# Patient Record
Sex: MALE | Race: WHITE | NOT HISPANIC OR LATINO | URBAN - METROPOLITAN AREA
[De-identification: names, ages, dates, MRNs, and addresses within clinical notes are randomized per-mention and may not be internally consistent; named-entity substitution may affect disease eponyms.]

---

## 2017-05-05 ENCOUNTER — INPATIENT (INPATIENT)
Facility: HOSPITAL | Age: 57
LOS: 1 days | Discharge: HOME | DRG: 394 | End: 2017-05-07
Attending: HOSPITALIST | Admitting: INTERNAL MEDICINE
Payer: COMMERCIAL

## 2017-05-05 VITALS
RESPIRATION RATE: 20 BRPM | HEIGHT: 71 IN | HEART RATE: 76 BPM | OXYGEN SATURATION: 100 % | WEIGHT: 149.91 LBS | SYSTOLIC BLOOD PRESSURE: 135 MMHG | TEMPERATURE: 98 F | DIASTOLIC BLOOD PRESSURE: 68 MMHG

## 2017-05-05 DIAGNOSIS — R19.00 INTRA-ABDOMINAL AND PELVIC SWELLING, MASS AND LUMP, UNSPECIFIED SITE: ICD-10-CM

## 2017-05-05 DIAGNOSIS — R93.3 ABNORMAL FINDINGS ON DIAGNOSTIC IMAGING OF OTHER PARTS OF DIGESTIVE TRACT: ICD-10-CM

## 2017-05-05 DIAGNOSIS — N50.89 OTHER SPECIFIED DISORDERS OF THE MALE GENITAL ORGANS: ICD-10-CM

## 2017-05-05 LAB
ALBUMIN SERPL ELPH-MCNC: 3.8 G/DL — SIGNIFICANT CHANGE UP (ref 3.3–5.2)
ALP SERPL-CCNC: 113 U/L — SIGNIFICANT CHANGE UP (ref 40–120)
ALT FLD-CCNC: 14 U/L — SIGNIFICANT CHANGE UP
ANION GAP SERPL CALC-SCNC: 10 MMOL/L — SIGNIFICANT CHANGE UP (ref 5–17)
APPEARANCE UR: CLEAR — SIGNIFICANT CHANGE UP
APTT BLD: 33.6 SEC — SIGNIFICANT CHANGE UP (ref 27.5–37.4)
AST SERPL-CCNC: 17 U/L — SIGNIFICANT CHANGE UP
BACTERIA # UR AUTO: ABNORMAL
BASOPHILS # BLD AUTO: 0 K/UL — SIGNIFICANT CHANGE UP (ref 0–0.2)
BASOPHILS NFR BLD AUTO: 0 % — SIGNIFICANT CHANGE UP (ref 0–2)
BILIRUB SERPL-MCNC: 0.3 MG/DL — LOW (ref 0.4–2)
BILIRUB UR-MCNC: NEGATIVE — SIGNIFICANT CHANGE UP
BLD GP AB SCN SERPL QL: SIGNIFICANT CHANGE UP
BUN SERPL-MCNC: 13 MG/DL — SIGNIFICANT CHANGE UP (ref 8–20)
CALCIUM SERPL-MCNC: 9.1 MG/DL — SIGNIFICANT CHANGE UP (ref 8.6–10.2)
CHLORIDE SERPL-SCNC: 102 MMOL/L — SIGNIFICANT CHANGE UP (ref 98–107)
CO2 SERPL-SCNC: 27 MMOL/L — SIGNIFICANT CHANGE UP (ref 22–29)
COLOR SPEC: YELLOW — SIGNIFICANT CHANGE UP
CREAT SERPL-MCNC: 0.5 MG/DL — SIGNIFICANT CHANGE UP (ref 0.5–1.3)
DIFF PNL FLD: NEGATIVE — SIGNIFICANT CHANGE UP
EOSINOPHIL # BLD AUTO: 1.2 K/UL — HIGH (ref 0–0.5)
EOSINOPHIL NFR BLD AUTO: 8 % — HIGH (ref 0–6)
EPI CELLS # UR: SIGNIFICANT CHANGE UP
GLUCOSE SERPL-MCNC: 105 MG/DL — SIGNIFICANT CHANGE UP (ref 70–115)
GLUCOSE UR QL: NEGATIVE MG/DL — SIGNIFICANT CHANGE UP
HCT VFR BLD CALC: 41.9 % — LOW (ref 42–52)
HGB BLD-MCNC: 14.5 G/DL — SIGNIFICANT CHANGE UP (ref 14–18)
INR BLD: 1.15 RATIO — SIGNIFICANT CHANGE UP (ref 0.88–1.16)
KETONES UR-MCNC: ABNORMAL
LEUKOCYTE ESTERASE UR-ACNC: ABNORMAL
LIDOCAIN IGE QN: 963 U/L — HIGH (ref 22–51)
LYMPHOCYTES # BLD AUTO: 19 % — LOW (ref 20–55)
LYMPHOCYTES # BLD AUTO: 2.4 K/UL — SIGNIFICANT CHANGE UP (ref 1–4.8)
MCHC RBC-ENTMCNC: 34.4 PG — HIGH (ref 27–31)
MCHC RBC-ENTMCNC: 34.6 G/DL — SIGNIFICANT CHANGE UP (ref 32–36)
MCV RBC AUTO: 99.5 FL — HIGH (ref 80–94)
MONOCYTES # BLD AUTO: 0.7 K/UL — SIGNIFICANT CHANGE UP (ref 0–0.8)
MONOCYTES NFR BLD AUTO: 6 % — SIGNIFICANT CHANGE UP (ref 3–10)
NEUTROPHILS # BLD AUTO: 7 K/UL — SIGNIFICANT CHANGE UP (ref 1.8–8)
NEUTROPHILS NFR BLD AUTO: 66 % — SIGNIFICANT CHANGE UP (ref 37–73)
NITRITE UR-MCNC: NEGATIVE — SIGNIFICANT CHANGE UP
PH UR: 6.5 — SIGNIFICANT CHANGE UP (ref 5–8)
PLAT MORPH BLD: NORMAL — SIGNIFICANT CHANGE UP
PLATELET # BLD AUTO: 279 K/UL — SIGNIFICANT CHANGE UP (ref 150–400)
POTASSIUM SERPL-MCNC: 4.2 MMOL/L — SIGNIFICANT CHANGE UP (ref 3.5–5.3)
POTASSIUM SERPL-SCNC: 4.2 MMOL/L — SIGNIFICANT CHANGE UP (ref 3.5–5.3)
PROT SERPL-MCNC: 7.3 G/DL — SIGNIFICANT CHANGE UP (ref 6.6–8.7)
PROT UR-MCNC: 30 MG/DL
PROTHROM AB SERPL-ACNC: 12.7 SEC — SIGNIFICANT CHANGE UP (ref 9.8–12.7)
RBC # BLD: 4.21 M/UL — LOW (ref 4.6–6.2)
RBC # FLD: 12.7 % — SIGNIFICANT CHANGE UP (ref 11–15.6)
RBC BLD AUTO: SIGNIFICANT CHANGE UP
RBC CASTS # UR COMP ASSIST: SIGNIFICANT CHANGE UP /HPF (ref 0–4)
SODIUM SERPL-SCNC: 139 MMOL/L — SIGNIFICANT CHANGE UP (ref 135–145)
SP GR SPEC: 1.01 — SIGNIFICANT CHANGE UP (ref 1.01–1.02)
TYPE + AB SCN PNL BLD: SIGNIFICANT CHANGE UP
UROBILINOGEN FLD QL: 1 MG/DL
VARIANT LYMPHS # BLD: 1 % — SIGNIFICANT CHANGE UP (ref 0–6)
WBC # BLD: 11.2 K/UL — HIGH (ref 4.8–10.8)
WBC # FLD AUTO: 11.2 K/UL — HIGH (ref 4.8–10.8)
WBC UR QL: SIGNIFICANT CHANGE UP

## 2017-05-05 PROCEDURE — 99223 1ST HOSP IP/OBS HIGH 75: CPT

## 2017-05-05 PROCEDURE — 74177 CT ABD & PELVIS W/CONTRAST: CPT | Mod: 26

## 2017-05-05 PROCEDURE — 74182 MRI ABDOMEN W/CONTRAST: CPT | Mod: 26

## 2017-05-05 PROCEDURE — 76870 US EXAM SCROTUM: CPT | Mod: 26

## 2017-05-05 RX ORDER — HYDROMORPHONE HYDROCHLORIDE 2 MG/ML
0.5 INJECTION INTRAMUSCULAR; INTRAVENOUS; SUBCUTANEOUS EVERY 6 HOURS
Qty: 0 | Refills: 0 | Status: DISCONTINUED | OUTPATIENT
Start: 2017-05-05 | End: 2017-05-07

## 2017-05-05 RX ORDER — AMLODIPINE BESYLATE 2.5 MG/1
10 TABLET ORAL DAILY
Qty: 0 | Refills: 0 | Status: DISCONTINUED | OUTPATIENT
Start: 2017-05-05 | End: 2017-05-07

## 2017-05-05 RX ORDER — ENOXAPARIN SODIUM 100 MG/ML
40 INJECTION SUBCUTANEOUS DAILY
Qty: 0 | Refills: 0 | Status: DISCONTINUED | OUTPATIENT
Start: 2017-05-05 | End: 2017-05-07

## 2017-05-05 RX ORDER — SODIUM CHLORIDE 9 MG/ML
1000 INJECTION INTRAMUSCULAR; INTRAVENOUS; SUBCUTANEOUS
Qty: 0 | Refills: 0 | Status: DISCONTINUED | OUTPATIENT
Start: 2017-05-05 | End: 2017-05-07

## 2017-05-05 RX ORDER — MORPHINE SULFATE 50 MG/1
4 CAPSULE, EXTENDED RELEASE ORAL EVERY 4 HOURS
Qty: 0 | Refills: 0 | Status: DISCONTINUED | OUTPATIENT
Start: 2017-05-05 | End: 2017-05-07

## 2017-05-05 RX ORDER — NICOTINE POLACRILEX 2 MG
1 GUM BUCCAL DAILY
Qty: 0 | Refills: 0 | Status: DISCONTINUED | OUTPATIENT
Start: 2017-05-05 | End: 2017-05-07

## 2017-05-05 RX ORDER — SODIUM CHLORIDE 9 MG/ML
3 INJECTION INTRAMUSCULAR; INTRAVENOUS; SUBCUTANEOUS ONCE
Qty: 0 | Refills: 0 | Status: COMPLETED | OUTPATIENT
Start: 2017-05-05 | End: 2017-05-05

## 2017-05-05 RX ADMIN — Medication 1 PATCH: at 18:22

## 2017-05-05 RX ADMIN — HYDROMORPHONE HYDROCHLORIDE 0.5 MILLIGRAM(S): 2 INJECTION INTRAMUSCULAR; INTRAVENOUS; SUBCUTANEOUS at 18:37

## 2017-05-05 RX ADMIN — HYDROMORPHONE HYDROCHLORIDE 0.5 MILLIGRAM(S): 2 INJECTION INTRAMUSCULAR; INTRAVENOUS; SUBCUTANEOUS at 18:13

## 2017-05-05 RX ADMIN — SODIUM CHLORIDE 100 MILLILITER(S): 9 INJECTION INTRAMUSCULAR; INTRAVENOUS; SUBCUTANEOUS at 18:11

## 2017-05-05 RX ADMIN — SODIUM CHLORIDE 3 MILLILITER(S): 9 INJECTION INTRAMUSCULAR; INTRAVENOUS; SUBCUTANEOUS at 10:38

## 2017-05-05 NOTE — ED STATDOCS - OBJECTIVE STATEMENT
58 y/o male presents to ED c/o abdominal pain that began 4 days ago with +bloating, which resolved, though notes development of penile/scrotal swelling yesterday. He notes penile discomfort secondary to swelling, though denies penile pain or difficulty urinating. Pt reports decreased appetite while experiencing abd pain, though now with normal appetite and PO intake. Denies pedal edema or hand swelling. PMHx = HTN and "mild stroke" 2 years ago. Non-smoker, reports former heavy EtOH use (stopped 2 years ago), no illicit drug use. No further complaints at this time.

## 2017-05-05 NOTE — ED STATDOCS - GENITOURINARY, MLM
normal... Edema of the penis, scrotum, and groin.  Normal cremasteric reflexes. Testicles palpated without difficulty bilaterally. Chaperone: SUDHA Roman.

## 2017-05-05 NOTE — H&P ADULT - HISTORY OF PRESENT ILLNESS
57 y M hx tobacco use, past ETOH abuse, HTN, sent from [facility] for evaluation of testicular/scrotal swelling which pt noted today. c/o abd bloating since 4 d ago. Denies pain, CP, SOB, N/V/D, constipation, weight loss, BRBPR, melena. Previous heavy ETOH use, quit 2 yr ago, now only drinks sporadically, last intake around December.

## 2017-05-05 NOTE — H&P ADULT - NSHPLABSRESULTS_GEN_ALL_CORE
CTAP: IMPRESSION:    Evidence of chronic pancreatitis most pronounced in the region of the   pancreatic head. A cyst in the pancreatic head as well as a cyst in the   girish hepatis region are likely pseudocysts. There is obstruction of the   pancreatic duct with diffuse dilatation of the duct throughout the   pancreas.    Infiltration of the fat planes surrounding the SMA and celiac artery   raise the possibility of concomitant neoplasm as well.    Small amount of ascites.    Diffuse soft tissue swelling in the scrotal region..      Testicular sono: IMPRESSION:     Suspect fat and possibly bowel containing right inguinal hernia.   Bilateral scrotal edema. Further evaluation with contrast-enhanced CT to   include the area of scrotum may be considered.    EKG: ordered

## 2017-05-05 NOTE — CONSULT NOTE ADULT - PROBLEM SELECTOR RECOMMENDATION 9
The above CT findings are strongly suggestive of a possible pancreatic malignancy. CA 19-9 and MRI of abdomen and MRCP ordered. Would also recommend  general surgical and urological consultations for the above CT and scrotal ultrasound findings. The above CT findings are strongly suggestive of a possible pancreatic malignancy. CA 19-9 and MRI of abdomen and MRCP ordered. Would also recommend  general surgical and urological consultations for the above CT and scrotal ultrasound findings. Amylase and lipase also ordered as well as CEA..

## 2017-05-05 NOTE — H&P ADULT - NSHPPHYSICALEXAM_GEN_ALL_CORE
Vital Signs Last 24 Hrs  T(C): 36.6, Max: 36.6 (05-05 @ 09:41)  T(F): 97.8, Max: 97.8 (05-05 @ 09:41)  HR: 76 (76 - 76)  BP: 135/68 (135/68 - 135/68)  BP(mean): --  RR: 20 (20 - 20)  SpO2: 100% (100% - 100%)    PHYSICAL EXAM-  GENERAL: alert, NAD  HEAD:  Atraumatic, Normocephalic  EYES: EOMI,  conjunctiva and sclera clear  NECK: Supple   CHEST/LUNG: CTA bilaterally   HEART: Regular rate and rhythm; No murmurs   ABDOMEN: Soft, Nontender, Nondistended; Bowel sounds present  EXTREMITIES:  2+ Peripheral Pulses, No clubbing, cyanosis, or edema  : deferred, patient in results waiting area  NERVOUS SYSTEM:  Alert & Oriented X3, Motor Strength 5/5 B/L upper and lower extremities; CN grossly intact  SKIN: No rashes or lesions  PSYCH: normal affect

## 2017-05-05 NOTE — ED ADULT NURSE NOTE - OBJECTIVE STATEMENT
pt alert and awake x3, arrived to ED with abd pain x4 days and testicle swelling, pt states the abd pain started first and then testicles started to swell yesterday, was seen at [facility] and was advised to come to ED, pt was drinking coffee during assessment, was advised to not eat or drink anything until testing is done, pt accepted, denies cardiac history, denies chest pain/sob, will continue to monitor.

## 2017-05-05 NOTE — ED STATDOCS - PROGRESS NOTE DETAILS
Patient presents c/o having abdominal distention for several days, patient states today edema resolved in abdomen, however groin area is now swollen, no abdominal pain, no vomiting, no dysuria, patient states is taking PO however decreased, no dysuria, no hesitancy, no change in BM's. patient denies any alcohol use for past 2 years, no drug use.  Exam performed by Dr. Barbosa while I was in room, agree with exam, will follow results for attending and discuss when results are in with attending

## 2017-05-05 NOTE — ED STATDOCS - ENMT, MLM
Neck supple, non-tender, no JVD, no palpable nodes or masses. TM’s within normal limits. Posterior pharynx clear and moist.

## 2017-05-05 NOTE — CONSULT NOTE ADULT - SUBJECTIVE AND OBJECTIVE BOX
Patient is a 57y old  Male who presents with a chief complaint of scrotal and penile swelling    HPI:  57 year old male with h/o ETOH induced pancreatitis in 2001 with no subsequent episodes presents to the ED today with scrotal and penile swelling which started last night with lower abdominal distention. No associated nausea or vomiting and the pt. states that he has not consumed ETOH in over two years. His only other PMH is HTN for which he takes Amlodopine 10 mg./d. and ASA 81 mg./d. He has no difficulty urinating as a result of the above scrotal and penile edema and no known h/o chronic pancreatitis or alcoholic liver disease. He has no c/o abdominal pain and denies gross hematochezia or melena.    REVIEW OF SYSTEMS:  Constitutional: No fever, weight loss or fatigue  ENMT:  No difficulty hearing, tinnitus, vertigo; No sinus or throat pain  Respiratory: No cough, wheezing, chills or hemoptysis  Cardiovascular: No chest pain, palpitations, dizziness or leg swelling  Gastrointestinal: No abdominal or epigastric pain. No nausea, vomiting or hematemesis; No diarrhea or constipation. No melena or hematochezia. + lower abdominal distention w/o associated abdominal pain.  Skin: No itching, burning, rashes or lesions   Musculoskeletal: No joint pain or swelling; No muscle, back or extremity paiin.  Urological: Scrotal and penile swelling since last night  Patient has no cardiopulmonary, peripheral vascular, musculoskeletal, dermatological, neurological, or psychological symptoms or complaints at this time    PAST MEDICAL & SURGICAL HISTORY:  Hypertension  TIA (transient ischemic attack)  ORIF left femur many years ago.        FAMILY HISTORY:  Family history of duodenal cancer (Father)  No pertinent family history in first degree relatives      SOCIAL HISTORY:  Smoking Status: [ x] Current, [ ] Former, [ ] Never  Pack Years: 42, 1 ppd. x 40 years. + ETOH in the past mostly heavy beer drinking but pt. states that he has been abstinent from ETOH for roughly two years, no IVDU    MEDICATIONS:  MEDICATIONS  (STANDING):  Amlodopine Tablet 10milliGRAM(s) Oral daily, ASA 81 mg./d.  enoxaparin Injectable 40milliGRAM(s) SubCutaneous daily  sodium chloride 0.9%. 1000milliLiter(s) IV Continuous <Continuous>    MEDICATIONS  (PRN):      Allergies    iodine (Swelling)    Intolerances        Vital Signs Last 24 Hrs  T(C): 36.6, Max: 36.6 (05-05 @ 09:41)  T(F): 97.8, Max: 97.8 (05-05 @ 09:41)  HR: 76 (76 - 76)  BP: 135/68 (135/68 - 135/68)  BP(mean): --  RR: 20 (20 - 20)  SpO2: 100% (100% - 100%)        PHYSICAL EXAM: Very limited as pt. is presently dressed in a large chair in the results waiting section of the ER.  General: Well developed; well nourished; in no acute distress  HEENT: MMM, conjunctiva pink and sclera anicteric  Lungs: Clear bilaterally.  Cor: RRR S1, S2 only  Gastrointestinal: Soft, non-tender non-distended; Normal bowel sounds; No rebound or guarding or palpable HSM  Unable to do SOFÍA or examine his penis and scrotum because pt. in a large chair in the Results Waiting Section in the ER.   Extremities: Normal range of motion, No clubbing, cyanosis or edema  Neurological: Alert and oriented x3  Skin: Warm and dry. No obvious rash      LABS:                        14.5   11.2  )-----------( 279      ( 05 May 2017 10:40 )             41.9     05-05    139  |  102  |  13.0  ----------------------------<  105  4.2   |  27.0  |  0.50    Ca    9.1      05 May 2017 10:40    TPro  7.3  /  Alb  3.8  /  TBili  0.3<L>  /  DBili  x   /  AST  17  /  ALT  14  /  AlkPhos  113  05-05          RADIOLOGY & ADDITIONAL STUDIES:     CT of the abdomen and pelvis with oral and IV contrast: Multiple pancreatic calcifications within the uncinate process of the pancreas and pancreatic head likely secondary to chronic pancreatitis. Marked dilatation of the pancreatic duct extending through the tail and body of the pancreas as well as the pancreatic neck and head. There is a probable 3.5 cm. pseudocyst within the head of the pancreas, There is also a fluid collection measuring 7,3 x 6.1 cm superior to the pancreatic head extending into the girish hepatitis also likely a pseudocyst. Fat planes surrounding the celiac artery and SMA are obliterated. This combination of findings raises possibility of pancreatic malignancy. Prostate normal. Free intraperitoneal fluid in the pelvis.  Bilateral hydroceles with soft tissue swelling in the scrotal region bilaterally, right greater than left.    Scrotal Ultrasound: Suspect fat and possibly bowel containing right inguinal hernia with bilateral scrotal edema. Contrast enhanced CT to include scrotal area may be considered.

## 2017-05-06 DIAGNOSIS — K40.90 UNILATERAL INGUINAL HERNIA, WITHOUT OBSTRUCTION OR GANGRENE, NOT SPECIFIED AS RECURRENT: ICD-10-CM

## 2017-05-06 LAB
AMYLASE P1 CFR SERPL: 1010 U/L — HIGH (ref 36–128)
ANION GAP SERPL CALC-SCNC: 19 MMOL/L — HIGH (ref 5–17)
BASOPHILS # BLD AUTO: 0 K/UL — SIGNIFICANT CHANGE UP (ref 0–0.2)
BASOPHILS NFR BLD AUTO: 0.3 % — SIGNIFICANT CHANGE UP (ref 0–2)
BUN SERPL-MCNC: 13 MG/DL — SIGNIFICANT CHANGE UP (ref 8–20)
CALCIUM SERPL-MCNC: 8.9 MG/DL — SIGNIFICANT CHANGE UP (ref 8.6–10.2)
CHLORIDE SERPL-SCNC: 102 MMOL/L — SIGNIFICANT CHANGE UP (ref 98–107)
CO2 SERPL-SCNC: 21 MMOL/L — LOW (ref 22–29)
CREAT SERPL-MCNC: 0.57 MG/DL — SIGNIFICANT CHANGE UP (ref 0.5–1.3)
EOSINOPHIL # BLD AUTO: 1.2 K/UL — HIGH (ref 0–0.5)
EOSINOPHIL NFR BLD AUTO: 10.9 % — HIGH (ref 0–5)
GLUCOSE SERPL-MCNC: 70 MG/DL — SIGNIFICANT CHANGE UP (ref 70–115)
HCT VFR BLD CALC: 42.9 % — SIGNIFICANT CHANGE UP (ref 42–52)
HGB BLD-MCNC: 14.6 G/DL — SIGNIFICANT CHANGE UP (ref 14–18)
LIDOCAIN IGE QN: 1397 U/L — HIGH (ref 22–51)
LYMPHOCYTES # BLD AUTO: 2.7 K/UL — SIGNIFICANT CHANGE UP (ref 1–4.8)
LYMPHOCYTES # BLD AUTO: 23.5 % — SIGNIFICANT CHANGE UP (ref 20–55)
MCHC RBC-ENTMCNC: 34 G/DL — SIGNIFICANT CHANGE UP (ref 32–36)
MCHC RBC-ENTMCNC: 34.1 PG — HIGH (ref 27–31)
MCV RBC AUTO: 100.2 FL — HIGH (ref 80–94)
MONOCYTES # BLD AUTO: 0.6 K/UL — SIGNIFICANT CHANGE UP (ref 0–0.8)
MONOCYTES NFR BLD AUTO: 5.7 % — SIGNIFICANT CHANGE UP (ref 3–10)
NEUTROPHILS # BLD AUTO: 6.8 K/UL — SIGNIFICANT CHANGE UP (ref 1.8–8)
NEUTROPHILS NFR BLD AUTO: 59.3 % — SIGNIFICANT CHANGE UP (ref 37–73)
PLATELET # BLD AUTO: 302 K/UL — SIGNIFICANT CHANGE UP (ref 150–400)
POTASSIUM SERPL-MCNC: 4 MMOL/L — SIGNIFICANT CHANGE UP (ref 3.5–5.3)
POTASSIUM SERPL-SCNC: 4 MMOL/L — SIGNIFICANT CHANGE UP (ref 3.5–5.3)
RBC # BLD: 4.28 M/UL — LOW (ref 4.6–6.2)
RBC # FLD: 12.9 % — SIGNIFICANT CHANGE UP (ref 11–15.6)
SODIUM SERPL-SCNC: 142 MMOL/L — SIGNIFICANT CHANGE UP (ref 135–145)
WBC # BLD: 11.5 K/UL — HIGH (ref 4.8–10.8)
WBC # FLD AUTO: 11.5 K/UL — HIGH (ref 4.8–10.8)

## 2017-05-06 PROCEDURE — 99233 SBSQ HOSP IP/OBS HIGH 50: CPT

## 2017-05-06 RX ADMIN — ENOXAPARIN SODIUM 40 MILLIGRAM(S): 100 INJECTION SUBCUTANEOUS at 12:16

## 2017-05-06 RX ADMIN — MORPHINE SULFATE 4 MILLIGRAM(S): 50 CAPSULE, EXTENDED RELEASE ORAL at 19:18

## 2017-05-06 RX ADMIN — Medication 1 PATCH: at 12:00

## 2017-05-06 RX ADMIN — SODIUM CHLORIDE 100 MILLILITER(S): 9 INJECTION INTRAMUSCULAR; INTRAVENOUS; SUBCUTANEOUS at 05:36

## 2017-05-06 RX ADMIN — AMLODIPINE BESYLATE 10 MILLIGRAM(S): 2.5 TABLET ORAL at 05:36

## 2017-05-06 RX ADMIN — SODIUM CHLORIDE 100 MILLILITER(S): 9 INJECTION INTRAMUSCULAR; INTRAVENOUS; SUBCUTANEOUS at 12:17

## 2017-05-06 RX ADMIN — Medication 1 PATCH: at 12:15

## 2017-05-06 RX ADMIN — MORPHINE SULFATE 4 MILLIGRAM(S): 50 CAPSULE, EXTENDED RELEASE ORAL at 18:48

## 2017-05-06 NOTE — PROGRESS NOTE ADULT - SUBJECTIVE AND OBJECTIVE BOX
Pt seen and examined MRI results from yesterday noted. + multiple cystic lesions in pancreas with dilated PD and possible SMA involvement concerning for pancreatic neoplasm. No discrete pancreatic masses seen. Will likely need OPT EUS for further evaluation of his pancreas and surrounding structures. CA 19-9 pending. Pt's penile and scrotal edema have improved and are likely secondary to a large right inguinal hernia with scrotal extension and not a primary urological problem as originally suspected yesterday.    MEDICATIONS:  MEDICATIONS  (STANDING):  amLODIPine   Tablet 10milliGRAM(s) Oral daily  enoxaparin Injectable 40milliGRAM(s) SubCutaneous daily  sodium chloride 0.9%. 1000milliLiter(s) IV Continuous <Continuous>  nicotine -  14 mG/24Hr(s) Patch 1patch Transdermal daily    MEDICATIONS  (PRN):  morphine  - Injectable 4milliGRAM(s) IV Push every 4 hours PRN Moderate Pain (4 - 6)  HYDROmorphone  Injectable 0.5milliGRAM(s) IV Push every 6 hours PRN Severe Pain (7 - 10)      Allergies    iodine (Swelling)    Intolerances        Vital Signs Last 24 Hrs  T(C): 36.7, Max: 37.2 ( @ 18:28)  T(F): 98.1, Max: 98.9 ( @ 18:28)  HR: 86 (70 - 86)  BP: 114/57 (114/57 - 140/78)  BP(mean): --  RR: 18 (18 - 18)  SpO2: 97% (97% - 100%)    I & Os for current day (as of  @ 11:28)  =============================================  IN: 1100 ml / OUT: 0 ml / NET: 1100 ml      PHYSICAL EXAM:    General: Well developed; well nourished; in no acute distress  HEENT: MMM, conjunctiva pink and sclera anicteric.  Lungs: clear to auscultation and percussion.  Cor: RRR S1, S2 only.  Gastrointestinal: Abdomen: Soft non-tender non-distended; Normal bowel sounds; No hepatosplenomegaly  no surgical scars.  SOFÍA: Good sphincter tone, no carrie-anal pathology, no masses or tenderness, brown, hemoccult negative stool.  Genitalia: Large right inguinal hernia with scrital extension and tenderness on palpation.  Extremities: no cyanosis, clubbing or edema.  Skin: Warm and dry. No obvious rash  Neuro: Pt. a + o x 3, no tremulousness or asterixsis    LABS:      CBC Full  -  ( 06 May 2017 09:22 )  WBC Count : 11.5 K/uL  Hemoglobin : 14.6 g/dL  Hematocrit : 42.9 %  Platelet Count - Automated : 302 K/uL  Mean Cell Volume : 100.2 fl  Mean Cell Hemoglobin : 34.1 pg  Mean Cell Hemoglobin Concentration : 34.0 g/dL  Auto Neutrophil # : 6.8 K/uL  Auto Lymphocyte # : 2.7 K/uL  Auto Monocyte # : 0.6 K/uL  Auto Eosinophil # : 1.2 K/uL  Auto Basophil # : 0.0 K/uL  Auto Neutrophil % : 59.3 %  Auto Lymphocyte % : 23.5 %  Auto Monocyte % : 5.7 %  Auto Eosinophil % : 10.9 %  Auto Basophil % : 0.3 %        142  |  102  |  13.0  ----------------------------<  70  4.0   |  21.0<L>  |  0.57    Ca    8.9      06 May 2017 09:22    TPro  7.3  /  Alb  3.8  /  TBili  0.3<L>  /  DBili  x   /  AST  17  /  ALT  14  /  AlkPhos  113  05-    PT/INR - ( 05 May 2017 10:40 )   PT: 12.7 sec;   INR: 1.15 ratio         PTT - ( 05 May 2017 10:40 )  PTT:33.6 sec      Urinalysis Basic - ( 05 May 2017 13:37 )    Color: Yellow / Appearance: Clear / S.010 / pH: x  Gluc: x / Ketone: Moderate  / Bili: Negative / Urobili: 1 mg/dL   Blood: x / Protein: 30 mg/dL / Nitrite: Negative   Leuk Esterase: Trace / RBC: 0-2 /HPF / WBC 3-5   Sq Epi: x / Non Sq Epi: Occasional / Bacteria: Occasional                RADIOLOGY & ADDITIONAL STUDIES (The following images were personally reviewed):

## 2017-05-06 NOTE — PROGRESS NOTE ADULT - SUBJECTIVE AND OBJECTIVE BOX
DON GOOD    [id number]    57y      Male    INTERVAL HPI/OVERNIGHT EVENTS:    patient being seen for pancreatic mass, scrotal swelling and medical management. Patient seen at bedside and states swelling improved.     last 24 hours patient is afebrile.     REVIEW OF SYSTEMS:    CONSTITUTIONAL: No fever, weight loss, or fatigue  RESPIRATORY: No cough, wheezing, hemoptysis; No shortness of breath  CARDIOVASCULAR: No chest pain, palpitations  GASTROINTESTINAL: No abdominal or epigastric pain. No nausea, vomiting  NEUROLOGICAL: No headaches, memory loss, loss of strength.  MISCELLANEOUS:      Vital Signs Last 24 Hrs  T(C): 36.7, Max: 37.2 ( @ 18:28)  T(F): 98.1, Max: 98.9 ( @ 18:28)  HR: 86 (70 - 86)  BP: 114/57 (114/57 - 140/78)  BP(mean): --  RR: 18 (18 - 18)  SpO2: 97% (97% - 100%)    PHYSICAL EXAM:    GENERAL: NAD,  HEENT: PERRL, +EOMI  NECK: soft, Supple, No JVD,   CHEST/LUNG: Clear to percussion bilaterally; No wheezing  HEART: S1S2+, Regular rate and rhythm; No murmurs  ABDOMEN: Soft, Nontender,  EXTREMITIES:  2+ Peripheral Pulses, No clubbing, cyanosis, or edema  SKIN: No rashes or lesions  NEURO: AAOX3, no focal deficits, no motor r sensory loss  PSYCH: normal mood      LABS:                        14.6   11.5  )-----------( 302      ( 06 May 2017 09:22 )             42.9         142  |  102  |  13.0  ----------------------------<  70  4.0   |  21.0<L>  |  0.57    Ca    8.9      06 May 2017 09:22    TPro  7.3  /  Alb  3.8  /  TBili  0.3<L>  /  DBili  x   /  AST  17  /  ALT  14  /  AlkPhos  113      PT/INR - ( 05 May 2017 10:40 )   PT: 12.7 sec;   INR: 1.15 ratio         PTT - ( 05 May 2017 10:40 )  PTT:33.6 sec  Urinalysis Basic - ( 05 May 2017 13:37 )    Color: Yellow / Appearance: Clear / S.010 / pH: x  Gluc: x / Ketone: Moderate  / Bili: Negative / Urobili: 1 mg/dL   Blood: x / Protein: 30 mg/dL / Nitrite: Negative   Leuk Esterase: Trace / RBC: 0-2 /HPF / WBC 3-5   Sq Epi: x / Non Sq Epi: Occasional / Bacteria: Occasional          MEDICATIONS  (STANDING):  amLODIPine   Tablet 10milliGRAM(s) Oral daily  enoxaparin Injectable 40milliGRAM(s) SubCutaneous daily  sodium chloride 0.9%. 1000milliLiter(s) IV Continuous <Continuous>  nicotine -  14 mG/24Hr(s) Patch 1patch Transdermal daily    MEDICATIONS  (PRN):  morphine  - Injectable 4milliGRAM(s) IV Push every 4 hours PRN Moderate Pain (4 - 6)  HYDROmorphone  Injectable 0.5milliGRAM(s) IV Push every 6 hours PRN Severe Pain (7 - 10)      RADIOLOGY & ADDITIONAL TESTS: DON GOOD    [id number]    57y      Male    INTERVAL HPI/OVERNIGHT EVENTS:    patient being seen for pancreatic mass, scrotal swelling and medical management. Patient seen at bedside and states swelling improved.     last 24 hours patient is afebrile.     REVIEW OF SYSTEMS:    CONSTITUTIONAL: No fever, weight loss, or fatigue  RESPIRATORY: No cough, wheezing, hemoptysis; No shortness of breath  CARDIOVASCULAR: No chest pain, palpitations  GASTROINTESTINAL: No abdominal or epigastric pain. No nausea, vomiting  NEUROLOGICAL: No headaches, memory loss, loss of strength.  MISCELLANEOUS:      Vital Signs Last 24 Hrs  T(C): 36.7, Max: 37.2 ( @ 18:28)  T(F): 98.1, Max: 98.9 ( @ 18:28)  HR: 86 (70 - 86)  BP: 114/57 (114/57 - 140/78)  BP(mean): --  RR: 18 (18 - 18)  SpO2: 97% (97% - 100%)    PHYSICAL EXAM:    GENERAL: NAD,  HEENT: PERRL, +EOMI  NECK: soft, Supple, No JVD,   CHEST/LUNG: Clear to percussion bilaterally; No wheezing  HEART: S1S2+, Regular rate and rhythm; No murmurs  ABDOMEN: Soft, Nontender,  EXTREMITIES:  2+ Peripheral Pulses, No edema  SKIN: No rashes or lesions  NEURO: AAOX3, no focal deficits  PSYCH: normal mood      LABS:                        14.6   11.5  )-----------( 302      ( 06 May 2017 09:22 )             42.9         142  |  102  |  13.0  ----------------------------<  70  4.0   |  21.0<L>  |  0.57    Ca    8.9      06 May 2017 09:22    TPro  7.3  /  Alb  3.8  /  TBili  0.3<L>  /  DBili  x   /  AST  17  /  ALT  14  /  AlkPhos  113      PT/INR - ( 05 May 2017 10:40 )   PT: 12.7 sec;   INR: 1.15 ratio         PTT - ( 05 May 2017 10:40 )  PTT:33.6 sec  Urinalysis Basic - ( 05 May 2017 13:37 )    Color: Yellow / Appearance: Clear / S.010 / pH: x  Gluc: x / Ketone: Moderate  / Bili: Negative / Urobili: 1 mg/dL   Blood: x / Protein: 30 mg/dL / Nitrite: Negative   Leuk Esterase: Trace / RBC: 0-2 /HPF / WBC 3-5   Sq Epi: x / Non Sq Epi: Occasional / Bacteria: Occasional      MEDICATIONS  (STANDING):  amLODIPine   Tablet 10milliGRAM(s) Oral daily  enoxaparin Injectable 40milliGRAM(s) SubCutaneous daily  sodium chloride 0.9%. 1000milliLiter(s) IV Continuous <Continuous>  nicotine -  14 mG/24Hr(s) Patch 1patch Transdermal daily    MEDICATIONS  (PRN):  morphine  - Injectable 4milliGRAM(s) IV Push every 4 hours PRN Moderate Pain (4 - 6)  HYDROmorphone  Injectable 0.5milliGRAM(s) IV Push every 6 hours PRN Severe Pain (7 - 10)      RADIOLOGY & ADDITIONAL TESTS:    mrcp - IMPRESSION:   Multiple cystic lesions throughout the pancreas, possibly pseudocysts.   Diffuse main pancreatic duct dilatation, measuring up to 1.2 cm.   Unchanged suggestion of enhancing soft tissue surrounding the superior   mesenteric artery, possibly neoplasm. Consider further evaluation with   EUS/ERCP. DON GOOD    [id number]    57y      Male    INTERVAL HPI/OVERNIGHT EVENTS:    patient being seen for pancreatic mass, scrotal swelling and medical management. Patient seen at bedside and states swelling improved.     last 24 hours patient is afebrile.     REVIEW OF SYSTEMS:    CONSTITUTIONAL: No fever, weight loss, or fatigue  RESPIRATORY: No cough, wheezing, hemoptysis; No shortness of breath  CARDIOVASCULAR: No chest pain, palpitations  GASTROINTESTINAL: No abdominal or epigastric pain. No nausea, vomiting  NEUROLOGICAL: No headaches, memory loss, loss of strength.  MISCELLANEOUS:      Vital Signs Last 24 Hrs  T(C): 36.7, Max: 37.2 ( @ 18:28)  T(F): 98.1, Max: 98.9 ( @ 18:28)  HR: 86 (70 - 86)  BP: 114/57 (114/57 - 140/78)  BP(mean): --  RR: 18 (18 - 18)  SpO2: 97% (97% - 100%)    PHYSICAL EXAM:    GENERAL: NAD,  HEENT: PERRL, +EOMI  NECK: soft, Supple, No JVD,   CHEST/LUNG: Clear to percussion bilaterally; No wheezing  HEART: S1S2+, Regular rate and rhythm; No murmurs  ABDOMEN: Soft, Nontender,  EXTREMITIES:  inguinal hernia  SKIN: No rashes or lesions  NEURO: AAOX3, no focal deficits  PSYCH: normal mood      LABS:                        14.6   11.5  )-----------( 302      ( 06 May 2017 09:22 )             42.9     -    142  |  102  |  13.0  ----------------------------<  70  4.0   |  21.0<L>  |  0.57    Ca    8.9      06 May 2017 09:22    TPro  7.3  /  Alb  3.8  /  TBili  0.3<L>  /  DBili  x   /  AST  17  /  ALT  14  /  AlkPhos  113      PT/INR - ( 05 May 2017 10:40 )   PT: 12.7 sec;   INR: 1.15 ratio         PTT - ( 05 May 2017 10:40 )  PTT:33.6 sec  Urinalysis Basic - ( 05 May 2017 13:37 )    Color: Yellow / Appearance: Clear / S.010 / pH: x  Gluc: x / Ketone: Moderate  / Bili: Negative / Urobili: 1 mg/dL   Blood: x / Protein: 30 mg/dL / Nitrite: Negative   Leuk Esterase: Trace / RBC: 0-2 /HPF / WBC 3-5   Sq Epi: x / Non Sq Epi: Occasional / Bacteria: Occasional      MEDICATIONS  (STANDING):  amLODIPine   Tablet 10milliGRAM(s) Oral daily  enoxaparin Injectable 40milliGRAM(s) SubCutaneous daily  sodium chloride 0.9%. 1000milliLiter(s) IV Continuous <Continuous>  nicotine -  14 mG/24Hr(s) Patch 1patch Transdermal daily    MEDICATIONS  (PRN):  morphine  - Injectable 4milliGRAM(s) IV Push every 4 hours PRN Moderate Pain (4 - 6)  HYDROmorphone  Injectable 0.5milliGRAM(s) IV Push every 6 hours PRN Severe Pain (7 - 10)      RADIOLOGY & ADDITIONAL TESTS:    mrcp - IMPRESSION:   Multiple cystic lesions throughout the pancreas, possibly pseudocysts.   Diffuse main pancreatic duct dilatation, measuring up to 1.2 cm.   Unchanged suggestion of enhancing soft tissue surrounding the superior   mesenteric artery, possibly neoplasm. Consider further evaluation with   EUS/ERCP.

## 2017-05-06 NOTE — CONSULT NOTE ADULT - SUBJECTIVE AND OBJECTIVE BOX
[facility]  MD Jhoana Aranda MD  [phone]  Answering David : [phone]        DON [id number]      HPI:  57 y M hx tobacco use, past ETOH abuse, HTN, admitted with c/  testicular/scrotal swelling . Denies pain, CP, SOB, N/V/D, constipation, weight loss, BRBPR, melena. Previous heavy ETOH use, quit 2 yr ago, now only drinks sporadically, last intake around December. (05 May 2017 16:54)  CTscan of abdomen  findings are strongly suggestive of a possible pancreatic malignancy.         PAST MEDICAL & SURGICAL HISTORY:  Hypertension  TIA (transient ischemic attack)  No pertinent past medical history  No significant past surgical history      ANTIBIOTICS      Allergies    iodine (Swelling)    Intolerances        SOCIAL HISTORY:    FAMILY HISTORY:  Family history of duodenal cancer (Father)  No pertinent family history in first degree relatives      Vital Signs Last 24 Hrs  T(C): 36.7, Max: 37.2 ( @ 18:28)  T(F): 98.1, Max: 98.9 ( @ 18:28)  HR: 86 (70 - 86)  BP: 114/57 (114/57 - 140/78)  BP(mean): --  RR: 18 (18 - 18)  SpO2: 97% (97% - 100%)  Drug Dosing Weight  Height (cm): 180.3 (05 May 2017 09:41)  Weight (kg): 68 (05 May 2017 09:41)  BMI (kg/m2): 20.9 (05 May 2017 09:41)  BSA (m2): 1.87 (05 May 2017 09:41)      REVIEW OF SYSTEMS:    CONSTITUTIONAL:  As per HPI.    HEENT:  Eyes:  No diplopia or blurred vision. ENT:  No earache, sore throat or runny nose.    CARDIOVASCULAR:  No pressure, squeezing, strangling, tightness, heaviness or aching about the chest, neck, axilla or epigastrium.    RESPIRATORY:  No cough, shortness of breath, PND or orthopnea.    GASTROINTESTINAL:  No nausea, vomiting or diarrhea.    GENITOURINARY:  No dysuria, frequency or urgency.    MUSCULOSKELETAL:  As per HPI.    SKIN:  No change in skin, hair or nails.    NEUROLOGIC:  No paresthesias, fasciculations, seizures or weakness.    PSYCHIATRIC:  No disorder of thought or mood.    ENDOCRINE:  No heat or cold intolerance, polyuria or polydipsia.    HEMATOLOGICAL:  No easy bruising or bleeding.           PHYSICAL EXAMINATION:    GENERAL: The patient is a well-developed, well-nourished _____in no apparent distress. ___ is alert and oriented x3.    VITAL SIGNS:     HEENT: Head is normocephalic and atraumatic. Extraocular muscles are intact. Pupils are equal, round, and reactive to light and accommodation. Nares appeared normal. Mouth is well hydrated and without lesions. Mucous membranes are moist. Posterior pharynx clear of any exudate or lesions.    NECK: Supple. No carotid bruits.  No lymphadenopathy or thyromegaly.    LUNGS: Clear to auscultation.    HEART: Regular rate and rhythm without murmur.    ABDOMEN: Soft, nontender, and nondistended.  Positive bowel sounds.  No hepatosplenomegaly was noted.    EXTREMITIES: Without any cyanosis, clubbing, rash, lesions or edema.    NEUROLOGIC: Cranial nerves II through XII are grossly intact.    PSYCHIATRIC: Flat affect, but denies suicidal or homicidal ideations.  SKIN: No ulceration or induration present.    MICROBIOLOGY:      LABS:                        14.6   11.5  )-----------( 302      ( 06 May 2017 09:22 )             42.9     -    142  |  102  |  13.0  ----------------------------<  70  4.0   |  21.0<L>  |  0.57    Ca    8.9      06 May 2017 09:22    TPro  7.3  /  Alb  3.8  /  TBili  0.3<L>  /  DBili  x   /  AST  17  /  ALT  14  /  AlkPhos  113      PT/INR - ( 05 May 2017 10:40 )   PT: 12.7 sec;   INR: 1.15 ratio         PTT - ( 05 May 2017 10:40 )  PTT:33.6 sec  Urinalysis Basic - ( 05 May 2017 13:37 )    Color: Yellow / Appearance: Clear / S.010 / pH: x  Gluc: x / Ketone: Moderate  / Bili: Negative / Urobili: 1 mg/dL   Blood: x / Protein: 30 mg/dL / Nitrite: Negative   Leuk Esterase: Trace / RBC: 0-2 /HPF / WBC 3-5   Sq Epi: x / Non Sq Epi: Occasional / Bacteria: Occasional        RADIOLOGY & ADDITIONAL STUDIES:      ASSESSMENT:    57 yr old male with hx of excessive alcohol intake ,and pancreatitis admitted with scrotal swelling sec to hernia.   Ct scan/ MRI  suspicious for pancreatic malignancy    PLAN:  EUS as recommended by GI  Ca 19- 9 levels  Will f/u as out patient pending results of EUS    MARILIN RICHARDSON MD

## 2017-05-06 NOTE — PROGRESS NOTE ADULT - ASSESSMENT
1) Pancreatic mass - 1) Pancreatic mass - GI following  --> consulted oncology   --> may need EUS     2) Scrotal swelling --> improved  --> has inguinal hernia  --,> outpatient surgical consult    3) Pancreatic cysts --> follow up with GI    4) pain control --> continue with iv pain meds prn     5) diet --> low fat diet     6) htn --> norvasc    7) smoker --> patch     8) dvt prop --> lovenox sub q

## 2017-05-06 NOTE — PROGRESS NOTE ADULT - PROBLEM SELECTOR PLAN 1
Pt. may have a pancreatic neoplasm in the face or setting of chronic pancreatitis. He will need an eventual EUS which can be done as OPT at [facility] by the advanced gastroenterologists there. Await CA 19-9. A low fat, low sodium diet was ordered.

## 2017-05-07 VITALS
TEMPERATURE: 99 F | OXYGEN SATURATION: 97 % | DIASTOLIC BLOOD PRESSURE: 74 MMHG | HEART RATE: 76 BPM | RESPIRATION RATE: 18 BRPM | SYSTOLIC BLOOD PRESSURE: 131 MMHG

## 2017-05-07 LAB
ANION GAP SERPL CALC-SCNC: 11 MMOL/L — SIGNIFICANT CHANGE UP (ref 5–17)
BUN SERPL-MCNC: 7 MG/DL — LOW (ref 8–20)
CALCIUM SERPL-MCNC: 8.3 MG/DL — LOW (ref 8.6–10.2)
CANCER AG19-9 SERPL-ACNC: 9.3 U/ML — SIGNIFICANT CHANGE UP
CEA SERPL-MCNC: 2.2 NG/ML — SIGNIFICANT CHANGE UP (ref 0–3.8)
CHLORIDE SERPL-SCNC: 108 MMOL/L — HIGH (ref 98–107)
CO2 SERPL-SCNC: 23 MMOL/L — SIGNIFICANT CHANGE UP (ref 22–29)
CREAT SERPL-MCNC: 0.48 MG/DL — LOW (ref 0.5–1.3)
GLUCOSE SERPL-MCNC: 93 MG/DL — SIGNIFICANT CHANGE UP (ref 70–115)
HCT VFR BLD CALC: 36.9 % — LOW (ref 42–52)
HGB BLD-MCNC: 12.7 G/DL — LOW (ref 14–18)
MCHC RBC-ENTMCNC: 33.8 PG — HIGH (ref 27–31)
MCHC RBC-ENTMCNC: 34.4 G/DL — SIGNIFICANT CHANGE UP (ref 32–36)
MCV RBC AUTO: 98.1 FL — HIGH (ref 80–94)
PLATELET # BLD AUTO: 267 K/UL — SIGNIFICANT CHANGE UP (ref 150–400)
POTASSIUM SERPL-MCNC: 4.5 MMOL/L — SIGNIFICANT CHANGE UP (ref 3.5–5.3)
POTASSIUM SERPL-SCNC: 4.5 MMOL/L — SIGNIFICANT CHANGE UP (ref 3.5–5.3)
RBC # BLD: 3.76 M/UL — LOW (ref 4.6–6.2)
RBC # FLD: 12.6 % — SIGNIFICANT CHANGE UP (ref 11–15.6)
SODIUM SERPL-SCNC: 142 MMOL/L — SIGNIFICANT CHANGE UP (ref 135–145)
WBC # BLD: 10 K/UL — SIGNIFICANT CHANGE UP (ref 4.8–10.8)
WBC # FLD AUTO: 10 K/UL — SIGNIFICANT CHANGE UP (ref 4.8–10.8)

## 2017-05-07 PROCEDURE — 83690 ASSAY OF LIPASE: CPT

## 2017-05-07 PROCEDURE — 99285 EMERGENCY DEPT VISIT HI MDM: CPT | Mod: 25

## 2017-05-07 PROCEDURE — 80048 BASIC METABOLIC PNL TOTAL CA: CPT

## 2017-05-07 PROCEDURE — 82378 CARCINOEMBRYONIC ANTIGEN: CPT

## 2017-05-07 PROCEDURE — 99232 SBSQ HOSP IP/OBS MODERATE 35: CPT

## 2017-05-07 PROCEDURE — 74182 MRI ABDOMEN W/CONTRAST: CPT

## 2017-05-07 PROCEDURE — 81001 URINALYSIS AUTO W/SCOPE: CPT

## 2017-05-07 PROCEDURE — 85730 THROMBOPLASTIN TIME PARTIAL: CPT

## 2017-05-07 PROCEDURE — 86301 IMMUNOASSAY TUMOR CA 19-9: CPT

## 2017-05-07 PROCEDURE — 76870 US EXAM SCROTUM: CPT

## 2017-05-07 PROCEDURE — 74177 CT ABD & PELVIS W/CONTRAST: CPT

## 2017-05-07 PROCEDURE — 86900 BLOOD TYPING SEROLOGIC ABO: CPT

## 2017-05-07 PROCEDURE — 86850 RBC ANTIBODY SCREEN: CPT

## 2017-05-07 PROCEDURE — 85610 PROTHROMBIN TIME: CPT

## 2017-05-07 PROCEDURE — 86901 BLOOD TYPING SEROLOGIC RH(D): CPT

## 2017-05-07 PROCEDURE — 36415 COLL VENOUS BLD VENIPUNCTURE: CPT

## 2017-05-07 PROCEDURE — 82150 ASSAY OF AMYLASE: CPT

## 2017-05-07 PROCEDURE — 80053 COMPREHEN METABOLIC PANEL: CPT

## 2017-05-07 PROCEDURE — 99239 HOSP IP/OBS DSCHRG MGMT >30: CPT

## 2017-05-07 PROCEDURE — 85027 COMPLETE CBC AUTOMATED: CPT

## 2017-05-07 RX ORDER — NICOTINE POLACRILEX 2 MG
1 GUM BUCCAL
Qty: 14 | Refills: 0 | OUTPATIENT
Start: 2017-05-07 | End: 2017-05-21

## 2017-05-07 RX ADMIN — SODIUM CHLORIDE 100 MILLILITER(S): 9 INJECTION INTRAMUSCULAR; INTRAVENOUS; SUBCUTANEOUS at 06:26

## 2017-05-07 RX ADMIN — SODIUM CHLORIDE 100 MILLILITER(S): 9 INJECTION INTRAMUSCULAR; INTRAVENOUS; SUBCUTANEOUS at 03:20

## 2017-05-07 RX ADMIN — AMLODIPINE BESYLATE 10 MILLIGRAM(S): 2.5 TABLET ORAL at 06:24

## 2017-05-07 NOTE — DISCHARGE NOTE ADULT - CARE PROVIDER_API CALL
David Travis), Gastroenterology; Internal Medicine  [address]  [place] [address]  Phone: [phone]  Fax: [phone]    Stella Ross), Hematology; Internal Medicine; Medical Oncology  [address] [place] [address]  Phone: [phone]  Fax: [phone]    Khoi Ch (), Family Medicine  [address] [place] [address] [address]  [place] [address]  Phone: [phone]  Fax: [phone]

## 2017-05-07 NOTE — DISCHARGE NOTE ADULT - HOSPITAL COURSE
57 y M hx tobacco use, past ETOH abuse, HTN, sent from [facility] for evaluation of testicular/scrotal swelling which pt noted today. c/o abd bloating since 4 d ago. Denies pain, CP, SOB, N/V/D, constipation, weight loss, BRBPR, melena. Previous heavy ETOH use, quit 2 yr ago, now only drinks sporadically, last intake around December.     patient admitted to medicine and had a ct scan:    Evidence of chronic pancreatitis most pronounced in the region of the   pancreatic head. A cyst in the pancreatic head as well as a cyst in the   girish hepatis region are likely pseudocysts. There is obstruction of the   pancreatic duct with diffuse dilatation of the duct throughout the   pancreas.    Infiltration of the fat planes surrounding the SMA and celiac artery   raise the possibility of concomitant neoplasm as well.    patient also had elevated lipase and seen by GI. MRCP ordered which showed:    IMPRESSION:   Multiple cystic lesions throughout the pancreas, possibly pseudocysts.   Diffuse main pancreatic duct dilatation, measuring up to 1.2 cm.   Unchanged suggestion of enhancing soft tissue surrounding the superior   mesenteric artery, possibly neoplasm. Consider further evaluation with   EUS/ERCP.    Patient also seen by hemon and sent ca19-9 which is pending. Patient told to follow up with gi and hematology for outpatient EUS. Patient is now stable and ready for dc. Scrotal edema resolved    time spent on dc 32 minutes

## 2017-05-07 NOTE — PROGRESS NOTE ADULT - PROBLEM SELECTOR PLAN 2
Pt. was given my card to arrange for OPT EUS at [facility]. ETOH abstinence and a low fat diet recommended as an OPT.

## 2017-05-07 NOTE — DISCHARGE NOTE ADULT - MEDICATION SUMMARY - MEDICATIONS TO TAKE
I will START or STAY ON the medications listed below when I get home from the hospital:    Percocet 5/325 325 mg-5 mg oral tablet  -- 1 tab(s) by mouth every 6 hours, As Needed MDD:max 4 tabs  -- Caution federal law prohibits the transfer of this drug to any person other  than the person for whom it was prescribed.  May cause drowsiness.  Alcohol may intensify this effect.  Use care when operating dangerous machinery.  This prescription cannot be refilled.  This product contains acetaminophen.  Do not use  with any other product containing acetaminophen to prevent possible liver damage.  Using more of this medication than prescribed may cause serious breathing problems.    -- Indication: For pain    amLODIPine 10 mg oral tablet  -- 1 tab(s) by mouth once a day  -- Indication: For Htn    nicotine 14 mg/24 hr transdermal film, extended release  -- 1 patch by transdermal patch once a day  -- Indication: For Smoking

## 2017-05-07 NOTE — DISCHARGE NOTE ADULT - CARE PLAN
Principal Discharge DX:	Abdominal mass, unspecified location  Goal:	follow up with GI needs EUS as outpatient  Instructions for follow-up, activity and diet:	low fat diet  follow up with pcp and hem onc  Secondary Diagnosis:	Scrotal edema  Goal:	resolved  Instructions for follow-up, activity and diet:	follow up with pcp  Secondary Diagnosis:	Hypertension  Goal:	home meds  Secondary Diagnosis:	Inguinal hernia of right side without obstruction or gangrene  Goal:	follow up with pcp  Secondary Diagnosis:	Smoker

## 2017-05-07 NOTE — PROGRESS NOTE ADULT - SUBJECTIVE AND OBJECTIVE BOX
Pt seen and examined. He is apparently being discharged home today with OPT surgical and GI follow ups.     MEDICATIONS:  MEDICATIONS  (STANDING):  amLODIPine   Tablet 10milliGRAM(s) Oral daily  enoxaparin Injectable 40milliGRAM(s) SubCutaneous daily  nicotine -  14 mG/24Hr(s) Patch 1patch Transdermal daily    MEDICATIONS  (PRN):  morphine  - Injectable 4milliGRAM(s) IV Push every 4 hours PRN Moderate Pain (4 - 6)  HYDROmorphone  Injectable 0.5milliGRAM(s) IV Push every 6 hours PRN Severe Pain (7 - 10)      Allergies    iodine (Swelling)    Intolerances        Vital Signs Last 24 Hrs  T(C): 37, Max: 37.3 ( @ 23:34)  T(F): 98.6, Max: 99.1 ( @ 23:34)  HR: 76 (76 - 83)  BP: 131/74 (126/72 - 131/74)  BP(mean): --  RR: 18 (18 - 20)  SpO2: 97% (92% - 97%)  I & Os for 24h ending  @ 07:00  =============================================  IN: 2340 ml / OUT: 0 ml / NET: 2340 ml    I & Os for current day (as of  @ 10:32)  =============================================  IN: 100 ml / OUT: 0 ml / NET: 100 ml      PHYSICAL EXAM:    General: Well developed; well nourished; in no acute distress  HEENT: MMM, conjunctiva pink and sclera anicteric  Lungs: clear to auscultation and percussion.  Cor: RRR S1, S2 only.  Gastrointestinal: Abdomen: Soft non-tender non-distended; Normal bowel sounds; No hepatosplenomegaly  no surgical scars. + large non tender, non incarerated right inguinal hernia with scrotal extension. No scrotal or penile edema presently.  Extremities: no cyanosis, clubbing or edema.  Skin: Warm and dry. No obvious rash  Neuro: Pt. a + o x 3    LABS:      CBC Full  -  ( 07 May 2017 09:24 )  WBC Count : 10.0 K/uL  Hemoglobin : 12.7 g/dL  Hematocrit : 36.9 %  Platelet Count - Automated : 267 K/uL  Mean Cell Volume : 98.1 fl  Mean Cell Hemoglobin : 33.8 pg  Mean Cell Hemoglobin Concentration : 34.4 g/dL  Auto Neutrophil # : x  Auto Lymphocyte # : x  Auto Monocyte # : x  Auto Eosinophil # : x  Auto Basophil # : x  Auto Neutrophil % : x  Auto Lymphocyte % : x  Auto Monocyte % : x  Auto Eosinophil % : x  Auto Basophil % : x        142  |  108<H>  |  7.0<L>  ----------------------------<  93  4.5   |  23.0  |  0.48<L>    Ca    8.3<L>      07 May 2017 09:24    TPro  7.3  /  Alb  3.8  /  TBili  0.3<L>  /  DBili  x   /  AST  17  /  ALT  14  /  AlkPhos  113  05-    PT/INR - ( 05 May 2017 10:40 )   PT: 12.7 sec;   INR: 1.15 ratio         PTT - ( 05 May 2017 10:40 )  PTT:33.6 sec      Urinalysis Basic - ( 05 May 2017 13:37 )    Color: Yellow / Appearance: Clear / S.010 / pH: x  Gluc: x / Ketone: Moderate  / Bili: Negative / Urobili: 1 mg/dL   Blood: x / Protein: 30 mg/dL / Nitrite: Negative   Leuk Esterase: Trace / RBC: 0-2 /HPF / WBC 3-5   Sq Epi: x / Non Sq Epi: Occasional / Bacteria: Occasional                RADIOLOGY & ADDITIONAL STUDIES (The following images were personally reviewed):

## 2017-05-07 NOTE — DISCHARGE NOTE ADULT - PLAN OF CARE
follow up with GI needs EUS as outpatient low fat diet  follow up with pcp and hem onc resolved follow up with pcp home meds

## 2017-05-07 NOTE — DISCHARGE NOTE ADULT - PATIENT PORTAL LINK FT
“You can access the Follow[facility] Patient Portal, offered by [facility], by registering with the following website: [email or web address]”

## 2017-05-08 PROBLEM — Z00.00 ENCOUNTER FOR PREVENTIVE HEALTH EXAMINATION: Noted: 2017-05-08

## 2017-05-18 ENCOUNTER — LABORATORY RESULT (OUTPATIENT)
Age: 57
End: 2017-05-18

## 2017-05-19 ENCOUNTER — APPOINTMENT (OUTPATIENT)
Dept: FAMILY MEDICINE | Facility: CLINIC | Age: 57
End: 2017-05-19

## 2017-05-19 VITALS
BODY MASS INDEX: 21.7 KG/M2 | HEIGHT: 71 IN | DIASTOLIC BLOOD PRESSURE: 60 MMHG | WEIGHT: 155 LBS | SYSTOLIC BLOOD PRESSURE: 90 MMHG

## 2017-05-19 DIAGNOSIS — N40.0 BENIGN PROSTATIC HYPERPLASIA WITHOUT LOWER URINARY TRACT SYMPMS: ICD-10-CM

## 2017-05-19 DIAGNOSIS — R93.5 ABNORMAL FINDINGS ON DIAGNOSTIC IMAGING OF OTHER ABDOMINAL REGIONS, INCLUDING RETROPERITONEUM: ICD-10-CM

## 2017-05-19 DIAGNOSIS — H91.90 UNSPECIFIED HEARING LOSS, UNSPECIFIED EAR: ICD-10-CM

## 2017-05-19 DIAGNOSIS — R19.09 OTHER INTRA-ABDOMINAL AND PELVIC SWELLING, MASS AND LUMP: ICD-10-CM

## 2017-05-19 DIAGNOSIS — E78.5 HYPERLIPIDEMIA, UNSPECIFIED: ICD-10-CM

## 2017-05-19 DIAGNOSIS — K86.89 OTHER SPECIFIED DISEASES OF PANCREAS: ICD-10-CM

## 2017-05-19 DIAGNOSIS — N50.89 OTHER SPECIFIED DISORDERS OF THE MALE GENITAL ORGANS: ICD-10-CM

## 2017-05-20 ENCOUNTER — CLINICAL ADVICE (OUTPATIENT)
Age: 57
End: 2017-05-20

## 2017-05-20 ENCOUNTER — INPATIENT (INPATIENT)
Facility: HOSPITAL | Age: 57
LOS: 1 days | Discharge: HOME | DRG: 432 | End: 2017-05-22
Attending: HOSPITALIST | Admitting: HOSPITALIST
Payer: COMMERCIAL

## 2017-05-20 ENCOUNTER — RESULT REVIEW (OUTPATIENT)
Age: 57
End: 2017-05-20

## 2017-05-20 VITALS
OXYGEN SATURATION: 98 % | TEMPERATURE: 98 F | HEIGHT: 71 IN | WEIGHT: 160.06 LBS | DIASTOLIC BLOOD PRESSURE: 74 MMHG | SYSTOLIC BLOOD PRESSURE: 161 MMHG | RESPIRATION RATE: 18 BRPM | HEART RATE: 79 BPM

## 2017-05-20 DIAGNOSIS — Z98.890 OTHER SPECIFIED POSTPROCEDURAL STATES: Chronic | ICD-10-CM

## 2017-05-20 PROBLEM — G45.9 TRANSIENT CEREBRAL ISCHEMIC ATTACK, UNSPECIFIED: Chronic | Status: ACTIVE | Noted: 2017-05-05

## 2017-05-20 LAB
ALBUMIN SERPL ELPH-MCNC: 3.1 G/DL — LOW (ref 3.3–5.2)
ALP SERPL-CCNC: 104 U/L — SIGNIFICANT CHANGE UP (ref 40–120)
ALT FLD-CCNC: 16 U/L — SIGNIFICANT CHANGE UP
AMYLASE P1 CFR SERPL: 2170 U/L — HIGH (ref 36–128)
ANION GAP SERPL CALC-SCNC: 13 MMOL/L — SIGNIFICANT CHANGE UP (ref 5–17)
APPEARANCE UR: CLEAR — SIGNIFICANT CHANGE UP
APTT BLD: 35.7 SEC — SIGNIFICANT CHANGE UP (ref 27.5–37.4)
AST SERPL-CCNC: 21 U/L — SIGNIFICANT CHANGE UP
BASOPHILS NFR BLD AUTO: 1 % — SIGNIFICANT CHANGE UP (ref 0–2)
BILIRUB SERPL-MCNC: 0.1 MG/DL — LOW (ref 0.4–2)
BILIRUB UR-MCNC: NEGATIVE — SIGNIFICANT CHANGE UP
BLD GP AB SCN SERPL QL: SIGNIFICANT CHANGE UP
BUN SERPL-MCNC: 11 MG/DL — SIGNIFICANT CHANGE UP (ref 8–20)
CALCIUM SERPL-MCNC: 9.3 MG/DL — SIGNIFICANT CHANGE UP (ref 8.6–10.2)
CHLORIDE SERPL-SCNC: 104 MMOL/L — SIGNIFICANT CHANGE UP (ref 98–107)
CO2 SERPL-SCNC: 26 MMOL/L — SIGNIFICANT CHANGE UP (ref 22–29)
COLOR SPEC: YELLOW — SIGNIFICANT CHANGE UP
CREAT SERPL-MCNC: 0.56 MG/DL — SIGNIFICANT CHANGE UP (ref 0.5–1.3)
CRP SERPL-MCNC: 4.3 MG/DL — HIGH (ref 0–0.4)
DIFF PNL FLD: NEGATIVE — SIGNIFICANT CHANGE UP
EOSINOPHIL NFR BLD AUTO: 47 % — HIGH (ref 0–6)
GGT SERPL-CCNC: 26 U/L — SIGNIFICANT CHANGE UP (ref 8–61)
GLUCOSE SERPL-MCNC: 88 MG/DL — SIGNIFICANT CHANGE UP (ref 70–115)
GLUCOSE UR QL: NEGATIVE MG/DL — SIGNIFICANT CHANGE UP
HCT VFR BLD CALC: 39.1 % — LOW (ref 42–52)
HGB BLD-MCNC: 13.5 G/DL — LOW (ref 14–18)
INR BLD: 1.15 RATIO — SIGNIFICANT CHANGE UP (ref 0.88–1.16)
KETONES UR-MCNC: NEGATIVE — SIGNIFICANT CHANGE UP
LACTATE BLDV-MCNC: 1.1 MMOL/L — SIGNIFICANT CHANGE UP (ref 0.7–2)
LEUKOCYTE ESTERASE UR-ACNC: NEGATIVE — SIGNIFICANT CHANGE UP
LIDOCAIN IGE QN: 2262 U/L — HIGH (ref 22–51)
LYMPHOCYTES # BLD AUTO: 13 % — LOW (ref 20–55)
MAGNESIUM SERPL-MCNC: 2 MG/DL — SIGNIFICANT CHANGE UP (ref 1.6–2.6)
MCHC RBC-ENTMCNC: 34.5 G/DL — SIGNIFICANT CHANGE UP (ref 32–36)
MCHC RBC-ENTMCNC: 34.5 PG — HIGH (ref 27–31)
MCV RBC AUTO: 100 FL — HIGH (ref 80–94)
MONOCYTES NFR BLD AUTO: 1 % — LOW (ref 3–10)
NEUTROPHILS NFR BLD AUTO: 38 % — SIGNIFICANT CHANGE UP (ref 37–73)
NITRITE UR-MCNC: NEGATIVE — SIGNIFICANT CHANGE UP
PH UR: 5 — SIGNIFICANT CHANGE UP (ref 5–8)
PHOSPHATE SERPL-MCNC: 3.8 MG/DL — SIGNIFICANT CHANGE UP (ref 2.4–4.7)
PLAT MORPH BLD: NORMAL — SIGNIFICANT CHANGE UP
PLATELET # BLD AUTO: 370 K/UL — SIGNIFICANT CHANGE UP (ref 150–400)
POTASSIUM SERPL-MCNC: 4.2 MMOL/L — SIGNIFICANT CHANGE UP (ref 3.5–5.3)
POTASSIUM SERPL-SCNC: 4.2 MMOL/L — SIGNIFICANT CHANGE UP (ref 3.5–5.3)
PROT SERPL-MCNC: 6.5 G/DL — LOW (ref 6.6–8.7)
PROT UR-MCNC: NEGATIVE MG/DL — SIGNIFICANT CHANGE UP
PROTHROM AB SERPL-ACNC: 12.7 SEC — SIGNIFICANT CHANGE UP (ref 9.8–12.7)
RBC # BLD: 3.91 M/UL — LOW (ref 4.6–6.2)
RBC # FLD: 13.8 % — SIGNIFICANT CHANGE UP (ref 11–15.6)
RBC BLD AUTO: NORMAL — SIGNIFICANT CHANGE UP
SODIUM SERPL-SCNC: 143 MMOL/L — SIGNIFICANT CHANGE UP (ref 135–145)
SP GR SPEC: 1.01 — SIGNIFICANT CHANGE UP (ref 1.01–1.02)
TYPE + AB SCN PNL BLD: SIGNIFICANT CHANGE UP
UROBILINOGEN FLD QL: NEGATIVE MG/DL — SIGNIFICANT CHANGE UP
WBC # BLD: 16.9 K/UL — HIGH (ref 4.8–10.8)
WBC # FLD AUTO: 16.9 K/UL — HIGH (ref 4.8–10.8)

## 2017-05-20 PROCEDURE — 74177 CT ABD & PELVIS W/CONTRAST: CPT | Mod: 26

## 2017-05-20 RX ORDER — SODIUM CHLORIDE 9 MG/ML
3 INJECTION INTRAMUSCULAR; INTRAVENOUS; SUBCUTANEOUS ONCE
Qty: 0 | Refills: 0 | Status: COMPLETED | OUTPATIENT
Start: 2017-05-20 | End: 2017-05-20

## 2017-05-20 RX ADMIN — SODIUM CHLORIDE 3 MILLILITER(S): 9 INJECTION INTRAMUSCULAR; INTRAVENOUS; SUBCUTANEOUS at 20:34

## 2017-05-20 NOTE — ED PROVIDER NOTE - OBJECTIVE STATEMENT
The patient is 57 year old male presenting to ED with abd pain.  The patient recently diagnosed to have pancreatic mass and hernia.  The PMD called him to return to ED for high WBC count on recent blood test.  +Nausea, No vomit, No CP, No SOB, No fever

## 2017-05-20 NOTE — ED PROVIDER NOTE - CHPI ED SYMPTOMS NEG
no vomiting/no dysuria/no fever/no burning urination/no nausea/no hematuria/no blood in stool/no chills/no diarrhea

## 2017-05-20 NOTE — ED STATDOCS - PROGRESS NOTE DETAILS
56 y/o M pt presents to ED c/o abdominal pain x2 weeks. Pt reports he saw his PMD yesterday and was sent to the ED today for evaluation of "high white count". He was recently admitted to the hospital for a mass on his pancreas and "a swollen hernia in my groin". Pt reports he was never seen by the surgeons while he was here. He notes he was supposed to have surgery to repair the hernia but was discharged on 5/7/17 instead. Pt denies fever, nausea, vomiting, CP, and SOB. No further complaints at this time. NKDA. A brief and initial focused evaluation was performed in intake, pt protocol orders entered, pt placed in main for complete evaluation by another provider. Surgery was called and told to get CT and that they will see the pt.

## 2017-05-20 NOTE — ED PROVIDER NOTE - PROGRESS NOTE DETAILS
WBC, amylase, and lipase trending up.  Eosinophilia noted.  Awaiting CT Abd, will likely require admission, medical vs. surgical received sign out  ct reviewed, findings discussed with patient

## 2017-05-20 NOTE — ED STATDOCS - OBJECTIVE STATEMENT
58 y/o M pt presents to ED c/o abdominal pain x2 weeks. Pt reports he saw his PMD yesterday and was sent to the ED today for evaluation of "high white count". He was recently admitted to the hospital and d/c 5/7/17 for a mass on his pancreas and "a swollen hernia in my groin". Pt reports he was never seen by the surgeons while he was here. He notes he was supposed to have surgery to repair the hernia but was d/c instead. Pt denies fever, nausea, vomiting, CP, and SOB. No further complaints at this time. NKDA. A brief and initial focused evaluation was performed in intake, pt protocol orders entered, pt placed in main for complete evaluation by another provider.

## 2017-05-21 DIAGNOSIS — R18.8 OTHER ASCITES: ICD-10-CM

## 2017-05-21 DIAGNOSIS — K86.0 ALCOHOL-INDUCED CHRONIC PANCREATITIS: ICD-10-CM

## 2017-05-21 DIAGNOSIS — K70.31 ALCOHOLIC CIRRHOSIS OF LIVER WITH ASCITES: ICD-10-CM

## 2017-05-21 DIAGNOSIS — K40.90 UNILATERAL INGUINAL HERNIA, WITHOUT OBSTRUCTION OR GANGRENE, NOT SPECIFIED AS RECURRENT: ICD-10-CM

## 2017-05-21 DIAGNOSIS — K85.90 ACUTE PANCREATITIS WITHOUT NECROSIS OR INFECTION, UNSPECIFIED: ICD-10-CM

## 2017-05-21 DIAGNOSIS — I10 ESSENTIAL (PRIMARY) HYPERTENSION: ICD-10-CM

## 2017-05-21 PROCEDURE — 99254 IP/OBS CNSLTJ NEW/EST MOD 60: CPT

## 2017-05-21 PROCEDURE — 99285 EMERGENCY DEPT VISIT HI MDM: CPT

## 2017-05-21 RX ORDER — SODIUM CHLORIDE 9 MG/ML
3 INJECTION INTRAMUSCULAR; INTRAVENOUS; SUBCUTANEOUS EVERY 8 HOURS
Qty: 0 | Refills: 0 | Status: DISCONTINUED | OUTPATIENT
Start: 2017-05-21 | End: 2017-05-22

## 2017-05-21 RX ORDER — MORPHINE SULFATE 50 MG/1
4 CAPSULE, EXTENDED RELEASE ORAL EVERY 4 HOURS
Qty: 0 | Refills: 0 | Status: DISCONTINUED | OUTPATIENT
Start: 2017-05-21 | End: 2017-05-22

## 2017-05-21 RX ORDER — AMLODIPINE BESYLATE 2.5 MG/1
1 TABLET ORAL
Qty: 0 | Refills: 0 | COMMUNITY

## 2017-05-21 RX ORDER — ENOXAPARIN SODIUM 100 MG/ML
40 INJECTION SUBCUTANEOUS EVERY 24 HOURS
Qty: 0 | Refills: 0 | Status: DISCONTINUED | OUTPATIENT
Start: 2017-05-21 | End: 2017-05-22

## 2017-05-21 RX ORDER — AMLODIPINE BESYLATE 2.5 MG/1
5 TABLET ORAL DAILY
Qty: 0 | Refills: 0 | Status: DISCONTINUED | OUTPATIENT
Start: 2017-05-21 | End: 2017-05-22

## 2017-05-21 RX ORDER — ONDANSETRON 8 MG/1
4 TABLET, FILM COATED ORAL EVERY 6 HOURS
Qty: 0 | Refills: 0 | Status: DISCONTINUED | OUTPATIENT
Start: 2017-05-21 | End: 2017-05-22

## 2017-05-21 RX ADMIN — SODIUM CHLORIDE 3 MILLILITER(S): 9 INJECTION INTRAMUSCULAR; INTRAVENOUS; SUBCUTANEOUS at 13:17

## 2017-05-21 RX ADMIN — MORPHINE SULFATE 4 MILLIGRAM(S): 50 CAPSULE, EXTENDED RELEASE ORAL at 15:50

## 2017-05-21 RX ADMIN — SODIUM CHLORIDE 3 MILLILITER(S): 9 INJECTION INTRAMUSCULAR; INTRAVENOUS; SUBCUTANEOUS at 06:30

## 2017-05-21 RX ADMIN — ENOXAPARIN SODIUM 40 MILLIGRAM(S): 100 INJECTION SUBCUTANEOUS at 08:54

## 2017-05-21 RX ADMIN — MORPHINE SULFATE 4 MILLIGRAM(S): 50 CAPSULE, EXTENDED RELEASE ORAL at 16:43

## 2017-05-21 RX ADMIN — AMLODIPINE BESYLATE 5 MILLIGRAM(S): 2.5 TABLET ORAL at 06:29

## 2017-05-21 RX ADMIN — SODIUM CHLORIDE 3 MILLILITER(S): 9 INJECTION INTRAMUSCULAR; INTRAVENOUS; SUBCUTANEOUS at 22:00

## 2017-05-21 NOTE — CONSULT NOTE ADULT - ATTENDING COMMENTS
plan for right hernia repair on this admission after acute pancreatic / ascites issues are resolved .

## 2017-05-21 NOTE — H&P ADULT - PROBLEM SELECTOR PLAN 2
Surgery note as above, no acute need for repair of inguinal hernia, no evidence of incarceration/strangulation.

## 2017-05-21 NOTE — ED ADULT NURSE REASSESSMENT NOTE - NS ED NURSE REASSESS COMMENT FT1
assumed care of pt from RN in ED. pt resting comfortably in cart. breathing si even and unlabored. pt admitted. pt awaiting bed availability on floor. will continue to monitor and reassess

## 2017-05-21 NOTE — H&P ADULT - PROBLEM SELECTOR PLAN 1
CT with contrast does not show evidence of pancreatic necrosis/hemorrhagic pancreatitis. Likely cause of acute on chronic pancreatitis is ductal obstruction from cyst/mass. Leukocytosis likely reactive. No fever, abdomina pain or other stigmata of infectious process. No clinical evidence of SBP. Can arrange for diagnostic SBP to asses SAAG, cytology and cell count. Patient will need EUS/Biopsy to be arranged prior to DC. Will need to then f/u with GI/Onc as o/p as per previous plan. Trend amylase/lipase, ambulation, DVT-P, diet as tolerated. Smoking cessation counseling provided.

## 2017-05-21 NOTE — H&P ADULT - HISTORY OF PRESENT ILLNESS
56 y/o male with history of HTN, remote hx of etoh induced pancreatitis in 01 who was admitted earlier this month for scrotal edema and bloating and was found to have right sided inguinal hernia he sustained while climbing his roof to get into his house when he got locked out. He was also found to have a pancreatic lesion and evidence of pancreatitis. His tumor markers where negative and he was seen by GI/Onc and was to f/u as o/p at [facility] for EUS with biopsy. He states he called this past weds but has not gotten a call back. He recently saw Dr. Ross but was told until he has tissue diagnosis there is no role for any therapy. He saw his PMD yesterday and was called to come to ED because his "labs where worse. He denies any fever, chills, N/V, SOB, CP, rash. His only complaint is his cont. scrotal swelling for which surgery saw him and at this time there is no acute need for repair of inguinal hernia. CT of abd shows evidence of chronic pancreatitis, pseudocysts and ascites. He has no evidence of SBP clinically. His lipas and amylase have increased and he now has a leukocytosis.

## 2017-05-21 NOTE — CONSULT NOTE ADULT - PROBLEM SELECTOR RECOMMENDATION 9
no acute surgical intervention needed at the moment  pt can follow with [facility] as Out patient to plan for surgery  recommend medicine consultation for ascites and increased lipase  If medicine admits the Pt, surgery will follow and plan for hernia repair before discharge.

## 2017-05-21 NOTE — ED ADULT NURSE REASSESSMENT NOTE - NS ED NURSE REASSESS COMMENT FT1
pt resting comfortably in cart. breathing si even and unlabored. pt awaiting bed availability on floor. will continue to monitor and reassess

## 2017-05-21 NOTE — CONSULT NOTE ADULT - SUBJECTIVE AND OBJECTIVE BOX
56 y/o M presented to ED with complaints of Rt scrotal swelling for past 2 and half week. as per the Pt he was here in the ED on the 5/5 when he got a CT and US. During the process a cyst in his pancreas was noticed and he was admitted under medicine for further management  he states that the swelling was not present before, appeared spontaneously, decreases in size when he lie down but does not disappear completely, a/w dull pain.  he is passing gas and having bowel movement  denies N/V  PMH: chronic pancreatitis  allergies: none      Vitals on arrival: stable    OE:  pt alert, Ox3  Chest: CTAB  CVS: S1S2  abd: soft, distended, non tender on palpation  swelling around 4x3 cm on the rt inguinal region,  from the testis, decreases in size on supine position, no Bowel sound on auscultation      Labs:   WBC: 16.9  tot ricky:0.1  alk phos: 104  lipase: 2262    CT: No change from prior exam. Unchanged right inguinal hernia is seen  containing fluid which is presumably responsible for the patient's symptoms.    Moderate volume ascites. Sequela of chronic pancreatitis with multiple  pancreatic pseudocysts, fully detailed on recent MRI/CT examinations from  5/5/2017. Sizing size and configuration of the pseudocysts is unchanged.

## 2017-05-21 NOTE — H&P ADULT - ASSESSMENT
58 y/o male with acute on chronic pancreatitis, abdominopelvic ascites, pancreatic head cyst/lesion with ductal obstruction

## 2017-05-21 NOTE — ED ADULT NURSE NOTE - OBJECTIVE STATEMENT
pt A&Ox3, states that on 5/8 he was had ED and dc with dx of pancreatic mass and hernia and was sent to ED by PCP for elevated WBC, pt has generalized abd pain and right side groin pain, resp even and unlabored no distress noted, abd soft NTND, pt denies n/v/d,

## 2017-05-21 NOTE — ED ADULT NURSE REASSESSMENT NOTE - NS ED NURSE REASSESS COMMENT FT1
pt sleeping easily arousable , A&OX3 resp even and unlabored no distress noted pt denies any pain , will continue to monitor

## 2017-05-22 ENCOUNTER — RESULT REVIEW (OUTPATIENT)
Age: 57
End: 2017-05-22

## 2017-05-22 ENCOUNTER — TRANSCRIPTION ENCOUNTER (OUTPATIENT)
Age: 57
End: 2017-05-22

## 2017-05-22 VITALS
SYSTOLIC BLOOD PRESSURE: 116 MMHG | HEART RATE: 73 BPM | RESPIRATION RATE: 18 BRPM | OXYGEN SATURATION: 98 % | TEMPERATURE: 98 F | DIASTOLIC BLOOD PRESSURE: 74 MMHG

## 2017-05-22 LAB
ALBUMIN FLD-MCNC: 2 G/DL — SIGNIFICANT CHANGE UP
ALBUMIN SERPL ELPH-MCNC: 2.6 G/DL — LOW (ref 3.3–5.2)
ALP SERPL-CCNC: 94 U/L — SIGNIFICANT CHANGE UP (ref 40–120)
ALT FLD-CCNC: 12 U/L — SIGNIFICANT CHANGE UP
AMYLASE P1 CFR SERPL: 2778 U/L — HIGH (ref 36–128)
ANION GAP SERPL CALC-SCNC: 12 MMOL/L — SIGNIFICANT CHANGE UP (ref 5–17)
AST SERPL-CCNC: 15 U/L — SIGNIFICANT CHANGE UP
B PERT IGG+IGM PNL SER: ABNORMAL
BILIRUB SERPL-MCNC: 0.3 MG/DL — LOW (ref 0.4–2)
BUN SERPL-MCNC: 10 MG/DL — SIGNIFICANT CHANGE UP (ref 8–20)
CALCIUM SERPL-MCNC: 8.5 MG/DL — LOW (ref 8.6–10.2)
CHLORIDE SERPL-SCNC: 106 MMOL/L — SIGNIFICANT CHANGE UP (ref 98–107)
CO2 SERPL-SCNC: 23 MMOL/L — SIGNIFICANT CHANGE UP (ref 22–29)
COLOR FLD: YELLOW
CREAT SERPL-MCNC: 0.47 MG/DL — LOW (ref 0.5–1.3)
EOSINOPHIL # FLD: 75 % — SIGNIFICANT CHANGE UP
FLUID INTAKE SUBSTANCE CLASS: SIGNIFICANT CHANGE UP
FLUID SEGMENTED GRANULOCYTES: 4 % — SIGNIFICANT CHANGE UP
GLUCOSE FLD-MCNC: 79 MG/DL — SIGNIFICANT CHANGE UP
GLUCOSE SERPL-MCNC: 88 MG/DL — SIGNIFICANT CHANGE UP (ref 70–115)
GRAM STN FLD: SIGNIFICANT CHANGE UP
HCT VFR BLD CALC: 35.8 % — LOW (ref 42–52)
HGB BLD-MCNC: 12.3 G/DL — LOW (ref 14–18)
INR BLD: 1.17 RATIO — HIGH (ref 0.88–1.16)
LIDOCAIN IGE QN: 2426 U/L — HIGH (ref 22–51)
LYMPHOCYTES # FLD: 1 % — SIGNIFICANT CHANGE UP
MCHC RBC-ENTMCNC: 34 PG — HIGH (ref 27–31)
MCHC RBC-ENTMCNC: 34.4 G/DL — SIGNIFICANT CHANGE UP (ref 32–36)
MCV RBC AUTO: 98.9 FL — HIGH (ref 80–94)
MESOTHL CELL # FLD: 8 % — SIGNIFICANT CHANGE UP
MONOS+MACROS # FLD: 12 % — SIGNIFICANT CHANGE UP
PLATELET # BLD AUTO: 317 K/UL — SIGNIFICANT CHANGE UP (ref 150–400)
POTASSIUM SERPL-MCNC: 4 MMOL/L — SIGNIFICANT CHANGE UP (ref 3.5–5.3)
POTASSIUM SERPL-SCNC: 4 MMOL/L — SIGNIFICANT CHANGE UP (ref 3.5–5.3)
PROT FLD-MCNC: 3.8 G/DL — SIGNIFICANT CHANGE UP
PROT SERPL-MCNC: 5.6 G/DL — LOW (ref 6.6–8.7)
PROTHROM AB SERPL-ACNC: 12.9 SEC — HIGH (ref 9.8–12.7)
RBC # BLD: 3.62 M/UL — LOW (ref 4.6–6.2)
RBC # FLD: 13.7 % — SIGNIFICANT CHANGE UP (ref 11–15.6)
RCV VOL RI: 215 /UL — HIGH (ref 0–5)
SODIUM SERPL-SCNC: 141 MMOL/L — SIGNIFICANT CHANGE UP (ref 135–145)
SPECIMEN SOURCE: SIGNIFICANT CHANGE UP
TOTAL NUCLEATED CELL COUNT, BODY FLUID: 1493 /UL — HIGH (ref 0–5)
TUBE TYPE: SIGNIFICANT CHANGE UP
WBC # BLD: 14.2 K/UL — HIGH (ref 4.8–10.8)
WBC # FLD AUTO: 14.2 K/UL — HIGH (ref 4.8–10.8)

## 2017-05-22 PROCEDURE — 82042 OTHER SOURCE ALBUMIN QUAN EA: CPT

## 2017-05-22 PROCEDURE — 99285 EMERGENCY DEPT VISIT HI MDM: CPT | Mod: 25

## 2017-05-22 PROCEDURE — 88112 CYTOPATH CELL ENHANCE TECH: CPT | Mod: 26

## 2017-05-22 PROCEDURE — 81001 URINALYSIS AUTO W/SCOPE: CPT

## 2017-05-22 PROCEDURE — 83690 ASSAY OF LIPASE: CPT

## 2017-05-22 PROCEDURE — 83735 ASSAY OF MAGNESIUM: CPT

## 2017-05-22 PROCEDURE — 88305 TISSUE EXAM BY PATHOLOGIST: CPT | Mod: 26

## 2017-05-22 PROCEDURE — 49083 ABD PARACENTESIS W/IMAGING: CPT

## 2017-05-22 PROCEDURE — 85027 COMPLETE CBC AUTOMATED: CPT

## 2017-05-22 PROCEDURE — 85730 THROMBOPLASTIN TIME PARTIAL: CPT

## 2017-05-22 PROCEDURE — 86900 BLOOD TYPING SEROLOGIC ABO: CPT

## 2017-05-22 PROCEDURE — 87102 FUNGUS ISOLATION CULTURE: CPT

## 2017-05-22 PROCEDURE — 89051 BODY FLUID CELL COUNT: CPT

## 2017-05-22 PROCEDURE — 87015 SPECIMEN INFECT AGNT CONCNTJ: CPT

## 2017-05-22 PROCEDURE — 87116 MYCOBACTERIA CULTURE: CPT

## 2017-05-22 PROCEDURE — 87206 SMEAR FLUORESCENT/ACID STAI: CPT

## 2017-05-22 PROCEDURE — 86850 RBC ANTIBODY SCREEN: CPT

## 2017-05-22 PROCEDURE — 85610 PROTHROMBIN TIME: CPT

## 2017-05-22 PROCEDURE — 74177 CT ABD & PELVIS W/CONTRAST: CPT

## 2017-05-22 PROCEDURE — 86140 C-REACTIVE PROTEIN: CPT

## 2017-05-22 PROCEDURE — 82150 ASSAY OF AMYLASE: CPT

## 2017-05-22 PROCEDURE — 84100 ASSAY OF PHOSPHORUS: CPT

## 2017-05-22 PROCEDURE — 87205 SMEAR GRAM STAIN: CPT

## 2017-05-22 PROCEDURE — 36415 COLL VENOUS BLD VENIPUNCTURE: CPT

## 2017-05-22 PROCEDURE — 88305 TISSUE EXAM BY PATHOLOGIST: CPT

## 2017-05-22 PROCEDURE — 80053 COMPREHEN METABOLIC PANEL: CPT

## 2017-05-22 PROCEDURE — 84157 ASSAY OF PROTEIN OTHER: CPT

## 2017-05-22 PROCEDURE — 83605 ASSAY OF LACTIC ACID: CPT

## 2017-05-22 PROCEDURE — 82977 ASSAY OF GGT: CPT

## 2017-05-22 PROCEDURE — 87075 CULTR BACTERIA EXCEPT BLOOD: CPT

## 2017-05-22 PROCEDURE — 86901 BLOOD TYPING SEROLOGIC RH(D): CPT

## 2017-05-22 PROCEDURE — 83615 LACTATE (LD) (LDH) ENZYME: CPT

## 2017-05-22 PROCEDURE — 87070 CULTURE OTHR SPECIMN AEROBIC: CPT

## 2017-05-22 PROCEDURE — 88112 CYTOPATH CELL ENHANCE TECH: CPT

## 2017-05-22 PROCEDURE — 82945 GLUCOSE OTHER FLUID: CPT

## 2017-05-22 PROCEDURE — 96372 THER/PROPH/DIAG INJ SC/IM: CPT

## 2017-05-22 RX ADMIN — SODIUM CHLORIDE 3 MILLILITER(S): 9 INJECTION INTRAMUSCULAR; INTRAVENOUS; SUBCUTANEOUS at 13:46

## 2017-05-22 RX ADMIN — MORPHINE SULFATE 4 MILLIGRAM(S): 50 CAPSULE, EXTENDED RELEASE ORAL at 02:38

## 2017-05-22 RX ADMIN — AMLODIPINE BESYLATE 5 MILLIGRAM(S): 2.5 TABLET ORAL at 05:39

## 2017-05-22 RX ADMIN — SODIUM CHLORIDE 3 MILLILITER(S): 9 INJECTION INTRAMUSCULAR; INTRAVENOUS; SUBCUTANEOUS at 05:38

## 2017-05-22 RX ADMIN — MORPHINE SULFATE 4 MILLIGRAM(S): 50 CAPSULE, EXTENDED RELEASE ORAL at 01:33

## 2017-05-22 NOTE — DISCHARGE NOTE ADULT - MEDICATION SUMMARY - MEDICATIONS TO STOP TAKING
I will STOP taking the medications listed below when I get home from the hospital:    nicotine 14 mg/24 hr transdermal film, extended release  -- 1 patch by transdermal patch once a day

## 2017-05-22 NOTE — DISCHARGE NOTE ADULT - PROVIDER TOKENS
TOKEN:'[id number]:'[id number]:'[id number]' TOKEN:'[id number]:'[id number]:'[id number]'

## 2017-05-22 NOTE — DISCHARGE NOTE ADULT - MEDICATION SUMMARY - MEDICATIONS TO TAKE
I will START or STAY ON the medications listed below when I get home from the hospital:    Percocet 5/325 325 mg-5 mg oral tablet  -- 2 tab(s) by mouth every 6 hours, As Needed MDD:max 8 tabs  -- Caution federal law prohibits the transfer of this drug to any person other  than the person for whom it was prescribed.  May cause drowsiness.  Alcohol may intensify this effect.  Use care when operating dangerous machinery.  This prescription cannot be refilled.  This product contains acetaminophen.  Do not use  with any other product containing acetaminophen to prevent possible liver damage.  Using more of this medication than prescribed may cause serious breathing problems.    -- Indication: For Pain    amLODIPine 10 mg oral tablet  -- 0.5 tab(s) by mouth once a day  -- Indication: For htn

## 2017-05-22 NOTE — DISCHARGE NOTE ADULT - CARE PROVIDER_API CALL
Estrada Recio (), Surgical ICU  [address] [place] [address]  Phone: [phone]  Fax: [phone]    Khoi Ch (), Family Medicine  [address] [place] [address] [address]  [place] [address]  Phone: [phone]  Fax: [phone]    Stella Ross), Hematology; Internal Medicine; Medical Oncology  [address] [place] [address]  Phone: [phone]  Fax: [phone] Estrada Recio (), Surgical ICU  [address] [place] [address]  Phone: [phone]  Fax: [phone]    Khoi Ch (), Family Medicine  [address]  [place] [address]  Phone: [phone]  Fax: [phone]    Stella Ross), Hematology; Internal Medicine; Medical Oncology  [address] [place] [address]  Phone: [phone]  Fax: [phone]    Semaj Briones), Gastroenterology  [address] [place] [address]  Phone: [phone]  Fax: [phone]

## 2017-05-22 NOTE — DISCHARGE NOTE ADULT - PLAN OF CARE
s/p paracentesis follow up with oncology and GI stable no pain follow up with surgery no strangulation no longer drinking home meds follow up GI and hematology

## 2017-05-22 NOTE — PROCEDURE NOTE - NSPROCDETAILS_GEN_ALL_CORE
location identified, sterile technique used, catheter introduced, fluid drained/ultrasound utilization

## 2017-05-22 NOTE — DISCHARGE NOTE ADULT - ADDITIONAL INSTRUCTIONS
follow up with surgery, pcp and GI follow up with surgery, pcp and GI  FOLLOW UP WITH [phone] TOMORROW!!!!! Dr BRIONES is going to fit the patient in tomorrow, he musr go tomorrow and tell the  that Dr Briones said it was ok.

## 2017-05-22 NOTE — DISCHARGE NOTE ADULT - HOSPITAL COURSE
56 y/o male with history of HTN, remote hx of etoh induced pancreatitis in 01 who was admitted earlier this month for scrotal edema and bloating and was found to have right sided inguinal hernia he sustained while climbing his roof to get into his house when he got locked out. He was also found to have a pancreatic lesion and evidence of pancreatitis. His tumor markers where negative and he was seen by GI/Onc and was to f/u as o/p at [facility] for EUS with biopsy. He states he called this past weds but has not gotten a call back. He recently saw Dr. Ross but was told until he has tissue diagnosis there is no role for any therapy. He saw his PMD yesterday and was called to come to ED because his "labs where worse. He denies any fever, chills, N/V, SOB, CP, rash. His only complaint is his cont. scrotal swelling for which surgery saw him and at this time there is no acute need for repair of inguinal hernia. CT of abd shows evidence of chronic pancreatitis, pseudocysts and ascites. He has no evidence of SBP clinically. His lipas and amylase have increased and he now has a leukocytosis.       patient admitted to medicine and had ir paracentesis on 5/22 and toelrated it well. patient will see GI tomorrow in office and is stabel for dc home. patient is asymptomatic and has no abd pain and hernia is stable. follow up with GI and surgery.      time spent on dc 32 minutes

## 2017-05-22 NOTE — DISCHARGE NOTE ADULT - CARE PLAN
Principal Discharge DX:	Ascites  Goal:	s/p paracentesis  Instructions for follow-up, activity and diet:	follow up with oncology and GI  Secondary Diagnosis:	Pancreatitis  Goal:	stable  Instructions for follow-up, activity and diet:	no pain  Secondary Diagnosis:	Unilateral inguinal hernia without obstruction or gangrene, recurrence not specified  Goal:	follow up with surgery  Instructions for follow-up, activity and diet:	no strangulation  Secondary Diagnosis:	Alcohol-induced chronic pancreatitis  Goal:	no longer drinking  Secondary Diagnosis:	Essential hypertension  Goal:	home meds  Secondary Diagnosis:	Pancreatic mass  Goal:	follow up GI and hematology

## 2017-05-22 NOTE — DISCHARGE NOTE ADULT - SECONDARY DIAGNOSIS.
Pancreatitis Unilateral inguinal hernia without obstruction or gangrene, recurrence not specified Alcohol-induced chronic pancreatitis Essential hypertension Pancreatic mass

## 2017-05-22 NOTE — DISCHARGE NOTE ADULT - CARE PROVIDERS DIRECT ADDRESSES
,[email or web address],[email or web address] [email or web address],DirectAddress_Unknown,DirectAddress_Unknown

## 2017-05-23 PROBLEM — K85.90 ACUTE PANCREATITIS WITHOUT NECROSIS OR INFECTION, UNSPECIFIED: Chronic | Status: ACTIVE | Noted: 2017-05-21

## 2017-05-23 LAB
NIGHT BLUE STAIN TISS: SIGNIFICANT CHANGE UP
SPECIMEN SOURCE: SIGNIFICANT CHANGE UP

## 2017-05-24 LAB — LDH SERPL L TO P-CCNC: 180 U/L — SIGNIFICANT CHANGE UP

## 2017-05-25 LAB
ALBUMIN SERPL ELPH-MCNC: 3.2 G/DL
ALP BLD-CCNC: 98 U/L
ALT SERPL-CCNC: 17 U/L
AMYLASE/CREAT SERPL: 989 U/L
ANION GAP SERPL CALC-SCNC: 17 MMOL/L
AST SERPL-CCNC: 20 U/L
BASOPHILS # BLD AUTO: 0.6 K/UL
BASOPHILS NFR BLD AUTO: 2.6 %
BILIRUB SERPL-MCNC: 0.2 MG/DL
BUN SERPL-MCNC: 17 MG/DL
CALCIUM SERPL-MCNC: 9.4 MG/DL
CHLORIDE SERPL-SCNC: 109 MMOL/L
CO2 SERPL-SCNC: 20 MMOL/L
CREAT SERPL-MCNC: 0.78 MG/DL
EOSINOPHIL # BLD AUTO: 14.36 K/UL
EOSINOPHIL NFR BLD AUTO: 62.6 %
GLUCOSE SERPL-MCNC: 80 MG/DL
HCT VFR BLD CALC: 39.2 %
HGB BLD-MCNC: 12.9 G/DL
LPL SERPL-CCNC: 1070 U/L
LYMPHOCYTES # BLD AUTO: 0.99 K/UL
LYMPHOCYTES NFR BLD AUTO: 4.3 %
MAN DIFF?: NORMAL
MCHC RBC-ENTMCNC: 32.9 GM/DL
MCHC RBC-ENTMCNC: 34.3 PG
MCV RBC AUTO: 104.3 FL
MONOCYTES # BLD AUTO: 0.99 K/UL
MONOCYTES NFR BLD AUTO: 4.3 %
NEUTROPHILS # BLD AUTO: 5.99 K/UL
NEUTROPHILS NFR BLD AUTO: 26.1 %
NON-GYN CYTOLOGY SPEC: SIGNIFICANT CHANGE UP
PLATELET # BLD AUTO: 365 K/UL
POTASSIUM SERPL-SCNC: 5.8 MMOL/L
PROT SERPL-MCNC: 6 G/DL
RBC # BLD: 3.76 M/UL
RBC # FLD: 14.6 %
SODIUM SERPL-SCNC: 146 MMOL/L
WBC # FLD AUTO: 22.94 K/UL

## 2017-05-27 LAB
CULTURE RESULTS: SIGNIFICANT CHANGE UP
SPECIMEN SOURCE: SIGNIFICANT CHANGE UP

## 2017-06-01 ENCOUNTER — APPOINTMENT (OUTPATIENT)
Dept: TRAUMA SURGERY | Facility: CLINIC | Age: 57
End: 2017-06-01

## 2017-06-01 VITALS
HEART RATE: 99 BPM | BODY MASS INDEX: 20.44 KG/M2 | SYSTOLIC BLOOD PRESSURE: 123 MMHG | RESPIRATION RATE: 16 BRPM | DIASTOLIC BLOOD PRESSURE: 81 MMHG | OXYGEN SATURATION: 98 % | HEIGHT: 71 IN | TEMPERATURE: 98.7 F | WEIGHT: 146 LBS

## 2017-06-01 DIAGNOSIS — F10.10 ALCOHOL ABUSE, UNCOMPLICATED: ICD-10-CM

## 2017-06-01 DIAGNOSIS — Z87.19 PERSONAL HISTORY OF OTHER DISEASES OF THE DIGESTIVE SYSTEM: ICD-10-CM

## 2017-06-02 ENCOUNTER — OUTPATIENT (OUTPATIENT)
Dept: OUTPATIENT SERVICES | Facility: HOSPITAL | Age: 57
LOS: 1 days | End: 2017-06-02
Payer: COMMERCIAL

## 2017-06-02 VITALS
WEIGHT: 141.1 LBS | SYSTOLIC BLOOD PRESSURE: 124 MMHG | DIASTOLIC BLOOD PRESSURE: 66 MMHG | RESPIRATION RATE: 16 BRPM | HEIGHT: 71 IN | HEART RATE: 82 BPM | TEMPERATURE: 97 F

## 2017-06-02 DIAGNOSIS — Z01.818 ENCOUNTER FOR OTHER PREPROCEDURAL EXAMINATION: ICD-10-CM

## 2017-06-02 DIAGNOSIS — K40.90 UNILATERAL INGUINAL HERNIA, WITHOUT OBSTRUCTION OR GANGRENE, NOT SPECIFIED AS RECURRENT: ICD-10-CM

## 2017-06-02 DIAGNOSIS — I10 ESSENTIAL (PRIMARY) HYPERTENSION: ICD-10-CM

## 2017-06-02 DIAGNOSIS — S72.302A UNSPECIFIED FRACTURE OF SHAFT OF LEFT FEMUR, INITIAL ENCOUNTER FOR CLOSED FRACTURE: Chronic | ICD-10-CM

## 2017-06-02 DIAGNOSIS — Z98.890 OTHER SPECIFIED POSTPROCEDURAL STATES: Chronic | ICD-10-CM

## 2017-06-02 DIAGNOSIS — K46.9 UNSPECIFIED ABDOMINAL HERNIA WITHOUT OBSTRUCTION OR GANGRENE: ICD-10-CM

## 2017-06-02 DIAGNOSIS — Z94.5 SKIN TRANSPLANT STATUS: Chronic | ICD-10-CM

## 2017-06-02 LAB
ALBUMIN SERPL ELPH-MCNC: 3 G/DL — LOW (ref 3.3–5.2)
ALP SERPL-CCNC: 111 U/L — SIGNIFICANT CHANGE UP (ref 40–120)
ALT FLD-CCNC: 14 U/L — SIGNIFICANT CHANGE UP
ANION GAP SERPL CALC-SCNC: 12 MMOL/L — SIGNIFICANT CHANGE UP (ref 5–17)
APTT BLD: 31.6 SEC — SIGNIFICANT CHANGE UP (ref 27.5–37.4)
AST SERPL-CCNC: 18 U/L — SIGNIFICANT CHANGE UP
BILIRUB SERPL-MCNC: 0.3 MG/DL — LOW (ref 0.4–2)
BUN SERPL-MCNC: 17 MG/DL — SIGNIFICANT CHANGE UP (ref 8–20)
CALCIUM SERPL-MCNC: 9.4 MG/DL — SIGNIFICANT CHANGE UP (ref 8.6–10.2)
CHLORIDE SERPL-SCNC: 99 MMOL/L — SIGNIFICANT CHANGE UP (ref 98–107)
CO2 SERPL-SCNC: 26 MMOL/L — SIGNIFICANT CHANGE UP (ref 22–29)
CREAT SERPL-MCNC: 0.64 MG/DL — SIGNIFICANT CHANGE UP (ref 0.5–1.3)
GLUCOSE SERPL-MCNC: 115 MG/DL — SIGNIFICANT CHANGE UP (ref 70–115)
HCT VFR BLD CALC: 36.7 % — LOW (ref 42–52)
HGB BLD-MCNC: 12.5 G/DL — LOW (ref 14–18)
INR BLD: 1.2 RATIO — HIGH (ref 0.88–1.16)
MCHC RBC-ENTMCNC: 33.4 PG — HIGH (ref 27–31)
MCHC RBC-ENTMCNC: 34.1 G/DL — SIGNIFICANT CHANGE UP (ref 32–36)
MCV RBC AUTO: 98.1 FL — HIGH (ref 80–94)
PLATELET # BLD AUTO: 453 K/UL — HIGH (ref 150–400)
POTASSIUM SERPL-MCNC: 4.3 MMOL/L — SIGNIFICANT CHANGE UP (ref 3.5–5.3)
POTASSIUM SERPL-SCNC: 4.3 MMOL/L — SIGNIFICANT CHANGE UP (ref 3.5–5.3)
PROT SERPL-MCNC: 6.9 G/DL — SIGNIFICANT CHANGE UP (ref 6.6–8.7)
PROTHROM AB SERPL-ACNC: 13.3 SEC — HIGH (ref 9.8–12.7)
RBC # BLD: 3.74 M/UL — LOW (ref 4.6–6.2)
RBC # FLD: 13.7 % — SIGNIFICANT CHANGE UP (ref 11–15.6)
SODIUM SERPL-SCNC: 137 MMOL/L — SIGNIFICANT CHANGE UP (ref 135–145)
WBC # BLD: 12 K/UL — HIGH (ref 4.8–10.8)
WBC # FLD AUTO: 12 K/UL — HIGH (ref 4.8–10.8)

## 2017-06-02 PROCEDURE — 93010 ELECTROCARDIOGRAM REPORT: CPT

## 2017-06-02 PROCEDURE — 85610 PROTHROMBIN TIME: CPT

## 2017-06-02 PROCEDURE — 85730 THROMBOPLASTIN TIME PARTIAL: CPT

## 2017-06-02 PROCEDURE — 85027 COMPLETE CBC AUTOMATED: CPT

## 2017-06-02 PROCEDURE — 80053 COMPREHEN METABOLIC PANEL: CPT

## 2017-06-02 PROCEDURE — 93005 ELECTROCARDIOGRAM TRACING: CPT

## 2017-06-02 RX ORDER — CEFAZOLIN SODIUM 1 G
2000 VIAL (EA) INJECTION ONCE
Qty: 0 | Refills: 0 | Status: DISCONTINUED | OUTPATIENT
Start: 2017-06-06 | End: 2017-06-21

## 2017-06-02 NOTE — H&P PST ADULT - NSANTHOSAYNRD_GEN_A_CORE
No. DEQUAN screening performed.  STOP BANG Legend: 0-2 = LOW Risk; 3-4 = INTERMEDIATE Risk; 5-8 = HIGH Risk

## 2017-06-02 NOTE — ASU PATIENT PROFILE, ADULT - LEARNING ASSESSMENT (PATIENT) ADDITIONAL COMMENTS
Instructed pt on pre-op instructions, tips for safer surgery, pre-surgical infection prevention instructions, pain management scale and smoking cessation pamphlets given to pt and verbalized understanding of all. Ebola Screening: Negative,

## 2017-06-02 NOTE — H&P PST ADULT - HISTORY OF PRESENT ILLNESS
57 year old male presents with c/o right inguinal hernia. On may 4 th 2017 pt noticed right groin bulge and discomfort. 57 year old male presents with c/o right inguinal hernia. On may 4 th 2017 pt noticed right groin bulge and discomfort. Pt had ascites in May , removed in ER om 5/20/17. Hx pancreatitis. 57 year old male presents with c/o right inguinal hernia. On may 4 th 2017 pt noticed right groin bulge and discomfort. Pt had ascites in May , removed in ER on 5/20/17. Hx pancreatitis.

## 2017-06-02 NOTE — H&P PST ADULT - PMH
Hernia    Hypertension    Pancreatitis    TIA (transient ischemic attack) Ascites  may 2017  Hernia    Hypertension    Pancreatitis    TIA (transient ischemic attack)

## 2017-06-02 NOTE — H&P PST ADULT - NSALCOHOLUSECOMMENT_GEN_ALL_CORE_FT
last drink 2015 . stopped on own hx etoh abuse no counciling last drink 2015 . stopped on own hx etoh abuse no aa

## 2017-06-02 NOTE — ASU PATIENT PROFILE, ADULT - PSH
Fracture of shaft of left femur  1971 compound fracrture left femur  1980 left femur bone spur removed  S/P skin graft using Integra  right calf skin graft

## 2017-06-04 ENCOUNTER — FORM ENCOUNTER (OUTPATIENT)
Age: 57
End: 2017-06-04

## 2017-06-05 ENCOUNTER — APPOINTMENT (OUTPATIENT)
Dept: FAMILY MEDICINE | Facility: CLINIC | Age: 57
End: 2017-06-05

## 2017-06-05 ENCOUNTER — APPOINTMENT (OUTPATIENT)
Dept: RADIOLOGY | Facility: CLINIC | Age: 57
End: 2017-06-05

## 2017-06-05 ENCOUNTER — OUTPATIENT (OUTPATIENT)
Dept: OUTPATIENT SERVICES | Facility: HOSPITAL | Age: 57
LOS: 1 days | End: 2017-06-05
Payer: COMMERCIAL

## 2017-06-05 VITALS
DIASTOLIC BLOOD PRESSURE: 66 MMHG | BODY MASS INDEX: 20.44 KG/M2 | TEMPERATURE: 98.7 F | SYSTOLIC BLOOD PRESSURE: 120 MMHG | WEIGHT: 146 LBS | HEIGHT: 71 IN | HEART RATE: 99 BPM | RESPIRATION RATE: 16 BRPM

## 2017-06-05 DIAGNOSIS — K40.90 UNILATERAL INGUINAL HERNIA, W/OUT OBSTRUCTION OR GANGRENE, NOT SPECIFIED AS RECURRENT: ICD-10-CM

## 2017-06-05 DIAGNOSIS — R18.8 OTHER ASCITES: ICD-10-CM

## 2017-06-05 DIAGNOSIS — K86.2 CYST OF PANCREAS: ICD-10-CM

## 2017-06-05 DIAGNOSIS — Z98.890 OTHER SPECIFIED POSTPROCEDURAL STATES: Chronic | ICD-10-CM

## 2017-06-05 DIAGNOSIS — S72.302A UNSPECIFIED FRACTURE OF SHAFT OF LEFT FEMUR, INITIAL ENCOUNTER FOR CLOSED FRACTURE: Chronic | ICD-10-CM

## 2017-06-05 DIAGNOSIS — I10 ESSENTIAL (PRIMARY) HYPERTENSION: ICD-10-CM

## 2017-06-05 DIAGNOSIS — Z94.5 SKIN TRANSPLANT STATUS: Chronic | ICD-10-CM

## 2017-06-05 PROCEDURE — 71020: CPT | Mod: 26

## 2017-06-05 PROCEDURE — 71046 X-RAY EXAM CHEST 2 VIEWS: CPT

## 2017-06-06 ENCOUNTER — OUTPATIENT (OUTPATIENT)
Dept: OUTPATIENT SERVICES | Facility: HOSPITAL | Age: 57
LOS: 1 days | End: 2017-06-06
Payer: COMMERCIAL

## 2017-06-06 VITALS
RESPIRATION RATE: 16 BRPM | WEIGHT: 141.1 LBS | DIASTOLIC BLOOD PRESSURE: 88 MMHG | HEART RATE: 82 BPM | TEMPERATURE: 99 F | SYSTOLIC BLOOD PRESSURE: 145 MMHG | OXYGEN SATURATION: 100 % | HEIGHT: 71 IN

## 2017-06-06 VITALS
SYSTOLIC BLOOD PRESSURE: 117 MMHG | HEART RATE: 76 BPM | DIASTOLIC BLOOD PRESSURE: 76 MMHG | TEMPERATURE: 98 F | OXYGEN SATURATION: 95 % | RESPIRATION RATE: 16 BRPM

## 2017-06-06 DIAGNOSIS — Z98.890 OTHER SPECIFIED POSTPROCEDURAL STATES: Chronic | ICD-10-CM

## 2017-06-06 DIAGNOSIS — S72.302A UNSPECIFIED FRACTURE OF SHAFT OF LEFT FEMUR, INITIAL ENCOUNTER FOR CLOSED FRACTURE: Chronic | ICD-10-CM

## 2017-06-06 DIAGNOSIS — K40.90 UNILATERAL INGUINAL HERNIA, WITHOUT OBSTRUCTION OR GANGRENE, NOT SPECIFIED AS RECURRENT: ICD-10-CM

## 2017-06-06 DIAGNOSIS — Z94.5 SKIN TRANSPLANT STATUS: Chronic | ICD-10-CM

## 2017-06-06 PROCEDURE — 49520 REREPAIR ING HERNIA REDUCE: CPT

## 2017-06-06 PROCEDURE — 88302 TISSUE EXAM BY PATHOLOGIST: CPT | Mod: 26

## 2017-06-06 PROCEDURE — C1781: CPT

## 2017-06-06 PROCEDURE — 49505 PRP I/HERN INIT REDUC >5 YR: CPT | Mod: RT

## 2017-06-06 PROCEDURE — 88302 TISSUE EXAM BY PATHOLOGIST: CPT

## 2017-06-06 RX ORDER — ACETAMINOPHEN 500 MG
1000 TABLET ORAL ONCE
Qty: 0 | Refills: 0 | Status: DISCONTINUED | OUTPATIENT
Start: 2017-06-06 | End: 2017-06-21

## 2017-06-06 RX ORDER — ONDANSETRON 8 MG/1
4 TABLET, FILM COATED ORAL ONCE
Qty: 0 | Refills: 0 | Status: DISCONTINUED | OUTPATIENT
Start: 2017-06-06 | End: 2017-06-06

## 2017-06-06 RX ORDER — SODIUM CHLORIDE 9 MG/ML
3 INJECTION INTRAMUSCULAR; INTRAVENOUS; SUBCUTANEOUS ONCE
Qty: 0 | Refills: 0 | Status: DISCONTINUED | OUTPATIENT
Start: 2017-06-06 | End: 2017-06-06

## 2017-06-06 RX ORDER — SODIUM CHLORIDE 9 MG/ML
1000 INJECTION, SOLUTION INTRAVENOUS
Qty: 0 | Refills: 0 | Status: DISCONTINUED | OUTPATIENT
Start: 2017-06-06 | End: 2017-06-06

## 2017-06-06 RX ORDER — FENTANYL CITRATE 50 UG/ML
25 INJECTION INTRAVENOUS
Qty: 0 | Refills: 0 | Status: DISCONTINUED | OUTPATIENT
Start: 2017-06-06 | End: 2017-06-06

## 2017-06-06 NOTE — ASU DISCHARGE PLAN (ADULT/PEDIATRIC). - NOTIFY
Bleeding that does not stop/Numbness, color, or temperature change to extremity/Pain not relieved by Medications/Persistent Nausea and Vomiting/Fever greater than 101/Unable to Urinate

## 2017-06-06 NOTE — ASU DISCHARGE PLAN (ADULT/PEDIATRIC). - MEDICATION SUMMARY - MEDICATIONS TO TAKE
I will START or STAY ON the medications listed below when I get home from the hospital:    Aleve 220 mg oral tablet  -- 1 tab(s) by mouth every 8 hours  -- Indication: For as per PMD    Motrin  mg oral tablet  -- 1 tab(s) by mouth every 6 hours  -- Indication: For as per PMD     aspirin 81 mg oral tablet  -- 1 tab(s) by mouth once a day  -- Indication: For as per PMD     amLODIPine 10 mg oral tablet  -- 0.5 tab(s) by mouth once a day  -- 5 mg daily  -- Indication: For as per PMD

## 2017-06-07 ENCOUNTER — TRANSCRIPTION ENCOUNTER (OUTPATIENT)
Age: 57
End: 2017-06-07

## 2017-06-12 LAB
CULTURE RESULTS: SIGNIFICANT CHANGE UP
SPECIMEN SOURCE: SIGNIFICANT CHANGE UP

## 2017-07-05 LAB
CULTURE RESULTS: SIGNIFICANT CHANGE UP
SPECIMEN SOURCE: SIGNIFICANT CHANGE UP

## 2017-07-26 DIAGNOSIS — K64.8 OTHER HEMORRHOIDS: ICD-10-CM

## 2017-07-26 DIAGNOSIS — K57.30 DIVERTICULOSIS OF LARGE INTESTINE W/OUT PERFORATION OR ABSCESS W/OUT BLEEDING: ICD-10-CM

## 2017-07-26 DIAGNOSIS — K63.5 POLYP OF COLON: ICD-10-CM

## 2017-07-27 ENCOUNTER — TRANSCRIPTION ENCOUNTER (OUTPATIENT)
Age: 57
End: 2017-07-27

## 2017-07-29 ENCOUNTER — INPATIENT (INPATIENT)
Facility: HOSPITAL | Age: 57
LOS: 16 days | Discharge: HOME | DRG: 602 | End: 2017-08-15
Attending: INTERNAL MEDICINE | Admitting: INTERNAL MEDICINE
Payer: COMMERCIAL

## 2017-07-29 VITALS
OXYGEN SATURATION: 100 % | SYSTOLIC BLOOD PRESSURE: 161 MMHG | WEIGHT: 139.99 LBS | TEMPERATURE: 98 F | RESPIRATION RATE: 18 BRPM | HEIGHT: 71 IN | HEART RATE: 100 BPM | DIASTOLIC BLOOD PRESSURE: 93 MMHG

## 2017-07-29 DIAGNOSIS — F17.200 NICOTINE DEPENDENCE, UNSPECIFIED, UNCOMPLICATED: ICD-10-CM

## 2017-07-29 DIAGNOSIS — I10 ESSENTIAL (PRIMARY) HYPERTENSION: ICD-10-CM

## 2017-07-29 DIAGNOSIS — Z94.5 SKIN TRANSPLANT STATUS: Chronic | ICD-10-CM

## 2017-07-29 DIAGNOSIS — L03.116 CELLULITIS OF LEFT LOWER LIMB: ICD-10-CM

## 2017-07-29 DIAGNOSIS — L03.119 CELLULITIS OF UNSPECIFIED PART OF LIMB: ICD-10-CM

## 2017-07-29 DIAGNOSIS — S72.302A UNSPECIFIED FRACTURE OF SHAFT OF LEFT FEMUR, INITIAL ENCOUNTER FOR CLOSED FRACTURE: Chronic | ICD-10-CM

## 2017-07-29 LAB
ANISOCYTOSIS BLD QL: SLIGHT — SIGNIFICANT CHANGE UP
APTT BLD: 28.3 SEC — SIGNIFICANT CHANGE UP (ref 27.5–37.4)
BASOPHILS # BLD AUTO: 0.1 K/UL — SIGNIFICANT CHANGE UP (ref 0–0.2)
BASOPHILS NFR BLD AUTO: 0.3 % — SIGNIFICANT CHANGE UP (ref 0–2)
CRP SERPL-MCNC: 20 MG/DL — HIGH (ref 0–0.4)
EOSINOPHIL # BLD AUTO: 4.4 K/UL — HIGH (ref 0–0.5)
EOSINOPHIL NFR BLD AUTO: 21.8 % — HIGH (ref 0–6)
ERYTHROCYTE [SEDIMENTATION RATE] IN BLOOD: 54 MM/HR — HIGH (ref 0–20)
GIANT PLATELETS BLD QL SMEAR: PRESENT — SIGNIFICANT CHANGE UP
HCT VFR BLD CALC: 34.8 % — LOW (ref 42–52)
HGB BLD-MCNC: 11.5 G/DL — LOW (ref 14–18)
HYPOCHROMIA BLD QL: SLIGHT — SIGNIFICANT CHANGE UP
INR BLD: 1.25 RATIO — HIGH (ref 0.88–1.16)
LACTATE BLDV-MCNC: 1.5 MMOL/L — SIGNIFICANT CHANGE UP (ref 0.5–2)
LYMPHOCYTES # BLD AUTO: 1.4 K/UL — SIGNIFICANT CHANGE UP (ref 1–4.8)
LYMPHOCYTES # BLD AUTO: 7.2 % — LOW (ref 20–55)
MACROCYTES BLD QL: SLIGHT — SIGNIFICANT CHANGE UP
MCHC RBC-ENTMCNC: 32.4 PG — HIGH (ref 27–31)
MCHC RBC-ENTMCNC: 33 G/DL — SIGNIFICANT CHANGE UP (ref 32–36)
MCV RBC AUTO: 98 FL — HIGH (ref 80–94)
MICROCYTES BLD QL: SLIGHT — SIGNIFICANT CHANGE UP
MONOCYTES # BLD AUTO: 1.4 K/UL — HIGH (ref 0–0.8)
MONOCYTES NFR BLD AUTO: 6.9 % — SIGNIFICANT CHANGE UP (ref 3–10)
NEUTROPHILS # BLD AUTO: 12.7 K/UL — HIGH (ref 1.8–8)
NEUTROPHILS NFR BLD AUTO: 63.6 % — SIGNIFICANT CHANGE UP (ref 37–73)
NT-PROBNP SERPL-SCNC: 209 PG/ML — SIGNIFICANT CHANGE UP (ref 0–300)
PLAT MORPH BLD: ABNORMAL
PLATELET # BLD AUTO: 381 K/UL — SIGNIFICANT CHANGE UP (ref 150–400)
PLATELET CLUMP BLD QL SMEAR: SIGNIFICANT CHANGE UP
PROTHROM AB SERPL-ACNC: 13.8 SEC — HIGH (ref 9.8–12.7)
RBC # BLD: 3.55 M/UL — LOW (ref 4.6–6.2)
RBC # FLD: 16.4 % — HIGH (ref 11–15.6)
RBC BLD AUTO: ABNORMAL
URATE SERPL-MCNC: 4.2 MG/DL — SIGNIFICANT CHANGE UP (ref 3.4–7)
WBC # BLD: 20 K/UL — HIGH (ref 4.8–10.8)
WBC # FLD AUTO: 20 K/UL — HIGH (ref 4.8–10.8)

## 2017-07-29 PROCEDURE — 73700 CT LOWER EXTREMITY W/O DYE: CPT | Mod: 26,50

## 2017-07-29 PROCEDURE — 99223 1ST HOSP IP/OBS HIGH 75: CPT

## 2017-07-29 PROCEDURE — 99285 EMERGENCY DEPT VISIT HI MDM: CPT | Mod: 25

## 2017-07-29 PROCEDURE — 73630 X-RAY EXAM OF FOOT: CPT | Mod: 26,50

## 2017-07-29 PROCEDURE — 93970 EXTREMITY STUDY: CPT | Mod: 26

## 2017-07-29 RX ORDER — VANCOMYCIN HCL 1 G
1000 VIAL (EA) INTRAVENOUS ONCE
Qty: 0 | Refills: 0 | Status: COMPLETED | OUTPATIENT
Start: 2017-07-29 | End: 2017-07-29

## 2017-07-29 RX ORDER — ASPIRIN/CALCIUM CARB/MAGNESIUM 324 MG
1 TABLET ORAL
Qty: 0 | Refills: 0 | COMMUNITY

## 2017-07-29 RX ORDER — NICOTINE POLACRILEX 2 MG
1 GUM BUCCAL DAILY
Qty: 0 | Refills: 0 | Status: DISCONTINUED | OUTPATIENT
Start: 2017-07-29 | End: 2017-08-15

## 2017-07-29 RX ORDER — CEFEPIME 1 G/1
1000 INJECTION, POWDER, FOR SOLUTION INTRAMUSCULAR; INTRAVENOUS EVERY 12 HOURS
Qty: 0 | Refills: 0 | Status: DISCONTINUED | OUTPATIENT
Start: 2017-07-29 | End: 2017-07-29

## 2017-07-29 RX ORDER — CEFAZOLIN SODIUM 1 G
2000 VIAL (EA) INJECTION EVERY 8 HOURS
Qty: 0 | Refills: 0 | Status: DISCONTINUED | OUTPATIENT
Start: 2017-07-29 | End: 2017-08-03

## 2017-07-29 RX ORDER — AMLODIPINE BESYLATE 2.5 MG/1
5 TABLET ORAL DAILY
Qty: 0 | Refills: 0 | Status: DISCONTINUED | OUTPATIENT
Start: 2017-07-29 | End: 2017-08-15

## 2017-07-29 RX ORDER — CEFAZOLIN SODIUM 1 G
2000 VIAL (EA) INJECTION ONCE
Qty: 0 | Refills: 0 | Status: COMPLETED | OUTPATIENT
Start: 2017-07-29 | End: 2017-07-29

## 2017-07-29 RX ORDER — LACTOBACILLUS ACIDOPHILUS 100MM CELL
2 CAPSULE ORAL
Qty: 0 | Refills: 0 | Status: DISCONTINUED | OUTPATIENT
Start: 2017-07-29 | End: 2017-08-11

## 2017-07-29 RX ORDER — OXYCODONE AND ACETAMINOPHEN 5; 325 MG/1; MG/1
1 TABLET ORAL EVERY 4 HOURS
Qty: 0 | Refills: 0 | Status: DISCONTINUED | OUTPATIENT
Start: 2017-07-29 | End: 2017-07-31

## 2017-07-29 RX ORDER — ENOXAPARIN SODIUM 100 MG/ML
40 INJECTION SUBCUTANEOUS EVERY 24 HOURS
Qty: 0 | Refills: 0 | Status: DISCONTINUED | OUTPATIENT
Start: 2017-07-29 | End: 2017-08-06

## 2017-07-29 RX ORDER — OXYCODONE AND ACETAMINOPHEN 5; 325 MG/1; MG/1
1 TABLET ORAL ONCE
Qty: 0 | Refills: 0 | Status: DISCONTINUED | OUTPATIENT
Start: 2017-07-29 | End: 2017-07-29

## 2017-07-29 RX ORDER — CEFAZOLIN SODIUM 1 G
VIAL (EA) INJECTION
Qty: 0 | Refills: 0 | Status: DISCONTINUED | OUTPATIENT
Start: 2017-07-29 | End: 2017-08-03

## 2017-07-29 RX ADMIN — Medication 100 MILLIGRAM(S): at 19:27

## 2017-07-29 RX ADMIN — OXYCODONE AND ACETAMINOPHEN 1 TABLET(S): 5; 325 TABLET ORAL at 20:17

## 2017-07-29 RX ADMIN — Medication 100 MILLIGRAM(S): at 20:20

## 2017-07-29 RX ADMIN — OXYCODONE AND ACETAMINOPHEN 1 TABLET(S): 5; 325 TABLET ORAL at 15:43

## 2017-07-29 RX ADMIN — ENOXAPARIN SODIUM 40 MILLIGRAM(S): 100 INJECTION SUBCUTANEOUS at 19:28

## 2017-07-29 RX ADMIN — OXYCODONE AND ACETAMINOPHEN 1 TABLET(S): 5; 325 TABLET ORAL at 20:47

## 2017-07-29 RX ADMIN — CEFEPIME 100 MILLIGRAM(S): 1 INJECTION, POWDER, FOR SOLUTION INTRAMUSCULAR; INTRAVENOUS at 14:12

## 2017-07-29 RX ADMIN — Medication 2 TABLET(S): at 19:27

## 2017-07-29 RX ADMIN — OXYCODONE AND ACETAMINOPHEN 1 TABLET(S): 5; 325 TABLET ORAL at 10:37

## 2017-07-29 RX ADMIN — Medication 250 MILLIGRAM(S): at 10:37

## 2017-07-29 RX ADMIN — Medication 1 PATCH: at 14:12

## 2017-07-29 NOTE — CONSULT NOTE ADULT - SUBJECTIVE AND OBJECTIVE BOX
[facility] INFECTIOUS DISEASES AND INTERNAL MEDICINE OF [place]  =======================================================  Lyle Hardy MD [facility]   Kendell Caceres MD  Diplomates American Board of Internal Medicine and Infectious Diseases  =======================================================    [id number]  DON GOOD is a 57y  Male   This 57 y.o. man with HTN, here for left foot infectio  4 days prior to admission,  reported redness and pain to left foot medial dorsum of foot. he was given Clindamycin by his PMD Motrin for possible spider bite. Since then it has been worsening and increasing clinically.       =======================================================  Past Medical & Surgical Hx:  =====================  PAST MEDICAL & SURGICAL HISTORY:  Ascites: may 2017  Hernia  Pancreatitis  Hypertension  TIA (transient ischemic attack)  S/P skin graft using Integra: right calf skin graft  Fracture of shaft of left femur: 1971 compound fracrture left femur  1980 left femur bone spur removed      Problem List:  ==========  HEALTH ISSUES - PROBLEM Dx:     Social Hx:  =======  41 yrs of smoking 1 ppd, no intention of quitting, quit alcohol 2 yrs ago, when in his teens - substance abuse    FAMILY HISTORY:  Family history of duodenal cancer      Allergies    iodine (Swelling)    Intolerances        MEDICATIONS  (STANDING):  cefepime  IVPB 1000 milliGRAM(s) IV Intermittent every 12 hours  nicotine - 21 mG/24Hr(s) Patch 1 patch Transdermal daily  amLODIPine   Tablet 5 milliGRAM(s) Oral daily  enoxaparin Injectable 40 milliGRAM(s) SubCutaneous every 24 hours    MEDICATIONS  (PRN):  oxyCODONE    5 mG/acetaminophen 325 mG 1 Tablet(s) Oral every 4 hours PRN Moderate Pain (4 - 6)        =======================================================  REVIEW OF SYSTEMS:  Constitutional: No fever, No chills, No sweats.  Eye: No icterus, No double vision.  Ear/Nose/Mouth/Throat: No nasal congestion, No sore throat.  Respiratory: No shortness of breath, No cough, No sputum production, No wheezing.  Cardiovascular: No chest pain, No palpitations, No syncope.  Gastrointestinal: No nausea, No vomiting, No diarrhea, No abdominal pain.  Genitourinary: No dysuria, No hematuria, No change in urine stream.  Hematology/Lymphatics: No bleeding tendency.  Endocrine: No excessive thirst, No polyuria.  Immunologic: No malaise.  Musculoskeletal: No back pain, No neck pain, No joint pain, No muscle pain.  Integumentary: No rash, No pruritus, No skin lesion.  Neurologic: No numbness, No tingling, No headache.  Psychiatric: No depression.    =======================================================  I&O's Detail      Physical Exam:  ============  Vital Signs Last 24 Hrs  T(C): 37.7 (29 Jul 2017 16:23), Max: 37.7 (29 Jul 2017 16:23)  T(F): 99.8 (29 Jul 2017 16:23), Max: 99.8 (29 Jul 2017 16:23)  HR: 98 (29 Jul 2017 16:23) (77 - 100)  BP: 142/63 (29 Jul 2017 16:23) (133/73 - 161/93)  BP(mean): --  RR: 18 (29 Jul 2017 16:23) (16 - 18)  SpO2: 95% (29 Jul 2017 16:23) (95% - 100%)  Height (cm): 180.34 (07-29 @ 05:45)  Weight (kg): 63.5 (07-29 @ 05:45)  BMI (kg/m2): 19.5 (07-29 @ 05:45)  BSA (m2): 1.81 (07-29 @ 05:45)  General: Alert and oriented, No acute distress.  Eye: Pupils are equal, round and reactive to light, Extraocular movements are intact, Normal conjunctiva.  HENT: Normocephalic, Oral mucosa is moist, No pharyngeal erythema, No sinus tenderness.  Neck: Supple, No lymphadenopathy.  Respiratory: Lungs are clear to auscultation, Respirations are non-labored.  Cardiovascular: Normal rate, Regular rhythm, No murmur, Good pulses equal in all extremities, No edema.  Gastrointestinal: Soft, Non-tender, Non-distended, Normal bowel sounds.  Genitourinary: No costovertebral angle tenderness.  Lymphatics: No lymphadenopathy neck, axilla, groin.  Musculoskeletal: Normal range of motion, Normal strength.  Integumentary: No rash.  Neurologic: Alert, Oriented, No focal deficits, Cranial Nerves II-XII are grossly intact.  Psychiatric: Appropriate mood & affect.      =======================================================  Labs:  ====    Labs:                              11.5   20.0  )-----------( 381      ( 29 Jul 2017 08:18 )             34.8       PT/INR - ( 29 Jul 2017 08:18 )   PT: 13.8 sec;   INR: 1.25 ratio         PTT - ( 29 Jul 2017 08:18 )  PTT:28.3 sec          CAPILLARY BLOOD GLUCOSE            RECENT CULTURES: [facility] INFECTIOUS DISEASES AND INTERNAL MEDICINE [facility]  =======================================================  Lyle Hardy MD [facility]   Kendell Caceres MD  Diplomates American Board of Internal Medicine and Infectious Diseases  =======================================================    [id number]  DON GOOD is a 57y  Male   This 57 y.o. man with HTN, here for left foot infectio  4 days prior to admission,  reported redness and pain to left foot medial dorsum of foot. He called his PMD Khoi Ch and sent to [facility], seen by PA and  he was given Clindamycin by 300mg PO Q8H for possible spider bite. Since then it has been worsening and increasing clinically.   No trauma reported.       =======================================================  Past Medical & Surgical Hx:  =====================  PAST MEDICAL & SURGICAL HISTORY:  Ascites: may 2017  Hernia  Pancreatitis  Hypertension  TIA (transient ischemic attack)  S/P skin graft using Integra: right calf skin graft  Fracture of shaft of left femur: 1971 compound fracrture left femur  1980 left femur bone spur removed      Problem List:  ==========  HEALTH ISSUES - PROBLEM Dx:     Social Hx:  =======  41 yrs of smoking 1 ppd; used to smoke 2-3 PPD, no intention of quitting, quit alcohol 2 yrs ago, when in his teens - substance abuse  works as     FAMILY HISTORY:  Family history of duodenal cancer      Allergies    iodine (Swelling)    Intolerances        MEDICATIONS  (STANDING):  cefepime  IVPB 1000 milliGRAM(s) IV Intermittent every 12 hours  nicotine - 21 mG/24Hr(s) Patch 1 patch Transdermal daily  amLODIPine   Tablet 5 milliGRAM(s) Oral daily  enoxaparin Injectable 40 milliGRAM(s) SubCutaneous every 24 hours    MEDICATIONS  (PRN):  oxyCODONE    5 mG/acetaminophen 325 mG 1 Tablet(s) Oral every 4 hours PRN Moderate Pain (4 - 6)        =======================================================  REVIEW OF SYSTEMS:  Constitutional: No fever, No chills, No sweats.  Eye: No icterus, No double vision.  Ear/Nose/Mouth/Throat: No nasal congestion, No sore throat.  Respiratory: No shortness of breath, No cough, No sputum production, No wheezing.  Cardiovascular: No chest pain, No palpitations, No syncope.  Gastrointestinal: No nausea, No vomiting, No diarrhea, No abdominal pain.  Genitourinary: No dysuria, No hematuria, No change in urine stream.  Hematology/Lymphatics: No bleeding tendency.  Endocrine: No excessive thirst, No polyuria.  Immunologic: No malaise.  Musculoskeletal: No back pain, No neck pain, No joint pain, No muscle pain.  Integumentary: No rash, No pruritus, No skin lesion.  Neurologic: No numbness, No tingling, No headache.  Psychiatric: No depression.    =======================================================  I&O's Detail      Physical Exam:  ============  Vital Signs Last 24 Hrs  T(C): 37.7 (29 Jul 2017 16:23), Max: 37.7 (29 Jul 2017 16:23)  T(F): 99.8 (29 Jul 2017 16:23), Max: 99.8 (29 Jul 2017 16:23)  HR: 98 (29 Jul 2017 16:23) (77 - 100)  BP: 142/63 (29 Jul 2017 16:23) (133/73 - 161/93)  BP(mean): --  RR: 18 (29 Jul 2017 16:23) (16 - 18)  SpO2: 95% (29 Jul 2017 16:23) (95% - 100%)  Height (cm): 180.34 (07-29 @ 05:45)  Weight (kg): 63.5 (07-29 @ 05:45)  BMI (kg/m2): 19.5 (07-29 @ 05:45)  BSA (m2): 1.81 (07-29 @ 05:45)    General: Alert and oriented, No acute distress.  Eye: Pupils are equal, round and reactive to light, Extraocular movements are intact, Normal conjunctiva.  HENT: Normocephalic, Oral mucosa is moist, No pharyngeal erythema, No sinus tenderness.  Neck: Supple, No lymphadenopathy.  Respiratory: Lungs are clear to auscultation, Respirations are non-labored.  Cardiovascular: Normal rate, Regular rhythm, No murmur, Good pulses equal in all extremities, No edema.  Gastrointestinal: Soft, Non-tender, Non-distended, Normal bowel sounds.  Genitourinary: No costovertebral angle tenderness.  Lymphatics: No lymphadenopathy neck, axilla, groin.  Musculoskeletal: Normal range of motion, Normal strength.  Integumentary: ERYTHEMA & EDEMA of dorsum of the LEFT FOOT and the left 3rd and 4th toes.  ERYTHEMA & EDEMA of RIGHT 4th toe  Neurologic: Alert, Oriented, No focal deficits, Cranial Nerves II-XII are grossly intact.  Psychiatric: Appropriate mood & affect.      =======================================================  Labs:  ====    Labs:                              11.5   20.0  )-----------( 381      ( 29 Jul 2017 08:18 )             34.8       PT/INR - ( 29 Jul 2017 08:18 )   PT: 13.8 sec;   INR: 1.25 ratio         PTT - ( 29 Jul 2017 08:18 )  PTT:28.3 sec

## 2017-07-29 NOTE — ED PROVIDER NOTE - OBJECTIVE STATEMENT
69 year old male with a h/o ascites, pancreatitis, htn  presents with pain to the both legs and swelling. pt went to an outpatient center and has been on clindamycin for 2 days with worsening of his symptoms

## 2017-07-29 NOTE — H&P ADULT - ASSESSMENT
1. Marcus feet abscess/ cellulitis- CT scan; Podiatry and ID consult; cultures sent from ED.   2. Pain issues- on pain medications  3. HTN- COnt home medications  4. prior admission- pancreatic psuedocysts and was recommended to f/u GI outpatient for EUS/ ERCP. No issues now.  Lovenox

## 2017-07-29 NOTE — CONSULT NOTE ADULT - SUBJECTIVE AND OBJECTIVE BOX
Patient is a 57y old  Male who presents with a chief complaint of foot swelling and pain (29 Jul 2017 12:54)       HPI:  Pt noted on Tuesday night, left foot medial aspect on the dorsum area of redness with pain and swelling. HE went to PMD and was given Clinda + Motrin for possible spider bite. Since then it has been worsening and increasing clinically. denies trauma/ fevers, patient denies illicit drug use, no recent travel history , no trauma    PMH- HTN, Inguinal hernia repair right  SH- 41 yrs of smoking 1 ppd, no intention of quitting, quit alcohol 2 yrs ago, when in his teens - substance abuse  meds- amlodipine (29 Jul 2017 12:54)      PAST MEDICAL & SURGICAL HISTORY:  Ascites: may 2017  Hernia  Pancreatitis  Hypertension  TIA (transient ischemic attack)  S/P skin graft using Integra: right calf skin graft  Fracture of shaft of left femur: 1971 compound fracrture left femur  1980 left femur bone spur removed      MEDICATIONS  (STANDING):  cefepime  IVPB 1000 milliGRAM(s) IV Intermittent every 12 hours  nicotine - 21 mG/24Hr(s) Patch 1 patch Transdermal daily  amLODIPine   Tablet 5 milliGRAM(s) Oral daily  enoxaparin Injectable 40 milliGRAM(s) SubCutaneous every 24 hours    MEDICATIONS  (PRN):  oxyCODONE    5 mG/acetaminophen 325 mG 1 Tablet(s) Oral every 4 hours PRN Moderate Pain (4 - 6)      Allergies    iodine (Swelling)    Intolerances        FAMILY HISTORY:  Family history of duodenal cancer                            11.5   20.0  )-----------( 381      ( 29 Jul 2017 08:18 )             34.8                                                          PT/INR - ( 29 Jul 2017 08:18 )   PT: 13.8 sec;   INR: 1.25 ratio         PTT - ( 29 Jul 2017 08:18 )  PTT:28.3 sec    Medical Imaging: X rays negative for fracture or soft tissue emphysema       PE:   GEN: Patient is a 57y well developed, well nourished Male, alert, awake and oriented to person, place and time in no acute distress.   HEENT: NCAT; PERRLA, no appreciable asymmetry noted  Lungs: Non-labored breathing in no respiratory distress  Heart: RRR  Abdomen: NTND, BS x4     Extremities : Bilateral foot edema and erythema extending from digits to midfoot, isolated edema and erythema to right 4th digit , pain on light touch to dorsal foot , no open lesion , no drainage, varicosities visible medial ankle B/L  vascular and neuro status unremarkable to feet with CFT< 3 sec and palpable pedal pulse , No neurological focal deficit with protective sensation intact , no pain on ROM of pedal joints     A/P:  57yMale presents with bilateral foot pain possible cellultis  Patient evaluated   ESR/CRP/Uric acis ordered to R/O gout   continue IV abx   CT pending   pain management per medicine  podiatry will follow in house with

## 2017-07-29 NOTE — ED ADULT TRIAGE NOTE - CHIEF COMPLAINT QUOTE
pt c/o swelling to bilat feet, worse left. began tuesday. placed on clindamycin and ibuprofen by PMD.

## 2017-07-29 NOTE — H&P ADULT - HISTORY OF PRESENT ILLNESS
Pt noted on Tuesday night, left foot medial aspect on the dorsum area of redness with pain and swelling. HE went to PMD and was given Clinda + Motrin for possible spider bite. Since then it has been worsening and increasing clinically. denies trauma/ fevers    PMH- HTN, Inguinal hernia repair right  SH- 41 yrs of smoking 1 ppd, no intention of quitting, quit alcohol 2 yrs ago, when in his teens - substance abuse  meds- amlodipine Pt noted on Tuesday night, left foot medial aspect on the dorsum area of redness with pain and swelling. HE went to PMD and was given Clinda + Motrin for possible spider bite. Since then it has been worsening and increasing clinically. denies trauma/ fevers    PMH- HTN, Inguinal hernia repair right, noted from prior records- pancreatitis  SH- 41 yrs of smoking 1 ppd, no intention of quitting, quit alcohol 2 yrs ago, when in his teens - substance abuse  meds- amlodipine

## 2017-07-29 NOTE — ED ADULT NURSE REASSESSMENT NOTE - NS ED NURSE REASSESS COMMENT FT1
Patient A&OX3, c/o swelling to BLE, worse left extremity. redness and swelling noted to BLE. began Tuesday. placed on clindamycin and ibuprofen by PMD. VSS. clear BBS abd soft, nondistended and nontender. moving all extremities well.

## 2017-07-29 NOTE — H&P ADULT - ADDITIONAL PE
left foot- swelling/ tender/ edema with possible tip point on the medial aspect of dorsum.   right foot- swelling, 4th toe purple with tenderness/ edema/ soft

## 2017-07-29 NOTE — H&P ADULT - NSHPPHYSICALEXAM_GEN_ALL_CORE
ICU Vital Signs Last 24 Hrs  T(C): 36.8 (29 Jul 2017 11:24), Max: 36.8 (29 Jul 2017 11:24)  T(F): 98.2 (29 Jul 2017 11:24), Max: 98.2 (29 Jul 2017 11:24)  HR: 77 (29 Jul 2017 11:24) (77 - 100)  BP: 133/73 (29 Jul 2017 11:24) (133/73 - 161/93)  BP(mean): --  ABP: --  ABP(mean): --  RR: 16 (29 Jul 2017 11:24) (16 - 18)  SpO2: 100% (29 Jul 2017 11:24) (100% - 100%)

## 2017-07-29 NOTE — ED PROVIDER NOTE - CARE PLAN
Principal Discharge DX:	Cellulitis of foot, left  Secondary Diagnosis:	Cellulitis of leg, left  Secondary Diagnosis:	Cellulitis of leg without foot, right

## 2017-07-30 LAB
ANION GAP SERPL CALC-SCNC: 14 MMOL/L — SIGNIFICANT CHANGE UP (ref 5–17)
BUN SERPL-MCNC: 9 MG/DL — SIGNIFICANT CHANGE UP (ref 8–20)
CALCIUM SERPL-MCNC: 7.9 MG/DL — LOW (ref 8.6–10.2)
CHLORIDE SERPL-SCNC: 99 MMOL/L — SIGNIFICANT CHANGE UP (ref 98–107)
CO2 SERPL-SCNC: 22 MMOL/L — SIGNIFICANT CHANGE UP (ref 22–29)
CREAT SERPL-MCNC: 0.4 MG/DL — LOW (ref 0.5–1.3)
GLUCOSE SERPL-MCNC: 100 MG/DL — SIGNIFICANT CHANGE UP (ref 70–115)
HCT VFR BLD CALC: 32 % — LOW (ref 42–52)
HGB BLD-MCNC: 11.2 G/DL — LOW (ref 14–18)
MCHC RBC-ENTMCNC: 34.3 PG — HIGH (ref 27–31)
MCHC RBC-ENTMCNC: 35 G/DL — SIGNIFICANT CHANGE UP (ref 32–36)
MCV RBC AUTO: 97.9 FL — HIGH (ref 80–94)
PLATELET # BLD AUTO: 390 K/UL — SIGNIFICANT CHANGE UP (ref 150–400)
POTASSIUM SERPL-MCNC: 3.5 MMOL/L — SIGNIFICANT CHANGE UP (ref 3.5–5.3)
POTASSIUM SERPL-SCNC: 3.5 MMOL/L — SIGNIFICANT CHANGE UP (ref 3.5–5.3)
RBC # BLD: 3.27 M/UL — LOW (ref 4.6–6.2)
RBC # FLD: 15.9 % — HIGH (ref 11–15.6)
SODIUM SERPL-SCNC: 135 MMOL/L — SIGNIFICANT CHANGE UP (ref 135–145)
WBC # BLD: 16.4 K/UL — HIGH (ref 4.8–10.8)
WBC # FLD AUTO: 16.4 K/UL — HIGH (ref 4.8–10.8)

## 2017-07-30 PROCEDURE — 99233 SBSQ HOSP IP/OBS HIGH 50: CPT

## 2017-07-30 RX ORDER — SODIUM CHLORIDE 9 MG/ML
2000 INJECTION INTRAMUSCULAR; INTRAVENOUS; SUBCUTANEOUS ONCE
Qty: 0 | Refills: 0 | Status: COMPLETED | OUTPATIENT
Start: 2017-07-30 | End: 2017-07-30

## 2017-07-30 RX ORDER — ACETAMINOPHEN 500 MG
650 TABLET ORAL EVERY 6 HOURS
Qty: 0 | Refills: 0 | Status: DISCONTINUED | OUTPATIENT
Start: 2017-07-30 | End: 2017-08-15

## 2017-07-30 RX ORDER — SODIUM CHLORIDE 9 MG/ML
1000 INJECTION INTRAMUSCULAR; INTRAVENOUS; SUBCUTANEOUS
Qty: 0 | Refills: 0 | Status: DISCONTINUED | OUTPATIENT
Start: 2017-07-30 | End: 2017-08-04

## 2017-07-30 RX ORDER — SODIUM CHLORIDE 9 MG/ML
1000 INJECTION INTRAMUSCULAR; INTRAVENOUS; SUBCUTANEOUS ONCE
Qty: 0 | Refills: 0 | Status: COMPLETED | OUTPATIENT
Start: 2017-07-30 | End: 2017-07-30

## 2017-07-30 RX ADMIN — OXYCODONE AND ACETAMINOPHEN 1 TABLET(S): 5; 325 TABLET ORAL at 16:20

## 2017-07-30 RX ADMIN — Medication 100 MILLIGRAM(S): at 19:22

## 2017-07-30 RX ADMIN — OXYCODONE AND ACETAMINOPHEN 1 TABLET(S): 5; 325 TABLET ORAL at 02:35

## 2017-07-30 RX ADMIN — Medication 100 MILLIGRAM(S): at 11:52

## 2017-07-30 RX ADMIN — OXYCODONE AND ACETAMINOPHEN 1 TABLET(S): 5; 325 TABLET ORAL at 20:40

## 2017-07-30 RX ADMIN — Medication 1 PATCH: at 15:40

## 2017-07-30 RX ADMIN — Medication 100 MILLIGRAM(S): at 20:40

## 2017-07-30 RX ADMIN — Medication 100 MILLIGRAM(S): at 03:25

## 2017-07-30 RX ADMIN — OXYCODONE AND ACETAMINOPHEN 1 TABLET(S): 5; 325 TABLET ORAL at 21:10

## 2017-07-30 RX ADMIN — Medication 100 MILLIGRAM(S): at 12:14

## 2017-07-30 RX ADMIN — Medication 2 TABLET(S): at 18:48

## 2017-07-30 RX ADMIN — OXYCODONE AND ACETAMINOPHEN 1 TABLET(S): 5; 325 TABLET ORAL at 08:48

## 2017-07-30 RX ADMIN — Medication 100 MILLIGRAM(S): at 02:06

## 2017-07-30 RX ADMIN — SODIUM CHLORIDE 75 MILLILITER(S): 9 INJECTION INTRAMUSCULAR; INTRAVENOUS; SUBCUTANEOUS at 16:38

## 2017-07-30 RX ADMIN — ENOXAPARIN SODIUM 40 MILLIGRAM(S): 100 INJECTION SUBCUTANEOUS at 18:48

## 2017-07-30 RX ADMIN — OXYCODONE AND ACETAMINOPHEN 1 TABLET(S): 5; 325 TABLET ORAL at 02:05

## 2017-07-30 RX ADMIN — Medication 650 MILLIGRAM(S): at 15:39

## 2017-07-30 RX ADMIN — AMLODIPINE BESYLATE 5 MILLIGRAM(S): 2.5 TABLET ORAL at 04:56

## 2017-07-30 RX ADMIN — Medication 1 PATCH: at 12:14

## 2017-07-30 RX ADMIN — SODIUM CHLORIDE 4000 MILLILITER(S): 9 INJECTION INTRAMUSCULAR; INTRAVENOUS; SUBCUTANEOUS at 15:39

## 2017-07-30 RX ADMIN — OXYCODONE AND ACETAMINOPHEN 1 TABLET(S): 5; 325 TABLET ORAL at 15:38

## 2017-07-30 RX ADMIN — Medication 2 TABLET(S): at 08:54

## 2017-07-30 RX ADMIN — OXYCODONE AND ACETAMINOPHEN 1 TABLET(S): 5; 325 TABLET ORAL at 09:20

## 2017-07-30 RX ADMIN — SODIUM CHLORIDE 4000 MILLILITER(S): 9 INJECTION INTRAMUSCULAR; INTRAVENOUS; SUBCUTANEOUS at 18:13

## 2017-07-30 NOTE — PROGRESS NOTE ADULT - SUBJECTIVE AND OBJECTIVE BOX
HEALTH ISSUES - PROBLEM Dx:  HPI:  Pt noted on Tuesday night, left foot medial aspect on the dorsum area of redness with pain and swelling. HE went to PMD and was given Clinda + Motrin for possible spider bite. Since then it has been worsening and increasing clinically. denies trauma/ fevers    PMH- HTN, Inguinal hernia repair right, noted from prior records- pancreatitis  SH- 41 yrs of smoking 1 ppd, no intention of quitting, quit alcohol 2 yrs ago, when in his teens - substance abuse  meds- amlodipine (29 Jul 2017 12:54)    NOW  cellulitis/ Abscess    INTERVAL HPI/ OVERNIGHT EVENTS:  c/o gen body aches, feet pain, still able to walk to the bathroom and back  denies chest pain, nausea, vomits, cough, SOB, fever, HA    MEDICATIONS  (STANDING):  nicotine - 21 mG/24Hr(s) Patch 1 patch Transdermal daily  amLODIPine   Tablet 5 milliGRAM(s) Oral daily  enoxaparin Injectable 40 milliGRAM(s) SubCutaneous every 24 hours  clindamycin IVPB   IV Intermittent   lactobacillus acidophilus 2 Tablet(s) Oral two times a day with meals  ceFAZolin   IVPB   IV Intermittent   clindamycin IVPB 600 milliGRAM(s) IV Intermittent every 8 hours  ceFAZolin   IVPB 2000 milliGRAM(s) IV Intermittent every 8 hours    MEDICATIONS  (PRN):  oxyCODONE    5 mG/acetaminophen 325 mG 1 Tablet(s) Oral every 4 hours PRN Moderate Pain (4 - 6)      Allergies    iodine (Swelling)    Intolerances        Vital Signs Last 24 Hrs  T(C): 37.4 (30 Jul 2017 08:08), Max: 37.7 (29 Jul 2017 16:23)  T(F): 99.3 (30 Jul 2017 08:08), Max: 99.8 (29 Jul 2017 16:23)  HR: 101 (30 Jul 2017 08:08) (75 - 101)  BP: 124/71 (30 Jul 2017 08:08) (124/71 - 142/63)  BP(mean): --  RR: 19 (30 Jul 2017 08:08) (18 - 20)  SpO2: 96% (29 Jul 2017 23:22) (95% - 100%)    ACCUCHECKS    PHYSICAL EXAM-  GENERAL: NAD, well-groomed, well-developed  HEAD:  Atraumatic, Normocephalic  EYES: EOMI, PERRLA, conjunctiva and sclera clear  ENMT:  Moist mucous membranes, No lesions  NECK: Supple, No JVD, Normal thyroid  NERVOUS SYSTEM:  Alert & Oriented X 3, Motor Strength 5/5 B/L upper and lower extremities;  CHEST/LUNG: Clear to asucultate bilaterally; No rales, rhonchi, wheezing, or rubs  HEART: Regular rate and rhythm; No murmurs, rubs, or gallops  ABDOMEN: Soft, Nontender, Nondistended; Bowel sounds present  EXTREMITIES:  2+ Peripheral Pulses, No clubbing, cyanosis; · left foot- swelling/ tender/ edema with possible tip point on the medial aspect of dorsum; right foot- swelling, 4th toe purple with tenderness/ edema/ soft  LYMPH: No lymphadenopathy noted  SKIN: No rashes or lesions    LABS:                        11.2   16.4  )-----------( 390      ( 30 Jul 2017 08:35 )             32.0     07-30    135  |  99  |  9.0  ----------------------------<  100  3.5   |  22.0  |  0.40<L>    Ca    7.9<L>      30 Jul 2017 08:33      PT/INR - ( 29 Jul 2017 08:18 )   PT: 13.8 sec;   INR: 1.25 ratio         PTT - ( 29 Jul 2017 08:18 )  PTT:28.3 sec    RADIOLOGY & ADDITIONAL TESTS:    Assessment and Plan  DVT Prophylaxis    Discussed with: Patient, family, RN, CM, Consultants  Plan of care/ Discharge planning discussed.    Visit Time:

## 2017-07-30 NOTE — PROGRESS NOTE ADULT - ASSESSMENT
1. Marcus feet abscess/ cellulitis- CT scan no bone involvement or collection; Podiatry and ID consult; cultures sent from ED.   2. Pain issues- on pain medications  3. HTN- Cont home medications  4. prior admission- pancreatic pseudocysts and was recommended to f/u GI outpatient for EUS/ ERCP. No issues now.  Lovenox

## 2017-07-30 NOTE — PROGRESS NOTE ADULT - SUBJECTIVE AND OBJECTIVE BOX
I was called to see this patient by RN who noticed new swelling of right elbow, and less so on left elbow. Patient is not complaining of any new symptoms or physical ailments. RN called attending who asked me to come assess the patient for any progression of admission diagnosis signs and symptoms. I was called to see this patient by RN who noticed new swelling of right elbow, and less so on left elbow. Patient is not complaining of any new symptoms or physical ailments. RN called attending who asked me to come assess the patient for any progression of admission diagnosis signs and symptoms.  Pt found lying in bed in supine, appearing comfortable and in NAD.   Pt is alert and well oriented X 3 and not complaining of any acute pain or discomfort at this time.    PHYSICAL EXAM-  GENERAL: NAD, well-groomed, well-developed  HEAD:  Atraumatic, Normocephalic  EYES: EOMI, PERRLA, conjunctiva and sclera clear  ENMT:  Moist mucous membranes, No lesions  NECK: Supple, No JVD, Normal thyroid  NERVOUS SYSTEM:  Alert & Oriented X 3, Motor Strength 5/5 B/L upper and lower extremities;  CHEST/LUNG: Clear to auscultation  bilaterally; No rales, rhonchi, wheezing, or rubs  HEART: Regular rate and rhythm; No murmurs, rubs, or gallops  ABDOMEN: Soft, Nontender, Nondistended; Bowel sounds present  EXTREMITIES:  2+ Peripheral Pulses, No clubbing, cyanosis; · left foot- swelling/ tender/ edema with possible tip point on the medial aspect of dorsum; right foot- swelling, 4th toe purple with tenderness/ edema/ soft with pustule present in center portion of 4th toe right foot. Several petechiae over both hands and lower ext below elbows. None on either lower extremity.  LYMPH: No lymphadenopathy noted  SKIN: Several petechiae over dorsum both hands extending upwards towards elbows. None on upper arms.  Neuro: Intact, without any deficits except decreased range of motion bilateral extremities related to current swelling and pain on passive motion of upper and lower extremities.      All lab results reviewed, and case update discussed with Dr. Sam who will follow-up with ANGELY ESTEVES.

## 2017-07-31 ENCOUNTER — RESULT REVIEW (OUTPATIENT)
Age: 57
End: 2017-07-31

## 2017-07-31 DIAGNOSIS — M25.50 PAIN IN UNSPECIFIED JOINT: ICD-10-CM

## 2017-07-31 LAB
ALBUMIN SERPL ELPH-MCNC: 1.9 G/DL — LOW (ref 3.3–5.2)
ALP SERPL-CCNC: 158 U/L — HIGH (ref 40–120)
ALT FLD-CCNC: 19 U/L — SIGNIFICANT CHANGE UP
AMYLASE P1 CFR SERPL: 6813 U/L — HIGH (ref 36–128)
ANION GAP SERPL CALC-SCNC: 13 MMOL/L — SIGNIFICANT CHANGE UP (ref 5–17)
APTT BLD: 29.2 SEC — SIGNIFICANT CHANGE UP (ref 27.5–37.4)
AST SERPL-CCNC: 39 U/L — SIGNIFICANT CHANGE UP
BASOPHILS # BLD AUTO: 0 K/UL — SIGNIFICANT CHANGE UP (ref 0–0.2)
BILIRUB SERPL-MCNC: 0.3 MG/DL — LOW (ref 0.4–2)
BUN SERPL-MCNC: 11 MG/DL — SIGNIFICANT CHANGE UP (ref 8–20)
CALCIUM SERPL-MCNC: 7.8 MG/DL — LOW (ref 8.6–10.2)
CHLORIDE SERPL-SCNC: 102 MMOL/L — SIGNIFICANT CHANGE UP (ref 98–107)
CO2 SERPL-SCNC: 20 MMOL/L — LOW (ref 22–29)
CREAT SERPL-MCNC: 0.52 MG/DL — SIGNIFICANT CHANGE UP (ref 0.5–1.3)
EOSINOPHIL # BLD AUTO: 0.6 K/UL — HIGH (ref 0–0.5)
EOSINOPHIL NFR BLD AUTO: 5 % — SIGNIFICANT CHANGE UP (ref 0–6)
GLUCOSE SERPL-MCNC: 112 MG/DL — SIGNIFICANT CHANGE UP (ref 70–115)
GRAM STN FLD: SIGNIFICANT CHANGE UP
HCT VFR BLD CALC: 31.2 % — LOW (ref 42–52)
HGB BLD-MCNC: 10.6 G/DL — LOW (ref 14–18)
INR BLD: 1.34 RATIO — HIGH (ref 0.88–1.16)
LACTATE BLDV-MCNC: 1.5 MMOL/L — SIGNIFICANT CHANGE UP (ref 0.5–2)
LIDOCAIN IGE QN: >3000 U/L — HIGH (ref 22–51)
LYMPHOCYTES # BLD AUTO: 1.4 K/UL — SIGNIFICANT CHANGE UP (ref 1–4.8)
LYMPHOCYTES # BLD AUTO: 5 % — LOW (ref 20–55)
MCHC RBC-ENTMCNC: 33 PG — HIGH (ref 27–31)
MCHC RBC-ENTMCNC: 34 G/DL — SIGNIFICANT CHANGE UP (ref 32–36)
MCV RBC AUTO: 97.2 FL — HIGH (ref 80–94)
MONOCYTES # BLD AUTO: 1.9 K/UL — HIGH (ref 0–0.8)
MONOCYTES NFR BLD AUTO: 9 % — SIGNIFICANT CHANGE UP (ref 3–10)
NEUTROPHILS # BLD AUTO: 20.2 K/UL — HIGH (ref 1.8–8)
NEUTROPHILS NFR BLD AUTO: 79 % — HIGH (ref 37–73)
NEUTS BAND # BLD: 2 % — SIGNIFICANT CHANGE UP (ref 0–8)
PLAT MORPH BLD: NORMAL — SIGNIFICANT CHANGE UP
PLATELET # BLD AUTO: 437 K/UL — HIGH (ref 150–400)
POTASSIUM SERPL-MCNC: 4.1 MMOL/L — SIGNIFICANT CHANGE UP (ref 3.5–5.3)
POTASSIUM SERPL-SCNC: 4.1 MMOL/L — SIGNIFICANT CHANGE UP (ref 3.5–5.3)
PROCALCITONIN SERPL-MCNC: 0.42 NG/ML — HIGH (ref 0–0.04)
PROT SERPL-MCNC: 5.7 G/DL — LOW (ref 6.6–8.7)
PROTHROM AB SERPL-ACNC: 14.8 SEC — HIGH (ref 9.8–12.7)
RBC # BLD: 3.21 M/UL — LOW (ref 4.6–6.2)
RBC # FLD: 15.9 % — HIGH (ref 11–15.6)
RBC BLD AUTO: SIGNIFICANT CHANGE UP
SODIUM SERPL-SCNC: 135 MMOL/L — SIGNIFICANT CHANGE UP (ref 135–145)
SPECIMEN SOURCE: SIGNIFICANT CHANGE UP
WBC # BLD: 24.2 K/UL — HIGH (ref 4.8–10.8)
WBC # FLD AUTO: 24.2 K/UL — HIGH (ref 4.8–10.8)

## 2017-07-31 PROCEDURE — 71010: CPT | Mod: 26

## 2017-07-31 PROCEDURE — 99233 SBSQ HOSP IP/OBS HIGH 50: CPT

## 2017-07-31 PROCEDURE — 88305 TISSUE EXAM BY PATHOLOGIST: CPT | Mod: 26

## 2017-07-31 RX ORDER — SODIUM CHLORIDE 9 MG/ML
2000 INJECTION, SOLUTION INTRAVENOUS ONCE
Qty: 0 | Refills: 0 | Status: DISCONTINUED | OUTPATIENT
Start: 2017-07-31 | End: 2017-07-31

## 2017-07-31 RX ORDER — SODIUM CHLORIDE 9 MG/ML
1000 INJECTION, SOLUTION INTRAVENOUS ONCE
Qty: 0 | Refills: 0 | Status: DISCONTINUED | OUTPATIENT
Start: 2017-07-31 | End: 2017-07-31

## 2017-07-31 RX ORDER — KETOROLAC TROMETHAMINE 30 MG/ML
30 SYRINGE (ML) INJECTION EVERY 6 HOURS
Qty: 0 | Refills: 0 | Status: DISCONTINUED | OUTPATIENT
Start: 2017-07-31 | End: 2017-08-02

## 2017-07-31 RX ORDER — OXYCODONE AND ACETAMINOPHEN 5; 325 MG/1; MG/1
1 TABLET ORAL ONCE
Qty: 0 | Refills: 0 | Status: DISCONTINUED | OUTPATIENT
Start: 2017-07-31 | End: 2017-07-31

## 2017-07-31 RX ORDER — OXYCODONE AND ACETAMINOPHEN 5; 325 MG/1; MG/1
2 TABLET ORAL EVERY 4 HOURS
Qty: 0 | Refills: 0 | Status: DISCONTINUED | OUTPATIENT
Start: 2017-07-31 | End: 2017-08-07

## 2017-07-31 RX ORDER — GABAPENTIN 400 MG/1
300 CAPSULE ORAL EVERY 8 HOURS
Qty: 0 | Refills: 0 | Status: DISCONTINUED | OUTPATIENT
Start: 2017-07-31 | End: 2017-08-15

## 2017-07-31 RX ORDER — OXYCODONE AND ACETAMINOPHEN 5; 325 MG/1; MG/1
1 TABLET ORAL EVERY 4 HOURS
Qty: 0 | Refills: 0 | Status: DISCONTINUED | OUTPATIENT
Start: 2017-07-31 | End: 2017-08-02

## 2017-07-31 RX ORDER — KETOROLAC TROMETHAMINE 30 MG/ML
30 SYRINGE (ML) INJECTION ONCE
Qty: 0 | Refills: 0 | Status: DISCONTINUED | OUTPATIENT
Start: 2017-07-31 | End: 2017-07-31

## 2017-07-31 RX ORDER — SODIUM CHLORIDE 9 MG/ML
800 INJECTION, SOLUTION INTRAVENOUS ONCE
Qty: 0 | Refills: 0 | Status: DISCONTINUED | OUTPATIENT
Start: 2017-07-31 | End: 2017-07-31

## 2017-07-31 RX ADMIN — OXYCODONE AND ACETAMINOPHEN 1 TABLET(S): 5; 325 TABLET ORAL at 12:40

## 2017-07-31 RX ADMIN — OXYCODONE AND ACETAMINOPHEN 1 TABLET(S): 5; 325 TABLET ORAL at 00:47

## 2017-07-31 RX ADMIN — Medication 100 MILLIGRAM(S): at 18:52

## 2017-07-31 RX ADMIN — GABAPENTIN 300 MILLIGRAM(S): 400 CAPSULE ORAL at 21:40

## 2017-07-31 RX ADMIN — SODIUM CHLORIDE 75 MILLILITER(S): 9 INJECTION INTRAMUSCULAR; INTRAVENOUS; SUBCUTANEOUS at 11:27

## 2017-07-31 RX ADMIN — Medication 100 MILLIGRAM(S): at 04:37

## 2017-07-31 RX ADMIN — ENOXAPARIN SODIUM 40 MILLIGRAM(S): 100 INJECTION SUBCUTANEOUS at 17:58

## 2017-07-31 RX ADMIN — Medication 1 PATCH: at 11:46

## 2017-07-31 RX ADMIN — SODIUM CHLORIDE 75 MILLILITER(S): 9 INJECTION INTRAMUSCULAR; INTRAVENOUS; SUBCUTANEOUS at 04:59

## 2017-07-31 RX ADMIN — SODIUM CHLORIDE 75 MILLILITER(S): 9 INJECTION INTRAMUSCULAR; INTRAVENOUS; SUBCUTANEOUS at 07:36

## 2017-07-31 RX ADMIN — Medication 30 MILLIGRAM(S): at 12:03

## 2017-07-31 RX ADMIN — Medication 2 TABLET(S): at 07:36

## 2017-07-31 RX ADMIN — Medication 30 MILLIGRAM(S): at 11:47

## 2017-07-31 RX ADMIN — Medication 1 PATCH: at 13:42

## 2017-07-31 RX ADMIN — AMLODIPINE BESYLATE 5 MILLIGRAM(S): 2.5 TABLET ORAL at 05:00

## 2017-07-31 RX ADMIN — OXYCODONE AND ACETAMINOPHEN 1 TABLET(S): 5; 325 TABLET ORAL at 12:00

## 2017-07-31 RX ADMIN — GABAPENTIN 300 MILLIGRAM(S): 400 CAPSULE ORAL at 13:22

## 2017-07-31 RX ADMIN — OXYCODONE AND ACETAMINOPHEN 1 TABLET(S): 5; 325 TABLET ORAL at 04:59

## 2017-07-31 RX ADMIN — OXYCODONE AND ACETAMINOPHEN 1 TABLET(S): 5; 325 TABLET ORAL at 11:46

## 2017-07-31 RX ADMIN — OXYCODONE AND ACETAMINOPHEN 1 TABLET(S): 5; 325 TABLET ORAL at 01:17

## 2017-07-31 RX ADMIN — OXYCODONE AND ACETAMINOPHEN 2 TABLET(S): 5; 325 TABLET ORAL at 21:40

## 2017-07-31 RX ADMIN — Medication 100 MILLIGRAM(S): at 02:20

## 2017-07-31 RX ADMIN — OXYCODONE AND ACETAMINOPHEN 2 TABLET(S): 5; 325 TABLET ORAL at 22:10

## 2017-07-31 RX ADMIN — Medication 30 MILLIGRAM(S): at 17:53

## 2017-07-31 RX ADMIN — Medication 100 MILLIGRAM(S): at 18:51

## 2017-07-31 RX ADMIN — OXYCODONE AND ACETAMINOPHEN 1 TABLET(S): 5; 325 TABLET ORAL at 00:29

## 2017-07-31 RX ADMIN — Medication 100 MILLIGRAM(S): at 11:27

## 2017-07-31 RX ADMIN — Medication 2 TABLET(S): at 17:53

## 2017-07-31 RX ADMIN — OXYCODONE AND ACETAMINOPHEN 1 TABLET(S): 5; 325 TABLET ORAL at 11:27

## 2017-07-31 RX ADMIN — Medication 30 MILLIGRAM(S): at 18:08

## 2017-07-31 RX ADMIN — Medication 650 MILLIGRAM(S): at 17:53

## 2017-07-31 NOTE — CONSULT NOTE ADULT - SUBJECTIVE AND OBJECTIVE BOX
Chief Complaint:    HPI:  Pt noted on Tuesday night, left foot medial aspect on the dorsum area of redness with pain and swelling. HE went to PMD and was given Clinda + Motrin for possible spider bite. Since then it has been worsening and increasing clinically. denies trauma/ fevers    PMH- HTN, Inguinal hernia repair right, noted from prior records- pancreatitis  SH- 41 yrs of smoking 1 ppd, no intention of quitting, quit alcohol 2 yrs ago, when in his teens - substance abuse  meds- amlodipine (29 Jul 2017 12:54)      PAST MEDICAL & SURGICAL HISTORY:  Ascites: may 2017  Hernia  Pancreatitis  Hypertension  TIA (transient ischemic attack)  S/P skin graft using Integra: right calf skin graft  Fracture of shaft of left femur: 1971 compound fracrture left femur  1980 left femur bone spur removed      FAMILY HISTORY:  Family history of duodenal cancer      SOCIAL HISTORY:  [ ] Denies Smoking, Alcohol, or Drug Use    Allergies    iodine (Swelling)    Intolerances        PAIN MEDICATIONS:  oxyCODONE    5 mG/acetaminophen 325 mG 1 Tablet(s) Oral every 4 hours PRN  acetaminophen   Tablet 650 milliGRAM(s) Oral every 6 hours PRN    Heme:  enoxaparin Injectable 40 milliGRAM(s) SubCutaneous every 24 hours    Antibiotics:  ceFAZolin   IVPB   IV Intermittent   ceFAZolin   IVPB 2000 milliGRAM(s) IV Intermittent every 8 hours    Cardiovascular:  amLODIPine   Tablet 5 milliGRAM(s) Oral daily    GI:    Endocrine:    All Other Medications:  nicotine - 21 mG/24Hr(s) Patch 1 patch Transdermal daily  lactobacillus acidophilus 2 Tablet(s) Oral two times a day with meals  sodium chloride 0.9%. 1000 milliLiter(s) IV Continuous <Continuous>      REVIEW OF SYSTEMS:    CONSTITUTIONAL: No fever, weight loss, or fatigue  EYES: No eye pain, visual disturbances, or discharge  ENMT:  No difficulty hearing, tinnitus, vertigo; No sinus or throat pain  NECK: No pain or stiffness  BREASTS: No pain, masses, or nipple discharge  RESPIRATORY: No cough, wheezing, chills or hemoptysis; No shortness of breath  CARDIOVASCULAR: No chest pain, palpitations, dizziness, or leg swelling  GASTROINTESTINAL: No abdominal or epigastric pain. No nausea, vomiting, or hematemesis; No diarrhea or constipation. No melena or hematochezia.  GENITOURINARY: No dysuria, frequency, hematuria, or incontinence  NEUROLOGICAL: No headaches, memory loss, loss of strength, numbness, or tremors  SKIN: No itching, burning, rashes, or lesions   LYMPH NODES: No enlarged glands  ENDOCRINE: No heat or cold intolerance; No hair loss  MUSCULOSKELETAL: No joint pain or swelling; No muscle, back, or extremity pain  PSYCHIATRIC: No depression, anxiety, mood swings, or difficulty sleeping  HEME/LYMPH: No easy bruising, or bleeding gums  ALLERY AND IMMUNOLOGIC: No hives or eczema      Vital Signs Last 24 Hrs  T(C): 37.1 (31 Jul 2017 07:56), Max: 37.8 (30 Jul 2017 15:28)  T(F): 98.8 (31 Jul 2017 07:56), Max: 100.1 (30 Jul 2017 15:28)  HR: 90 (31 Jul 2017 07:56) (90 - 105)  BP: 123/76 (31 Jul 2017 07:56) (123/76 - 160/89)  BP(mean): --  RR: 18 (31 Jul 2017 07:56) (18 - 20)  SpO2: 94% (31 Jul 2017 07:56) (92% - 98%)    PAIN SCALE:     VNRS (Verbal Numerical Rating Scale)1-10             CONSTITUTIONAL: Well-appearing; well nourished; in no apparent distress.  HEAD: Normocephalic, atraumatic.  EYES: PERRL, EOM intact, conjunctiva and sclera WNL  NECK/LYMPH: Supple, non tender, no cervical lymphadenopathy  LUNGS: Normal chest excursion with respiration  ABD/GI: Normal bowel sounds; non-distended, non-tender, no palpable organomegaly.  Back: No evidence of deformity, or step off noted.  Mild pain to palpation of the para-spinal area.   EXT/MS: Normal ROM in all four extremities; non-tender to palpation; distal pulses are normal.  SKIN: Warm and dry; good skin turgor; no apparrent lesions or exudate.  NEURO: Awake, alert and oriented X3, no gross deficits        LABS:                          11.2   16.4  )-----------( 390      ( 30 Jul 2017 08:35 )             32.0     07-30    135  |  99  |  9.0  ----------------------------<  100  3.5   |  22.0  |  0.40<L>    Ca    7.9<L>      30 Jul 2017 08:33            RADIOLOGY:    Drug Screen:            [x ]  [place]  Reviewed and Copied to Chart    reflects the information as submitted by the pharmacies.  This report was requested by: Allyson Smalls | Reference #: [id number]   Others' Prescriptions  Patient Name:	David Guzman	YOB: 1960	  Address:	[address] [place] [address]	Sex:	Male	    Rx Written	Rx Dispensed	Drug	Quantity	Days Supply	Prescriber Name			  06/06/2017	06/06/2017	oxycodone-acetaminophen 5-325 mg tablet 	20	5	Chris Tipton			  05/22/2017	05/22/2017	oxycodone-acetaminophen 5-325 mg tablet 	24	3	Deep Yates MD			  05/07/2017	05/07/2017	oxycodone-acetaminophen 5-325 mg tablet 	20	5	Deep Yates MD			  * - Drugs marked with an asterisk are compound drugs. If the compound drug is made up of more than one controlled substance, then each controlled substance will Chief Complaint: Joint pain    HPI:  56 y/o male admitted via ED with left foot/ankle joint pain, swelling and redness two days ago. Pain and swelling has progressed to bilateral elbows, left wrist, and right ankle and foot.  Patient reports pain is constant, severe, excruciating 10/10 at it's worst, unable to eat as unable to move wrist, or hold utensils.  Current medication regimen of one (1) Percocet is not effective, unable to sleep, due to pain. Patient admits was treated by he PCP prior to admission with Clindamycin and Motrin for possible spider bite; without resolution. Patient denies fever, chills, nausea, vomiting, tick bite.        PMH- HTN,( Inguinal hernia repair right, noted from prior records- pancreatitis  SH- 41 yrs of smoking 1 ppd, no intention of quitting, quit alcohol 2 yrs ago, when in his teens - substance abuse.)      PAST MEDICAL & SURGICAL HISTORY:  Ascites: may 2017  Hernia  Pancreatitis  Hypertension  TIA (transient ischemic attack)  S/P skin graft using Integra: right calf skin graft  Fracture of shaft of left femur: 1971 compound fracrture left femur  1980 left femur bone spur removed      FAMILY HISTORY:  Family history of duodenal cancer      SOCIAL HISTORY:  [ ] Denies Smoking, Alcohol, or Drug Use    Allergies    iodine (Swelling)    Intolerances        PAIN MEDICATIONS:  oxyCODONE    5 mG/acetaminophen 325 mG 1 Tablet(s) Oral every 4 hours PRN  acetaminophen   Tablet 650 milliGRAM(s) Oral every 6 hours PRN    Heme:  enoxaparin Injectable 40 milliGRAM(s) SubCutaneous every 24 hours    Antibiotics:  ceFAZolin   IVPB   IV Intermittent   ceFAZolin   IVPB 2000 milliGRAM(s) IV Intermittent every 8 hours    Cardiovascular:  amLODIPine   Tablet 5 milliGRAM(s) Oral daily    GI:    Endocrine:    All Other Medications:  nicotine - 21 mG/24Hr(s) Patch 1 patch Transdermal daily  lactobacillus acidophilus 2 Tablet(s) Oral two times a day with meals  sodium chloride 0.9%. 1000 milliLiter(s) IV Continuous <Continuous>      REVIEW OF SYSTEMS:    CONSTITUTIONAL: No fever, weight loss, or fatigue  ENMT:  No difficulty hearing, tinnitus, vertigo; No sinus or throat pain  NECK: No pain or stiffness  RESPIRATORY: No cough, wheezing, chills; No shortness of breath  CARDIOVASCULAR: No chest pain, palpitations,   GASTROINTESTINAL: No abdominal pain. No nausea, vomiting; No diarrhea or constipation.   GENITOURINARY: No dysuria, frequency, hematuria, or incontinence  NEUROLOGICAL: No headaches, memory loss, + loss of strength, due to pain and swelling.  SKIN: No itching, burning,+  rashes, erythema   ENDOCRINE: No heat or cold intolerance; No hair loss  MUSCULOSKELETAL: + joint pain, swelling; No muscle, back, + extremity pain  PSYCHIATRIC: + depression, anxiety, + difficulty sleeping      Vital Signs Last 24 Hrs  T(C): 37.1 (31 Jul 2017 07:56), Max: 37.8 (30 Jul 2017 15:28)  T(F): 98.8 (31 Jul 2017 07:56), Max: 100.1 (30 Jul 2017 15:28)  HR: 90 (31 Jul 2017 07:56) (90 - 105)  BP: 123/76 (31 Jul 2017 07:56) (123/76 - 160/89)  BP(mean): --  RR: 18 (31 Jul 2017 07:56) (18 - 20)  SpO2: 94% (31 Jul 2017 07:56) (92% - 98%)    PAIN SCALE:  10/10   VNRS (Verbal Numerical Rating Scale)1-10             CONSTITUTIONAL: Uncomfortable-appearing; well nourished; in no apparent distress.  HEAD: Normocephalic, atraumatic.  EYES: PERRL, EOM intact, conjunctiva and sclera WNL  NECK/LYMPH: Supple, non tender, no cervical lymphadenopathy  LUNGS: Normal chest excursion with respiration  ABD/GI: Normal bowel sounds; non-distended, non-tender, no palpable organomegaly.  Back: No evidence of deformity, or step off noted.    EXT/MS: Limited ROM, positive tenderness to palpation, bilateral Upper and Lower joints, positive swelling  SKIN: Warm and dry; erythema to ankles, feet; left wrist and bilateral elbow; no exudate  NEURO: Awake, alert and oriented X3, no gross deficits        LABS:                          11.2   16.4  )-----------( 390      ( 30 Jul 2017 08:35 )             32.0     07-30    135  |  99  |  9.0  ----------------------------<  100  3.5   |  22.0  |  0.40<L>    Ca    7.9<L>      30 Jul 2017 08:33            RADIOLOGY:    Drug Screen:            [x ]  [place]  Reviewed and Copied to Chart    reflects the information as submitted by the pharmacies.  This report was requested by: Allyson Smalls | Reference #: [id number]   Others' Prescriptions  Patient Name:	David Guzman	YOB: 1960	  Address:	[address] [place] [address]	Sex:	Male	    Rx Written	Rx Dispensed	Drug	Quantity	Days Supply	Prescriber Name			  06/06/2017	06/06/2017	oxycodone-acetaminophen 5-325 mg tablet 	20	5	Chris Tipton			  05/22/2017	05/22/2017	oxycodone-acetaminophen 5-325 mg tablet 	24	3	Deep Yates MD			  05/07/2017	05/07/2017	oxycodone-acetaminophen 5-325 mg tablet 	20	5	Deep Yates MD			  * - Drugs marked with an asterisk are compound drugs. If the compound drug is made up of more than one controlled substance, then each controlled substance will

## 2017-07-31 NOTE — PROGRESS NOTE ADULT - SUBJECTIVE AND OBJECTIVE BOX
DON GOOD is a 57y Male with HPI:  Pt noted on Tuesday night, left foot medial aspect on the dorsum area of redness with pain and swelling. HE went to PMD and was given Clinda + Motrin for possible spider bite.  PT WITH BILATERAL FOOT EDEMA AND ERYTEHAM LEFT 4HT TOE WITH PUSTUEL ON DIGIT  NOW WITH BILATERAL  ELBOW PAIN   NO FEVERS        Allergies:  iodine (Swelling)      Medications:  nicotine - 21 mG/24Hr(s) Patch 1 patch Transdermal daily  oxyCODONE    5 mG/acetaminophen 325 mG 1 Tablet(s) Oral every 4 hours PRN  amLODIPine   Tablet 5 milliGRAM(s) Oral daily  enoxaparin Injectable 40 milliGRAM(s) SubCutaneous every 24 hours  lactobacillus acidophilus 2 Tablet(s) Oral two times a day with meals  ceFAZolin   IVPB   IV Intermittent   ceFAZolin   IVPB 2000 milliGRAM(s) IV Intermittent every 8 hours  acetaminophen   Tablet 650 milliGRAM(s) Oral every 6 hours PRN  sodium chloride 0.9%. 1000 milliLiter(s) IV Continuous <Continuous>      ANTIBIOTICS:         Review of Systems: - Negative except as mentioned above     Physical Exam:  ICU Vital Signs Last 24 Hrs  T(C): 37.1 (31 Jul 2017 07:56), Max: 37.8 (30 Jul 2017 15:28)  T(F): 98.8 (31 Jul 2017 07:56), Max: 100.1 (30 Jul 2017 15:28)  HR: 90 (31 Jul 2017 07:56) (90 - 105)  BP: 123/76 (31 Jul 2017 07:56) (123/76 - 160/89)  BP(mean): --  ABP: --  ABP(mean): --  RR: 18 (31 Jul 2017 07:56) (18 - 20)  SpO2: 94% (31 Jul 2017 07:56) (92% - 98%)    GEN: NAD, pleasant  HEENT: normocephalic and atraumatic. EOMI. LUDIN...  NECK: Supple. No carotid bruits.  No lymphadenopathy or thyromegaly.  LUNGS: Clear to auscultation.  HEART: Regular rate and rhythm without murmur.  ABDOMEN: Soft, nontender, and nondistended.  Positive bowel sounds.  No hepatosplenomegaly was noted.  NO REBOUND NO GUARDING  EXTREMITIES: BILATERAL FOOT EDEAMADN TENDERNESS LEFT 4TH TOE WITH PUSTULE   TENDER ELBOW  NEUROLOGIC: Cranial nerves II through XII are grossly intact.    SKIN:MACULAR RASH ON LOWER EXTEREMITIES      Labs:    07-30    135  |  99  |  9.0  ----------------------------<  100  3.5   |  22.0  |  0.40<L>    Ca    7.9<L>      30 Jul 2017 08:33                            11.2   16.4  )-----------( 390      ( 30 Jul 2017 08:35 )             32.0                 CAPILLARY BLOOD GLUCOSE

## 2017-07-31 NOTE — CHART NOTE - NSCHARTNOTEFT_GEN_A_CORE
57 year old male patient with previous medical history of hypertension, came into [facility] complaining of pain in his right foot, with some exudate.  Patient admitted with cellulitis of the right foot.  Code sepsis was called because patient had temperature of 101 and heart rate of 104. Patient complains of pain in his elbow, shoulder, hands bilaterally, as well as knees and foot bilaterally. Patient states that he had some pain in his joints all his life, but never had redness. Patient denies diarrhea, denies burning on urination, denies cough, denies abdominal pain.     Vitals:     HR: 104  BP: 120/80  Blood sugar: 120 SpO2: 90% on room air    Physical exam:    General: patient is in distress due to pain, mild respiratory distress  HEENT: EOMI/JUHI, throat no exudates, no erythema    Neck: No JVD, supple, normal thyroid gland  Cardiac: s1/s2/rrr/no s4 or s3, no murmurs  Pulmonary:   expiratory wheezing in the right lung base, otherwise clear to auscultation   Abdominal: soft, no guarding or rebound, bowel sounds positive  Extremities: no calf tednerness, no edema, no cyanosis, erythematous 1 cm lesions on the anterior shins which are painful to palpation, no calf tednress. Right foot with exudate in the 5th toe, with sorrounding erythema demarcated with pen. Erythema and edema of the carpo-metacarpal joint of the left hand 5th digit. Slight erythema of the elbows bilaterally. Decreased active and passive range of motion of the knees.   Neurological: alert and oriented x 3   Capillary refill: < 2 seconds     Labs:                         10.6   24.2  )-----------( 437      ( 31 Jul 2017 18:02 )             31.2       07-30    135  |  99  |  9.0  ----------------------------<  100  3.5   |  22.0  |  0.40<L>    Ca    7.9<L>      30 Jul 2017 08:33    Lactate:   1.5   Procalcitonin: pending     A/p:57 year old male patient with previous medical history of hypertension, came into [facility] complaining of pain in his right foot, with some exudate.  Patient admitted with cellulitis of the right foot.  Code sepsis was called because patient had temperature of 101 and heart rate of 104.    - paient does have exudate of the 5th toe of right foot  - lactate is 1.5, fluids were given at 30 cc/hour but then stopped  - continue with cefazolin for cellulitis of the left foot, pending wound cultures   - blood cultures are negative to date  - procalcitonin is pending  - patient will be started on methylprednisolone 100 mg  - nabil/anca/rf/anti-ds dna/anti-ccp  - case discussed with Dr. Sam who agrees with current management 57 year old male patient with previous medical history of hypertension, came into [facility] complaining of pain in his right foot, with some exudate.  Patient admitted with cellulitis of the right foot.  Code sepsis was called because patient had temperature of 101 and heart rate of 104. Patient complains of pain in his elbow, shoulder, hands bilaterally, as well as knees and foot bilaterally. Patient states that he had some pain in his joints all his life, but never had redness. Patient denies diarrhea, denies burning on urination, denies cough, denies abdominal pain.     Vitals:     HR: 104  BP: 120/80  Blood sugar: 120 SpO2: 90% on room air    Physical exam:    General: patient is in distress due to pain, mild respiratory distress  HEENT: EOMI/JUHI, throat no exudates, no erythema    Neck: No JVD, supple, normal thyroid gland  Cardiac: s1/s2/rrr/no s4 or s3, no murmurs  Pulmonary:   expiratory wheezing in the right lung base, otherwise clear to auscultation   Abdominal: soft, no guarding or rebound, bowel sounds positive  Extremities: no calf tednerness, no edema, no cyanosis, erythematous 1 cm lesions on the anterior shins which are painful to palpation, no calf tednress. Right foot with exudate in the 5th toe, with sorrounding erythema demarcated with pen. Erythema and edema of the carpo-metacarpal and PIP joint of the left hand 5th digit. Slight erythema of the elbows bilaterally. Decreased active and passive range of motion of the knees.   Neurological: alert and oriented x 3   Capillary refill: < 2 seconds     Labs:                         10.6   24.2  )-----------( 437      ( 31 Jul 2017 18:02 )             31.2       07-30    135  |  99  |  9.0  ----------------------------<  100  3.5   |  22.0  |  0.40<L>    Ca    7.9<L>      30 Jul 2017 08:33    Lactate:   1.5   Procalcitonin: pending     A/p:57 year old male patient with previous medical history of hypertension, came into [facility] complaining of pain in his right foot, with some exudate.  Patient admitted with cellulitis of the right foot.  Code sepsis was called because patient had temperature of 101 and heart rate of 104.    - paient does have exudate of the 5th toe of right foot  - lactate is 1.5, fluids were given at 30 cc/hour but then stopped  - continue with cefazolin for cellulitis of the left foot, pending wound cultures   - blood cultures are negative to date  - procalcitonin is pending  - blood cultures, urinalysis, urine cultures, chest x-ray, pt/inr   - patient will be started on methylprednisolone 100 mg  - nabil/anca/rf/anti-ds dna/anti-ccp  - case discussed with Dr. Sam who agrees with current management

## 2017-07-31 NOTE — PROGRESS NOTE ADULT - SUBJECTIVE AND OBJECTIVE BOX
CC: 58y/o male was visited bedside presenting with a cc of  foot swelling, redness and pain b/l    HPI:  Pt noted on Tuesday night, left foot medial aspect on the dorsum area of redness with pain and swelling. HE went to PMD and was given Clinda + Motrin for possible spider bite. Since then it has been worsening and increasing clinically. Denies F/C/N/V/SOB, illicit drug use, no recent travel history , no trauma. Pt is NAD and AAOx3.    PMH- HTN, Inguinal hernia repair right  SH- 41 yrs of smoking 1 ppd, no intention of quitting, quit alcohol 2 yrs ago, when in his teens - substance abuse  Meds- Medications reconciled    PAST MEDICAL & SURGICAL HISTORY:  Ascites: may 2017  Hernia  Pancreatitis  Hypertension  TIA (transient ischemic attack)  S/P skin graft using Integra: right calf skin graft  Fracture of shaft of left femur: 1971 compound fracrture left femur  1980 left femur bone spur removed        Allergies: iodine (Swelling)    FAMILY HISTORY:Family history of duodenal cancer                                                     11.2   16.4  )-----------( 390      ( 30 Jul 2017 08:35 )             32.0       07-30    135  |  99  |  9.0  ----------------------------<  100  3.5   |  22.0  |  0.40<L>    Ca    7.9<L>      30 Jul 2017 08:33    O:  Vasc: 2/4 bounding DP/PT b/l; TG: warm to warm; pedal hair absent with +2 pitting edema dorsally extending up just distal to the anterior ankle joint. Diffuse erythema extending proximally from the digits up to the midfoot, CFT<3secs x10  Neuro: Epicritic sensation unremarkable  Derm: Diffuse erythema b/l, clinical signs of infection are evident (rubor, calor, dolor, tumor) b/l, no open lesion , no drainage, varicosities visible medial ankle B/L  Ortho: NO POP at any of the distal pedal digits     A: B/L pedal cellulitis     P:  Patient evaluated and chart reveiwed   Continue treatment via IV abx   CT: Limited evaluation w/o contrast but no abscess noted or OM    MRI: ordered  Abcess on the dorsal PIPJ of the fourth digit was cultured and drained   Culture was sent to CNS   Wound was dressed with bacitracin and DSD.  Pt tolerated procedure well.   Podiatry will follow in house with

## 2017-07-31 NOTE — PROGRESS NOTE ADULT - SUBJECTIVE AND OBJECTIVE BOX
HEALTH ISSUES - PROBLEM Dx:  HPI:  Pt noted on Tuesday night, left foot medial aspect on the dorsum area of redness with pain and swelling. HE went to PMD and was given Clinda + Motrin for possible spider bite. Since then it has been worsening and increasing clinically. denies trauma/ fevers    PMH- HTN, Inguinal hernia repair right, noted from prior records- pancreatitis  SH- 41 yrs of smoking 1 ppd, no intention of quitting, quit alcohol 2 yrs ago, when in his teens - substance abuse  meds- amlodipine (29 Jul 2017 12:54)    NOW  foot cellulitis, toe abscess, joint swelling, skin rashes    INTERVAL HPI/ OVERNIGHT EVENTS:  overnight with low grade fever and fluid boluses given, responded well. last evening noted right elbow selling and erythema with skin rash macular on dorsum of both hands. this morning noted quarter sized macular lesions on LE. ascending upwards. and ricky wrist joint severe tenderness  denies chest pain, nausea, vomits, cough, SOB, fever, HA    MEDICATIONS  (STANDING):  nicotine - 21 mG/24Hr(s) Patch 1 patch Transdermal daily  amLODIPine   Tablet 5 milliGRAM(s) Oral daily  enoxaparin Injectable 40 milliGRAM(s) SubCutaneous every 24 hours  lactobacillus acidophilus 2 Tablet(s) Oral two times a day with meals  ceFAZolin   IVPB   IV Intermittent   ceFAZolin   IVPB 2000 milliGRAM(s) IV Intermittent every 8 hours  sodium chloride 0.9%. 1000 milliLiter(s) (75 mL/Hr) IV Continuous <Continuous>  ketorolac   Injectable 30 milliGRAM(s) IV Push every 6 hours  gabapentin 300 milliGRAM(s) Oral every 8 hours    MEDICATIONS  (PRN):  acetaminophen   Tablet 650 milliGRAM(s) Oral every 6 hours PRN For Temp greater than 38 C (100.4 F)  oxyCODONE    5 mG/acetaminophen 325 mG 1 Tablet(s) Oral every 4 hours PRN Moderate Pain (4 - 6)  oxyCODONE    5 mG/acetaminophen 325 mG 2 Tablet(s) Oral every 4 hours PRN Severe Pain (7 - 10)      Allergies    iodine (Swelling)    Intolerances        Vital Signs Last 24 Hrs  T(C): 37.1 (31 Jul 2017 07:56), Max: 37.8 (30 Jul 2017 15:28)  T(F): 98.8 (31 Jul 2017 07:56), Max: 100.1 (30 Jul 2017 15:28)  HR: 90 (31 Jul 2017 07:56) (90 - 105)  BP: 123/76 (31 Jul 2017 07:56) (123/76 - 160/89)  BP(mean): --  RR: 18 (31 Jul 2017 07:56) (18 - 20)  SpO2: 94% (31 Jul 2017 07:56) (92% - 98%)    ACCUCHECKS    PHYSICAL EXAM-  GENERAL: NAD, well-groomed, well-developed  HEAD:  Atraumatic, Normocephalic  EYES: EOMI, PERRLA, conjunctiva and sclera clear  ENMT:  Moist mucous membranes, No lesions  NECK: Supple, No JVD, Normal thyroid  NERVOUS SYSTEM:  Alert & Oriented X 3, Motor Strength 5/5 B/L upper and lower extremities;  CHEST/LUNG: Clear to asucultate bilaterally; No rales, rhonchi, wheezing, or rubs  HEART: Regular rate and rhythm; No murmurs, rubs, or gallops  ABDOMEN: Soft, Nontender, Nondistended; Bowel sounds present  EXTREMITIES:  2+ Peripheral Pulses, No clubbing, cyanosis;  left foot- swelling/ tender/ edema with possible tip point on the medial aspect of dorsum; right foot- swelling, 4th toe purple with tenderness/ edema/ soft. right elbow swelling with erythema and tenderness, ricky hands dorsum rash, ricky wrist tenderness, ascending rash ricky LE  LYMPH: No lymphadenopathy noted  SKIN: No rashes or lesions    LABS:                        11.2   16.4  )-----------( 390      ( 30 Jul 2017 08:35 )             32.0     07-30    135  |  99  |  9.0  ----------------------------<  100  3.5   |  22.0  |  0.40<L>    Ca    7.9<L>      30 Jul 2017 08:33          RADIOLOGY & ADDITIONAL TESTS:    Assessment and Plan  DVT Prophylaxis    Discussed with: Patient, family, RN, CM, Consultants  Plan of care/ Discharge planning discussed.    Visit Time:

## 2017-07-31 NOTE — PROGRESS NOTE ADULT - ASSESSMENT
Pt noted on Tuesday night, left foot medial aspect on the dorsum area of redness with pain and swelling. HE went to PMD and was given Clinda + Motrin for possible spider bite.  PT WITH BILATERAL FOOT EDEMA AND ERYTEHAM LEFT 4HT TOE WITH PUSTUEL ON DIGIT  NOW WITH BILATERAL  ELBOW PAIN   NO FEVERS NO ANIAML OR TICK EXPOSURE NO HX STD NO  SX  UNCLEAR ETIOLOGY F JOINT PAIN AND RASH I AM CONCEREND ABOUT AUTOIMMUNE PROCESS  SERUMSICKNESS LIKE ILLNESS   OR VIRAL RECOMMEND RHEUM EVAL ?DERM EVAL  CONSIDER STEROIDS

## 2017-07-31 NOTE — PROGRESS NOTE ADULT - ASSESSMENT
1. Marcus feet abscess/ cellulitis- CT scan no bone involvement or collection; Podiatry and ID consult; cultures sent from ED- testing; with new findings of joint and skin involvement derm opines- panniculitis; skin biopsy done; await rheumatic consult. possibly starting on steroids tomorrow  2. Pain issues- on pain medications; pain management called.   3. HTN- Cont home medications  4. Prior admission- pancreatic pseudocysts and was recommended to f/u GI outpatient for EUS/ ERCP. per pt he went to see Dr Briones and got upper and lower endoscopy WNL. deemed possible alcoholic pancreatitis and was advised to f/u MRI in 6 months for the psuedocysts.  Lovenox
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
## 2017-07-31 NOTE — CHART NOTE - NSCHARTNOTEFT_GEN_A_CORE
Went to reasses patient, he states that he is feeling better with the steroid injections, as well as he can almost bend his hands now, whereas before he couldn't.  Overall he seems improved from code sepsis.

## 2017-08-01 DIAGNOSIS — K86.0 ALCOHOL-INDUCED CHRONIC PANCREATITIS: ICD-10-CM

## 2017-08-01 LAB
ANION GAP SERPL CALC-SCNC: 13 MMOL/L — SIGNIFICANT CHANGE UP (ref 5–17)
BUN SERPL-MCNC: 17 MG/DL — SIGNIFICANT CHANGE UP (ref 8–20)
CALCIUM SERPL-MCNC: 8 MG/DL — LOW (ref 8.6–10.2)
CHLORIDE SERPL-SCNC: 104 MMOL/L — SIGNIFICANT CHANGE UP (ref 98–107)
CO2 SERPL-SCNC: 21 MMOL/L — LOW (ref 22–29)
CREAT SERPL-MCNC: 0.49 MG/DL — LOW (ref 0.5–1.3)
GLUCOSE SERPL-MCNC: 145 MG/DL — HIGH (ref 70–115)
HCT VFR BLD CALC: 30.8 % — LOW (ref 42–52)
HGB BLD-MCNC: 10.3 G/DL — LOW (ref 14–18)
MCHC RBC-ENTMCNC: 32.6 PG — HIGH (ref 27–31)
MCHC RBC-ENTMCNC: 33.4 G/DL — SIGNIFICANT CHANGE UP (ref 32–36)
MCV RBC AUTO: 97.5 FL — HIGH (ref 80–94)
PLATELET # BLD AUTO: 411 K/UL — HIGH (ref 150–400)
POTASSIUM SERPL-MCNC: 4.2 MMOL/L — SIGNIFICANT CHANGE UP (ref 3.5–5.3)
POTASSIUM SERPL-SCNC: 4.2 MMOL/L — SIGNIFICANT CHANGE UP (ref 3.5–5.3)
RBC # BLD: 3.16 M/UL — LOW (ref 4.6–6.2)
RBC # FLD: 16.3 % — HIGH (ref 11–15.6)
SODIUM SERPL-SCNC: 138 MMOL/L — SIGNIFICANT CHANGE UP (ref 135–145)
WBC # BLD: 25 K/UL — HIGH (ref 4.8–10.8)
WBC # FLD AUTO: 25 K/UL — HIGH (ref 4.8–10.8)

## 2017-08-01 PROCEDURE — 99233 SBSQ HOSP IP/OBS HIGH 50: CPT

## 2017-08-01 PROCEDURE — 99223 1ST HOSP IP/OBS HIGH 75: CPT

## 2017-08-01 RX ORDER — ONDANSETRON 8 MG/1
4 TABLET, FILM COATED ORAL ONCE
Qty: 0 | Refills: 0 | Status: COMPLETED | OUTPATIENT
Start: 2017-08-01 | End: 2017-08-01

## 2017-08-01 RX ORDER — PANTOPRAZOLE SODIUM 20 MG/1
40 TABLET, DELAYED RELEASE ORAL
Qty: 0 | Refills: 0 | Status: DISCONTINUED | OUTPATIENT
Start: 2017-08-01 | End: 2017-08-15

## 2017-08-01 RX ORDER — ONDANSETRON 8 MG/1
4 TABLET, FILM COATED ORAL EVERY 4 HOURS
Qty: 0 | Refills: 0 | Status: DISCONTINUED | OUTPATIENT
Start: 2017-08-01 | End: 2017-08-15

## 2017-08-01 RX ADMIN — Medication 100 MILLIGRAM(S): at 12:25

## 2017-08-01 RX ADMIN — ENOXAPARIN SODIUM 40 MILLIGRAM(S): 100 INJECTION SUBCUTANEOUS at 21:32

## 2017-08-01 RX ADMIN — Medication 1 PATCH: at 12:39

## 2017-08-01 RX ADMIN — ONDANSETRON 4 MILLIGRAM(S): 8 TABLET, FILM COATED ORAL at 14:37

## 2017-08-01 RX ADMIN — Medication 30 MILLIGRAM(S): at 01:07

## 2017-08-01 RX ADMIN — Medication 2 TABLET(S): at 07:44

## 2017-08-01 RX ADMIN — Medication 100 MILLIGRAM(S): at 21:32

## 2017-08-01 RX ADMIN — OXYCODONE AND ACETAMINOPHEN 1 TABLET(S): 5; 325 TABLET ORAL at 08:30

## 2017-08-01 RX ADMIN — Medication 100 MILLIGRAM(S): at 02:33

## 2017-08-01 RX ADMIN — Medication 1 PATCH: at 12:25

## 2017-08-01 RX ADMIN — Medication 100 MILLIGRAM(S): at 05:41

## 2017-08-01 RX ADMIN — GABAPENTIN 300 MILLIGRAM(S): 400 CAPSULE ORAL at 21:32

## 2017-08-01 RX ADMIN — SODIUM CHLORIDE 75 MILLILITER(S): 9 INJECTION INTRAMUSCULAR; INTRAVENOUS; SUBCUTANEOUS at 02:33

## 2017-08-01 RX ADMIN — Medication 30 MILLIGRAM(S): at 12:38

## 2017-08-01 RX ADMIN — Medication 30 MILLIGRAM(S): at 07:38

## 2017-08-01 RX ADMIN — OXYCODONE AND ACETAMINOPHEN 2 TABLET(S): 5; 325 TABLET ORAL at 20:10

## 2017-08-01 RX ADMIN — Medication 30 MILLIGRAM(S): at 14:53

## 2017-08-01 RX ADMIN — OXYCODONE AND ACETAMINOPHEN 1 TABLET(S): 5; 325 TABLET ORAL at 07:43

## 2017-08-01 RX ADMIN — Medication 30 MILLIGRAM(S): at 00:52

## 2017-08-01 RX ADMIN — Medication 30 MILLILITER(S): at 21:32

## 2017-08-01 RX ADMIN — Medication 30 MILLIGRAM(S): at 17:18

## 2017-08-01 RX ADMIN — Medication 60 MILLIGRAM(S): at 12:24

## 2017-08-01 RX ADMIN — GABAPENTIN 300 MILLIGRAM(S): 400 CAPSULE ORAL at 05:40

## 2017-08-01 RX ADMIN — Medication 60 MILLIGRAM(S): at 17:17

## 2017-08-01 RX ADMIN — Medication 30 MILLIGRAM(S): at 17:17

## 2017-08-01 RX ADMIN — Medication 30 MILLIGRAM(S): at 05:41

## 2017-08-01 RX ADMIN — SODIUM CHLORIDE 75 MILLILITER(S): 9 INJECTION INTRAMUSCULAR; INTRAVENOUS; SUBCUTANEOUS at 07:44

## 2017-08-01 RX ADMIN — AMLODIPINE BESYLATE 5 MILLIGRAM(S): 2.5 TABLET ORAL at 05:40
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
## 2017-08-01 NOTE — PROGRESS NOTE ADULT - ASSESSMENT
1. Marcus feet abscess/ cellulitis- derm- s/p skin Bx; Rheum consult not done yet despite multiple calls, steroids started. with good response  2. Pain issues- on pain medications; pain management called.   3. HTN- Cont home medications  4. Prior admission- pancreatic pseudocysts and was recommended to f/u GI outpatient for EUS/ ERCP. per pt he went to see Dr Briones and got upper and lower endoscopy WNL. deemed possible alcoholic pancreatitis and was advised to f/u MRI in 6 months for the psuedocysts. amylase/ lipase elevated. per patient outpatient w/u was recommended prior. no acute signs now. will get Gi to opine if repeat imaging is required +/ - other w/u.  Lovenox
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
## 2017-08-01 NOTE — PROGRESS NOTE ADULT - SUBJECTIVE AND OBJECTIVE BOX
CC: 56y/o male was visited bedside presenting with a cc of  foot swelling, redness and pain b/l    HPI:  Pt noted on Tuesday night, left foot medial aspect on the dorsum area of redness with pain and swelling. HE went to PMD and was given Clinda + Motrin for possible spider bite. Since then it has been worsening and increasing clinically. Denies F/C/N/V/SOB, illicit drug use, no recent travel history , no trauma. Pt is NAD and AAOx3. Today, Patient states that his joint pain is aching less and believes it had to do with steroids he received.    PMH- HTN, Inguinal hernia repair right  SH- 41 yrs of smoking 1 ppd, no intention of quitting, quit alcohol 2 yrs ago, when in his teens - substance abuse  Meds- Medications reconciled    PAST MEDICAL & SURGICAL HISTORY:  Ascites: may 2017  Hernia  Pancreatitis  Hypertension  TIA (transient ischemic attack)  S/P skin graft using Integra: right calf skin graft  Fracture of shaft of left femur: 1971 compound fracrture left femur  1980 left femur bone spur removed        Allergies: iodine (Swelling)    FAMILY HISTORY:Family history of duodenal cancer                                                      10.3   25.0  )-----------( 411      ( 01 Aug 2017 07:52 )             30.8     08-01    138  |  104  |  17.0  ----------------------------<  145<H>  4.2   |  21.0<L>  |  0.49<L>    Ca    8.0<L>      01 Aug 2017 07:52    TPro  5.7<L>  /  Alb  1.9<L>  /  TBili  0.3<L>  /  DBili  x   /  AST  39  /  ALT  19  /  AlkPhos  158<H>  07-31      O:  Vasc: 2/4 bounding DP/PT b/l; TG: warm to warm; pedal hair absent with +2 pitting edema dorsally extending up just distal to the anterior ankle joint. Diffuse erythema extending proximally from the digits up to the midfoot, CFT<3secs x10  Neuro: Epicritic sensation unremarkable  Derm: Diffuse erythema b/l, clinical signs of infection are evident (rubor, calor, dolor, tumor) b/l, no open lesion , no drainage, varicosities visible medial ankle B/L; right 4th digit pustule  Ortho: NO POP at any of the distal pedal digits     A: B/L pedal cellulitis     P:  Patient evaluated and chart reveiwed   Continue treatment via IV abx   CT: Limited evaluation w/o contrast but no abscess noted or OM    MRI cancelled after discussion with medicine and the conclusion was made that the issue is systemic and an MRI of the foot would provide little information in use for treatment - awaiting Reumatology consult  Abcess on the dorsal PIPJ of the fourth digit was drained again   Wound was dressed with bacitracin and DSD.  Pt tolerated procedure well.   Podiatry will follow in house with

## 2017-08-01 NOTE — PROGRESS NOTE ADULT - ASSESSMENT
Pt noted on Tuesday night, left foot medial aspect on the dorsum area of redness with pain and swelling. HE went to PMD and was given Clinda + Motrin for possible spider bite.  PT WITH BILATERAL FOOT EDEMA AND ERYTHEMA  LEFT 4TH  TOE WITH PUSTULE  ON DIGIT    WITH BILATERAL  ELBOW PAIN   NO FEVERS NO ANIMAL  OR TICK EXPOSURE NO HX STD NO  SX  UNCLEAR ETIOLOGY OF JOINT PAIN AND RASH I AM CONCERNED ABOUT AUTOIMMUNE PROCESS  DERM EVAL DONE   BX TAKEN ONE  DOSE OF STEROIDS GIVEN LAST NGHT  WILL CONTINUE   AWAIT RHEUM EVAL  MIGRATORY ARTHALGIAS  SERUM SICKNESS  LIKE ILLNESS   OR VIRAL

## 2017-08-01 NOTE — PROGRESS NOTE ADULT - SUBJECTIVE AND OBJECTIVE BOX
DON GOOD is a 57y Male with HPI:  Pt noted on Tuesday night, left foot medial aspect on the dorsum area of redness with pain and swelling. HE went to PMD and was given Clinda + Motrin for possible spider bite.  PT WITH BILATERAL FOOT EDEMA AND ERYTHEMA  LEFT 4TH  TOE WITH PUSTULE  ON DIGIT  NOW WITH BILATERAL  ELBOW PAIN   FEVER LAST PM        Allergies:  iodine (Swelling)      Medications:  nicotine - 21 mG/24Hr(s) Patch 1 patch Transdermal daily  oxyCODONE    5 mG/acetaminophen 325 mG 1 Tablet(s) Oral every 4 hours PRN  amLODIPine   Tablet 5 milliGRAM(s) Oral daily  enoxaparin Injectable 40 milliGRAM(s) SubCutaneous every 24 hours  lactobacillus acidophilus 2 Tablet(s) Oral two times a day with meals  ceFAZolin   IVPB   IV Intermittent   ceFAZolin   IVPB 2000 milliGRAM(s) IV Intermittent every 8 hours  acetaminophen   Tablet 650 milliGRAM(s) Oral every 6 hours PRN  sodium chloride 0.9%. 1000 milliLiter(s) IV Continuous <Continuous>      ANTIBIOTICS:         Review of Systems: - Negative except as mentioned above     Physical Exam:  ICU Vital Signs Last 24 Hrs  T(C): 37.1 (31 Jul 2017 07:56), Max: 37.8 (30 Jul 2017 15:28)  T(F): 98.8 (31 Jul 2017 07:56), Max: 100.1 (30 Jul 2017 15:28)  HR: 90 (31 Jul 2017 07:56) (90 - 105)  BP: 123/76 (31 Jul 2017 07:56) (123/76 - 160/89)  BP(mean): --  ABP: --  ABP(mean): --  RR: 18 (31 Jul 2017 07:56) (18 - 20)  SpO2: 94% (31 Jul 2017 07:56) (92% - 98%)    GEN: NAD, pleasant  HEENT: normocephalic and atraumatic. EOMI. LUDIN...  NECK: Supple. No carotid bruits.  No lymphadenopathy or thyromegaly.  LUNGS: Clear to auscultation.  HEART: Regular rate and rhythm without murmur.  ABDOMEN: Soft, nontender, and nondistended.  Positive bowel sounds.  No hepatosplenomegaly was noted.  NO REBOUND NO GUARDING  EXTREMITIES: BILATERAL FOOT EDEAMADN TENDERNESS LEFT 4TH TOE WITH PUSTULE   TENDER ELBOW  NEUROLOGIC: Cranial nerves II through XII are grossly intact.    SKIN:MACULAR RASH ON LOWER EXTEREMITIES      Labs:                           10.6   24.2  )-----------( 437      ( 31 Jul 2017 18:02 )             31.2   08-01    138  |  104  |  17.0  ----------------------------<  145<H>  4.2   |  21.0<L>  |  0.49<L>    Ca    8.0<L>      01 Aug 2017 07:52    TPro  5.7<L>  /  Alb  1.9<L>  /  TBili  0.3<L>  /  DBili  x   /  AST  39  /  ALT  19  /  AlkPhos  158<H>  07-31                CAPILLARY BLOOD GLUCOSE

## 2017-08-01 NOTE — PROGRESS NOTE ADULT - PROBLEM SELECTOR PLAN 1
1-Patient is stable, improved pain control  2-Meds:   -Continue: Percocet 5/325mg 1-2tabs q4h/prn                  Toradol 30mg/ivp-q6h/x 2dys                  Gabapentin 300mg/q8h  3-Patient agrees with above plan, questions answered to his satisfaction verbalised understanding.  4-Will sign off, reconsult as needed

## 2017-08-01 NOTE — PROGRESS NOTE ADULT - SUBJECTIVE AND OBJECTIVE BOX
HEALTH ISSUES - PROBLEM Dx:  HPI:  Pt noted on Tuesday night, left foot medial aspect on the dorsum area of redness with pain and swelling. HE went to PMD and was given Clinda + Motrin for possible spider bite. Since then it has been worsening and increasing clinically. denies trauma/ fevers    PMH- HTN, Inguinal hernia repair right, noted from prior records- pancreatitis  SH- 41 yrs of smoking 1 ppd, no intention of quitting, quit alcohol 2 yrs ago, when in his teens - substance abuse  meds- amlodipine (29 Jul 2017 12:54)      NOW  foot cellulitis, toe abscess, joint swelling, skin rashes    INTERVAL HPI/ OVERNIGHT EVENTS:  joint pain reduced, elbow joint resolved issue, skin rash much improved. ricky feet edema +, right foot erythema reduced, left foot erythema reducing in size.  denies chest pain, nausea, vomits, cough, SOB, fever, HA      MEDICATIONS  (STANDING):  nicotine - 21 mG/24Hr(s) Patch 1 patch Transdermal daily  amLODIPine   Tablet 5 milliGRAM(s) Oral daily  enoxaparin Injectable 40 milliGRAM(s) SubCutaneous every 24 hours  lactobacillus acidophilus 2 Tablet(s) Oral two times a day with meals  ceFAZolin   IVPB   IV Intermittent   ceFAZolin   IVPB 2000 milliGRAM(s) IV Intermittent every 8 hours  sodium chloride 0.9%. 1000 milliLiter(s) (75 mL/Hr) IV Continuous <Continuous>  ketorolac   Injectable 30 milliGRAM(s) IV Push every 6 hours  gabapentin 300 milliGRAM(s) Oral every 8 hours  methylPREDNISolone sodium succinate Injectable 60 milliGRAM(s) IV Push four times a day    MEDICATIONS  (PRN):  acetaminophen   Tablet 650 milliGRAM(s) Oral every 6 hours PRN For Temp greater than 38 C (100.4 F)  oxyCODONE    5 mG/acetaminophen 325 mG 1 Tablet(s) Oral every 4 hours PRN Moderate Pain (4 - 6)  oxyCODONE    5 mG/acetaminophen 325 mG 2 Tablet(s) Oral every 4 hours PRN Severe Pain (7 - 10)      Allergies    iodine (Swelling)    Intolerances        Vital Signs Last 24 Hrs  T(C): 37 (01 Aug 2017 16:08), Max: 38.3 (31 Jul 2017 17:34)  T(F): 98.6 (01 Aug 2017 16:08), Max: 101 (31 Jul 2017 17:34)  HR: 81 (01 Aug 2017 16:08) (76 - 114)  BP: 140/80 (01 Aug 2017 16:08) (111/69 - 160/70)  BP(mean): --  RR: 18 (01 Aug 2017 16:08) (18 - 20)  SpO2: 91% (01 Aug 2017 08:06) (90% - 91%)    ACCUCHECKS    PHYSICAL EXAM-  GENERAL: NAD, well-groomed, well-developed  HEAD:  Atraumatic, Normocephalic  EYES: EOMI, PERRLA, conjunctiva and sclera clear  ENMT:  Moist mucous membranes, No lesions  NECK: Supple, No JVD, Normal thyroid  NERVOUS SYSTEM:  Alert & Oriented X 3, Motor Strength 5/5 B/L upper and lower extremities;  CHEST/LUNG: Clear to asucultate bilaterally; No rales, rhonchi, wheezing, or rubs  HEART: Regular rate and rhythm; No murmurs, rubs, or gallops  ABDOMEN: Soft, Nontender, Nondistended; Bowel sounds present  EXTREMITIES:  2+ Peripheral Pulses, joint pain reduced, elbow joint resolved issue, skin rash much improved. ricky feet edema +, right foot erythema reduced, left foot erythema reducing in size  LYMPH: No lymphadenopathy noted  SKIN: No rashes or lesions    LABS:                        10.3   25.0  )-----------( 411      ( 01 Aug 2017 07:52 )             30.8     08-01    138  |  104  |  17.0  ----------------------------<  145<H>  4.2   |  21.0<L>  |  0.49<L>    Ca    8.0<L>      01 Aug 2017 07:52    TPro  5.7<L>  /  Alb  1.9<L>  /  TBili  0.3<L>  /  DBili  x   /  AST  39  /  ALT  19  /  AlkPhos  158<H>  07-31    PT/INR - ( 31 Jul 2017 18:02 )   PT: 14.8 sec;   INR: 1.34 ratio         PTT - ( 31 Jul 2017 18:02 )  PTT:29.2 sec    RADIOLOGY & ADDITIONAL TESTS:    Assessment and Plan  DVT Prophylaxis    Discussed with: Patient, family, RN, CM, Consultants  Plan of care/ Discharge planning discussed.    Visit Time:

## 2017-08-01 NOTE — PROGRESS NOTE ADULT - ASSESSMENT
Patient seen and examined at bedside, improved pain relief, with adjustment of medication regimen.  Patient states able to change position with greater ease, although pain persists, able to tolerate. No adverse side effects. Diet as tolerated

## 2017-08-02 LAB
ANA TITR SER: NEGATIVE — SIGNIFICANT CHANGE UP
APPEARANCE UR: CLEAR — SIGNIFICANT CHANGE UP
AUTO DIFF PNL BLD: NEGATIVE — SIGNIFICANT CHANGE UP
BACTERIA # UR AUTO: ABNORMAL
BILIRUB UR-MCNC: NEGATIVE — SIGNIFICANT CHANGE UP
C-ANCA SER-ACNC: NEGATIVE — SIGNIFICANT CHANGE UP
COLOR SPEC: YELLOW — SIGNIFICANT CHANGE UP
DIFF PNL FLD: NEGATIVE — SIGNIFICANT CHANGE UP
EPI CELLS # UR: SIGNIFICANT CHANGE UP
GLUCOSE UR QL: NEGATIVE MG/DL — SIGNIFICANT CHANGE UP
HAV IGM SER-ACNC: SIGNIFICANT CHANGE UP
HBV CORE IGM SER-ACNC: SIGNIFICANT CHANGE UP
HBV SURFACE AG SER-ACNC: SIGNIFICANT CHANGE UP
HCV AB S/CO SERPL IA: 0.14 S/CO — SIGNIFICANT CHANGE UP
HCV AB SERPL-IMP: SIGNIFICANT CHANGE UP
KETONES UR-MCNC: NEGATIVE — SIGNIFICANT CHANGE UP
LEUKOCYTE ESTERASE UR-ACNC: NEGATIVE — SIGNIFICANT CHANGE UP
NITRITE UR-MCNC: NEGATIVE — SIGNIFICANT CHANGE UP
P-ANCA SER-ACNC: NEGATIVE — SIGNIFICANT CHANGE UP
PH UR: 6 — SIGNIFICANT CHANGE UP (ref 5–8)
PROT UR-MCNC: 15 MG/DL
RBC CASTS # UR COMP ASSIST: SIGNIFICANT CHANGE UP /HPF (ref 0–4)
RHEUMATOID FACT SERPL-ACNC: 416 IU/ML — HIGH (ref 0–13.9)
SP GR SPEC: 1.01 — SIGNIFICANT CHANGE UP (ref 1.01–1.02)
SURGICAL PATHOLOGY FINAL REPORT - CH: SIGNIFICANT CHANGE UP
UROBILINOGEN FLD QL: NEGATIVE MG/DL — SIGNIFICANT CHANGE UP
WBC UR QL: SIGNIFICANT CHANGE UP

## 2017-08-02 PROCEDURE — 99233 SBSQ HOSP IP/OBS HIGH 50: CPT

## 2017-08-02 PROCEDURE — 99255 IP/OBS CONSLTJ NEW/EST HI 80: CPT

## 2017-08-02 RX ORDER — LACTULOSE 10 G/15ML
10 SOLUTION ORAL ONCE
Qty: 0 | Refills: 0 | Status: COMPLETED | OUTPATIENT
Start: 2017-08-02 | End: 2017-08-02

## 2017-08-02 RX ADMIN — Medication 60 MILLIGRAM(S): at 00:19

## 2017-08-02 RX ADMIN — Medication 100 MILLIGRAM(S): at 03:05

## 2017-08-02 RX ADMIN — Medication 60 MILLIGRAM(S): at 16:43

## 2017-08-02 RX ADMIN — SODIUM CHLORIDE 75 MILLILITER(S): 9 INJECTION INTRAMUSCULAR; INTRAVENOUS; SUBCUTANEOUS at 21:47

## 2017-08-02 RX ADMIN — Medication 60 MILLIGRAM(S): at 12:51

## 2017-08-02 RX ADMIN — Medication 30 MILLIGRAM(S): at 12:51

## 2017-08-02 RX ADMIN — Medication 100 MILLIGRAM(S): at 16:42

## 2017-08-02 RX ADMIN — Medication 30 MILLIGRAM(S): at 12:30

## 2017-08-02 RX ADMIN — Medication 1 PATCH: at 13:00

## 2017-08-02 RX ADMIN — Medication 100 MILLIGRAM(S): at 12:52

## 2017-08-02 RX ADMIN — ENOXAPARIN SODIUM 40 MILLIGRAM(S): 100 INJECTION SUBCUTANEOUS at 16:43

## 2017-08-02 RX ADMIN — Medication 1 PATCH: at 12:52

## 2017-08-02 RX ADMIN — GABAPENTIN 300 MILLIGRAM(S): 400 CAPSULE ORAL at 21:47

## 2017-08-02 RX ADMIN — Medication 30 MILLIGRAM(S): at 06:05

## 2017-08-02 RX ADMIN — OXYCODONE AND ACETAMINOPHEN 1 TABLET(S): 5; 325 TABLET ORAL at 12:58

## 2017-08-02 RX ADMIN — Medication 30 MILLIGRAM(S): at 00:19

## 2017-08-02 RX ADMIN — Medication 2 TABLET(S): at 16:43

## 2017-08-02 RX ADMIN — OXYCODONE AND ACETAMINOPHEN 1 TABLET(S): 5; 325 TABLET ORAL at 10:30

## 2017-08-02 RX ADMIN — OXYCODONE AND ACETAMINOPHEN 2 TABLET(S): 5; 325 TABLET ORAL at 20:07

## 2017-08-02 RX ADMIN — Medication 2 TABLET(S): at 07:51

## 2017-08-02 RX ADMIN — PANTOPRAZOLE SODIUM 40 MILLIGRAM(S): 20 TABLET, DELAYED RELEASE ORAL at 05:50

## 2017-08-02 RX ADMIN — OXYCODONE AND ACETAMINOPHEN 1 TABLET(S): 5; 325 TABLET ORAL at 09:28

## 2017-08-02 RX ADMIN — Medication 30 MILLIGRAM(S): at 00:35

## 2017-08-02 RX ADMIN — LACTULOSE 10 GRAM(S): 10 SOLUTION ORAL at 16:45

## 2017-08-02 RX ADMIN — AMLODIPINE BESYLATE 5 MILLIGRAM(S): 2.5 TABLET ORAL at 05:48

## 2017-08-02 RX ADMIN — OXYCODONE AND ACETAMINOPHEN 1 TABLET(S): 5; 325 TABLET ORAL at 13:40

## 2017-08-02 RX ADMIN — Medication 60 MILLIGRAM(S): at 05:49

## 2017-08-02 RX ADMIN — SODIUM CHLORIDE 75 MILLILITER(S): 9 INJECTION INTRAMUSCULAR; INTRAVENOUS; SUBCUTANEOUS at 07:51

## 2017-08-02 RX ADMIN — OXYCODONE AND ACETAMINOPHEN 2 TABLET(S): 5; 325 TABLET ORAL at 19:37

## 2017-08-02 RX ADMIN — GABAPENTIN 300 MILLIGRAM(S): 400 CAPSULE ORAL at 15:45

## 2017-08-02 RX ADMIN — GABAPENTIN 300 MILLIGRAM(S): 400 CAPSULE ORAL at 05:48

## 2017-08-02 RX ADMIN — Medication 30 MILLIGRAM(S): at 05:48

## 2017-08-02 NOTE — PROGRESS NOTE ADULT - SUBJECTIVE AND OBJECTIVE BOX
HEALTH ISSUES - PROBLEM Dx:  HPI:  Pt noted on Tuesday night, left foot medial aspect on the dorsum area of redness with pain and swelling. HE went to PMD and was given Clinda + Motrin for possible spider bite. Since then it has been worsening and increasing clinically. denies trauma/ fevers    PMH- HTN, Inguinal hernia repair right, noted from prior records- pancreatitis  SH- 41 yrs of smoking 1 ppd, no intention of quitting, quit alcohol 2 yrs ago, when in his teens - substance abuse  meds- amlodipine (29 Jul 2017 12:54)      NOW  foot cellulitis, toe abscess, joint swelling, skin rashes    INTERVAL HPI/ OVERNIGHT EVENTS:  joint pain reduced, elbow joint resolved issue, skin rash much improved. ricky feet edema +, right foot erythema reduced, left foot erythema reducing in size.  denies chest pain, nausea, vomits, cough, SOB, fever, HA      MEDICATIONS  (STANDING):  nicotine - 21 mG/24Hr(s) Patch 1 patch Transdermal daily  amLODIPine   Tablet 5 milliGRAM(s) Oral daily  enoxaparin Injectable 40 milliGRAM(s) SubCutaneous every 24 hours  lactobacillus acidophilus 2 Tablet(s) Oral two times a day with meals  ceFAZolin   IVPB   IV Intermittent   ceFAZolin   IVPB 2000 milliGRAM(s) IV Intermittent every 8 hours  sodium chloride 0.9%. 1000 milliLiter(s) (75 mL/Hr) IV Continuous <Continuous>  gabapentin 300 milliGRAM(s) Oral every 8 hours  methylPREDNISolone sodium succinate Injectable 60 milliGRAM(s) IV Push four times a day  pantoprazole    Tablet 40 milliGRAM(s) Oral before breakfast  lactulose Syrup 10 Gram(s) Oral once    MEDICATIONS  (PRN):  acetaminophen   Tablet 650 milliGRAM(s) Oral every 6 hours PRN For Temp greater than 38 C (100.4 F)  oxyCODONE    5 mG/acetaminophen 325 mG 1 Tablet(s) Oral every 4 hours PRN Moderate Pain (4 - 6)  oxyCODONE    5 mG/acetaminophen 325 mG 2 Tablet(s) Oral every 4 hours PRN Severe Pain (7 - 10)  aluminum hydroxide/magnesium hydroxide/simethicone Suspension 30 milliLiter(s) Oral every 6 hours PRN Dyspepsia  ondansetron Injectable 4 milliGRAM(s) IV Push every 4 hours PRN Nausea and/or Vomiting      Allergies    iodine (Swelling)    Intolerances        Vital Signs Last 24 Hrs  T(C): 36.8 (02 Aug 2017 07:40), Max: 36.9 (02 Aug 2017 00:33)  T(F): 98.3 (02 Aug 2017 07:40), Max: 98.4 (02 Aug 2017 00:33)  HR: 86 (02 Aug 2017 07:40) (78 - 86)  BP: 131/70 (02 Aug 2017 07:40) (131/70 - 147/84)  BP(mean): --  RR: 18 (02 Aug 2017 07:40) (18 - 18)  SpO2: 98% (02 Aug 2017 00:33) (98% - 98%)    ACCUCHECKS    PHYSICAL EXAM-  GENERAL: NAD, well-groomed, well-developed  HEAD:  Atraumatic, Normocephalic  EYES: EOMI, PERRLA, conjunctiva and sclera clear  ENMT:  Moist mucous membranes, No lesions  NECK: Supple, No JVD, Normal thyroid  NERVOUS SYSTEM:  Alert & Oriented X 3, Motor Strength 5/5 B/L upper and lower extremities;  CHEST/LUNG: Clear to asucultate bilaterally; No rales, rhonchi, wheezing, or rubs  HEART: Regular rate and rhythm; No murmurs, rubs, or gallops  ABDOMEN: Soft, Nontender, Nondistended; Bowel sounds present  EXTREMITIES:  2+ Peripheral Pulses, joint pain reduced, elbow joint resolved issue, skin rash much improved. ricky feet edema +, right foot erythema reduced, left foot erythema reducing in size  LYMPH: No lymphadenopathy noted  SKIN: No rashes or lesions    LABS:                        10.3   25.0  )-----------( 411      ( 01 Aug 2017 07:52 )             30.8     08-01    138  |  104  |  17.0  ----------------------------<  145<H>  4.2   |  21.0<L>  |  0.49<L>    Ca    8.0<L>      01 Aug 2017 07:52    TPro  5.7<L>  /  Alb  1.9<L>  /  TBili  0.3<L>  /  DBili  x   /  AST  39  /  ALT  19  /  AlkPhos  158<H>  07-31    PT/INR - ( 31 Jul 2017 18:02 )   PT: 14.8 sec;   INR: 1.34 ratio         PTT - ( 31 Jul 2017 18:02 )  PTT:29.2 sec    RADIOLOGY & ADDITIONAL TESTS:    Assessment and Plan  DVT Prophylaxis    Discussed with: Patient, family, RN, CM, Consultants  Plan of care/ Discharge planning discussed.    Visit Time:

## 2017-08-02 NOTE — PROGRESS NOTE ADULT - ASSESSMENT
Pt noted on Tuesday night, left foot medial aspect on the dorsum area of redness with pain and swelling. HE went to PMD and was given Clinda + Motrin for possible spider bite.  PT WITH BILATERAL FOOT EDEMA AND ERYTHEMA  LEFT 4TH  TOE WITH PUSTULE  ON DIGIT    WITH BILATERAL  ELBOW PAIN   NO FEVERS NO ANIMAL  OR TICK EXPOSURE NO HX STD NO  SX   RHEUMATOID FACTOR POSITVE  RHEUM CONSULT  PENDING  ON STEROIDS IMPROVING  ALSO WITH ELEVATED AMYLASE LIPASE  AWAIT SKIN BIOPSY   WILL LIKELY D/C ALL ABX IF CX REMAIN NEGATIVE   AS UNLIKELY INFECTIOUS PROCESS

## 2017-08-02 NOTE — PROGRESS NOTE ADULT - SUBJECTIVE AND OBJECTIVE BOX
Dermatology followup note  Spoke to Dr. Wali Olivier at dermatopathology and he confirmed the skin biopsy results supporting the diagnosis of pancreatic panniculitis. As pt's pancreatitis improves, so will his skin lesions. Then he can be weaned off the systemic steroids. I will try to contact Dr. Sam with these results.    Minnie Blank MD

## 2017-08-02 NOTE — PROGRESS NOTE ADULT - SUBJECTIVE AND OBJECTIVE BOX
Followup of pt with clinical panniculitis likely due to pancreatitis. Awaiting biopsy report. Started on steroids with much improvement of his symptoms.    PMH- HTN, Inguinal hernia repair right, noted from prior records- pancreatitis  SH- 41 yrs of smoking 1 ppd, no intention of quitting, quit alcohol 2 yrs ago, when in his teens - substance abuse  meds- amlodipine (29 Jul 2017 12:54)      PAST MEDICAL & SURGICAL HISTORY:  Ascites: may 2017  Hernia  Pancreatitis  Hypertension  TIA (transient ischemic attack)  S/P skin graft using Integra: right calf skin graft  Fracture of shaft of left femur: 1971 compound fracrture left femur  1980 left femur bone spur removed      REVIEW OF SYSTEMS:    CONSTITUTIONAL: No fever, weight loss, or fatigue    SKIN: improving eruptions        MEDICATIONS  (STANDING):  nicotine - 21 mG/24Hr(s) Patch 1 patch Transdermal daily  amLODIPine   Tablet 5 milliGRAM(s) Oral daily  enoxaparin Injectable 40 milliGRAM(s) SubCutaneous every 24 hours  lactobacillus acidophilus 2 Tablet(s) Oral two times a day with meals  ceFAZolin   IVPB   IV Intermittent   ceFAZolin   IVPB 2000 milliGRAM(s) IV Intermittent every 8 hours  sodium chloride 0.9%. 1000 milliLiter(s) (75 mL/Hr) IV Continuous <Continuous>  ketorolac   Injectable 30 milliGRAM(s) IV Push every 6 hours  gabapentin 300 milliGRAM(s) Oral every 8 hours  methylPREDNISolone sodium succinate Injectable 60 milliGRAM(s) IV Push four times a day  pantoprazole    Tablet 40 milliGRAM(s) Oral before breakfast    MEDICATIONS  (PRN):  acetaminophen   Tablet 650 milliGRAM(s) Oral every 6 hours PRN For Temp greater than 38 C (100.4 F)  oxyCODONE    5 mG/acetaminophen 325 mG 1 Tablet(s) Oral every 4 hours PRN Moderate Pain (4 - 6)  oxyCODONE    5 mG/acetaminophen 325 mG 2 Tablet(s) Oral every 4 hours PRN Severe Pain (7 - 10)  aluminum hydroxide/magnesium hydroxide/simethicone Suspension 30 milliLiter(s) Oral every 6 hours PRN Dyspepsia  ondansetron Injectable 4 milliGRAM(s) IV Push every 4 hours PRN Nausea and/or Vomiting      Allergies    iodine (Swelling)    Intolerances        SOCIAL HISTORY:    FAMILY HISTORY:  Family history of duodenal cancer      Vital Signs Last 24 Hrs  T(C): 36.8 (02 Aug 2017 07:40), Max: 37 (01 Aug 2017 16:08)  T(F): 98.3 (02 Aug 2017 07:40), Max: 98.6 (01 Aug 2017 16:08)  HR: 86 (02 Aug 2017 07:40) (78 - 86)  BP: 131/70 (02 Aug 2017 07:40) (131/70 - 147/84)  BP(mean): --  RR: 18 (02 Aug 2017 07:40) (18 - 18)  SpO2: 98% (02 Aug 2017 00:33) (98% - 98%)    PHYSICAL EXAM:    GENERAL: NAD, well-groomed, well-developed  HEAD:  Atraumatic, Normocephalic  EYES: EOMI, PERRLA, conjunctiva and sclera clear  SKIN: resolving pink nodules that are definitely smaller and flatter on his legs with decreased edema and redness of his feet  EXTREMITIES:  decreasing edema and erythema  LYMPH: No lymphadenopathy noted        LABS:                        10.3   25.0  )-----------( 411      ( 01 Aug 2017 07:52 )             30.8     08-01    138  |  104  |  17.0  ----------------------------<  145<H>  4.2   |  21.0<L>  |  0.49<L>    Ca    8.0<L>      01 Aug 2017 07:52    TPro  5.7<L>  /  Alb  1.9<L>  /  TBili  0.3<L>  /  DBili  x   /  AST  39  /  ALT  19  /  AlkPhos  158<H>  07-31    PT/INR - ( 31 Jul 2017 18:02 )   PT: 14.8 sec;   INR: 1.34 ratio         PTT - ( 31 Jul 2017 18:02 )  PTT:29.2 sec    Sedimentation Rate, Erythrocyte: 54 mm/hr (07-29 @ 18:40)  increased amylase and lipase and positive RF    ASSESSMENT/PLAN:  Clinical panniculitis probably related to his underlying pancreatitis with increased amylase/lipase.  I will check on his skin biopsy this morning. Continue steroids for now. Ask GI for guidance in further managing his pancreatitis.  Thank you,  Minnie Blank MD

## 2017-08-02 NOTE — PROGRESS NOTE ADULT - SUBJECTIVE AND OBJECTIVE BOX
DON GOOD is a 57y Male with HPI:  Pt noted on Tuesday night, left foot medial aspect on the dorsum area of redness with pain and swelling. HE went to PMD and was given Clinda + Motrin for possible spider bite.  PT WITH BILATERAL FOOT EDEMA AND ERYTHEMA  LEFT 4TH  TOE WITH PUSTULE  ON DIGIT  OVERALL IMPROVED ON STEROIDS ELEVATED RHEUAMTOID FACTOR AND AMYLASE LIPASE        Allergies:  iodine (Swelling)      Medications:  nicotine - 21 mG/24Hr(s) Patch 1 patch Transdermal daily  oxyCODONE    5 mG/acetaminophen 325 mG 1 Tablet(s) Oral every 4 hours PRN  amLODIPine   Tablet 5 milliGRAM(s) Oral daily  enoxaparin Injectable 40 milliGRAM(s) SubCutaneous every 24 hours  lactobacillus acidophilus 2 Tablet(s) Oral two times a day with meals  ceFAZolin   IVPB   IV Intermittent   ceFAZolin   IVPB 2000 milliGRAM(s) IV Intermittent every 8 hours  acetaminophen   Tablet 650 milliGRAM(s) Oral every 6 hours PRN  sodium chloride 0.9%. 1000 milliLiter(s) IV Continuous <Continuous>      ANTIBIOTICS:         Review of Systems: - Negative except as mentioned above     Physical Exam:  I Vital Signs Last 24 Hrs  T(C): 36.9 (02 Aug 2017 00:33), Max: 37 (01 Aug 2017 16:08)  T(F): 98.4 (02 Aug 2017 00:33), Max: 98.6 (01 Aug 2017 16:08)  HR: 78 (02 Aug 2017 00:33) (77 - 81)  BP: 147/84 (02 Aug 2017 00:33) (111/69 - 147/84)  BP(mean): --  RR: 18 (02 Aug 2017 00:33) (18 - 19)  SpO2: 98% (02 Aug 2017 00:33) (91% - 98%)    GEN: NAD, pleasant  HEENT: normocephalic and atraumatic. EOMI. LUDIN...  NECK: Supple. No carotid bruits.  No lymphadenopathy or thyromegaly.  LUNGS: Clear to auscultation.  HEART: Regular rate and rhythm without murmur.  ABDOMEN: Soft, nontender, and nondistended.  Positive bowel sounds.  No hepatosplenomegaly was noted.  NO REBOUND NO GUARDING  EXTREMITIES:  DECREASED BILATERAL FOOT EDEMA LEFT ELBOW ERYTHEMA AND EDEMA DECREASED SED   NEUROLOGIC: Cranial nerves II through XII are grossly intact.    SKIN: MACULAR RASH ON LOWER EXTEREMITIES REOLVING      Labs:                                         10.3   25.0  )-----------( 411      ( 01 Aug 2017 07:52 )             30.8    08-01    138  |  104  |  17.0  ----------------------------<  145<H>  4.2   |  21.0<L>  |  0.49<L>    Ca    8.0<L>      01 Aug 2017 07:52    TPro  5.7<L>  /  Alb  1.9<L>  /  TBili  0.3<L>  /  DBili  x   /  AST  39  /  ALT  19  /  AlkPhos  158<H>  07-31                  CAPILLARY BLOOD GLUCOSE

## 2017-08-02 NOTE — PROGRESS NOTE ADULT - ASSESSMENT
1. Marcus feet abscess/ cellulitis- derm- s/p skin Bx; Rheum consult not done yet despite multiple calls, steroids started. with good response. Rheum Kleiner called today. skin Bx consistent with panniculitis due to pancreatitis. Rheum to decide steroid course.  2. Pain issues- on pain medications; pain management called.   3. HTN- Cont home medications  4. Prior admission- pancreatic pseudocysts and was recommended to f/u GI outpatient for EUS/ ERCP. d/w Dr. Briones- pt got EUS outpatient which showed chronic calcified pancreatitis likely etiology Alcoholic. However IgG was elevated abnormally suggesting autoimmune component. amylase/ lipase elevated chronically. and if needed can rpt MRI   Lovenox

## 2017-08-02 NOTE — CONSULT NOTE ADULT - SUBJECTIVE AND OBJECTIVE BOX
Rheumatology Consultation Note:  Patient was seen and Hx and physical exam performed.  Full consultation note to follow.  Thank you for this consultation.  (Of note: I first was told of a request for this consultation today.)  Myron I. Kleiner, M.D., FACR
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
## 2017-08-03 DIAGNOSIS — M79.3 PANNICULITIS, UNSPECIFIED: ICD-10-CM

## 2017-08-03 DIAGNOSIS — M08.40 PAUCIARTICULAR JUVENILE RHEUMATOID ARTHRITIS, UNSPECIFIED SITE: ICD-10-CM

## 2017-08-03 DIAGNOSIS — K85.90 ACUTE PANCREATITIS WITHOUT NECROSIS OR INFECTION, UNSPECIFIED: ICD-10-CM

## 2017-08-03 LAB
CK SERPL-CCNC: 72 U/L — SIGNIFICANT CHANGE UP (ref 30–200)
CULTURE RESULTS: NO GROWTH — SIGNIFICANT CHANGE UP
CULTURE RESULTS: SIGNIFICANT CHANGE UP
DSDNA AB SER-ACNC: <12 IU/ML — SIGNIFICANT CHANGE UP
SPECIMEN SOURCE: SIGNIFICANT CHANGE UP
T3 SERPL-MCNC: 63 NG/DL — LOW (ref 80–200)
T4 AB SER-ACNC: 6.5 UG/DL — SIGNIFICANT CHANGE UP (ref 4.5–12)
TSH SERPL-MCNC: 0.83 UIU/ML — SIGNIFICANT CHANGE UP (ref 0.27–4.2)

## 2017-08-03 PROCEDURE — 73130 X-RAY EXAM OF HAND: CPT | Mod: 26,50

## 2017-08-03 PROCEDURE — 99233 SBSQ HOSP IP/OBS HIGH 50: CPT

## 2017-08-03 PROCEDURE — 73110 X-RAY EXAM OF WRIST: CPT | Mod: 26,50

## 2017-08-03 RX ORDER — LACTULOSE 10 G/15ML
10 SOLUTION ORAL
Qty: 0 | Refills: 0 | Status: DISCONTINUED | OUTPATIENT
Start: 2017-08-03 | End: 2017-08-04

## 2017-08-03 RX ADMIN — Medication 60 MILLIGRAM(S): at 00:03

## 2017-08-03 RX ADMIN — OXYCODONE AND ACETAMINOPHEN 2 TABLET(S): 5; 325 TABLET ORAL at 12:37

## 2017-08-03 RX ADMIN — OXYCODONE AND ACETAMINOPHEN 2 TABLET(S): 5; 325 TABLET ORAL at 21:06

## 2017-08-03 RX ADMIN — OXYCODONE AND ACETAMINOPHEN 2 TABLET(S): 5; 325 TABLET ORAL at 13:45

## 2017-08-03 RX ADMIN — Medication 100 MILLIGRAM(S): at 12:37

## 2017-08-03 RX ADMIN — Medication 2 TABLET(S): at 08:28

## 2017-08-03 RX ADMIN — Medication 60 MILLIGRAM(S): at 05:17

## 2017-08-03 RX ADMIN — AMLODIPINE BESYLATE 5 MILLIGRAM(S): 2.5 TABLET ORAL at 05:17

## 2017-08-03 RX ADMIN — OXYCODONE AND ACETAMINOPHEN 2 TABLET(S): 5; 325 TABLET ORAL at 09:00

## 2017-08-03 RX ADMIN — OXYCODONE AND ACETAMINOPHEN 2 TABLET(S): 5; 325 TABLET ORAL at 08:27

## 2017-08-03 RX ADMIN — ENOXAPARIN SODIUM 40 MILLIGRAM(S): 100 INJECTION SUBCUTANEOUS at 17:35

## 2017-08-03 RX ADMIN — Medication 1 PATCH: at 12:39

## 2017-08-03 RX ADMIN — OXYCODONE AND ACETAMINOPHEN 2 TABLET(S): 5; 325 TABLET ORAL at 20:36

## 2017-08-03 RX ADMIN — GABAPENTIN 300 MILLIGRAM(S): 400 CAPSULE ORAL at 17:35

## 2017-08-03 RX ADMIN — GABAPENTIN 300 MILLIGRAM(S): 400 CAPSULE ORAL at 21:27

## 2017-08-03 RX ADMIN — GABAPENTIN 300 MILLIGRAM(S): 400 CAPSULE ORAL at 05:17

## 2017-08-03 RX ADMIN — OXYCODONE AND ACETAMINOPHEN 2 TABLET(S): 5; 325 TABLET ORAL at 02:43

## 2017-08-03 RX ADMIN — Medication 1 PATCH: at 12:38

## 2017-08-03 RX ADMIN — LACTULOSE 10 GRAM(S): 10 SOLUTION ORAL at 17:35

## 2017-08-03 RX ADMIN — Medication 100 MILLIGRAM(S): at 02:25

## 2017-08-03 RX ADMIN — OXYCODONE AND ACETAMINOPHEN 2 TABLET(S): 5; 325 TABLET ORAL at 03:13

## 2017-08-03 RX ADMIN — SODIUM CHLORIDE 75 MILLILITER(S): 9 INJECTION INTRAMUSCULAR; INTRAVENOUS; SUBCUTANEOUS at 08:28

## 2017-08-03 RX ADMIN — Medication 2 TABLET(S): at 17:35

## 2017-08-03 RX ADMIN — PANTOPRAZOLE SODIUM 40 MILLIGRAM(S): 20 TABLET, DELAYED RELEASE ORAL at 05:17

## 2017-08-03 NOTE — PROGRESS NOTE ADULT - ASSESSMENT
1. Marcus feet abscess/ cellulitis- derm- s/p skin Bx; Rheum consult not done yet despite multiple calls, steroids started. with good response. Rheum Kleiner called and Melissa saw pt. skin Bx consistent with panniculitis due to pancreatitis. Rheum to decide steroid course.  2. Pain issues- on pain medications; pain management called.   3. HTN- Cont home medications  4. Prior admission- pancreatic pseudocysts and was recommended to f/u GI outpatient for EUS/ ERCP. d/w Dr. Briones- pt got EUS outpatient which showed chronic calcified pancreatitis likely etiology Alcoholic. However IgG was elevated abnormally suggesting autoimmune component. amylase/ lipase elevated chronically. and if needed can rpt MRI . Now high Amylase/ Lipase elevated, but consistent with prior elevated numbers. and same as outpatient numbers.  Rheum ordered panel of tests. sent out. when improved clinically to change to PO steroids and discharge.  5. cosntiaption with abd distention: lactulose until BMs. meanwhile liquid diet.  Lovenox 1. Marcus feet abscess/ cellulitis- derm- s/p skin Bx; steroids started. with good response. Rheum Kleiner called and Melissa saw pt. skin Bx consistent with panniculitis due to pancreatitis. Rheum to decide steroid course.  2. Pain issues- on pain medications; pain management called.   3. HTN- Cont home medications  4. Prior admission- pancreatic pseudocysts and was recommended to f/u GI outpatient for EUS/ ERCP. d/w Dr. Briones- pt got EUS outpatient which showed chronic calcified pancreatitis likely etiology Alcoholic. However IgG was elevated abnormally suggesting autoimmune component. amylase/ lipase elevated chronically. and if needed can rpt MRI . Now high Amylase/ Lipase elevated, but consistent with prior elevated numbers. and same as outpatient numbers.  Rheum ordered panel of tests. sent out. when improved clinically to change to PO steroids and discharge.  5. cosntiaption with abd distention: lactulose until BMs. meanwhile liquid diet.  Lovenox

## 2017-08-03 NOTE — PROGRESS NOTE ADULT - SUBJECTIVE AND OBJECTIVE BOX
DON GOOD is a 57y Male with HPI:  Pt noted on Tuesday night, left foot medial aspect on the dorsum area of redness with pain and swelling. HE went to PMD and was given Clinda + Motrin for possible spider bite.  PT WITH BILATERAL FOOT EDEMA AND ERYTHEMA  LEFT 4TH  TOE WITH PUSTULE  ON DIGIT  OVERALL IMPROVED ON STEROIDS ELEVATED RHEUAMTOID FACTOR AND AMYLASE LIPASE  RHEUM CONSULT DONE        Allergies:  iodine (Swelling)      Medications:  nicotine - 21 mG/24Hr(s) Patch 1 patch Transdermal daily  oxyCODONE    5 mG/acetaminophen 325 mG 1 Tablet(s) Oral every 4 hours PRN  amLODIPine   Tablet 5 milliGRAM(s) Oral daily  enoxaparin Injectable 40 milliGRAM(s) SubCutaneous every 24 hours  lactobacillus acidophilus 2 Tablet(s) Oral two times a day with meals     acetaminophen   Tablet 650 milliGRAM(s) Oral every 6 hours PRN  sodium chloride 0.9%. 1000 milliLiter(s) IV Continuous <Continuous>      ANTIBIOTICS: none        Review of Systems: - Negative except as mentioned above     Physical Exam:  I Vital Signs Last 24 Hrs  T(C): 36.9 (02 Aug 2017 23:24), Max: 36.9 (02 Aug 2017 23:24)  T(F): 98.4 (02 Aug 2017 23:24), Max: 98.4 (02 Aug 2017 23:24)  HR: 65 (03 Aug 2017 05:16) (65 - 78)  BP: 126/68 (03 Aug 2017 05:16) (126/68 - 133/78)  BP(mean): --  RR: 20 (02 Aug 2017 23:24) (18 - 20)  SpO2: --    GEN: NAD, pleasant  HEENT: normocephalic and atraumatic. EOMI. LUDIN...  NECK: Supple. No carotid bruits.  No lymphadenopathy or thyromegaly.  LUNGS: Clear to auscultation.  HEART: Regular rate and rhythm without murmur.  ABDOMEN: Soft, nontender, and nondistended.  Positive bowel sounds.  No hepatosplenomegaly was noted.  NO REBOUND NO GUARDING  EXTREMITIES:  DECREASED BILATERAL FOOT EDEMA LEFT ELBOW ERYTHEMA AND EDEMA DECREASED SED   NEUROLOGIC: Cranial nerves II through XII are grossly intact.    SKIN: MACULAR RASH ON LOWER EXTREMITIES  RESOLVING      Labs:                                         10.3   25.0  )-----------( 411      ( 01 Aug 2017 07:52 )             30.8    08-01    138  |  104  |  17.0  ----------------------------<  145<H>  4.2   |  21.0<L>  |  0.49<L>    Ca    8.0<L>      01 Aug 2017 07:52    TPro  5.7<L>  /  Alb  1.9<L>  /  TBili  0.3<L>  /  DBili  x   /  AST  39  /  ALT  19  /  AlkPhos  158<H>  07-31                  CAPILLARY BLOOD GLUCOSE

## 2017-08-03 NOTE — PROGRESS NOTE ADULT - SUBJECTIVE AND OBJECTIVE BOX
HEALTH ISSUES - PROBLEM Dx:  HPI:  Pt noted on Tuesday night, left foot medial aspect on the dorsum area of redness with pain and swelling. HE went to PMD and was given Clinda + Motrin for possible spider bite. Since then it has been worsening and increasing clinically. denies trauma/ fevers    PMH- HTN, Inguinal hernia repair right, noted from prior records- pancreatitis  SH- 41 yrs of smoking 1 ppd, no intention of quitting, quit alcohol 2 yrs ago, when in his teens - substance abuse  meds- amlodipine (2017 12:54)      NOW  foot cellulitis, toe abscess, joint swelling, skin rashes    INTERVAL HPI/ OVERNIGHT EVENTS:  joint pain reduced, elbow joint resolved issue, skin rash much improved. ricky feet edema +, right foot erythema reduced, left foot erythema reducing in size. cosntipation  denies chest pain, nausea, vomits, cough, SOB, fever, HA    MEDICATIONS  (STANDING):  nicotine - 21 mG/24Hr(s) Patch 1 patch Transdermal daily  amLODIPine   Tablet 5 milliGRAM(s) Oral daily  enoxaparin Injectable 40 milliGRAM(s) SubCutaneous every 24 hours  lactobacillus acidophilus 2 Tablet(s) Oral two times a day with meals  sodium chloride 0.9%. 1000 milliLiter(s) (75 mL/Hr) IV Continuous <Continuous>  gabapentin 300 milliGRAM(s) Oral every 8 hours  pantoprazole    Tablet 40 milliGRAM(s) Oral before breakfast  methylPREDNISolone sodium succinate Injectable 100 milliGRAM(s) IV Push daily  lactulose Syrup 10 Gram(s) Oral two times a day    MEDICATIONS  (PRN):  acetaminophen   Tablet 650 milliGRAM(s) Oral every 6 hours PRN For Temp greater than 38 C (100.4 F)  oxyCODONE    5 mG/acetaminophen 325 mG 1 Tablet(s) Oral every 4 hours PRN Moderate Pain (4 - 6)  oxyCODONE    5 mG/acetaminophen 325 mG 2 Tablet(s) Oral every 4 hours PRN Severe Pain (7 - 10)  aluminum hydroxide/magnesium hydroxide/simethicone Suspension 30 milliLiter(s) Oral every 6 hours PRN Dyspepsia  ondansetron Injectable 4 milliGRAM(s) IV Push every 4 hours PRN Nausea and/or Vomiting      Allergies    iodine (Swelling)    Intolerances        Vital Signs Last 24 Hrs  T(C): 36.8 (03 Aug 2017 07:51), Max: 36.9 (02 Aug 2017 23:24)  T(F): 98.2 (03 Aug 2017 07:51), Max: 98.4 (02 Aug 2017 23:24)  HR: 75 (03 Aug 2017 07:51) (65 - 78)  BP: 128/70 (03 Aug 2017 07:51) (126/68 - 133/78)  BP(mean): --  RR: 19 (03 Aug 2017 07:51) (18 - 20)  SpO2: --    ACCUCHECKS    PHYSICAL EXAM-  GENERAL: NAD, well-groomed, well-developed  HEAD:  Atraumatic, Normocephalic  EYES: EOMI, PERRLA, conjunctiva and sclera clear  ENMT:  Moist mucous membranes, No lesions  NECK: Supple, No JVD, Normal thyroid  NERVOUS SYSTEM:  Alert & Oriented X 3, Motor Strength 5/5 B/L upper and lower extremities;  CHEST/LUNG: Clear to asucultate bilaterally; No rales, rhonchi, wheezing, or rubs  HEART: Regular rate and rhythm; No murmurs, rubs, or gallops  ABDOMEN: Soft, Nontender, N++distended; Bowel sounds present  EXTREMITIES:  2+ Peripheral Pulses, joint pain reduced, elbow joint resolved issue, skin rash much improved. ricky feet edema +, right foot erythema reduced, left foot erythema reducing in size  LYMPH: No lymphadenopathy noted  SKIN: No rashes or lesions    LABS:    Urinalysis Basic - ( 02 Aug 2017 19:25 )    Color: Yellow / Appearance: Clear / S.015 / pH: x  Gluc: x / Ketone: Negative  / Bili: Negative / Urobili: Negative mg/dL   Blood: x / Protein: 15 mg/dL / Nitrite: Negative   Leuk Esterase: Negative / RBC: 0-2 /HPF / WBC 3-5   Sq Epi: x / Non Sq Epi: x / Bacteria: x      RADIOLOGY & ADDITIONAL TESTS:    Assessment and Plan  DVT Prophylaxis    Discussed with: Patient, family, RN, CM, Consultants  Plan of care/ Discharge planning discussed.    Visit Time:
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
## 2017-08-03 NOTE — PROGRESS NOTE ADULT - ASSESSMENT
Pt noted on Tuesday night, left foot medial aspect on the dorsum area of redness with pain and swelling. HE went to PMD and was given Clinda + Motrin for possible spider bite.  PT WITH BILATERAL FOOT EDEMA AND ERYTHEMA  LEFT 4TH  TOE WITH PUSTULE  ON DIGIT    WITH BILATERAL  ELBOW PAIN   NO FEVERS NO ANIMAL  OR TICK EXPOSURE NO HX STD NO  SX   RHEUMATOID FACTOR POSITVE   IMPROVING  ALSO WITH ELEVATED AMYLASE LIPASE  SKIN BX PANNICULITIS  RHEUM CONSULT  NOTED  STEROIDS CHANGED  WILL D/C ABX  WILL FOLLOW UP AS NEEDED PLEASE CALL IF QUESTIONS

## 2017-08-04 LAB
ACE SERPL-CCNC: 27 U/L — SIGNIFICANT CHANGE UP (ref 14–82)
ALDOLASE SERPL-CCNC: 11.3 U/L — HIGH (ref 3.3–10.3)
B2 GLYCOPROT1 AB SER QL: NEGATIVE — SIGNIFICANT CHANGE UP
C3 SERPL-MCNC: 128 MG/DL — SIGNIFICANT CHANGE UP (ref 80–180)
C4 SERPL-MCNC: 48 MG/DL — HIGH (ref 10–45)
CARDIOLIPIN AB SER-ACNC: POSITIVE
CARDIOLIPIN IGM SER-MCNC: 35.3 GPL — HIGH (ref 0–12.5)
CARDIOLIPIN IGM SER-MCNC: 9.2 MPL — SIGNIFICANT CHANGE UP (ref 0–12.5)
DEPRECATED CARDIOLIPIN IGA SER: <5 APL — SIGNIFICANT CHANGE UP (ref 0–12.5)
DSDNA AB FLD-ACNC: <0.2 AI — SIGNIFICANT CHANGE UP
ENA SS-A AB FLD IA-ACNC: <0.2 AI — SIGNIFICANT CHANGE UP
IGA FLD-MCNC: 269 MG/DL — SIGNIFICANT CHANGE UP (ref 68–378)
IGG FLD-MCNC: 818 MG/DL — SIGNIFICANT CHANGE UP (ref 694–1618)
IGM SERPL-MCNC: 104 MG/DL — SIGNIFICANT CHANGE UP (ref 40–230)
KAPPA LC SER QL IFE: 1.35 MG/DL — SIGNIFICANT CHANGE UP (ref 0.33–1.94)
KAPPA/LAMBDA FREE LIGHT CHAIN RATIO, SERUM: 0.75 RATIO — SIGNIFICANT CHANGE UP (ref 0.26–1.65)
LAMBDA LC SER QL IFE: 1.81 MG/DL — SIGNIFICANT CHANGE UP (ref 0.57–2.63)
MYELOPEROXIDASE AB SER-ACNC: <5 UNITS — SIGNIFICANT CHANGE UP
MYELOPEROXIDASE CELLS FLD QL: NEGATIVE — SIGNIFICANT CHANGE UP
PROTEINASE3 AB FLD-ACNC: <5 UNITS — SIGNIFICANT CHANGE UP
PROTEINASE3 AB SER-ACNC: NEGATIVE — SIGNIFICANT CHANGE UP

## 2017-08-04 PROCEDURE — 99233 SBSQ HOSP IP/OBS HIGH 50: CPT

## 2017-08-04 PROCEDURE — 99254 IP/OBS CNSLTJ NEW/EST MOD 60: CPT

## 2017-08-04 PROCEDURE — 74176 CT ABD & PELVIS W/O CONTRAST: CPT | Mod: 26

## 2017-08-04 RX ORDER — PSYLLIUM SEED (WITH DEXTROSE)
1 POWDER (GRAM) ORAL
Qty: 0 | Refills: 0 | Status: DISCONTINUED | OUTPATIENT
Start: 2017-08-04 | End: 2017-08-15

## 2017-08-04 RX ADMIN — LACTULOSE 10 GRAM(S): 10 SOLUTION ORAL at 05:11

## 2017-08-04 RX ADMIN — Medication 1 PATCH: at 12:29

## 2017-08-04 RX ADMIN — PANTOPRAZOLE SODIUM 40 MILLIGRAM(S): 20 TABLET, DELAYED RELEASE ORAL at 05:12

## 2017-08-04 RX ADMIN — GABAPENTIN 300 MILLIGRAM(S): 400 CAPSULE ORAL at 21:32

## 2017-08-04 RX ADMIN — OXYCODONE AND ACETAMINOPHEN 2 TABLET(S): 5; 325 TABLET ORAL at 15:41

## 2017-08-04 RX ADMIN — AMLODIPINE BESYLATE 5 MILLIGRAM(S): 2.5 TABLET ORAL at 05:12

## 2017-08-04 RX ADMIN — Medication 2 TABLET(S): at 08:28

## 2017-08-04 RX ADMIN — Medication 1 PATCH: at 12:30

## 2017-08-04 RX ADMIN — OXYCODONE AND ACETAMINOPHEN 2 TABLET(S): 5; 325 TABLET ORAL at 00:46

## 2017-08-04 RX ADMIN — ENOXAPARIN SODIUM 40 MILLIGRAM(S): 100 INJECTION SUBCUTANEOUS at 21:32

## 2017-08-04 RX ADMIN — OXYCODONE AND ACETAMINOPHEN 2 TABLET(S): 5; 325 TABLET ORAL at 19:06

## 2017-08-04 RX ADMIN — Medication 2 TABLET(S): at 19:05

## 2017-08-04 RX ADMIN — OXYCODONE AND ACETAMINOPHEN 2 TABLET(S): 5; 325 TABLET ORAL at 01:16

## 2017-08-04 RX ADMIN — OXYCODONE AND ACETAMINOPHEN 2 TABLET(S): 5; 325 TABLET ORAL at 23:51

## 2017-08-04 RX ADMIN — OXYCODONE AND ACETAMINOPHEN 2 TABLET(S): 5; 325 TABLET ORAL at 19:26

## 2017-08-04 RX ADMIN — Medication 1 PACKET(S): at 19:05

## 2017-08-04 RX ADMIN — OXYCODONE AND ACETAMINOPHEN 2 TABLET(S): 5; 325 TABLET ORAL at 10:28

## 2017-08-04 RX ADMIN — OXYCODONE AND ACETAMINOPHEN 2 TABLET(S): 5; 325 TABLET ORAL at 09:46

## 2017-08-04 RX ADMIN — Medication 100 MILLIGRAM(S): at 05:12

## 2017-08-04 RX ADMIN — OXYCODONE AND ACETAMINOPHEN 2 TABLET(S): 5; 325 TABLET ORAL at 05:12

## 2017-08-04 RX ADMIN — GABAPENTIN 300 MILLIGRAM(S): 400 CAPSULE ORAL at 15:02

## 2017-08-04 RX ADMIN — OXYCODONE AND ACETAMINOPHEN 2 TABLET(S): 5; 325 TABLET ORAL at 15:10

## 2017-08-04 RX ADMIN — OXYCODONE AND ACETAMINOPHEN 2 TABLET(S): 5; 325 TABLET ORAL at 05:42

## 2017-08-04 RX ADMIN — GABAPENTIN 300 MILLIGRAM(S): 400 CAPSULE ORAL at 05:12

## 2017-08-04 NOTE — DIETITIAN INITIAL EVALUATION ADULT. - OTHER INFO
Pt seen at bedside. Pt states having a good appetite and PO intake currently. Pt only consuming clear liquids as per diet order. Pt states having a good appetite and PO intake PTA and at beginning of admission but had an episode of vomiting on Wednesday after lunch and has had abdominal distention and is afraid to eat but willing to try eating solid foods again when permitted. Pt denies nausea. Pt states not having a bowel movement in 1 week. Pt states having limited mobility at home and decreased intake due to frequent testing and hospital visits since may.

## 2017-08-04 NOTE — PROGRESS NOTE ADULT - SUBJECTIVE AND OBJECTIVE BOX
HEALTH ISSUES - PROBLEM Dx:  HPI:  Pt noted on Tuesday night, left foot medial aspect on the dorsum area of redness with pain and swelling. HE went to PMD and was given Clinda + Motrin for possible spider bite. Since then it has been worsening and increasing clinically. denies trauma/ fevers    PMH- HTN, Inguinal hernia repair right, noted from prior records- pancreatitis  SH- 41 yrs of smoking 1 ppd, no intention of quitting, quit alcohol 2 yrs ago, when in his teens - substance abuse  meds- amlodipine (2017 12:54)      NOW  foot cellulitis, toe abscess, joint swelling, skin rashes    INTERVAL HPI/ OVERNIGHT EVENTS:  arthralgias resolved, diffuse rash and erythema much reduced, ricky feet cellulitis reduced, obstruction vs constipation ongoing  denies chest pain, nausea, vomits, cough, SOB, fever, HA        MEDICATIONS  (STANDING):  nicotine - 21 mG/24Hr(s) Patch 1 patch Transdermal daily  amLODIPine   Tablet 5 milliGRAM(s) Oral daily  enoxaparin Injectable 40 milliGRAM(s) SubCutaneous every 24 hours  lactobacillus acidophilus 2 Tablet(s) Oral two times a day with meals  gabapentin 300 milliGRAM(s) Oral every 8 hours  pantoprazole    Tablet 40 milliGRAM(s) Oral before breakfast  methylPREDNISolone sodium succinate Injectable 100 milliGRAM(s) IV Push daily    MEDICATIONS  (PRN):  acetaminophen   Tablet 650 milliGRAM(s) Oral every 6 hours PRN For Temp greater than 38 C (100.4 F)  oxyCODONE    5 mG/acetaminophen 325 mG 1 Tablet(s) Oral every 4 hours PRN Moderate Pain (4 - 6)  oxyCODONE    5 mG/acetaminophen 325 mG 2 Tablet(s) Oral every 4 hours PRN Severe Pain (7 - 10)  aluminum hydroxide/magnesium hydroxide/simethicone Suspension 30 milliLiter(s) Oral every 6 hours PRN Dyspepsia  ondansetron Injectable 4 milliGRAM(s) IV Push every 4 hours PRN Nausea and/or Vomiting      Allergies    iodine (Swelling)    Intolerances        Vital Signs Last 24 Hrs  T(C): 37 (04 Aug 2017 00:16), Max: 37 (04 Aug 2017 00:16)  T(F): 98.6 (04 Aug 2017 00:16), Max: 98.6 (04 Aug 2017 00:16)  HR: 77 (04 Aug 2017 05:08) (71 - 77)  BP: 166/97 (04 Aug 2017 05:08) (141/75 - 166/97)  BP(mean): --  RR: 16 (04 Aug 2017 00:16) (16 - 16)  SpO2: 93% (04 Aug 2017 00:16) (93% - 93%)    ACCUCHECKS    PHYSICAL EXAM-  GENERAL: NAD, well-groomed, well-developed  HEAD:  Atraumatic, Normocephalic  EYES: EOMI, PERRLA, conjunctiva and sclera clear  ENMT:  Moist mucous membranes, No lesions  NECK: Supple, No JVD, Normal thyroid  NERVOUS SYSTEM:  Alert & Oriented X 3, Motor Strength 5/5 B/L upper and lower extremities;  CHEST/LUNG: Clear to asucultate bilaterally; No rales, rhonchi, wheezing, or rubs  HEART: Regular rate and rhythm; No murmurs, rubs, or gallops  ABDOMEN: Soft, Nontender, ++distended; Bowel sounds present  EXTREMITIES:  2+ Peripheral Pulses, ricky leg edema due to immobility; skin lesions and joint pains much reduced   left foot erythema reducing in size  LYMPH: No lymphadenopathy noted  SKIN: reducing skin rash    LABS:      Urinalysis Basic - ( 02 Aug 2017 19:25 )    Color: Yellow / Appearance: Clear / S.015 / pH: x  Gluc: x / Ketone: Negative  / Bili: Negative / Urobili: Negative mg/dL   Blood: x / Protein: 15 mg/dL / Nitrite: Negative   Leuk Esterase: Negative / RBC: 0-2 /HPF / WBC 3-5   Sq Epi: x / Non Sq Epi: x / Bacteria: x      RADIOLOGY & ADDITIONAL TESTS:    Assessment and Plan  DVT Prophylaxis    Discussed with: Patient, family, RN, CM, Consultants  Plan of care/ Discharge planning discussed.    Visit Time:

## 2017-08-04 NOTE — CONSULT NOTE ADULT - SUBJECTIVE AND OBJECTIVE BOX
HPI:  56 yo wm with chronic alcoholic calcific pancreatitis, colon polyps, constipation and prior episodes of rectal prolapse, usually resolving spontaneously with external pressure.  Recent colonoscopy 1 week ago by Dr. Briones revealed 1 colon polyp but poor prep. Advised need for repeat.  Made no mention of hemorrhoids.  Presumed alcoholci liver disease and known to have ascites: aspirated 500 cc 3 months ago.  Recent EUS:  findings c/w chronic pancreatitis and multiple small pseudocysts.  No neoplasia identified.    Currently admitted for foot cellulitis, just completed a course of antibiotics via ID.    Strainig at stools, still and felt a rectal prolapse around 2pm today.  Unable to resolve with gentle pressure.  Uncomfortable.  Alleges to be passing small watery stools.  Minimal rectal bleeding.  No hx of hemorrhoids.      PMH- HTN, Inguinal hernia repair right, noted from prior records- pancreatitis  SH- 41 yrs of smoking 1 ppd, no intention of quitting, quit alcohol 2 yrs ago, when in his teens - substance abuse  meds- amlodipine (2017 12:54)      PAST MEDICAL & SURGICAL HISTORY:  Ascites: may 2017  Hernia  Pancreatitis  Hypertension  TIA (transient ischemic attack)  S/P skin graft using Integra: right calf skin graft  Fracture of shaft of left femur:  compound fracrture left femur   left femur bone spur removed      ROS:  No Heartburn, regurgitation, dysphagia, odynophagia.  No dyspepsia  No abdominal pain.    No Nausea, vomiting.  No Bleeding.  No hematemesis.   No diarrhea.    No hematochesia.  No weight loss, anorexia.  No edema.      MEDICATIONS  (STANDING):  nicotine - 21 mG/24Hr(s) Patch 1 patch Transdermal daily  amLODIPine   Tablet 5 milliGRAM(s) Oral daily  enoxaparin Injectable 40 milliGRAM(s) SubCutaneous every 24 hours  lactobacillus acidophilus 2 Tablet(s) Oral two times a day with meals  gabapentin 300 milliGRAM(s) Oral every 8 hours  pantoprazole    Tablet 40 milliGRAM(s) Oral before breakfast  methylPREDNISolone sodium succinate Injectable 100 milliGRAM(s) IV Push daily  psyllium Powder 1 Packet(s) Oral two times a day    MEDICATIONS  (PRN):  acetaminophen   Tablet 650 milliGRAM(s) Oral every 6 hours PRN For Temp greater than 38 C (100.4 F)  oxyCODONE    5 mG/acetaminophen 325 mG 1 Tablet(s) Oral every 4 hours PRN Moderate Pain (4 - 6)  oxyCODONE    5 mG/acetaminophen 325 mG 2 Tablet(s) Oral every 4 hours PRN Severe Pain (7 - 10)  aluminum hydroxide/magnesium hydroxide/simethicone Suspension 30 milliLiter(s) Oral every 6 hours PRN Dyspepsia  ondansetron Injectable 4 milliGRAM(s) IV Push every 4 hours PRN Nausea and/or Vomiting      Allergies    iodine (Swelling)    Intolerances        SOCIAL HISTORY:    FAMILY HISTORY:  Family history of duodenal cancer      Vital Signs Last 24 Hrs  T(C): 36.5 (04 Aug 2017 16:37), Max: 37 (04 Aug 2017 00:16)  T(F): 97.7 (04 Aug 2017 16:37), Max: 98.6 (04 Aug 2017 00:16)  HR: 72 (04 Aug 2017 16:37) (71 - 77)  BP: 153/86 (04 Aug 2017 16:37) (141/75 - 166/97)  BP(mean): --  RR: 18 (04 Aug 2017 16:37) (16 - 18)  SpO2: 96% (04 Aug 2017 16:37) (93% - 96%)    PHYSICAL EXAM:    GENERAL: NAD, well-groomed, well-developed  HEAD:  Atraumatic, Normocephalic  EYES: EOMI, PERRLA, conjunctiva and sclera clear  ENMT: No tonsillar erythema, exudates, or enlargement; Moist mucous membranes, Good dentition, No lesions  NECK: Supple, No JVD, Normal thyroid  CHEST/LUNG: Clear to percussion bilaterally; No rales, rhonchi, wheezing, or rubs  HEART: Regular rate and rhythm; No murmurs, rubs, or gallops  ABDOMEN: Soft, Nontender, Nondistended; Bowel sounds present  EXTREMITIES:  2+ Peripheral Pulses, No clubbing, cyanosis, or edema  LYMPH: No lymphadenopathy noted  SKIN: No rashes or lesions      LABS:  Last wbc: 25k.  Hgb: 10.3.  Pl: 411.  Bun: 17;  Cr: 0.5      Urinalysis Basic - ( 02 Aug 2017 19:25 )    Color: Yellow / Appearance: Clear / S.015 / pH: x  Gluc: x / Ketone: Negative  / Bili: Negative / Urobili: Negative mg/dL   Blood: x / Protein: 15 mg/dL / Nitrite: Negative   Leuk Esterase: Negative / RBC: 0-2 /HPF / WBC 3-5   Sq Epi: x / Non Sq Epi: x / Bacteria: x    RADIOLOGY & ADDITIONAL STUDIES:  CT A/p: pending.

## 2017-08-04 NOTE — PROGRESS NOTE ADULT - ASSESSMENT
1. Marcus feet abscess/ cellulitis- derm- s/p skin Bx; steroids started. with good response. Rheum Kleiner called and Melissa saw pt. skin Bx consistent with panniculitis due to pancreatitis. when clinically improved change to PO steroids of 60 mg daily and slowly taper over 2 weeks each dose.   2. Pain issues- on pain medications; pain management called.   3. HTN- Cont home medications  4. Prior admission- pancreatic pseudocysts and was recommended to f/u GI outpatient for EUS/ ERCP. d/w Dr. Briones- pt got EUS outpatient which showed chronic calcified pancreatitis likely etiology Alcoholic. However IgG was elevated abnormally suggesting autoimmune component. amylase/ lipase elevated chronically. and if needed can rpt MRI . Now high Amylase/ Lipase elevated, but consistent with prior elevated numbers. and same as outpatient numbers.  Rheum ordered panel of tests. sent out. when improved clinically to change to PO steroids and discharge.  5. constipation with abd distention: no response to lactulose. today patient strained but no BMs, instead now with rectal prolapse. GI recalled on case. CT abd non con ordered. suspect ileus.  Lovenox

## 2017-08-04 NOTE — CHART NOTE - NSCHARTNOTEFT_GEN_A_CORE
Upon Nutritional Assessment by the Registered Dietitian your patient was determined to meet criteria / has evidence of the following diagnosis/diagnoses:          [ ]  Mild Protein Calorie Malnutrition        [ ]  Moderate Protein Calorie Malnutrition        [ x ] Severe Protein Calorie Malnutrition        [ ] Unspecified Protein Calorie Malnutrition        [ ] Underweight / BMI <19        [ ] Morbid Obesity / BMI > 40      Findings as based on:  •  Comprehensive nutrition assessment and consultation  •  Calorie counts (nutrient intake analysis)  •  Food acceptance and intake status from observations by staff  •  Follow up  •  Patient education  •  Intervention secondary to interdisciplinary rounds  •   concerns      Treatment:    The following diet has been recommended:  Add Ensure Clear TID (600kcal, 21g protein) to diet.  Advance back to regular DASH/TLC diet when medically feasible.    PROVIDER Section:     By signing this assessment you are acknowledging and agree with the diagnosis/diagnoses assigned by the Registered Dietitian    Comments:

## 2017-08-04 NOTE — DIETITIAN INITIAL EVALUATION ADULT. - NS FNS REASON FOR WEIGHT CHANG
Pt with hospital visits and testing, also hard for pt to move around to get food at home/other (specify)

## 2017-08-04 NOTE — CONSULT NOTE ADULT - SUBJECTIVE AND OBJECTIVE BOX
57 year old male with a history of chronic pancreatitis, ascites who was admitted with left lower extremity cellulitis. He has undergone evaluation for infectious versus inflammatory etiology for cellulitis. He has a long standing history of intermittent rectal prolapse (~ 10 years) which he has been able to reduce manually Prolapse happens intermittently but usually with straining or constipation and as sporadically as once a month or less. He recently underwent a colonoscopy by Dr. Briones in the last week- polyps removed and poor prep so scheduled to repeat. No lead point or masses noted. He has been constipated since admission although today had some liquid stool and large amount of gas. He has prolapse of rectum with straining today and was unable to reduce himself. It required manually reduction by Dr. Gonzales who saw patient prior to my evaluation. He denies bleeding. No abdominal pain although feels bloated and did have emesis 2 days ago.    PAST MEDICAL & SURGICAL HISTORY:  Ascites:   Hernia -right inguinal hernia repair 2017  Pancreatitis  Hypertension  TIA (transient ischemic attack)  S/P skin graft using Integra: right calf skin graft  Fracture of shaft of left femur:  compound fracrture left femur   left femur bone spur removed  Alcohol abuse    Social history: current smoker, still 1ppd, Quite alcohol use 2 years ago    Family history: father  of duodenal cancer.     ROS: see HPI    Exam:  Vital Signs Last 24 Hrs  T(C): 36.5 (04 Aug 2017 16:37), Max: 37 (04 Aug 2017 00:16)  T(F): 97.7 (04 Aug 2017 16:37), Max: 98.6 (04 Aug 2017 00:16)  HR: 72 (04 Aug 2017 16:37) (71 - 77)  BP: 153/86 (04 Aug 2017 16:37) (141/75 - 166/97)  RR: 18 (04 Aug 2017 16:37) (16 - 18)  SpO2: 96% (04 Aug 2017 16:37) (93% - 96%)    General: in no distress  HEENT: normocephalic, atraumatic  Respiratory: non labored breathing on room air  Cardiovascular: regular rate and rhythm  Abdomen: soft, distended, non tender, healed RLQ incision  Rectal: no prolapse and could not reproduce. Was just reduced prior to arrival. SOFÍA with hemorrhoids, no mass. no blood  Extremities: swelling of both feet L.R  skin: warm and dry  neuro: alert and oriented x 3

## 2017-08-05 LAB
ALBUMIN SERPL ELPH-MCNC: 2 G/DL — LOW (ref 3.3–5.2)
ALP SERPL-CCNC: 236 U/L — HIGH (ref 40–120)
ALT FLD-CCNC: 103 U/L — HIGH
AMYLASE P1 CFR SERPL: 1789 U/L — HIGH (ref 36–128)
ANION GAP SERPL CALC-SCNC: 12 MMOL/L — SIGNIFICANT CHANGE UP (ref 5–17)
AST SERPL-CCNC: 70 U/L — HIGH
BILIRUB SERPL-MCNC: 0.6 MG/DL — SIGNIFICANT CHANGE UP (ref 0.4–2)
BUN SERPL-MCNC: 23 MG/DL — HIGH (ref 8–20)
CALCIUM SERPL-MCNC: 7.8 MG/DL — LOW (ref 8.6–10.2)
CCP IGG SERPL-ACNC: <8 UNITS — SIGNIFICANT CHANGE UP (ref 0–19)
CHLORIDE SERPL-SCNC: 102 MMOL/L — SIGNIFICANT CHANGE UP (ref 98–107)
CO2 SERPL-SCNC: 20 MMOL/L — LOW (ref 22–29)
CREAT SERPL-MCNC: 0.47 MG/DL — LOW (ref 0.5–1.3)
CULTURE RESULTS: SIGNIFICANT CHANGE UP
DSDNA AB SER-ACNC: <12 IU/ML — SIGNIFICANT CHANGE UP
GLUCOSE SERPL-MCNC: 108 MG/DL — SIGNIFICANT CHANGE UP (ref 70–115)
HCT VFR BLD CALC: 33.8 % — LOW (ref 42–52)
HGB BLD-MCNC: 11.3 G/DL — LOW (ref 14–18)
IGG SERPL-MCNC: 899 MG/DL — SIGNIFICANT CHANGE UP (ref 767–1590)
IGG1 SER-MCNC: 502 MG/DL — SIGNIFICANT CHANGE UP (ref 341–894)
IGG2 SER-MCNC: 287 MG/DL — SIGNIFICANT CHANGE UP (ref 171–632)
IGG3 SER-MCNC: 49.4 MG/DL — SIGNIFICANT CHANGE UP (ref 18.4–106)
IGG4 SER-MCNC: 101 MG/DL — SIGNIFICANT CHANGE UP (ref 2.4–121)
LIDOCAIN IGE QN: 997 U/L — HIGH (ref 22–51)
MAGNESIUM SERPL-MCNC: 2.1 MG/DL — SIGNIFICANT CHANGE UP (ref 1.6–2.6)
MCHC RBC-ENTMCNC: 32.9 PG — HIGH (ref 27–31)
MCHC RBC-ENTMCNC: 33.4 G/DL — SIGNIFICANT CHANGE UP (ref 32–36)
MCV RBC AUTO: 98.5 FL — HIGH (ref 80–94)
PLATELET # BLD AUTO: 609 K/UL — HIGH (ref 150–400)
POTASSIUM SERPL-MCNC: 4.8 MMOL/L — SIGNIFICANT CHANGE UP (ref 3.5–5.3)
POTASSIUM SERPL-SCNC: 4.8 MMOL/L — SIGNIFICANT CHANGE UP (ref 3.5–5.3)
PROT SERPL-MCNC: 5 G/DL — LOW (ref 6.6–8.7)
RBC # BLD: 3.43 M/UL — LOW (ref 4.6–6.2)
RBC # FLD: 15.7 % — HIGH (ref 11–15.6)
RF+CCP IGG SER-IMP: NEGATIVE — SIGNIFICANT CHANGE UP
SODIUM SERPL-SCNC: 134 MMOL/L — LOW (ref 135–145)
SPECIMEN SOURCE: SIGNIFICANT CHANGE UP
WBC # BLD: 35 K/UL — HIGH (ref 4.8–10.8)
WBC # FLD AUTO: 35 K/UL — HIGH (ref 4.8–10.8)

## 2017-08-05 PROCEDURE — 99233 SBSQ HOSP IP/OBS HIGH 50: CPT

## 2017-08-05 RX ORDER — HYDROCORTISONE 1 %
1 OINTMENT (GRAM) TOPICAL
Qty: 0 | Refills: 0 | Status: DISCONTINUED | OUTPATIENT
Start: 2017-08-05 | End: 2017-08-15

## 2017-08-05 RX ADMIN — OXYCODONE AND ACETAMINOPHEN 2 TABLET(S): 5; 325 TABLET ORAL at 09:52

## 2017-08-05 RX ADMIN — GABAPENTIN 300 MILLIGRAM(S): 400 CAPSULE ORAL at 16:40

## 2017-08-05 RX ADMIN — OXYCODONE AND ACETAMINOPHEN 2 TABLET(S): 5; 325 TABLET ORAL at 05:22

## 2017-08-05 RX ADMIN — OXYCODONE AND ACETAMINOPHEN 2 TABLET(S): 5; 325 TABLET ORAL at 21:51

## 2017-08-05 RX ADMIN — GABAPENTIN 300 MILLIGRAM(S): 400 CAPSULE ORAL at 21:51

## 2017-08-05 RX ADMIN — Medication 1 PATCH: at 12:06

## 2017-08-05 RX ADMIN — OXYCODONE AND ACETAMINOPHEN 2 TABLET(S): 5; 325 TABLET ORAL at 22:51

## 2017-08-05 RX ADMIN — GABAPENTIN 300 MILLIGRAM(S): 400 CAPSULE ORAL at 05:22

## 2017-08-05 RX ADMIN — OXYCODONE AND ACETAMINOPHEN 2 TABLET(S): 5; 325 TABLET ORAL at 00:21

## 2017-08-05 RX ADMIN — OXYCODONE AND ACETAMINOPHEN 2 TABLET(S): 5; 325 TABLET ORAL at 16:40

## 2017-08-05 RX ADMIN — Medication 2 TABLET(S): at 16:41

## 2017-08-05 RX ADMIN — Medication 100 MILLIGRAM(S): at 05:21

## 2017-08-05 RX ADMIN — Medication 2 TABLET(S): at 09:49

## 2017-08-05 RX ADMIN — PANTOPRAZOLE SODIUM 40 MILLIGRAM(S): 20 TABLET, DELAYED RELEASE ORAL at 05:23

## 2017-08-05 RX ADMIN — AMLODIPINE BESYLATE 5 MILLIGRAM(S): 2.5 TABLET ORAL at 05:22

## 2017-08-05 RX ADMIN — OXYCODONE AND ACETAMINOPHEN 2 TABLET(S): 5; 325 TABLET ORAL at 10:40

## 2017-08-05 RX ADMIN — OXYCODONE AND ACETAMINOPHEN 2 TABLET(S): 5; 325 TABLET ORAL at 05:52

## 2017-08-05 RX ADMIN — OXYCODONE AND ACETAMINOPHEN 2 TABLET(S): 5; 325 TABLET ORAL at 17:30

## 2017-08-05 RX ADMIN — ENOXAPARIN SODIUM 40 MILLIGRAM(S): 100 INJECTION SUBCUTANEOUS at 21:51

## 2017-08-05 NOTE — PROGRESS NOTE ADULT - SUBJECTIVE AND OBJECTIVE BOX
Patient seen and examined;  Still having some straining at stool and brief episode of rectal ptolapse today which reversed itself in 10 minutes with gentle pressure. Seen by Dr BLUM, of [facility] surgery.  Thought to have prolapsing internal hemorrhoids.    CT scan results reviewed:  confusing.  No clinical evidence of acute pancreatitis and no sx of bowel obstruction.      PAST MEDICAL & SURGICAL HISTORY:  Ascites: may 2017  Hernia  Pancreatitis  Hypertension  TIA (transient ischemic attack)  S/P skin graft using Integra: right calf skin graft  Fracture of shaft of left femur: 1971 compound fracrture left femur  1980 left femur bone spur removed      ROS:  No Heartburn, regurgitation, dysphagia, odynophagia.  No dyspepsia  No abdominal pain.    No Nausea, vomiting.  No Bleeding.  No hematemesis.   No diarrhea.    No hematochesia.  No weight loss, anorexia.  No edema.      MEDICATIONS  (STANDING):  nicotine - 21 mG/24Hr(s) Patch 1 patch Transdermal daily  amLODIPine   Tablet 5 milliGRAM(s) Oral daily  enoxaparin Injectable 40 milliGRAM(s) SubCutaneous every 24 hours  lactobacillus acidophilus 2 Tablet(s) Oral two times a day with meals  gabapentin 300 milliGRAM(s) Oral every 8 hours  pantoprazole    Tablet 40 milliGRAM(s) Oral before breakfast  methylPREDNISolone sodium succinate Injectable 100 milliGRAM(s) IV Push daily  psyllium Powder 1 Packet(s) Oral two times a day    MEDICATIONS  (PRN):  acetaminophen   Tablet 650 milliGRAM(s) Oral every 6 hours PRN For Temp greater than 38 C (100.4 F)  oxyCODONE    5 mG/acetaminophen 325 mG 1 Tablet(s) Oral every 4 hours PRN Moderate Pain (4 - 6)  oxyCODONE    5 mG/acetaminophen 325 mG 2 Tablet(s) Oral every 4 hours PRN Severe Pain (7 - 10)  aluminum hydroxide/magnesium hydroxide/simethicone Suspension 30 milliLiter(s) Oral every 6 hours PRN Dyspepsia  ondansetron Injectable 4 milliGRAM(s) IV Push every 4 hours PRN Nausea and/or Vomiting      Allergies    iodine (Swelling)    Intolerances        Vital Signs Last 24 Hrs  T(C): 36.7 (05 Aug 2017 16:15), Max: 37.1 (04 Aug 2017 23:40)  T(F): 98.1 (05 Aug 2017 16:15), Max: 98.7 (04 Aug 2017 23:40)  HR: 94 (05 Aug 2017 16:15) (76 - 109)  BP: 127/79 (05 Aug 2017 16:15) (127/79 - 146/77)  BP(mean): --  RR: 18 (04 Aug 2017 23:40) (18 - 18)  SpO2: 96% (04 Aug 2017 23:40) (96% - 96%)    PHYSICAL EXAM:    GENERAL: NAD, well-groomed, well-developed  HEAD:  Atraumatic, Normocephalic  EYES: EOMI, PERRLA, conjunctiva and sclera clear  ENMT: No tonsillar erythema, exudates, or enlargement; Moist mucous membranes, Good dentition, No lesions  NECK: Supple, No JVD, Normal thyroid  CHEST/LUNG: Clear to percussion bilaterally; No rales, rhonchi, wheezing, or rubs  HEART: Regular rate and rhythm; No murmurs, rubs, or gallops  ABDOMEN: Soft, Nontender, Nondistended; Bowel sounds present  EXTREMITIES:  2+ Peripheral Pulses, No clubbing, cyanosis, or edema  LYMPH: No lymphadenopathy noted  SKIN: No rashes or lesions      LABS:                        11.3   35.0  )-----------( 609      ( 05 Aug 2017 09:02 )             33.8     08-05    134<L>  |  102  |  23.0<H>  ----------------------------<  108  4.8   |  20.0<L>  |  0.47<L>    Ca    7.8<L>      05 Aug 2017 09:02  Mg     2.1     08-05    TPro  5.0<L>  /  Alb  2.0<L>  /  TBili  0.6  /  DBili  x   /  AST  70<H>  /  ALT  103<H>  /  AlkPhos  236<H>  08-05             RADIOLOGY & ADDITIONAL STUDIES:CT a/P:  Ascites.  Ileus vs SBO; Acute or chronic pancreatitis.  Normal liver and GB.and ducts.  Generous stool in colon.  Multiple psuedocysts. in and around the pancreas.

## 2017-08-05 NOTE — PROGRESS NOTE ADULT - ASSESSMENT
Severe chronic pancreatitis.  Patti and lipase are improving compared to priors.    Alleged to have prolapsing hemorrhoids.  Will prescribe anusol hc ointment.  Continue with high fiber diet and metamucil supplements.

## 2017-08-05 NOTE — PROGRESS NOTE ADULT - ASSESSMENT
57 year old male with constipation and rectal prolapse which required manual reduction. Rectal prolapse is a chronic but intermittent issue. CT scan and report noted. Recommend addition of miralax twice daily in addition to fiber. Minimize narcotics. CT also noted ileus, can continue diet as tolerated.     No plan for surgical management during this hospital stay. Will continue to follow.

## 2017-08-05 NOTE — PROGRESS NOTE ADULT - SUBJECTIVE AND OBJECTIVE BOX
DON GOOD Patient is a 57y old  Male who presents with a chief complaint of foot swelling and pain (29 Jul 2017 12:54)     HPI:  Pt noted on Tuesday night, left foot medial aspect on the dorsum area of redness with pain and swelling. HE went to PMD and was given Clinda + Motrin for possible spider bite. Since then it has been worsening and increasing clinically. denies trauma/ fevers    PMH- HTN, Inguinal hernia repair right, noted from prior records- pancreatitis  SH- 41 yrs of smoking 1 ppd, no intention of quitting, quit alcohol 2 yrs ago, when in his teens - substance abuse  meds- amlodipine (29 Jul 2017 12:54)    The patient was seen and evaluated feet edema and redness   The patient is in no acute distress.  Denied any fever chest pain, palpitations, shortness of breath, abdominal pain, fever, dysuria, cough, edema       I&O's Summary    04 Aug 2017 07:01  -  05 Aug 2017 07:00  --------------------------------------------------------  IN: 150 mL / OUT: 801 mL / NET: -651 mL      Allergies    iodine (Swelling)    Intolerances      HEALTH ISSUES - PROBLEM Dx:  Pancreatitis: Pancreatitis  Pauciarticular arthritis: Pauciarticular arthritis  Panniculitis: Panniculitis  Alcohol-induced chronic pancreatitis: Alcohol-induced chronic pancreatitis  Arthralgia of multiple joints: Arthralgia of multiple joints  Smoker: Smoker  Essential hypertension: Essential hypertension  Abscess or cellulitis of foot: Abscess or cellulitis of foot        PAST MEDICAL & SURGICAL HISTORY:  Ascites: may 2017  Hernia  Pancreatitis  Hypertension  TIA (transient ischemic attack)  S/P skin graft using Integra: right calf skin graft  Fracture of shaft of left femur: 1971 compound fracrture left femur  1980 left femur bone spur removed          Vital Signs Last 24 Hrs  T(C): 37.1 (04 Aug 2017 23:40), Max: 37.1 (04 Aug 2017 23:40)  T(F): 98.7 (04 Aug 2017 23:40), Max: 98.7 (04 Aug 2017 23:40)  HR: 109 (05 Aug 2017 05:20) (72 - 109)  BP: 138/84 (05 Aug 2017 05:20) (138/84 - 153/86)  BP(mean): --  RR: 18 (04 Aug 2017 23:40) (18 - 18)  SpO2: 96% (04 Aug 2017 23:40) (96% - 96%)T(C): 37.1 (04 Aug 2017 23:40), Max: 37.1 (04 Aug 2017 23:40)  HR: 109 (05 Aug 2017 05:20) (72 - 109)  BP: 138/84 (05 Aug 2017 05:20) (138/84 - 153/86)  RR: 18 (04 Aug 2017 23:40) (18 - 18)  SpO2: 96% (04 Aug 2017 23:40) (96% - 96%)  Wt(kg): --    PHYSICAL EXAM:    GENERAL: NAD, chronically ill appearing   HEAD:  Atraumatic, Normocephalic  EYES: EOMI, PERRL, conjunctiva and sclera clear  ENMT:  Moist mucous membranes,  No lesions  NECK: Supple, No JVD, Normal thyroid  NERVOUS SYSTEM:  Alert & Oriented X3,  Moves upper and lower extremities; CNS-II-XII  CHEST/LUNG: Clear to auscultation bilaterally; No rales, rhonchi, wheezing,   HEART: Regular rate and rhythm; No murmurs,   ABDOMEN: Soft, Nontender, Nondistended; Bowel sounds present  EXTREMITIES:  Peripheral2 plus edema  SKIN: No rashes or lesions  psychiatry- mood and affect approprite, Insight and judgement intact     nicotine - 21 mG/24Hr(s) Patch 1 patch Transdermal daily  amLODIPine   Tablet 5 milliGRAM(s) Oral daily  enoxaparin Injectable 40 milliGRAM(s) SubCutaneous every 24 hours  lactobacillus acidophilus 2 Tablet(s) Oral two times a day with meals  acetaminophen   Tablet 650 milliGRAM(s) Oral every 6 hours PRN  oxyCODONE    5 mG/acetaminophen 325 mG 1 Tablet(s) Oral every 4 hours PRN  oxyCODONE    5 mG/acetaminophen 325 mG 2 Tablet(s) Oral every 4 hours PRN  gabapentin 300 milliGRAM(s) Oral every 8 hours  pantoprazole    Tablet 40 milliGRAM(s) Oral before breakfast  aluminum hydroxide/magnesium hydroxide/simethicone Suspension 30 milliLiter(s) Oral every 6 hours PRN  ondansetron Injectable 4 milliGRAM(s) IV Push every 4 hours PRN  methylPREDNISolone sodium succinate Injectable 100 milliGRAM(s) IV Push daily  psyllium Powder 1 Packet(s) Oral two times a day      LABS:                          11.3   35.0  )-----------( 609      ( 05 Aug 2017 09:02 )             33.8     08-05    134<L>  |  102  |  23.0<H>  ----------------------------<  108  4.8   |  20.0<L>  |  0.47<L>    Ca    7.8<L>      05 Aug 2017 09:02  Mg     2.1     08-05    TPro  5.0<L>  /  Alb  2.0<L>  /  TBili  0.6  /  DBili  x   /  AST  70<H>  /  ALT  103<H>  /  AlkPhos  236<H>  08-05    LIVER FUNCTIONS - ( 05 Aug 2017 09:02 )  Alb: 2.0 g/dL / Pro: 5.0 g/dL / ALK PHOS: 236 U/L / ALT: 103 U/L / AST: 70 U/L / GGT: x                   CAPILLARY BLOOD GLUCOSE          RADIOLOGY & ADDITIONAL TESTS:      Consultant notes reviewed    Case discussed with consultant/provider/ family /patient

## 2017-08-06 PROCEDURE — 99233 SBSQ HOSP IP/OBS HIGH 50: CPT

## 2017-08-06 RX ORDER — SPIRONOLACTONE 25 MG/1
100 TABLET, FILM COATED ORAL DAILY
Qty: 0 | Refills: 0 | Status: DISCONTINUED | OUTPATIENT
Start: 2017-08-06 | End: 2017-08-15

## 2017-08-06 RX ORDER — FUROSEMIDE 40 MG
20 TABLET ORAL DAILY
Qty: 0 | Refills: 0 | Status: DISCONTINUED | OUTPATIENT
Start: 2017-08-06 | End: 2017-08-13

## 2017-08-06 RX ORDER — POLYETHYLENE GLYCOL 3350 17 G/17G
17 POWDER, FOR SOLUTION ORAL THREE TIMES A DAY
Qty: 0 | Refills: 0 | Status: COMPLETED | OUTPATIENT
Start: 2017-08-06 | End: 2017-08-07

## 2017-08-06 RX ADMIN — OXYCODONE AND ACETAMINOPHEN 2 TABLET(S): 5; 325 TABLET ORAL at 13:20

## 2017-08-06 RX ADMIN — OXYCODONE AND ACETAMINOPHEN 2 TABLET(S): 5; 325 TABLET ORAL at 17:20

## 2017-08-06 RX ADMIN — Medication 1 PATCH: at 12:20

## 2017-08-06 RX ADMIN — OXYCODONE AND ACETAMINOPHEN 2 TABLET(S): 5; 325 TABLET ORAL at 12:20

## 2017-08-06 RX ADMIN — Medication 2 TABLET(S): at 17:12

## 2017-08-06 RX ADMIN — Medication 100 MILLIGRAM(S): at 05:45

## 2017-08-06 RX ADMIN — Medication 1 PACKET(S): at 17:13

## 2017-08-06 RX ADMIN — OXYCODONE AND ACETAMINOPHEN 2 TABLET(S): 5; 325 TABLET ORAL at 03:10

## 2017-08-06 RX ADMIN — Medication 1 APPLICATION(S): at 17:12

## 2017-08-06 RX ADMIN — Medication 1 PATCH: at 12:21

## 2017-08-06 RX ADMIN — GABAPENTIN 300 MILLIGRAM(S): 400 CAPSULE ORAL at 21:58

## 2017-08-06 RX ADMIN — AMLODIPINE BESYLATE 5 MILLIGRAM(S): 2.5 TABLET ORAL at 05:46

## 2017-08-06 RX ADMIN — Medication 60 MILLIGRAM(S): at 17:20

## 2017-08-06 RX ADMIN — GABAPENTIN 300 MILLIGRAM(S): 400 CAPSULE ORAL at 05:46

## 2017-08-06 RX ADMIN — GABAPENTIN 300 MILLIGRAM(S): 400 CAPSULE ORAL at 14:16

## 2017-08-06 RX ADMIN — OXYCODONE AND ACETAMINOPHEN 2 TABLET(S): 5; 325 TABLET ORAL at 08:11

## 2017-08-06 RX ADMIN — Medication 1 APPLICATION(S): at 05:45

## 2017-08-06 RX ADMIN — OXYCODONE AND ACETAMINOPHEN 2 TABLET(S): 5; 325 TABLET ORAL at 23:46

## 2017-08-06 RX ADMIN — OXYCODONE AND ACETAMINOPHEN 2 TABLET(S): 5; 325 TABLET ORAL at 02:40

## 2017-08-06 RX ADMIN — Medication 2 TABLET(S): at 08:12

## 2017-08-06 RX ADMIN — OXYCODONE AND ACETAMINOPHEN 2 TABLET(S): 5; 325 TABLET ORAL at 09:11

## 2017-08-06 RX ADMIN — PANTOPRAZOLE SODIUM 40 MILLIGRAM(S): 20 TABLET, DELAYED RELEASE ORAL at 05:46

## 2017-08-06 RX ADMIN — Medication 20 MILLIGRAM(S): at 17:12

## 2017-08-06 NOTE — PROGRESS NOTE ADULT - ASSESSMENT
57 year old male with constipation and rectal prolapse which required manual reduction. Rectal prolapse is a chronic but intermittent issue.    Needs aggressive bowel regimen with Miralax and may need to add more if not having bowel function. His WBC is elevated at 35K today. He has no clinical suggestion of acute intraabdominal process- no pain, fevers or chills. He is distended and the ascites makes exam somewhat limited. There was  no obstruction on CT and constipation was mostly in right colon. If no bowel movements by tomorrow, will plan for abdominal xray to evaluate colonic distension and ensure cecum is not getting more and more enlarged. Leukocytosis may also be reactive from steroids.     No plan for surgical management during this hospital stay. Will continue to follow.

## 2017-08-06 NOTE — PHYSICAL THERAPY INITIAL EVALUATION ADULT - MANUAL MUSCLE TESTING RESULTS, REHAB EVAL
Pt c/o pain with movement. Both ankles PF/DF grossly 3/5. Both quads and hip extensors 3/5 grossly./grossly assessed due to

## 2017-08-06 NOTE — PROGRESS NOTE ADULT - ASSESSMENT
1. Marcus feet abscess/ cellulitis- derm- s/p skin Bx; steroids started. with good response. Rheum Kleiner called and Melissa saw pt. skin Bx consistent with panniculitis due to pancreatitis. NOw tapered steroid to solumedrol 60 daily-when clinically improved will change to PO steroids of 60 mg daily and slowly taper over 2 weeks each dose.   2. Pain issues- on pain medications; pain management called.   3. HTN- Cont home medications  4. Prior admission- pancreatic pseudocysts and was recommended to f/u GI outpatient for EUS/ ERCP. d/w Dr. Briones- pt got EUS outpatient which showed chronic calcified pancreatitis likely etiology Alcoholic. However IgG was elevated abnormally suggesting autoimmune component. amylase/ lipase elevated chronically. and if needed can rpt MRI . Now high Amylase/ Lipase elevated, but consistent with prior elevated numbers. and same as outpatient numbers.  Rheum ordered panel of tests. sent out. when improved clinically to change to PO steroids and discharge.  5. constipation with abd distention: no response to lactulose.  6 DVT PPX Lovenox   7. Ascites with edema - add lasix and aldactone- discussed with GI

## 2017-08-06 NOTE — PROGRESS NOTE ADULT - SUBJECTIVE AND OBJECTIVE BOX
Patient seen and examined;  has developed progressively worsening anasarca and abdominal distention.  Normal liver on Ct.  Chronic pancreatitis on CT.    No current abdominal apin or back pain.  no fever.  Weight up to 74 Kg.  Baseline 150#.  Prior paracentesis on 5/22 showed low protein ascites.   No further issues with rectal prolapse.  No rectal bleeding.   Still on IV antibiotics and IV medrol for cellulitis of both feet.      PAST MEDICAL & SURGICAL HISTORY:  Ascites: may 2017  Hernia  Pancreatitis  Hypertension  TIA (transient ischemic attack)  S/P skin graft using Integra: right calf skin graft  Fracture of shaft of left femur: 1971 compound fracrture left femur  1980 left femur bone spur removed      ROS:  No Heartburn, regurgitation, dysphagia, odynophagia.  No dyspepsia  No abdominal pain.    No Nausea, vomiting.  No Bleeding.  No hematemesis.   No diarrhea.    No hematochesia.  No weight loss, anorexia.  No edema.      MEDICATIONS  (STANDING):  nicotine - 21 mG/24Hr(s) Patch 1 patch Transdermal daily  amLODIPine   Tablet 5 milliGRAM(s) Oral daily  lactobacillus acidophilus 2 Tablet(s) Oral two times a day with meals  gabapentin 300 milliGRAM(s) Oral every 8 hours  pantoprazole    Tablet 40 milliGRAM(s) Oral before breakfast  methylPREDNISolone sodium succinate Injectable 100 milliGRAM(s) IV Push daily  psyllium Powder 1 Packet(s) Oral two times a day  hydrocortisone 2.5% Rectal Cream 1 Application(s) Rectal two times a day  polyethylene glycol 3350 17 Gram(s) Oral three times a day  spironolactone 100 milliGRAM(s) Oral daily  furosemide    Tablet 20 milliGRAM(s) Oral daily    MEDICATIONS  (PRN):  acetaminophen   Tablet 650 milliGRAM(s) Oral every 6 hours PRN For Temp greater than 38 C (100.4 F)  oxyCODONE    5 mG/acetaminophen 325 mG 1 Tablet(s) Oral every 4 hours PRN Moderate Pain (4 - 6)  oxyCODONE    5 mG/acetaminophen 325 mG 2 Tablet(s) Oral every 4 hours PRN Severe Pain (7 - 10)  aluminum hydroxide/magnesium hydroxide/simethicone Suspension 30 milliLiter(s) Oral every 6 hours PRN Dyspepsia  ondansetron Injectable 4 milliGRAM(s) IV Push every 4 hours PRN Nausea and/or Vomiting      Allergies    iodine (Swelling)    Intolerances        Vital Signs Last 24 Hrs  T(C): 36.9 (06 Aug 2017 07:18), Max: 37.1 (05 Aug 2017 23:36)  T(F): 98.4 (06 Aug 2017 07:18), Max: 98.7 (05 Aug 2017 23:36)  HR: 88 (06 Aug 2017 07:18) (82 - 94)  BP: 157/89 (06 Aug 2017 07:18) (127/79 - 157/89)  BP(mean): --  RR: 18 (06 Aug 2017 07:18) (18 - 20)  SpO2: 97% (06 Aug 2017 07:18) (97% - 97%)    PHYSICAL EXAM:    GENERAL: NAD, well-groomed, well-developed  HEAD:  Atraumatic, Normocephalic  EYES: EOMI, PERRLA, conjunctiva and sclera clear  ENMT: No tonsillar erythema, exudates, or enlargement; Moist mucous membranes, Good dentition, No lesions  NECK: Supple, No JVD, Normal thyroid  CHEST/LUNG: Clear to percussion bilaterally; No rales, rhonchi, wheezing, or rubs  HEART: Regular rate and rhythm; No murmurs, rubs, or gallops  ABDOMEN: Soft, Nontender, Nondistended; Bowel sounds present  EXTREMITIES:  3+ Pedal edema to thighs.  No clubbing, cyanosis;  Erythema at dorsum of both feet.    LYMPH: No lymphadenopathy noted  SKIN: No rashes or lesions      LABS:                        11.3   35.0  )-----------( 609      ( 05 Aug 2017 09:02 )             33.8     08-05    134<L>  |  102  |  23.0<H>  ----------------------------<  108  4.8   |  20.0<L>  |  0.47<L>    Ca    7.8<L>      05 Aug 2017 09:02  Mg     2.1     08-05    TPro  5.0<L>  /  Alb  2.0<L>  /  TBili  0.6  /  DBili  x   /  AST  70<H>  /  ALT  103<H>  /  AlkPhos  236<H>  08-05             RADIOLOGY & ADDITIONAL STUDIES:

## 2017-08-06 NOTE — PROGRESS NOTE ADULT - SUBJECTIVE AND OBJECTIVE BOX
DON GOOD Patient is a 57y old  Male who presents with a chief complaint of foot swelling and pain (29 Jul 2017 12:54)     HPI:  Pt noted on Tuesday night, left foot medial aspect on the dorsum area of redness with pain and swelling. HE went to PMD and was given Clinda + Motrin for possible spider bite. Since then it has been worsening and increasing clinically. denies trauma/ fevers    PMH- HTN, Inguinal hernia repair right, noted from prior records- pancreatitis  SH- 41 yrs of smoking 1 ppd, no intention of quitting, quit alcohol 2 yrs ago, when in his teens - substance abuse  meds- amlodipine (29 Jul 2017 12:54)    The patient was seen and evaluated complains of pain in both feet unable to walk due to pain   The patient is in no acute distress.  Denied any fever chest pain, palpitations, shortness of breath, abdominal pain, fever, dysuria, cough      I&O's Summary    Allergies    iodine (Swelling)    Intolerances      HEALTH ISSUES - PROBLEM Dx:  Pancreatitis: Pancreatitis  Pauciarticular arthritis: Pauciarticular arthritis  Panniculitis: Panniculitis  Alcohol-induced chronic pancreatitis: Alcohol-induced chronic pancreatitis  Arthralgia of multiple joints: Arthralgia of multiple joints  Smoker: Smoker  Essential hypertension: Essential hypertension  Abscess or cellulitis of foot: Abscess or cellulitis of foot        PAST MEDICAL & SURGICAL HISTORY:  Ascites: may 2017  Hernia  Pancreatitis  Hypertension  TIA (transient ischemic attack)  S/P skin graft using Integra: right calf skin graft  Fracture of shaft of left femur: 1971 compound fracrture left femur  1980 left femur bone spur removed          Vital Signs Last 24 Hrs  T(C): 36.9 (06 Aug 2017 07:18), Max: 37.1 (05 Aug 2017 23:36)  T(F): 98.4 (06 Aug 2017 07:18), Max: 98.7 (05 Aug 2017 23:36)  HR: 88 (06 Aug 2017 07:18) (82 - 88)  BP: 157/89 (06 Aug 2017 07:18) (138/72 - 157/89)  BP(mean): --  RR: 18 (06 Aug 2017 07:18) (18 - 20)  SpO2: 97% (06 Aug 2017 07:18) (97% - 97%)T(C): 36.9 (06 Aug 2017 07:18), Max: 37.1 (05 Aug 2017 23:36)  HR: 88 (06 Aug 2017 07:18) (82 - 88)  BP: 157/89 (06 Aug 2017 07:18) (138/72 - 157/89)  RR: 18 (06 Aug 2017 07:18) (18 - 20)  SpO2: 97% (06 Aug 2017 07:18) (97% - 97%)  Wt(kg): --    PHYSICAL EXAM:    GENERAL: NAD, anasarca, ill appearing   HEAD:  Atraumatic, Normocephalic  EYES: EOMI, PERRL, conjunctiva and sclera clear  ENMT:  Moist mucous membranes,  No lesions  NECK: Supple,  NERVOUS SYSTEM:  Alert & Oriented X3,  Moves upper and lower extremities; CNS-II-XII  CHEST/LUNG: Clear to auscultation bilaterally; No rales, rhonchi, wheezing,   HEART: Regular rate and rhythm; No murmurs,   ABDOMEN: Soft, Nontender, ascites with distension ,Bowel sounds present  EXTREMITIES:  Peripheral edema, swollen red bilateral feet   SKIN: No rashes or lesions  psychiatry- mood and affect nxious  Insight and judgement intact     nicotine - 21 mG/24Hr(s) Patch 1 patch Transdermal daily  amLODIPine   Tablet 5 milliGRAM(s) Oral daily  lactobacillus acidophilus 2 Tablet(s) Oral two times a day with meals  acetaminophen   Tablet 650 milliGRAM(s) Oral every 6 hours PRN  oxyCODONE    5 mG/acetaminophen 325 mG 1 Tablet(s) Oral every 4 hours PRN  oxyCODONE    5 mG/acetaminophen 325 mG 2 Tablet(s) Oral every 4 hours PRN  gabapentin 300 milliGRAM(s) Oral every 8 hours  pantoprazole    Tablet 40 milliGRAM(s) Oral before breakfast  aluminum hydroxide/magnesium hydroxide/simethicone Suspension 30 milliLiter(s) Oral every 6 hours PRN  ondansetron Injectable 4 milliGRAM(s) IV Push every 4 hours PRN  psyllium Powder 1 Packet(s) Oral two times a day  hydrocortisone 2.5% Rectal Cream 1 Application(s) Rectal two times a day  polyethylene glycol 3350 17 Gram(s) Oral three times a day  methylPREDNISolone sodium succinate Injectable 60 milliGRAM(s) IV Push daily  spironolactone 100 milliGRAM(s) Oral daily  furosemide    Tablet 20 milliGRAM(s) Oral daily      LABS:                          11.3   35.0  )-----------( 609      ( 05 Aug 2017 09:02 )             33.8     08-05    134<L>  |  102  |  23.0<H>  ----------------------------<  108  4.8   |  20.0<L>  |  0.47<L>    Ca    7.8<L>      05 Aug 2017 09:02  Mg     2.1     08-05    TPro  5.0<L>  /  Alb  2.0<L>  /  TBili  0.6  /  DBili  x   /  AST  70<H>  /  ALT  103<H>  /  AlkPhos  236<H>  08-05    LIVER FUNCTIONS - ( 05 Aug 2017 09:02 )  Alb: 2.0 g/dL / Pro: 5.0 g/dL / ALK PHOS: 236 U/L / ALT: 103 U/L / AST: 70 U/L / GGT: x                   CAPILLARY BLOOD GLUCOSE          RADIOLOGY & ADDITIONAL TESTS:      Consultant notes reviewed    Case discussed with consultant/provider/ family /patient

## 2017-08-06 NOTE — PROGRESS NOTE ADULT - SUBJECTIVE AND OBJECTIVE BOX
Stable. Has not had any further bowel movements. Denies abdominal pain, fevers or chills. No further prolapse    Exam:  Vital Signs Last 24 Hrs  T(C): 36.9 (06 Aug 2017 07:18), Max: 37.1 (05 Aug 2017 23:36)  T(F): 98.4 (06 Aug 2017 07:18), Max: 98.7 (05 Aug 2017 23:36)  HR: 88 (06 Aug 2017 07:18) (82 - 94)  BP: 157/89 (06 Aug 2017 07:18) (127/79 - 157/89)  RR: 18 (06 Aug 2017 07:18) (18 - 20)  SpO2: 97% (06 Aug 2017 07:18) (97% - 97%)    General: in no distress  Abdomen: soft, distended, non tender                          11.3   35.0  )-----------( 609      ( 05 Aug 2017 09:02 )             33.8   08-05    134<L>  |  102  |  23.0<H>  ----------------------------<  108  4.8   |  20.0<L>  |  0.47<L>    Ca    7.8<L>      05 Aug 2017 09:02  Mg     2.1     08-05    TPro  5.0<L>  /  Alb  2.0<L>  /  TBili  0.6  /  DBili  x   /  AST  70<H>  /  ALT  103<H>  /  AlkPhos  236<H>  08-05

## 2017-08-06 NOTE — PROGRESS NOTE ADULT - ASSESSMENT
Anasarca and ascites severe.  Chronic pancreatits.  Not clinically acute pancreatitis.  Volume overload.    Plan:  Start diuretics:  Lasix 20 mg po qd and Aldactone 100 mg po qd.  Daily weights.  Continue with Na restriction.  Arrange for Lg volume paracentesis.  Check for amylase and lipase in ascites fluid.    Consider DC of Medrol.  Needs podiatry reeval of feet.

## 2017-08-07 ENCOUNTER — RESULT REVIEW (OUTPATIENT)
Age: 57
End: 2017-08-07

## 2017-08-07 LAB
ALBUMIN FLD-MCNC: 1.2 G/DL — SIGNIFICANT CHANGE UP
ALBUMIN SERPL ELPH-MCNC: 2.2 G/DL — LOW (ref 3.3–5.2)
ALBUMIN SERPL ELPH-MCNC: 2.3 G/DL — LOW (ref 3.3–5.2)
ALP SERPL-CCNC: 173 U/L — HIGH (ref 40–120)
ALP SERPL-CCNC: 173 U/L — HIGH (ref 40–120)
ALT FLD-CCNC: 48 U/L — HIGH
ALT FLD-CCNC: 60 U/L — HIGH
ANION GAP SERPL CALC-SCNC: 11 MMOL/L — SIGNIFICANT CHANGE UP (ref 5–17)
ANION GAP SERPL CALC-SCNC: 13 MMOL/L — SIGNIFICANT CHANGE UP (ref 5–17)
AST SERPL-CCNC: 30 U/L — SIGNIFICANT CHANGE UP
AST SERPL-CCNC: 56 U/L — HIGH
B PERT IGG+IGM PNL SER: ABNORMAL
BASOPHILS # BLD AUTO: 0 K/UL — SIGNIFICANT CHANGE UP (ref 0–0.2)
BILIRUB DIRECT SERPL-MCNC: 0.1 MG/DL — SIGNIFICANT CHANGE UP (ref 0–0.3)
BILIRUB INDIRECT FLD-MCNC: 0.1 MG/DL — LOW (ref 0.2–1)
BILIRUB SERPL-MCNC: 0.2 MG/DL — LOW (ref 0.4–2)
BILIRUB SERPL-MCNC: 0.3 MG/DL — LOW (ref 0.4–2)
BUN SERPL-MCNC: 25 MG/DL — HIGH (ref 8–20)
BUN SERPL-MCNC: 25 MG/DL — HIGH (ref 8–20)
CALCIUM SERPL-MCNC: 8.1 MG/DL — LOW (ref 8.6–10.2)
CALCIUM SERPL-MCNC: 8.2 MG/DL — LOW (ref 8.6–10.2)
CHLORIDE SERPL-SCNC: 101 MMOL/L — SIGNIFICANT CHANGE UP (ref 98–107)
CHLORIDE SERPL-SCNC: 99 MMOL/L — SIGNIFICANT CHANGE UP (ref 98–107)
CO2 SERPL-SCNC: 22 MMOL/L — SIGNIFICANT CHANGE UP (ref 22–29)
CO2 SERPL-SCNC: 22 MMOL/L — SIGNIFICANT CHANGE UP (ref 22–29)
COLOR FLD: YELLOW
CREAT SERPL-MCNC: 0.49 MG/DL — LOW (ref 0.5–1.3)
CREAT SERPL-MCNC: 0.5 MG/DL — SIGNIFICANT CHANGE UP (ref 0.5–1.3)
EOSINOPHIL # FLD: 1 % — SIGNIFICANT CHANGE UP
FLUID INTAKE SUBSTANCE CLASS: SIGNIFICANT CHANGE UP
FLUID SEGMENTED GRANULOCYTES: 90 % — SIGNIFICANT CHANGE UP
GLUCOSE FLD-MCNC: 152 MG/DL — SIGNIFICANT CHANGE UP
GLUCOSE SERPL-MCNC: 139 MG/DL — HIGH (ref 70–115)
GLUCOSE SERPL-MCNC: 169 MG/DL — HIGH (ref 70–115)
GRAM STN FLD: SIGNIFICANT CHANGE UP
HCT VFR BLD CALC: 30.2 % — LOW (ref 42–52)
HCT VFR BLD CALC: 32.9 % — LOW (ref 42–52)
HGB BLD-MCNC: 10.2 G/DL — LOW (ref 14–18)
HGB BLD-MCNC: 11.1 G/DL — LOW (ref 14–18)
INR BLD: 1.36 RATIO — HIGH (ref 0.88–1.16)
INR BLD: 1.38 RATIO — HIGH (ref 0.88–1.16)
INTERPRETATION SERPL IFE-IMP: SIGNIFICANT CHANGE UP
LYMPHOCYTES # BLD AUTO: 0.6 K/UL — LOW (ref 1–4.8)
LYMPHOCYTES # BLD AUTO: 5 % — LOW (ref 20–55)
LYMPHOCYTES # FLD: 7 % — SIGNIFICANT CHANGE UP
MCHC RBC-ENTMCNC: 32.8 PG — HIGH (ref 27–31)
MCHC RBC-ENTMCNC: 32.8 PG — HIGH (ref 27–31)
MCHC RBC-ENTMCNC: 33.7 G/DL — SIGNIFICANT CHANGE UP (ref 32–36)
MCHC RBC-ENTMCNC: 33.8 G/DL — SIGNIFICANT CHANGE UP (ref 32–36)
MCV RBC AUTO: 97.1 FL — HIGH (ref 80–94)
MCV RBC AUTO: 97.3 FL — HIGH (ref 80–94)
MONOCYTES # BLD AUTO: 1 K/UL — HIGH (ref 0–0.8)
MONOCYTES NFR BLD AUTO: 4 % — SIGNIFICANT CHANGE UP (ref 3–10)
MONOS+MACROS # FLD: 2 % — SIGNIFICANT CHANGE UP
NEUTROPHILS # BLD AUTO: 29.8 K/UL — HIGH (ref 1.8–8)
NEUTROPHILS NFR BLD AUTO: 89 % — HIGH (ref 37–73)
NEUTS BAND # BLD: 2 % — SIGNIFICANT CHANGE UP (ref 0–8)
PLAT MORPH BLD: NORMAL — SIGNIFICANT CHANGE UP
PLATELET # BLD AUTO: 610 K/UL — HIGH (ref 150–400)
PLATELET # BLD AUTO: 629 K/UL — HIGH (ref 150–400)
POTASSIUM SERPL-MCNC: 5 MMOL/L — SIGNIFICANT CHANGE UP (ref 3.5–5.3)
POTASSIUM SERPL-MCNC: 5 MMOL/L — SIGNIFICANT CHANGE UP (ref 3.5–5.3)
POTASSIUM SERPL-SCNC: 5 MMOL/L — SIGNIFICANT CHANGE UP (ref 3.5–5.3)
POTASSIUM SERPL-SCNC: 5 MMOL/L — SIGNIFICANT CHANGE UP (ref 3.5–5.3)
PROT FLD-MCNC: 2.3 G/DL — SIGNIFICANT CHANGE UP
PROT SERPL-MCNC: 5 G/DL — LOW (ref 6.6–8.7)
PROT SERPL-MCNC: 5.2 G/DL — LOW (ref 6.6–8.7)
PROTHROM AB SERPL-ACNC: 15.1 SEC — HIGH (ref 9.8–12.7)
PROTHROM AB SERPL-ACNC: 15.3 SEC — HIGH (ref 9.8–12.7)
RBC # BLD: 3.11 M/UL — LOW (ref 4.6–6.2)
RBC # BLD: 3.38 M/UL — LOW (ref 4.6–6.2)
RBC # FLD: 15.5 % — SIGNIFICANT CHANGE UP (ref 11–15.6)
RBC # FLD: 15.6 % — SIGNIFICANT CHANGE UP (ref 11–15.6)
RBC BLD AUTO: SIGNIFICANT CHANGE UP
RCV VOL RI: 118 /UL — HIGH (ref 0–5)
SODIUM SERPL-SCNC: 134 MMOL/L — LOW (ref 135–145)
SODIUM SERPL-SCNC: 134 MMOL/L — LOW (ref 135–145)
SPECIMEN SOURCE: SIGNIFICANT CHANGE UP
TOTAL NUCLEATED CELL COUNT, BODY FLUID: 1523 /UL — HIGH (ref 0–5)
TUBE TYPE: SIGNIFICANT CHANGE UP
WBC # BLD: 29.1 K/UL — HIGH (ref 4.8–10.8)
WBC # BLD: 31.6 K/UL — HIGH (ref 4.8–10.8)
WBC # FLD AUTO: 29.1 K/UL — HIGH (ref 4.8–10.8)
WBC # FLD AUTO: 31.6 K/UL — HIGH (ref 4.8–10.8)

## 2017-08-07 PROCEDURE — 49083 ABD PARACENTESIS W/IMAGING: CPT

## 2017-08-07 PROCEDURE — 99233 SBSQ HOSP IP/OBS HIGH 50: CPT

## 2017-08-07 PROCEDURE — 99232 SBSQ HOSP IP/OBS MODERATE 35: CPT

## 2017-08-07 RX ORDER — ERTAPENEM SODIUM 1 G/1
1000 INJECTION, POWDER, LYOPHILIZED, FOR SOLUTION INTRAMUSCULAR; INTRAVENOUS EVERY 24 HOURS
Qty: 0 | Refills: 0 | Status: DISCONTINUED | OUTPATIENT
Start: 2017-08-08 | End: 2017-08-11

## 2017-08-07 RX ORDER — ERTAPENEM SODIUM 1 G/1
1000 INJECTION, POWDER, LYOPHILIZED, FOR SOLUTION INTRAMUSCULAR; INTRAVENOUS ONCE
Qty: 0 | Refills: 0 | Status: COMPLETED | OUTPATIENT
Start: 2017-08-07 | End: 2017-08-07

## 2017-08-07 RX ORDER — ERTAPENEM SODIUM 1 G/1
INJECTION, POWDER, LYOPHILIZED, FOR SOLUTION INTRAMUSCULAR; INTRAVENOUS
Qty: 0 | Refills: 0 | Status: DISCONTINUED | OUTPATIENT
Start: 2017-08-07 | End: 2017-08-11

## 2017-08-07 RX ORDER — DOCUSATE SODIUM 100 MG
100 CAPSULE ORAL
Qty: 0 | Refills: 0 | Status: DISCONTINUED | OUTPATIENT
Start: 2017-08-07 | End: 2017-08-15

## 2017-08-07 RX ADMIN — GABAPENTIN 300 MILLIGRAM(S): 400 CAPSULE ORAL at 05:57

## 2017-08-07 RX ADMIN — OXYCODONE AND ACETAMINOPHEN 2 TABLET(S): 5; 325 TABLET ORAL at 06:00

## 2017-08-07 RX ADMIN — Medication 100 MILLIGRAM(S): at 17:31

## 2017-08-07 RX ADMIN — PANTOPRAZOLE SODIUM 40 MILLIGRAM(S): 20 TABLET, DELAYED RELEASE ORAL at 05:58

## 2017-08-07 RX ADMIN — OXYCODONE AND ACETAMINOPHEN 2 TABLET(S): 5; 325 TABLET ORAL at 21:06

## 2017-08-07 RX ADMIN — POLYETHYLENE GLYCOL 3350 17 GRAM(S): 17 POWDER, FOR SOLUTION ORAL at 05:57

## 2017-08-07 RX ADMIN — OXYCODONE AND ACETAMINOPHEN 2 TABLET(S): 5; 325 TABLET ORAL at 07:00

## 2017-08-07 RX ADMIN — Medication 40 MILLIGRAM(S): at 17:30

## 2017-08-07 RX ADMIN — Medication 60 MILLIGRAM(S): at 06:01

## 2017-08-07 RX ADMIN — OXYCODONE AND ACETAMINOPHEN 2 TABLET(S): 5; 325 TABLET ORAL at 00:46

## 2017-08-07 RX ADMIN — Medication 2 TABLET(S): at 17:30

## 2017-08-07 RX ADMIN — GABAPENTIN 300 MILLIGRAM(S): 400 CAPSULE ORAL at 13:13

## 2017-08-07 RX ADMIN — ERTAPENEM SODIUM 120 MILLIGRAM(S): 1 INJECTION, POWDER, LYOPHILIZED, FOR SOLUTION INTRAMUSCULAR; INTRAVENOUS at 18:52

## 2017-08-07 RX ADMIN — Medication 1 PACKET(S): at 06:31

## 2017-08-07 RX ADMIN — OXYCODONE AND ACETAMINOPHEN 2 TABLET(S): 5; 325 TABLET ORAL at 14:44

## 2017-08-07 RX ADMIN — AMLODIPINE BESYLATE 5 MILLIGRAM(S): 2.5 TABLET ORAL at 05:57

## 2017-08-07 RX ADMIN — Medication 1 PACKET(S): at 17:33

## 2017-08-07 RX ADMIN — Medication 2 TABLET(S): at 09:32

## 2017-08-07 RX ADMIN — Medication 1 PATCH: at 17:31

## 2017-08-07 RX ADMIN — POLYETHYLENE GLYCOL 3350 17 GRAM(S): 17 POWDER, FOR SOLUTION ORAL at 17:33

## 2017-08-07 RX ADMIN — Medication 20 MILLIGRAM(S): at 05:57

## 2017-08-07 RX ADMIN — Medication 1 PATCH: at 14:46

## 2017-08-07 RX ADMIN — Medication 1 APPLICATION(S): at 05:56

## 2017-08-07 RX ADMIN — POLYETHYLENE GLYCOL 3350 17 GRAM(S): 17 POWDER, FOR SOLUTION ORAL at 22:47

## 2017-08-07 RX ADMIN — Medication 1 APPLICATION(S): at 17:31

## 2017-08-07 RX ADMIN — OXYCODONE AND ACETAMINOPHEN 2 TABLET(S): 5; 325 TABLET ORAL at 13:15

## 2017-08-07 RX ADMIN — SPIRONOLACTONE 100 MILLIGRAM(S): 25 TABLET, FILM COATED ORAL at 05:57

## 2017-08-07 RX ADMIN — GABAPENTIN 300 MILLIGRAM(S): 400 CAPSULE ORAL at 22:47

## 2017-08-07 RX ADMIN — OXYCODONE AND ACETAMINOPHEN 2 TABLET(S): 5; 325 TABLET ORAL at 20:36

## 2017-08-07 RX ADMIN — POLYETHYLENE GLYCOL 3350 17 GRAM(S): 17 POWDER, FOR SOLUTION ORAL at 13:13

## 2017-08-07 NOTE — PROGRESS NOTE ADULT - SUBJECTIVE AND OBJECTIVE BOX
Followup on biopsy proven pancreatic panniculitis.    PMH- HTN, Inguinal hernia repair right, noted from prior records- pancreatitis  SH- 41 yrs of smoking 1 ppd, no intention of quitting, quit alcohol 2 yrs ago, when in his teens - substance abuse  meds- amlodipine (29 Jul 2017 12:54)      PAST MEDICAL & SURGICAL HISTORY:  Ascites: may 2017  Hernia  Pancreatitis  Hypertension  TIA (transient ischemic attack)  S/P skin graft using Integra: right calf skin graft  Fracture of shaft of left femur: 1971 compound fracrture left femur  1980 left femur bone spur removed      REVIEW OF SYSTEMS:    CONSTITUTIONAL: No fever, weight loss, or fatigue    SKIN: No itching, burning, swelling of his feet          MEDICATIONS  (STANDING):  nicotine - 21 mG/24Hr(s) Patch 1 patch Transdermal daily  amLODIPine   Tablet 5 milliGRAM(s) Oral daily  lactobacillus acidophilus 2 Tablet(s) Oral two times a day with meals  gabapentin 300 milliGRAM(s) Oral every 8 hours  pantoprazole    Tablet 40 milliGRAM(s) Oral before breakfast  psyllium Powder 1 Packet(s) Oral two times a day  hydrocortisone 2.5% Rectal Cream 1 Application(s) Rectal two times a day  polyethylene glycol 3350 17 Gram(s) Oral three times a day  methylPREDNISolone sodium succinate Injectable 60 milliGRAM(s) IV Push daily  spironolactone 100 milliGRAM(s) Oral daily  furosemide    Tablet 20 milliGRAM(s) Oral daily    MEDICATIONS  (PRN):  acetaminophen   Tablet 650 milliGRAM(s) Oral every 6 hours PRN For Temp greater than 38 C (100.4 F)  oxyCODONE    5 mG/acetaminophen 325 mG 1 Tablet(s) Oral every 4 hours PRN Moderate Pain (4 - 6)  oxyCODONE    5 mG/acetaminophen 325 mG 2 Tablet(s) Oral every 4 hours PRN Severe Pain (7 - 10)  aluminum hydroxide/magnesium hydroxide/simethicone Suspension 30 milliLiter(s) Oral every 6 hours PRN Dyspepsia  ondansetron Injectable 4 milliGRAM(s) IV Push every 4 hours PRN Nausea and/or Vomiting      Allergies    iodine (Swelling)    Intolerances        SOCIAL HISTORY:    FAMILY HISTORY:  Family history of duodenal cancer      Vital Signs Last 24 Hrs  T(C): 36.6 (07 Aug 2017 07:59), Max: 37.1 (06 Aug 2017 15:50)  T(F): 97.9 (07 Aug 2017 07:59), Max: 98.7 (06 Aug 2017 15:50)  HR: 68 (07 Aug 2017 07:59) (68 - 89)  BP: 134/73 (07 Aug 2017 07:59) (134/73 - 143/85)  BP(mean): --  RR: 18 (07 Aug 2017 07:59) (18 - 19)  SpO2: 97% (07 Aug 2017 07:59) (97% - 98%)    PHYSICAL EXAM:    GENERAL: NAD, well-groomed, well-developed  HEAD:  Atraumatic, Normocephalic  EYES: EOMI, PERRLA, conjunctiva and sclera clear  ENMT: No tonsillar erythema, exudates, or enlargement; Moist mucous membranes, Good dentition, No lesions  SKIN: resolving erythematous nodules that are flatter and less red and not tender.  His feet remain swollen but decreased redness overall.        LABS:                        10.2   29.1  )-----------( 629      ( 07 Aug 2017 07:47 )             30.2     08-05    134<L>  |  102  |  23.0<H>  ----------------------------<  108  4.8   |  20.0<L>  |  0.47<L>    Ca    7.8<L>      05 Aug 2017 09:02  Mg     2.1     08-05    TPro  5.0<L>  /  Alb  2.0<L>  /  TBili  0.6  /  DBili  x   /  AST  70<H>  /  ALT  103<H>  /  AlkPhos  236<H>  08-05        Aldolase, Serum: 11.3 U/L (08-04 @ 00:09)  Anti SS-B Antibody: <0.2 AI (08-03 @ 22:54)    RADIOLOGY & ADDITIONAL STUDIES: all noted    ASSESSMENT/PLAN:  Continue steroids and taper as per Dr. Kleiner, Rheum. Complete evaluation as ordered.  GI/colorectal management as ordered.   Medical management per medical team.  I removed sutures from the punch biopsy site today.  Call me back if needed.    Thank you,   Minnie Blank MD

## 2017-08-07 NOTE — PROGRESS NOTE ADULT - ASSESSMENT
1. Marcus feet abscess/ panniculitis - not cellulitis -Leukocytosis with Steroids - derm- s/p skin Bx; steroids started. with good response. Rheum Kleiner called. skin Bx consistent with panniculitis due to pancreatitis. NOw tapered steroid to solumedrol 40 daily-when clinically improved will change to PO steroids of 60 mg daily and slowly taper over 2 weeks each dose.   2. Pain issues- on pain medications; pain management called.   3. HTN- Cont home medications  4. Prior admission- pancreatic pseudocysts and was recommended to f/u GI outpatient for EUS/ ERCP. d/w Dr. Briones- pt got EUS outpatient which showed chronic calcified pancreatitis likely etiology Alcoholic. However IgG was elevated abnormally suggesting autoimmune component. amylase/ lipase elevated chronically. and if needed can rpt MRI . Now high Amylase/ Lipase elevated, but consistent with prior elevated numbers. and same as outpatient numbers.  Rheum ordered panel of tests. sent out. when improved clinically to change to PO steroids and discharge.  5. constipation with abd distention: no response to lactulose.  6 DVT PPX Lovenox   7. Ascites with edema - add lasix and aldactone- discussed with GI - for IR US guided paracentesis today

## 2017-08-07 NOTE — PROGRESS NOTE ADULT - SUBJECTIVE AND OBJECTIVE BOX
DON GOOD Patient is a 57y old  Male who presents with a chief complaint of foot swelling and pain (29 Jul 2017 12:54)     HPI:  Pt noted on Tuesday night, left foot medial aspect on the dorsum area of redness with pain and swelling. HE went to PMD and was given Clinda + Motrin for possible spider bite. Since then it has been worsening and increasing clinically. denies trauma/ fevers    PMH- HTN, Inguinal hernia repair right, noted from prior records- pancreatitis  SH- 41 yrs of smoking 1 ppd, no intention of quitting, quit alcohol 2 yrs ago, when in his teens - substance abuse  meds- amlodipine (29 Jul 2017 12:54)    The patient was seen and evaluated panniculitis secondary to pancreatitis, Ascites, leukocytosis secondary to steroids   The patient is in no acute distress.  Denied any fever chest pain, palpitations, shortness of breath, abdominal pain, fever, dysuria, cough, edema       Height (cm): 165.1 (08-07 @ 10:09)I&O's Summary    06 Aug 2017 07:01  -  07 Aug 2017 07:00  --------------------------------------------------------  IN: 0 mL / OUT: 800 mL / NET: -800 mL      Allergies    iodine (Swelling)    Intolerances      HEALTH ISSUES - PROBLEM Dx:  Pancreatitis: Pancreatitis  Pauciarticular arthritis: Pauciarticular arthritis  Panniculitis: Panniculitis  Alcohol-induced chronic pancreatitis: Alcohol-induced chronic pancreatitis  Arthralgia of multiple joints: Arthralgia of multiple joints  Smoker: Smoker  Essential hypertension: Essential hypertension  Abscess or cellulitis of foot: Abscess or cellulitis of foot        PAST MEDICAL & SURGICAL HISTORY:  Ascites: may 2017  Hernia  Pancreatitis  Hypertension  TIA (transient ischemic attack)  S/P skin graft using Integra: right calf skin graft  Fracture of shaft of left femur: 1971 compound fracrture left femur  1980 left femur bone spur removed          Vital Signs Last 24 Hrs  T(C): 36.6 (07 Aug 2017 11:08), Max: 37.1 (06 Aug 2017 15:50)  T(F): 979 (07 Aug 2017 13:06), Max: 979 (07 Aug 2017 13:06)  HR: 68 (07 Aug 2017 13:06) (64 - 89)  BP: 130/72 (07 Aug 2017 13:06) (130/72 - 143/85)  BP(mean): --  RR: 18 (07 Aug 2017 13:06) (18 - 19)  SpO2: 97% (07 Aug 2017 13:06) (97% - 98%)T(C): 36.6 (07 Aug 2017 11:08), Max: 37.1 (06 Aug 2017 15:50)  HR: 68 (07 Aug 2017 13:06) (64 - 89)  BP: 130/72 (07 Aug 2017 13:06) (130/72 - 143/85)  RR: 18 (07 Aug 2017 13:06) (18 - 19)  SpO2: 97% (07 Aug 2017 13:06) (97% - 98%)  Wt(kg): --    PHYSICAL EXAM:    GENERAL: NAD, well-groomed, well-developed  HEAD:  Atraumatic, Normocephalic  EYES: EOMI, PERRL, conjunctiva and sclera clear  ENMT:  Moist mucous membranes,  No lesions  NECK: Supple,   NERVOUS SYSTEM:  Alert & Oriented X3,  Moves upper and lower extremities; CNS-II-XII  CHEST/LUNG: Clear to auscultation bilaterally; No rales, rhonchi, wheezing,   HEART: Regular rate and rhythm; No murmurs,   ABDOMEN: Soft, Nontender, Nondistended; Bowel sounds present, ascites   EXTREMITIES:  Peripheral 3 plus pedal edema  SKIN: red bilateral feet  psychiatry- mood and affect appropriate Insight and judgement intact     nicotine - 21 mG/24Hr(s) Patch 1 patch Transdermal daily  amLODIPine   Tablet 5 milliGRAM(s) Oral daily  lactobacillus acidophilus 2 Tablet(s) Oral two times a day with meals  acetaminophen   Tablet 650 milliGRAM(s) Oral every 6 hours PRN  oxyCODONE    5 mG/acetaminophen 325 mG 1 Tablet(s) Oral every 4 hours PRN  oxyCODONE    5 mG/acetaminophen 325 mG 2 Tablet(s) Oral every 4 hours PRN  gabapentin 300 milliGRAM(s) Oral every 8 hours  pantoprazole    Tablet 40 milliGRAM(s) Oral before breakfast  aluminum hydroxide/magnesium hydroxide/simethicone Suspension 30 milliLiter(s) Oral every 6 hours PRN  ondansetron Injectable 4 milliGRAM(s) IV Push every 4 hours PRN  psyllium Powder 1 Packet(s) Oral two times a day  hydrocortisone 2.5% Rectal Cream 1 Application(s) Rectal two times a day  polyethylene glycol 3350 17 Gram(s) Oral three times a day  methylPREDNISolone sodium succinate Injectable 60 milliGRAM(s) IV Push daily  spironolactone 100 milliGRAM(s) Oral daily  furosemide    Tablet 20 milliGRAM(s) Oral daily  docusate sodium 100 milliGRAM(s) Oral two times a day      LABS:                          10.2   29.1  )-----------( 629      ( 07 Aug 2017 07:47 )             30.2     08-07    134<L>  |  99  |  25.0<H>  ----------------------------<  139<H>  5.0   |  22.0  |  0.50    Ca    8.2<L>      07 Aug 2017 07:47    TPro  5.0<L>  /  Alb  2.3<L>  /  TBili  0.3<L>  /  DBili  x   /  AST  30  /  ALT  48<H>  /  AlkPhos  173<H>  08-07    LIVER FUNCTIONS - ( 07 Aug 2017 07:47 )  Alb: 2.3 g/dL / Pro: 5.0 g/dL / ALK PHOS: 173 U/L / ALT: 48 U/L / AST: 30 U/L / GGT: x           PT/INR - ( 07 Aug 2017 08:14 )   PT: 15.1 sec;   INR: 1.36 ratio                 CAPILLARY BLOOD GLUCOSE          RADIOLOGY & ADDITIONAL TESTS:      Consultant notes reviewed    Case discussed with consultant/provider/ family /patient

## 2017-08-07 NOTE — PROGRESS NOTE ADULT - ASSESSMENT
57 year old male with constipation and rectal prolapse which required manual reduction. Rectal prolapse is a chronic but intermittent issue.    Cont with current bowel regimen, adding colace  Proctozone cream for hemorrhoids  No abd xray for now    No plan for surgical management during this hospital stay. Will continue to follow

## 2017-08-07 NOTE — PROGRESS NOTE ADULT - SUBJECTIVE AND OBJECTIVE BOX
Patient is a 57y old  Male who presents with a chief complaint of foot swelling and pain (29 Jul 2017 12:54)      HPI: Patient with acute on chronic pancreatitis. No abdominal  pain. Increased abdominal girth. No labs in 2 days. Small BM today. Rectum intact  (no prolapse today at time of BM)    PMH- HTN, Inguinal hernia repair right, noted from prior records- pancreatitis  SH- 41 yrs of smoking 1 ppd, no intention of quitting, quit alcohol 2 yrs ago, when in his teens - substance abuse  meds- amlodipine (29 Jul 2017 12:54)      REVIEW OF SYSTEMS:  Constitutional: No fever, weight loss or fatigue  ENMT:  No difficulty hearing, tinnitus, vertigo; No sinus or throat pain  Respiratory: No cough, wheezing, chills or hemoptysis  Cardiovascular: No chest pain, palpitations, dizziness or leg swelling  Gastrointestinal: No abdominal or epigastric pain. No nausea, vomiting or hematemesis; No diarrhea or constipation. No melena or hematochezia.  Skin: No itching, burning, rashes or lesions   Musculoskeletal: No joint pain or swelling; No muscle, back or extremity pain    PAST MEDICAL & SURGICAL HISTORY:  Ascites: may 2017  Hernia  Pancreatitis  Hypertension  TIA (transient ischemic attack)  S/P skin graft using Integra: right calf skin graft  Fracture of shaft of left femur: 1971 compound fracrture left femur  1980 left femur bone spur removed      FAMILY HISTORY:  Family history of duodenal cancer      SOCIAL HISTORY:  Smoking Status: [ ] Current, [ ] Former, [ ] Never  Pack Years:  [X  ] EtOH- stopped 2 years ago  [  ] IVDA    MEDICATIONS:  MEDICATIONS  (STANDING):  nicotine - 21 mG/24Hr(s) Patch 1 patch Transdermal daily  amLODIPine   Tablet 5 milliGRAM(s) Oral daily  lactobacillus acidophilus 2 Tablet(s) Oral two times a day with meals  gabapentin 300 milliGRAM(s) Oral every 8 hours  pantoprazole    Tablet 40 milliGRAM(s) Oral before breakfast  psyllium Powder 1 Packet(s) Oral two times a day  hydrocortisone 2.5% Rectal Cream 1 Application(s) Rectal two times a day  polyethylene glycol 3350 17 Gram(s) Oral three times a day  methylPREDNISolone sodium succinate Injectable 60 milliGRAM(s) IV Push daily  spironolactone 100 milliGRAM(s) Oral daily  furosemide    Tablet 20 milliGRAM(s) Oral daily    MEDICATIONS  (PRN):  acetaminophen   Tablet 650 milliGRAM(s) Oral every 6 hours PRN For Temp greater than 38 C (100.4 F)  oxyCODONE    5 mG/acetaminophen 325 mG 1 Tablet(s) Oral every 4 hours PRN Moderate Pain (4 - 6)  oxyCODONE    5 mG/acetaminophen 325 mG 2 Tablet(s) Oral every 4 hours PRN Severe Pain (7 - 10)  aluminum hydroxide/magnesium hydroxide/simethicone Suspension 30 milliLiter(s) Oral every 6 hours PRN Dyspepsia  ondansetron Injectable 4 milliGRAM(s) IV Push every 4 hours PRN Nausea and/or Vomiting      Allergies    iodine (Swelling)    Intolerances        Vital Signs Last 24 Hrs  T(C): 37 (06 Aug 2017 23:21), Max: 37.1 (06 Aug 2017 15:50)  T(F): 98.6 (06 Aug 2017 23:21), Max: 98.7 (06 Aug 2017 15:50)  HR: 89 (06 Aug 2017 23:21) (74 - 89)  BP: 143/85 (06 Aug 2017 23:21) (134/75 - 157/89)  BP(mean): --  RR: 19 (06 Aug 2017 23:21) (18 - 19)  SpO2: 98% (06 Aug 2017 15:50) (97% - 98%)    08-06 @ 07:01  -  08-07 @ 07:00  --------------------------------------------------------  IN: 0 mL / OUT: 800 mL / NET: -800 mL          PHYSICAL EXAM:    General: thin  HEENT: MMM, conjunctiva and sclera clear  Gastrointestinal: Soft, +distended; Normal bowel sounds; No rebound or guarding  Extremities: Normal range of motion, No clubbing,+4 LE edema edema  Neurological: Alert and oriented x3  Skin: Warm and dry. No obvious rash      LABS:                        11.3   35.0  )-----------( 609      ( 05 Aug 2017 09:02 )             33.8     05 Aug 2017 09:02    134    |  102    |  23.0   ----------------------------<  108    4.8     |  20.0   |  0.47     Ca    7.8        05 Aug 2017 09:02  Mg     2.1       05 Aug 2017 09:02    TPro  5.0    /  Alb  2.0    /  TBili  0.6    /  DBili  x      /  AST  70     /  ALT  103    /  AlkPhos  236    / Amylase 1789   /Lipase 997    05 Aug 2017 09:02              R< from: CT Abdomen and Pelvis No Cont (08.04.17 @ 20:31) >  MPRESSION:       1.  Edema and inflammatory changes around the pancreas consistent with   acute   pancreatitis.    2.  Calcifications in the pancreas consistent with chronic pancreatitis.   Dilatation of the pancreatic duct.    3.  Multiple fluid collections adjacent to pancreas consistent with   pancreatic   pseudocysts. Largest measures 4 cm. Previously it measured 3.7 cm.    4.  Small left pleural effusion. Smaller right pleural effusion.    5.  Large amount of ascites throughout the abdomen and pelvis.    6.  There are multiple loops of dilated small bowel with air-fluid   levels.    This may represent ileus or early small bowel obstruction.    7. Anasarca      < end of copied text >  ADIOLOGY & ADDITIONAL STUDIES:

## 2017-08-07 NOTE — PROGRESS NOTE ADULT - SUBJECTIVE AND OBJECTIVE BOX
Just returned from IR, paracentesis  having small Bms  no n/v  no prolapse  states hemorrhoids are bothering somehwat    Vital Signs Last 24 Hrs  T(C): 36.6 (07 Aug 2017 11:08), Max: 37.1 (06 Aug 2017 15:50)  T(F): 979 (07 Aug 2017 13:06), Max: 979 (07 Aug 2017 13:06)  HR: 68 (07 Aug 2017 13:06) (64 - 89)  BP: 130/72 (07 Aug 2017 13:06) (130/72 - 143/85)  BP(mean): --  RR: 18 (07 Aug 2017 13:06) (18 - 19)  SpO2: 97% (07 Aug 2017 13:06) (97% - 98%)    NAD  abd soft                          10.2   29.1  )-----------( 629      ( 07 Aug 2017 07:47 )             30.2     08-07    134<L>  |  99  |  25.0<H>  ----------------------------<  139<H>  5.0   |  22.0  |  0.50    Ca    8.2<L>      07 Aug 2017 07:47    TPro  5.0<L>  /  Alb  2.3<L>  /  TBili  0.3<L>  /  DBili  x   /  AST  30  /  ALT  48<H>  /  AlkPhos  173<H>  08-07

## 2017-08-08 ENCOUNTER — APPOINTMENT (OUTPATIENT)
Dept: FAMILY MEDICINE | Facility: CLINIC | Age: 57
End: 2017-08-08

## 2017-08-08 DIAGNOSIS — K65.2 SPONTANEOUS BACTERIAL PERITONITIS: ICD-10-CM

## 2017-08-08 DIAGNOSIS — L03.116 CELLULITIS OF LEFT LOWER LIMB: ICD-10-CM

## 2017-08-08 DIAGNOSIS — R18.8 OTHER ASCITES: ICD-10-CM

## 2017-08-08 LAB
LDH SERPL L TO P-CCNC: 243 U/L — SIGNIFICANT CHANGE UP
NIGHT BLUE STAIN TISS: SIGNIFICANT CHANGE UP
SPECIMEN SOURCE: SIGNIFICANT CHANGE UP

## 2017-08-08 PROCEDURE — 99233 SBSQ HOSP IP/OBS HIGH 50: CPT

## 2017-08-08 RX ORDER — OXYCODONE AND ACETAMINOPHEN 5; 325 MG/1; MG/1
2 TABLET ORAL EVERY 4 HOURS
Qty: 0 | Refills: 0 | Status: DISCONTINUED | OUTPATIENT
Start: 2017-08-08 | End: 2017-08-11

## 2017-08-08 RX ADMIN — OXYCODONE AND ACETAMINOPHEN 2 TABLET(S): 5; 325 TABLET ORAL at 21:32

## 2017-08-08 RX ADMIN — OXYCODONE AND ACETAMINOPHEN 2 TABLET(S): 5; 325 TABLET ORAL at 22:02

## 2017-08-08 RX ADMIN — OXYCODONE AND ACETAMINOPHEN 2 TABLET(S): 5; 325 TABLET ORAL at 10:34

## 2017-08-08 RX ADMIN — SPIRONOLACTONE 100 MILLIGRAM(S): 25 TABLET, FILM COATED ORAL at 05:49

## 2017-08-08 RX ADMIN — Medication 1 PATCH: at 11:26

## 2017-08-08 RX ADMIN — GABAPENTIN 300 MILLIGRAM(S): 400 CAPSULE ORAL at 05:50

## 2017-08-08 RX ADMIN — PANTOPRAZOLE SODIUM 40 MILLIGRAM(S): 20 TABLET, DELAYED RELEASE ORAL at 05:50

## 2017-08-08 RX ADMIN — ONDANSETRON 4 MILLIGRAM(S): 8 TABLET, FILM COATED ORAL at 18:34

## 2017-08-08 RX ADMIN — Medication 1 PATCH: at 11:25

## 2017-08-08 RX ADMIN — Medication 20 MILLIGRAM(S): at 05:50

## 2017-08-08 RX ADMIN — OXYCODONE AND ACETAMINOPHEN 2 TABLET(S): 5; 325 TABLET ORAL at 11:20

## 2017-08-08 RX ADMIN — OXYCODONE AND ACETAMINOPHEN 2 TABLET(S): 5; 325 TABLET ORAL at 04:58

## 2017-08-08 RX ADMIN — Medication 100 MILLIGRAM(S): at 16:48

## 2017-08-08 RX ADMIN — Medication 2 TABLET(S): at 16:48

## 2017-08-08 RX ADMIN — Medication 100 MILLIGRAM(S): at 05:49

## 2017-08-08 RX ADMIN — Medication 1 APPLICATION(S): at 05:51

## 2017-08-08 RX ADMIN — AMLODIPINE BESYLATE 5 MILLIGRAM(S): 2.5 TABLET ORAL at 05:50

## 2017-08-08 RX ADMIN — Medication 40 MILLIGRAM(S): at 05:50

## 2017-08-08 RX ADMIN — GABAPENTIN 300 MILLIGRAM(S): 400 CAPSULE ORAL at 16:48

## 2017-08-08 RX ADMIN — OXYCODONE AND ACETAMINOPHEN 2 TABLET(S): 5; 325 TABLET ORAL at 17:35

## 2017-08-08 RX ADMIN — Medication 1 APPLICATION(S): at 21:32

## 2017-08-08 RX ADMIN — OXYCODONE AND ACETAMINOPHEN 2 TABLET(S): 5; 325 TABLET ORAL at 16:50

## 2017-08-08 RX ADMIN — Medication 2 TABLET(S): at 08:57

## 2017-08-08 RX ADMIN — OXYCODONE AND ACETAMINOPHEN 2 TABLET(S): 5; 325 TABLET ORAL at 04:28

## 2017-08-08 RX ADMIN — GABAPENTIN 300 MILLIGRAM(S): 400 CAPSULE ORAL at 21:32

## 2017-08-08 RX ADMIN — ERTAPENEM SODIUM 120 MILLIGRAM(S): 1 INJECTION, POWDER, LYOPHILIZED, FOR SOLUTION INTRAMUSCULAR; INTRAVENOUS at 16:48

## 2017-08-08 NOTE — PROGRESS NOTE ADULT - ASSESSMENT
ot stable post paracenthesis.  Awaiting cultures and on Invanz for soniya wbc count in ascitic fluid,  Continue to follow clinically

## 2017-08-08 NOTE — PROGRESS NOTE ADULT - SUBJECTIVE AND OBJECTIVE BOX
Pt seen and examined   cc .  Denies pain; passing flatus; no distention.  Joanie diet s/p lg vol paracenthesis.  Results = very high neutrophil ct constent with bacterial peritonitis.  Started on Invanz    REVIEW OF SYSTEMS:  Constitutional: No fever, weight loss or fatigue  Cardiovascular: No chest pain, palpitations, dizziness or leg swelling  Pulm:  No cough, sob, wheeze  Skin: No itching, burning, rashes or lesions       MEDICATIONS:  MEDICATIONS  (STANDING):  nicotine - 21 mG/24Hr(s) Patch 1 patch Transdermal daily  amLODIPine   Tablet 5 milliGRAM(s) Oral daily  lactobacillus acidophilus 2 Tablet(s) Oral two times a day with meals  gabapentin 300 milliGRAM(s) Oral every 8 hours  pantoprazole    Tablet 40 milliGRAM(s) Oral before breakfast  psyllium Powder 1 Packet(s) Oral two times a day  hydrocortisone 2.5% Rectal Cream 1 Application(s) Rectal two times a day  spironolactone 100 milliGRAM(s) Oral daily  furosemide    Tablet 20 milliGRAM(s) Oral daily  methylPREDNISolone sodium succinate Injectable 40 milliGRAM(s) IV Push daily  docusate sodium 100 milliGRAM(s) Oral two times a day  ertapenem  IVPB   IV Intermittent   ertapenem  IVPB 1000 milliGRAM(s) IV Intermittent every 24 hours    MEDICATIONS  (PRN):  acetaminophen   Tablet 650 milliGRAM(s) Oral every 6 hours PRN For Temp greater than 38 C (100.4 F)  aluminum hydroxide/magnesium hydroxide/simethicone Suspension 30 milliLiter(s) Oral every 6 hours PRN Dyspepsia  ondansetron Injectable 4 milliGRAM(s) IV Push every 4 hours PRN Nausea and/or Vomiting  oxyCODONE    5 mG/acetaminophen 325 mG 2 Tablet(s) Oral every 4 hours PRN Severe Pain (7 - 10)      Allergies    iodine (Swelling)    Intolerances        Vital Signs Last 24 Hrs  T(C): 37.1 (08 Aug 2017 07:45), Max: 37.3 (07 Aug 2017 23:22)  T(F): 98.8 (08 Aug 2017 07:45), Max: 979 (07 Aug 2017 13:06)  HR: 79 (07 Aug 2017 23:22) (68 - 79)  BP: 130/80 (08 Aug 2017 07:45) (130/72 - 139/68)  BP(mean): --  RR: 18 (08 Aug 2017 07:45) (18 - 18)  SpO2: 94% (07 Aug 2017 23:22) (94% - 97%)    08-07 @ 07:01  -  08-08 @ 07:00  --------------------------------------------------------  IN: 0 mL / OUT: 500 mL / NET: -500 mL        PHYSICAL EXAM:    General: Well developed; well nourished; in no acute distress  HEENT: Normocephalic; Anicteric, eom intact, throat clear   HEART nsr; no mrg  LUNGS:  clear to ippa  Gastrointestinal: Abd soft; bs wnl.  No masses, organomegaly or tenderness  Skin: Warm and dry. No obvious rash  EXT neg without edema    LABS:      CBC Full  -  ( 07 Aug 2017 14:07 )  WBC Count : 31.6 K/uL  Hemoglobin : 11.1 g/dL  Hematocrit : 32.9 %  Platelet Count - Automated : 610 K/uL  Mean Cell Volume : 97.3 fl  Mean Cell Hemoglobin : 32.8 pg  Mean Cell Hemoglobin Concentration : 33.7 g/dL  Auto Neutrophil # : 29.8 K/uL  Auto Lymphocyte # : 0.6 K/uL  Auto Monocyte # : 1.0 K/uL  Auto Eosinophil # : x  Auto Basophil # : 0.0 K/uL  Auto Neutrophil % : 89.0 %  Auto Lymphocyte % : 5.0 %  Auto Monocyte % : 4.0 %  Auto Eosinophil % : x  Auto Basophil % : x    08-07    134<L>  |  101  |  25.0<H>  ----------------------------<  169<H>  5.0   |  22.0  |  0.49<L>    Ca    8.1<L>      07 Aug 2017 14:07    TPro  5.2<L>  /  Alb  2.2<L>  /  TBili  0.2<L>  /  DBili  0.1  /  AST  56<H>  /  ALT  60<H>  /  AlkPhos  173<H>  08-07    PT/INR - ( 07 Aug 2017 14:07 )   PT: 15.3 sec;   INR: 1.38 ratio                           RADIOLOGY & ADDITIONAL STUDIES (The following images were personally reviewed):

## 2017-08-08 NOTE — PROGRESS NOTE ADULT - ASSESSMENT
1. Marcus feet abscess/ panniculitis - not cellulitis -Leukocytosis with Steroids - derm- s/p skin Bx; steroids started. with good response. Rheum Kleiner called. skin Bx consistent with panniculitis due to pancreatitis. NOw tapered steroid to solumedrol 40 daily-when clinically improved will change to PO steroids of 60 mg daily and slowly taper over 2 weeks each dose.   2. Pain issues- on pain medications; pain management called.   3. HTN- Cont home medications  4.pancreatic pseudocysts and was recommended to f/u GI outpatient for EUS/ ERCP. d/w Dr. Briones- pt got EUS outpatient which showed chronic calcified pancreatitis likely etiology Alcoholic. However IgG was elevated abnormally suggesting autoimmune component. amylase/ lipase elevated chronically. and if needed can rpt MRI . Now high Amylase/ Lipase elevated, but consistent with prior elevated numbers. and same as outpatient numbers.  Rheum ordered panel of tests. sent out. when improved clinically to change to PO steroids and discharge.  5. constipation with abd distention: no response to lactulose.  6 DVT PPX Lovenox   7. Ascites with edema - add lasix and aldactone- discussed with GI - for IR US guided paracentesis  8. possible SBP- INVANZ started by GI

## 2017-08-08 NOTE — PROGRESS NOTE ADULT - SUBJECTIVE AND OBJECTIVE BOX
DON GOOD Patient is a 57y old  Male who presents with a chief complaint of foot swelling and pain (29 Jul 2017 12:54)     HPI:  Pt noted on Tuesday night, left foot medial aspect on the dorsum area of redness with pain and swelling. HE went to PMD and was given Clinda + Motrin for possible spider bite. Since then it has been worsening and increasing clinically. denies trauma/ fevers    PMH- HTN, Inguinal hernia repair right, noted from prior records- pancreatitis  SH- 41 yrs of smoking 1 ppd, no intention of quitting, quit alcohol 2 yrs ago, when in his teens - substance abuse  meds- amlodipine (29 Jul 2017 12:54)    The patient was seen and evaluated complains of feet pain   The patient is in no acute distress.  Denied any fever chest pain, palpitations, shortness of breath, abdominal pain, fever, dysuria, cough, edema       I&O's Summary    07 Aug 2017 07:01  -  08 Aug 2017 07:00  --------------------------------------------------------  IN: 0 mL / OUT: 500 mL / NET: -500 mL      Allergies    iodine (Swelling)    Intolerances      HEALTH ISSUES - PROBLEM Dx:  SBP (spontaneous bacterial peritonitis): SBP (spontaneous bacterial peritonitis)  Ascites: Ascites  Cellulitis of leg, left: Cellulitis of leg, left  Pancreatitis: Pancreatitis  Pauciarticular arthritis: Pauciarticular arthritis  Panniculitis: Panniculitis  Alcohol-induced chronic pancreatitis: Alcohol-induced chronic pancreatitis  Arthralgia of multiple joints: Arthralgia of multiple joints  Smoker: Smoker  Essential hypertension: Essential hypertension  Abscess or cellulitis of foot: Abscess or cellulitis of foot        PAST MEDICAL & SURGICAL HISTORY:  Ascites: may 2017  Hernia  Pancreatitis  Hypertension  TIA (transient ischemic attack)  S/P skin graft using Integra: right calf skin graft  Fracture of shaft of left femur: 1971 compound fracrture left femur  1980 left femur bone spur removed          Vital Signs Last 24 Hrs  T(C): 37.1 (08 Aug 2017 07:45), Max: 37.3 (07 Aug 2017 23:22)  T(F): 98.8 (08 Aug 2017 07:45), Max: 99.1 (07 Aug 2017 23:22)  HR: 79 (07 Aug 2017 23:22) (79 - 79)  BP: 130/80 (08 Aug 2017 07:45) (130/80 - 139/68)  BP(mean): --  RR: 18 (08 Aug 2017 07:45) (18 - 18)  SpO2: 94% (07 Aug 2017 23:22) (94% - 94%)T(C): 37.1 (08 Aug 2017 07:45), Max: 37.3 (07 Aug 2017 23:22)  HR: 79 (07 Aug 2017 23:22) (79 - 79)  BP: 130/80 (08 Aug 2017 07:45) (130/80 - 139/68)  RR: 18 (08 Aug 2017 07:45) (18 - 18)  SpO2: 94% (07 Aug 2017 23:22) (94% - 94%)  Wt(kg): --    PHYSICAL EXAM:    GENERAL: NAD, ill appearing   HEAD:  Atraumatic, Normocephalic  EYES: EOMI, PERRL, conjunctiva and sclera clear  ENMT:  Moist mucous membranes,  No lesions  NECK: Supple,  NERVOUS SYSTEM:  Alert & Oriented X3,  Moves upper and lower extremities; CNS-II-XII  CHEST/LUNG: Clear to auscultation bilaterally; No rales, rhonchi, wheezing,   HEART: Regular rate and rhythm; No murmurs,   ABDOMEN: Soft, Nontender, Nondistended; Bowel sounds present, ascites   EXTREMITIES:  Peripheral Pulses, No  cyanosis, or edema  SKIN: No rashes or lesions improved   psychiatry- mood and affect appropriate Insight and judgement intact     nicotine - 21 mG/24Hr(s) Patch 1 patch Transdermal daily  amLODIPine   Tablet 5 milliGRAM(s) Oral daily  lactobacillus acidophilus 2 Tablet(s) Oral two times a day with meals  acetaminophen   Tablet 650 milliGRAM(s) Oral every 6 hours PRN  gabapentin 300 milliGRAM(s) Oral every 8 hours  pantoprazole    Tablet 40 milliGRAM(s) Oral before breakfast  aluminum hydroxide/magnesium hydroxide/simethicone Suspension 30 milliLiter(s) Oral every 6 hours PRN  ondansetron Injectable 4 milliGRAM(s) IV Push every 4 hours PRN  psyllium Powder 1 Packet(s) Oral two times a day  hydrocortisone 2.5% Rectal Cream 1 Application(s) Rectal two times a day  spironolactone 100 milliGRAM(s) Oral daily  furosemide    Tablet 20 milliGRAM(s) Oral daily  methylPREDNISolone sodium succinate Injectable 40 milliGRAM(s) IV Push daily  docusate sodium 100 milliGRAM(s) Oral two times a day  ertapenem  IVPB   IV Intermittent   ertapenem  IVPB 1000 milliGRAM(s) IV Intermittent every 24 hours  oxyCODONE    5 mG/acetaminophen 325 mG 2 Tablet(s) Oral every 4 hours PRN      LABS:                          11.1   31.6  )-----------( 610      ( 07 Aug 2017 14:07 )             32.9     08-07    134<L>  |  101  |  25.0<H>  ----------------------------<  169<H>  5.0   |  22.0  |  0.49<L>    Ca    8.1<L>      07 Aug 2017 14:07    TPro  5.2<L>  /  Alb  2.2<L>  /  TBili  0.2<L>  /  DBili  0.1  /  AST  56<H>  /  ALT  60<H>  /  AlkPhos  173<H>  08-07    LIVER FUNCTIONS - ( 07 Aug 2017 14:07 )  Alb: 2.2 g/dL / Pro: 5.2 g/dL / ALK PHOS: 173 U/L / ALT: 60 U/L / AST: 56 U/L / GGT: x           PT/INR - ( 07 Aug 2017 14:07 )   PT: 15.3 sec;   INR: 1.38 ratio                 CAPILLARY BLOOD GLUCOSE          RADIOLOGY & ADDITIONAL TESTS:      Consultant notes reviewed    Case discussed with consultant/provider/ family /patient

## 2017-08-09 ENCOUNTER — RESULT REVIEW (OUTPATIENT)
Age: 57
End: 2017-08-09

## 2017-08-09 LAB
ANION GAP SERPL CALC-SCNC: 14 MMOL/L — SIGNIFICANT CHANGE UP (ref 5–17)
B PERT IGG+IGM PNL SER: ABNORMAL
BASOPHILS # BLD AUTO: 0 K/UL — SIGNIFICANT CHANGE UP (ref 0–0.2)
BUN SERPL-MCNC: 36 MG/DL — HIGH (ref 8–20)
CALCIUM SERPL-MCNC: 8 MG/DL — LOW (ref 8.6–10.2)
CHLORIDE SERPL-SCNC: 96 MMOL/L — LOW (ref 98–107)
CO2 SERPL-SCNC: 22 MMOL/L — SIGNIFICANT CHANGE UP (ref 22–29)
COLOR FLD: YELLOW
CREAT SERPL-MCNC: 1.22 MG/DL — SIGNIFICANT CHANGE UP (ref 0.5–1.3)
FLUID INTAKE SUBSTANCE CLASS: SIGNIFICANT CHANGE UP
FLUID SEGMENTED GRANULOCYTES: 90 % — SIGNIFICANT CHANGE UP
GLUCOSE SERPL-MCNC: 128 MG/DL — HIGH (ref 70–115)
GRAM STN FLD: SIGNIFICANT CHANGE UP
HCT VFR BLD CALC: 41.5 % — LOW (ref 42–52)
HGB BLD-MCNC: 13.9 G/DL — LOW (ref 14–18)
LYMPHOCYTES # BLD AUTO: 1 K/UL — SIGNIFICANT CHANGE UP (ref 1–4.8)
LYMPHOCYTES # BLD AUTO: 3 % — LOW (ref 20–55)
LYMPHOCYTES # FLD: 4 % — SIGNIFICANT CHANGE UP
MCHC RBC-ENTMCNC: 33.1 PG — HIGH (ref 27–31)
MCHC RBC-ENTMCNC: 33.5 G/DL — SIGNIFICANT CHANGE UP (ref 32–36)
MCV RBC AUTO: 98.8 FL — HIGH (ref 80–94)
MONOCYTES # BLD AUTO: 1.6 K/UL — HIGH (ref 0–0.8)
MONOCYTES NFR BLD AUTO: 4 % — SIGNIFICANT CHANGE UP (ref 3–10)
MONOS+MACROS # FLD: 6 % — SIGNIFICANT CHANGE UP
NEUTROPHILS # BLD AUTO: 37.8 K/UL — HIGH (ref 1.8–8)
NEUTROPHILS NFR BLD AUTO: 90 % — HIGH (ref 37–73)
NEUTS BAND # BLD: 3 % — SIGNIFICANT CHANGE UP (ref 0–8)
PLAT MORPH BLD: NORMAL — SIGNIFICANT CHANGE UP
PLATELET # BLD AUTO: 663 K/UL — HIGH (ref 150–400)
POTASSIUM SERPL-MCNC: 5.9 MMOL/L — HIGH (ref 3.5–5.3)
POTASSIUM SERPL-SCNC: 5.9 MMOL/L — HIGH (ref 3.5–5.3)
RBC # BLD: 4.2 M/UL — LOW (ref 4.6–6.2)
RBC # FLD: 16 % — HIGH (ref 11–15.6)
RBC BLD AUTO: NORMAL — SIGNIFICANT CHANGE UP
RCV VOL RI: 188 /UL — HIGH (ref 0–5)
SODIUM SERPL-SCNC: 132 MMOL/L — LOW (ref 135–145)
SPECIMEN SOURCE: SIGNIFICANT CHANGE UP
TOTAL NUCLEATED CELL COUNT, BODY FLUID: 5363 /UL — HIGH (ref 0–5)
TUBE TYPE: SIGNIFICANT CHANGE UP
URATE SERPL-MCNC: 7.4 MG/DL — HIGH (ref 3.4–7)
WBC # BLD: 40.7 K/UL — CRITICAL HIGH (ref 4.8–10.8)
WBC # FLD AUTO: 40.7 K/UL — CRITICAL HIGH (ref 4.8–10.8)

## 2017-08-09 PROCEDURE — 74020: CPT | Mod: 26

## 2017-08-09 PROCEDURE — 99233 SBSQ HOSP IP/OBS HIGH 50: CPT

## 2017-08-09 PROCEDURE — 88112 CYTOPATH CELL ENHANCE TECH: CPT | Mod: 26

## 2017-08-09 PROCEDURE — 88305 TISSUE EXAM BY PATHOLOGIST: CPT | Mod: 26

## 2017-08-09 RX ORDER — LACTULOSE 10 G/15ML
30 SOLUTION ORAL ONCE
Qty: 0 | Refills: 0 | Status: COMPLETED | OUTPATIENT
Start: 2017-08-09 | End: 2017-08-09

## 2017-08-09 RX ADMIN — SPIRONOLACTONE 100 MILLIGRAM(S): 25 TABLET, FILM COATED ORAL at 06:20

## 2017-08-09 RX ADMIN — Medication 24 MILLIGRAM(S): at 21:47

## 2017-08-09 RX ADMIN — Medication 1 APPLICATION(S): at 17:03

## 2017-08-09 RX ADMIN — Medication 1 PATCH: at 16:45

## 2017-08-09 RX ADMIN — Medication 40 MILLIGRAM(S): at 06:20

## 2017-08-09 RX ADMIN — AMLODIPINE BESYLATE 5 MILLIGRAM(S): 2.5 TABLET ORAL at 06:20

## 2017-08-09 RX ADMIN — PANTOPRAZOLE SODIUM 40 MILLIGRAM(S): 20 TABLET, DELAYED RELEASE ORAL at 06:20

## 2017-08-09 RX ADMIN — Medication 1 PATCH: at 17:03

## 2017-08-09 RX ADMIN — Medication 20 MILLIGRAM(S): at 06:20

## 2017-08-09 RX ADMIN — OXYCODONE AND ACETAMINOPHEN 2 TABLET(S): 5; 325 TABLET ORAL at 01:39

## 2017-08-09 RX ADMIN — Medication 2 TABLET(S): at 08:32

## 2017-08-09 RX ADMIN — Medication 100 MILLIGRAM(S): at 06:20

## 2017-08-09 RX ADMIN — Medication 100 MILLIGRAM(S): at 16:46

## 2017-08-09 RX ADMIN — ERTAPENEM SODIUM 120 MILLIGRAM(S): 1 INJECTION, POWDER, LYOPHILIZED, FOR SOLUTION INTRAMUSCULAR; INTRAVENOUS at 16:45

## 2017-08-09 RX ADMIN — GABAPENTIN 300 MILLIGRAM(S): 400 CAPSULE ORAL at 14:05

## 2017-08-09 RX ADMIN — Medication 1 APPLICATION(S): at 06:20

## 2017-08-09 RX ADMIN — LACTULOSE 30 GRAM(S): 10 SOLUTION ORAL at 14:05

## 2017-08-09 RX ADMIN — GABAPENTIN 300 MILLIGRAM(S): 400 CAPSULE ORAL at 21:47

## 2017-08-09 RX ADMIN — Medication 2 TABLET(S): at 16:46

## 2017-08-09 RX ADMIN — GABAPENTIN 300 MILLIGRAM(S): 400 CAPSULE ORAL at 06:20

## 2017-08-09 RX ADMIN — OXYCODONE AND ACETAMINOPHEN 2 TABLET(S): 5; 325 TABLET ORAL at 02:09

## 2017-08-09 NOTE — PROGRESS NOTE ADULT - SUBJECTIVE AND OBJECTIVE BOX
[facility] [place] LUIS FRENCH MD FACP   KATYA URBAN MD  Diplomates American Board of Internal Medicine and Infectious Diseases      DON GOOD  MRN-[id number]  57y    INTERVAL HPI/OVERNIGHT EVENTS:    ID FOLLOW UP  LEUKOCYTOSIS PROB FROM STEROIDS  RECENT DX CULTURE NEG PERITONITIS ON INVANZ    ALSO C/O SWOLLEN JNTS    Vital Signs Last 24 Hrs  T(C): 36.9 (09 Aug 2017 08:00), Max: 36.9 (09 Aug 2017 08:00)  T(F): 98.4 (09 Aug 2017 08:00), Max: 98.4 (09 Aug 2017 08:00)  HR: 84 (08 Aug 2017 23:25) (73 - 84)  BP: 130/65 (09 Aug 2017 08:00) (130/65 - 140/79)  BP(mean): --  RR: 22 (09 Aug 2017 08:00) (18 - 22)  SpO2: 96% (09 Aug 2017 08:00) (95% - 97%)    ANTIBIOTICS  ertapenem  IVPB   IV Intermittent   ertapenem  IVPB 1000 milliGRAM(s) IV Intermittent every 24 hours      Allergies    iodine (Swelling)    Intolerances        REVIEW OF SYSTEMS:OAIN ABD AND FEET    PHYSICAL EXAM:  Vital Signs Last 24 Hrs  T(C): 36.9 (09 Aug 2017 08:00), Max: 36.9 (09 Aug 2017 08:00)  T(F): 98.4 (09 Aug 2017 08:00), Max: 98.4 (09 Aug 2017 08:00)  HR: 84 (08 Aug 2017 23:25) (73 - 84)  BP: 130/65 (09 Aug 2017 08:00) (130/65 - 140/79)  BP(mean): --  RR: 22 (09 Aug 2017 08:00) (18 - 22)  SpO2: 96% (09 Aug 2017 08:00) (95% - 97%)      GEN: NAD, pleasant  HEENT: normocephalic and atraumatic. EOMI. LUDIN. Moist mucosa. Clear Posterior pharynx.  NECK: Supple. No carotid bruits.  No lymphadenopathy or thyromegaly.  LUNGS: Clear to auscultation.  HEART: Regular rate and rhythm without murmur.  ABDOMEN: DISTENDED AND TENDER  EXTREMITIES: Without any cyanosis, clubbing, rash, lesions or edema.  NEUROLOGIC: Cranial nerves II through XII are grossly intact.  MUSCULOSKELETAL:PAINFUL TOES IST MTP L, SEVERAL FINGERS AT PIP  SKIN: No ulceration or induration present    LABS:                        11.1   31.6  )-----------( 610      ( 07 Aug 2017 14:07 )             32.9       08-09    132<L>  |  96<L>  |  36.0<H>  ----------------------------<  128<H>  5.9<H>   |  22.0  |  1.22    Ca    8.0<L>      09 Aug 2017 08:09    TPro  5.2<L>  /  Alb  2.2<L>  /  TBili  0.2<L>  /  DBili  0.1  /  AST  56<H>  /  ALT  60<H>  /  AlkPhos  173<H>  08-07 08-07 @ 14:07  hct 32.9 % hgb 11.1 g/dL    08-07 @ 14:07  plat 610 K/uL wbc 31.6 K/uL    08-07 @ 07:47  hct 30.2 % hgb 10.2 g/dL    08-07 @ 07:47  plat 629 K/uL wbc 29.1 K/uL      08-09-17  creat 1.22 mg/dL gfr 77 mL/min/1.73M2 bun 36.0 mg/dL k 5.9 mmol/L  08-07-17  creat 0.49 mg/dL gfr 143 mL/min/1.73M2 bun 25.0 mg/dL k 5.0 mmol/L  08-07-17  creat 0.50 mg/dL gfr 141 mL/min/1.73M2 bun 25.0 mg/dL k 5.0 mmol/L      MICROBIOLOGY:    ALL NG    RADIOLOGY & ADDITIONAL STUDIES:  < from: CT Abdomen and Pelvis No Cont (08.04.17 @ 20:31) >  MPRESSION:       1.  Edema and inflammatory changes around the pancreas consistent with   acute   pancreatitis.    2.  Calcifications in the pancreas consistent with chronic pancreatitis.   Dilatation of the pancreatic duct.    3.  Multiple fluid collections adjacent to pancreas consistent with   pancreatic   pseudocysts. Largest measures 4 cm. Previously it measured 3.7 cm.    4.  Small left pleural effusion. Smaller right pleural effusion.    5.  Large amount of ascites throughout the abdomen and pelvis.    6.  There are multiple loops of dilated small bowel with air-fluid   levels.    This may represent ileus or early small bowel obstruction.    7. Anasarca        < end of copied text >          BRIT FRENCH MD FACP

## 2017-08-09 NOTE — PROGRESS NOTE ADULT - ASSESSMENT
Continued abd pain, distention.  IGg Subsets gregory.  Amylase Lipase elev.  WBC 31,000.  Imaging acute and chr pancreatitis.  Prognosis guarded.  On DASH/TLC low fat diet and diet does not appear to alter sxs but will start clear liq diet.  Will continue abx and await cultures

## 2017-08-09 NOTE — PROGRESS NOTE ADULT - SUBJECTIVE AND OBJECTIVE BOX
DON GOOD Patient is a 57y old  Male who presents with a chief complaint of foot swelling and pain (29 Jul 2017 12:54)     HPI:  Pt noted on Tuesday night, left foot medial aspect on the dorsum area of redness with pain and swelling. HE went to PMD and was given Clinda + Motrin for possible spider bite. Since then it has been worsening and increasing clinically. denies trauma/ fevers    PMH- HTN, Inguinal hernia repair right, noted from prior records- pancreatitis  SH- 41 yrs of smoking 1 ppd, no intention of quitting, quit alcohol 2 yrs ago, when in his teens - substance abuse  meds- amlodipine (29 Jul 2017 12:54)    The patient was seen and evaluated SBP, pancreatitis with panniculitis, liver cirrhosis   The patient is in no acute distress.  Denied any fever chest pain, palpitations, shortness of breath,  fever, dysuria, cough, edema   Complains of abdominal distension and constipation     I&O's Summary    Allergies    iodine (Swelling)    Intolerances      HEALTH ISSUES - PROBLEM Dx:  SBP (spontaneous bacterial peritonitis): SBP (spontaneous bacterial peritonitis)  Ascites: Ascites  Cellulitis of leg, left: Cellulitis of leg, left  Pancreatitis: Pancreatitis  Pauciarticular arthritis: Pauciarticular arthritis  Panniculitis: Panniculitis  Alcohol-induced chronic pancreatitis: Alcohol-induced chronic pancreatitis  Arthralgia of multiple joints: Arthralgia of multiple joints  Smoker: Smoker  Essential hypertension: Essential hypertension  Abscess or cellulitis of foot: Abscess or cellulitis of foot        PAST MEDICAL & SURGICAL HISTORY:  Ascites: may 2017  Hernia  Pancreatitis  Hypertension  TIA (transient ischemic attack)  S/P skin graft using Integra: right calf skin graft  Fracture of shaft of left femur: 1971 compound fracrture left femur  1980 left femur bone spur removed          Vital Signs Last 24 Hrs  T(C): 36.7 (09 Aug 2017 11:08), Max: 36.9 (09 Aug 2017 08:00)  T(F): 98 (09 Aug 2017 11:08), Max: 98.4 (09 Aug 2017 08:00)  HR: 88 (09 Aug 2017 11:08) (73 - 88)  BP: 128/66 (09 Aug 2017 11:08) (128/66 - 140/79)  BP(mean): --  RR: 22 (09 Aug 2017 11:08) (18 - 22)  SpO2: 96% (09 Aug 2017 11:08) (95% - 97%)T(C): 36.7 (09 Aug 2017 11:08), Max: 36.9 (09 Aug 2017 08:00)  HR: 88 (09 Aug 2017 11:08) (73 - 88)  BP: 128/66 (09 Aug 2017 11:08) (128/66 - 140/79)  RR: 22 (09 Aug 2017 11:08) (18 - 22)  SpO2: 96% (09 Aug 2017 11:08) (95% - 97%)  Wt(kg): --    PHYSICAL EXAM:    GENERAL: NAD, ill appearing cachectic  HEAD:  Atraumatic, Normocephalic  EYES: EOMI, PERRL, conjunctiva and sclera clear  ENMT:  Moist mucous membranes,  No lesions  NECK: Supple, No JVD, Normal thyroid  NERVOUS SYSTEM:  Alert & Oriented X3,  Moves upper and lower extremities; CNS-II-XII  CHEST/LUNG: Clear to auscultation bilaterally; No rales, rhonchi, wheezing,   HEART: Regular rate and rhythm; No murmurs,   ABDOMEN: Soft, Nontender, ascites, distended; Bowel sounds present  EXTREMITIES:  Peripheral Pulses, No  cyanosis, or edema  SKIN: No rashes or lesions  psychiatry- mood and affect approprite, Insight and judgement intact     nicotine - 21 mG/24Hr(s) Patch 1 patch Transdermal daily  amLODIPine   Tablet 5 milliGRAM(s) Oral daily  lactobacillus acidophilus 2 Tablet(s) Oral two times a day with meals  acetaminophen   Tablet 650 milliGRAM(s) Oral every 6 hours PRN  gabapentin 300 milliGRAM(s) Oral every 8 hours  pantoprazole    Tablet 40 milliGRAM(s) Oral before breakfast  aluminum hydroxide/magnesium hydroxide/simethicone Suspension 30 milliLiter(s) Oral every 6 hours PRN  ondansetron Injectable 4 milliGRAM(s) IV Push every 4 hours PRN  psyllium Powder 1 Packet(s) Oral two times a day  hydrocortisone 2.5% Rectal Cream 1 Application(s) Rectal two times a day  spironolactone 100 milliGRAM(s) Oral daily  furosemide    Tablet 20 milliGRAM(s) Oral daily  docusate sodium 100 milliGRAM(s) Oral two times a day  ertapenem  IVPB   IV Intermittent   ertapenem  IVPB 1000 milliGRAM(s) IV Intermittent every 24 hours  oxyCODONE    5 mG/acetaminophen 325 mG 2 Tablet(s) Oral every 4 hours PRN  lactulose Syrup 30 Gram(s) Oral once  methylPREDNISolone  milliGRAM(s) Oral       LABS:                          13.9   40.7  )-----------( 663      ( 09 Aug 2017 08:09 )             41.5     08-09    132<L>  |  96<L>  |  36.0<H>  ----------------------------<  128<H>  5.9<H>   |  22.0  |  1.22    Ca    8.0<L>      09 Aug 2017 08:09    TPro  5.2<L>  /  Alb  2.2<L>  /  TBili  0.2<L>  /  DBili  0.1  /  AST  56<H>  /  ALT  60<H>  /  AlkPhos  173<H>  08-07    LIVER FUNCTIONS - ( 07 Aug 2017 14:07 )  Alb: 2.2 g/dL / Pro: 5.2 g/dL / ALK PHOS: 173 U/L / ALT: 60 U/L / AST: 56 U/L / GGT: x           PT/INR - ( 07 Aug 2017 14:07 )   PT: 15.3 sec;   INR: 1.38 ratio                 CAPILLARY BLOOD GLUCOSE          RADIOLOGY & ADDITIONAL TESTS:      Consultant notes reviewed    Case discussed with consultant/provider/ family /patient

## 2017-08-09 NOTE — PROGRESS NOTE ADULT - ASSESSMENT
LEUKEMOID RX FROM PANCREATITIS/STEROIDS AND SBP.  MULTI JNT SWELLING    POSS NEEDS ADDL REPEAT PARACENTESIS AND SURGICAL EVAL    CHK URIC ACID  NO NEED TO CHANGE ABS WITH CULT NEGATIVE  POOR PROGNOSIS

## 2017-08-09 NOTE — PROGRESS NOTE ADULT - PROBLEM SELECTOR PLAN 1
continue to monitor.  Need to check if culture obtained at paracenthesis.  May need repeat and would draw amylase on ascitic fluid

## 2017-08-09 NOTE — PROGRESS NOTE ADULT - SUBJECTIVE AND OBJECTIVE BOX
Pt seen and examined   cc Still pain;  abd sl more distended.  On antibioticsTol diet    REVIEW OF SYSTEMS:  Constitutional: No fever, weight loss or fatigue  Cardiovascular: No chest pain, palpitations, dizziness or leg swelling  Pulm:  No cough, sob, wheeze  Skin: No itching, burning, rashes or lesions       MEDICATIONS:  MEDICATIONS  (STANDING):  nicotine - 21 mG/24Hr(s) Patch 1 patch Transdermal daily  amLODIPine   Tablet 5 milliGRAM(s) Oral daily  lactobacillus acidophilus 2 Tablet(s) Oral two times a day with meals  gabapentin 300 milliGRAM(s) Oral every 8 hours  pantoprazole    Tablet 40 milliGRAM(s) Oral before breakfast  psyllium Powder 1 Packet(s) Oral two times a day  hydrocortisone 2.5% Rectal Cream 1 Application(s) Rectal two times a day  spironolactone 100 milliGRAM(s) Oral daily  furosemide    Tablet 20 milliGRAM(s) Oral daily  methylPREDNISolone sodium succinate Injectable 40 milliGRAM(s) IV Push daily  docusate sodium 100 milliGRAM(s) Oral two times a day  ertapenem  IVPB   IV Intermittent   ertapenem  IVPB 1000 milliGRAM(s) IV Intermittent every 24 hours    MEDICATIONS  (PRN):  acetaminophen   Tablet 650 milliGRAM(s) Oral every 6 hours PRN For Temp greater than 38 C (100.4 F)  aluminum hydroxide/magnesium hydroxide/simethicone Suspension 30 milliLiter(s) Oral every 6 hours PRN Dyspepsia  ondansetron Injectable 4 milliGRAM(s) IV Push every 4 hours PRN Nausea and/or Vomiting  oxyCODONE    5 mG/acetaminophen 325 mG 2 Tablet(s) Oral every 4 hours PRN Severe Pain (7 - 10)      Allergies    iodine (Swelling)    Intolerances        Vital Signs Last 24 Hrs  T(C): 36.8 (08 Aug 2017 23:25), Max: 37.1 (08 Aug 2017 07:45)  T(F): 98.3 (08 Aug 2017 23:25), Max: 98.8 (08 Aug 2017 07:45)  HR: 84 (08 Aug 2017 23:25) (73 - 84)  BP: 131/88 (08 Aug 2017 23:25) (130/80 - 140/79)  BP(mean): --  RR: 22 (08 Aug 2017 23:25) (18 - 22)  SpO2: 95% (08 Aug 2017 23:25) (95% - 97%)      PHYSICAL EXAM:    General: Well developed; well nourished; in no acute distress  HEENT: Normocephalic; Anicteric, eom intact, throat clear   HEART nsr; no mrg  LUNGS:  clear to ippa  Gastrointestinal: Abd distended.  +fluid shift.  Diffusely tended.  BS pos.  Skin: Warm and dry. No obvious rash  EXT neg without edema    LABS:      CBC Full  -  ( 07 Aug 2017 14:07 )  WBC Count : 31.6 K/uL  Hemoglobin : 11.1 g/dL  Hematocrit : 32.9 %  Platelet Count - Automated : 610 K/uL  Mean Cell Volume : 97.3 fl  Mean Cell Hemoglobin : 32.8 pg  Mean Cell Hemoglobin Concentration : 33.7 g/dL  Auto Neutrophil # : 29.8 K/uL  Auto Lymphocyte # : 0.6 K/uL  Auto Monocyte # : 1.0 K/uL  Auto Eosinophil # : x  Auto Basophil # : 0.0 K/uL  Auto Neutrophil % : 89.0 %  Auto Lymphocyte % : 5.0 %  Auto Monocyte % : 4.0 %  Auto Eosinophil % : x  Auto Basophil % : x    08-07    134<L>  |  101  |  25.0<H>  ----------------------------<  169<H>  5.0   |  22.0  |  0.49<L>    Ca    8.1<L>      07 Aug 2017 14:07    TPro  5.2<L>  /  Alb  2.2<L>  /  TBili  0.2<L>  /  DBili  0.1  /  AST  56<H>  /  ALT  60<H>  /  AlkPhos  173<H>  08-07    PT/INR - ( 07 Aug 2017 14:07 )   PT: 15.3 sec;   INR: 1.38 ratio                           RADIOLOGY & ADDITIONAL STUDIES (The following images were personally reviewed):

## 2017-08-09 NOTE — PROGRESS NOTE ADULT - ASSESSMENT
57 year old male with multiple issues including constipation and rectal prolapse; now spontaneous bacterial peritonitis. WBC up to 40K. On antibiotics. Recommend bowel rest and repeat xray or imaging. Bowel regimen. Rectal prolapse is a non issue as he has more acute problems with guarded prognosis. 57 year old male with multiple issues including constipation and rectal prolapse; now spontaneous bacterial peritonitis. WBC up to 40K. On antibiotics. Recommend bowel rest and abdominal  xray to make sure colon is not very distended. Aggressive Bowel regimen. I do not think there is an acute surgical process going on causing WBC, likely bacterial translocation causing SBP. Rectal prolapse is a non issue as he has more acute problems with guarded prognosis.     Discussed with Dr. Shukla

## 2017-08-09 NOTE — PROGRESS NOTE ADULT - ASSESSMENT
1. Marcus feet abscess/ panniculitis - not cellulitis -Leukocytosis with Steroids - derm- s/p skin Bx; steroids started. with good response. Rheum Kleiner called. skin Bx consistent with panniculitis due to pancreatitis. NOw tapered steroid to solumedrol 40 daily-when clinically improved will change to PO steroids of 60 mg daily and slowly taper over 2 weeks each dose. - changed to taper oral dose today   2. Pain issues- on pain medications; pain management called.   3. HTN- Cont home medications  4.pancreatic pseudocysts and was recommended to f/u GI outpatient for EUS/ ERCP. d/w Dr. Briones- pt got EUS outpatient which showed chronic calcified pancreatitis likely etiology Alcoholic. However IgG was elevated abnormally suggesting autoimmune component. amylase/ lipase elevated chronically. and if needed can rpt MRI . Now high Amylase/ Lipase elevated, but consistent with prior elevated numbers. and same as outpatient numbers.  Rheum ordered panel of tests. sent out. when improved clinically to change to PO steroids and discharge.  5. constipation with abd distention: no response to lactulose.  6 DVT PPX Lovenox   7. Ascites with edema - add lasix and aldactone- discussed with GI - for IR US guided paracentesis  8. SBP- INVANZ repeat paracentesis today will follow up fluid results and culture   9 constipation - lactulose, discussed with sx - flat and upright keisha

## 2017-08-09 NOTE — PROGRESS NOTE ADULT - SUBJECTIVE AND OBJECTIVE BOX
Paracentesis done two days ago and no organisms growing yet but high WBC consistent with SBP. Complains of abdominal discomfort. No bowel movement in > 24 hours. No further prolapse.     Exam:  Vital Signs Last 24 Hrs  T(C): 36.7 (09 Aug 2017 11:08), Max: 36.9 (09 Aug 2017 08:00)  T(F): 98 (09 Aug 2017 11:08), Max: 98.4 (09 Aug 2017 08:00)  HR: 88 (09 Aug 2017 11:08) (73 - 88)  BP: 128/66 (09 Aug 2017 11:08) (128/66 - 140/79)  RR: 22 (09 Aug 2017 11:08) (18 - 22)  SpO2: 96% (09 Aug 2017 11:08) (95% - 97%)    General: in no distress  Abdomen: soft, distended, no peritonitis                                      13.9   40.7  )-----------( 663      ( 09 Aug 2017 08:09 )             41.5   08-09    132<L>  |  96<L>  |  36.0<H>  ----------------------------<  128<H>  5.9<H>   |  22.0  |  1.22    Ca    8.0<L>      09 Aug 2017 08:09    TPro  5.2<L>  /  Alb  2.2<L>  /  TBili  0.2<L>  /  DBili  0.1  /  AST  56<H>  /  ALT  60<H>  /  AlkPhos  173<H>  08-07

## 2017-08-10 DIAGNOSIS — K70.31 ALCOHOLIC CIRRHOSIS OF LIVER WITH ASCITES: ICD-10-CM

## 2017-08-10 DIAGNOSIS — K86.3 PSEUDOCYST OF PANCREAS: ICD-10-CM

## 2017-08-10 DIAGNOSIS — K85.20 ALCOHOL INDUCED ACUTE PANCREATITIS WITHOUT NECROSIS OR INFECTION: ICD-10-CM

## 2017-08-10 LAB
ALBUMIN SERPL ELPH-MCNC: 2.1 G/DL — LOW (ref 3.3–5.2)
ALP SERPL-CCNC: 142 U/L — HIGH (ref 40–120)
ALT FLD-CCNC: 41 U/L — HIGH
AMYLASE FLD-CCNC: SIGNIFICANT CHANGE UP U/L
AMYLASE P1 CFR SERPL: 2226 U/L — HIGH (ref 36–128)
ANION GAP SERPL CALC-SCNC: 13 MMOL/L — SIGNIFICANT CHANGE UP (ref 5–17)
ANISOCYTOSIS BLD QL: SLIGHT — SIGNIFICANT CHANGE UP
AST SERPL-CCNC: 32 U/L — SIGNIFICANT CHANGE UP
BILIRUB SERPL-MCNC: 0.2 MG/DL — LOW (ref 0.4–2)
BUN SERPL-MCNC: 38 MG/DL — HIGH (ref 8–20)
CALCIUM SERPL-MCNC: 8.1 MG/DL — LOW (ref 8.6–10.2)
CHLORIDE SERPL-SCNC: 95 MMOL/L — LOW (ref 98–107)
CO2 SERPL-SCNC: 23 MMOL/L — SIGNIFICANT CHANGE UP (ref 22–29)
CREAT SERPL-MCNC: 0.76 MG/DL — SIGNIFICANT CHANGE UP (ref 0.5–1.3)
GLUCOSE SERPL-MCNC: 120 MG/DL — HIGH (ref 70–115)
HCT VFR BLD CALC: 35.3 % — LOW (ref 42–52)
HGB BLD-MCNC: 11.7 G/DL — LOW (ref 14–18)
HYPOCHROMIA BLD QL: SLIGHT — SIGNIFICANT CHANGE UP
LYMPHOCYTES # BLD AUTO: 2 % — LOW (ref 20–55)
MACROCYTES BLD QL: SLIGHT — SIGNIFICANT CHANGE UP
MCHC RBC-ENTMCNC: 32.4 PG — HIGH (ref 27–31)
MCHC RBC-ENTMCNC: 33.1 G/DL — SIGNIFICANT CHANGE UP (ref 32–36)
MCV RBC AUTO: 97.8 FL — HIGH (ref 80–94)
MICROCYTES BLD QL: SLIGHT — SIGNIFICANT CHANGE UP
MONOCYTES NFR BLD AUTO: 2 % — LOW (ref 3–10)
NEUTROPHILS NFR BLD AUTO: 96 % — HIGH (ref 37–73)
OVALOCYTES BLD QL SMEAR: SLIGHT — SIGNIFICANT CHANGE UP
PLAT MORPH BLD: NORMAL — SIGNIFICANT CHANGE UP
PLATELET # BLD AUTO: 563 K/UL — HIGH (ref 150–400)
POIKILOCYTOSIS BLD QL AUTO: SLIGHT — SIGNIFICANT CHANGE UP
POTASSIUM SERPL-MCNC: 5.7 MMOL/L — HIGH (ref 3.5–5.3)
POTASSIUM SERPL-SCNC: 5.7 MMOL/L — HIGH (ref 3.5–5.3)
PROT SERPL-MCNC: 4.9 G/DL — LOW (ref 6.6–8.7)
RBC # BLD: 3.61 M/UL — LOW (ref 4.6–6.2)
RBC # FLD: 16.2 % — HIGH (ref 11–15.6)
RBC BLD AUTO: ABNORMAL
SODIUM SERPL-SCNC: 131 MMOL/L — LOW (ref 135–145)
SPECIMEN SOURCE FLD: SIGNIFICANT CHANGE UP
WBC # BLD: 37.6 K/UL — HIGH (ref 4.8–10.8)
WBC # FLD AUTO: 37.6 K/UL — HIGH (ref 4.8–10.8)

## 2017-08-10 PROCEDURE — 99233 SBSQ HOSP IP/OBS HIGH 50: CPT

## 2017-08-10 RX ORDER — SODIUM POLYSTYRENE SULFONATE 4.1 MEQ/G
30 POWDER, FOR SUSPENSION ORAL ONCE
Qty: 0 | Refills: 0 | Status: COMPLETED | OUTPATIENT
Start: 2017-08-10 | End: 2017-08-10

## 2017-08-10 RX ORDER — METRONIDAZOLE 500 MG
500 TABLET ORAL EVERY 8 HOURS
Qty: 0 | Refills: 0 | Status: DISCONTINUED | OUTPATIENT
Start: 2017-08-10 | End: 2017-08-11

## 2017-08-10 RX ADMIN — OXYCODONE AND ACETAMINOPHEN 1 TABLET(S): 5; 325 TABLET ORAL at 09:44

## 2017-08-10 RX ADMIN — Medication 2 TABLET(S): at 17:30

## 2017-08-10 RX ADMIN — OXYCODONE AND ACETAMINOPHEN 2 TABLET(S): 5; 325 TABLET ORAL at 09:44

## 2017-08-10 RX ADMIN — Medication 500 MILLIGRAM(S): at 22:34

## 2017-08-10 RX ADMIN — Medication 2 TABLET(S): at 09:45

## 2017-08-10 RX ADMIN — GABAPENTIN 300 MILLIGRAM(S): 400 CAPSULE ORAL at 06:32

## 2017-08-10 RX ADMIN — OXYCODONE AND ACETAMINOPHEN 2 TABLET(S): 5; 325 TABLET ORAL at 07:04

## 2017-08-10 RX ADMIN — AMLODIPINE BESYLATE 5 MILLIGRAM(S): 2.5 TABLET ORAL at 06:32

## 2017-08-10 RX ADMIN — OXYCODONE AND ACETAMINOPHEN 2 TABLET(S): 5; 325 TABLET ORAL at 01:24

## 2017-08-10 RX ADMIN — Medication 8 MILLIGRAM(S): at 22:34

## 2017-08-10 RX ADMIN — Medication 20 MILLIGRAM(S): at 06:32

## 2017-08-10 RX ADMIN — Medication 500 MILLIGRAM(S): at 14:01

## 2017-08-10 RX ADMIN — SPIRONOLACTONE 100 MILLIGRAM(S): 25 TABLET, FILM COATED ORAL at 06:32

## 2017-08-10 RX ADMIN — GABAPENTIN 300 MILLIGRAM(S): 400 CAPSULE ORAL at 14:00

## 2017-08-10 RX ADMIN — Medication 100 MILLIGRAM(S): at 17:31

## 2017-08-10 RX ADMIN — OXYCODONE AND ACETAMINOPHEN 2 TABLET(S): 5; 325 TABLET ORAL at 10:47

## 2017-08-10 RX ADMIN — Medication 1 APPLICATION(S): at 06:33

## 2017-08-10 RX ADMIN — OXYCODONE AND ACETAMINOPHEN 2 TABLET(S): 5; 325 TABLET ORAL at 16:21

## 2017-08-10 RX ADMIN — Medication 4 MILLIGRAM(S): at 06:32

## 2017-08-10 RX ADMIN — OXYCODONE AND ACETAMINOPHEN 2 TABLET(S): 5; 325 TABLET ORAL at 23:30

## 2017-08-10 RX ADMIN — OXYCODONE AND ACETAMINOPHEN 2 TABLET(S): 5; 325 TABLET ORAL at 06:33

## 2017-08-10 RX ADMIN — GABAPENTIN 300 MILLIGRAM(S): 400 CAPSULE ORAL at 22:35

## 2017-08-10 RX ADMIN — SODIUM POLYSTYRENE SULFONATE 30 GRAM(S): 4.1 POWDER, FOR SUSPENSION ORAL at 17:31

## 2017-08-10 RX ADMIN — OXYCODONE AND ACETAMINOPHEN 2 TABLET(S): 5; 325 TABLET ORAL at 15:34

## 2017-08-10 RX ADMIN — Medication 4 MILLIGRAM(S): at 15:32

## 2017-08-10 RX ADMIN — OXYCODONE AND ACETAMINOPHEN 2 TABLET(S): 5; 325 TABLET ORAL at 22:35

## 2017-08-10 RX ADMIN — Medication 1 PACKET(S): at 17:32

## 2017-08-10 RX ADMIN — OXYCODONE AND ACETAMINOPHEN 2 TABLET(S): 5; 325 TABLET ORAL at 15:32

## 2017-08-10 RX ADMIN — Medication 1 PATCH: at 14:00

## 2017-08-10 RX ADMIN — Medication 4 MILLIGRAM(S): at 17:30

## 2017-08-10 RX ADMIN — OXYCODONE AND ACETAMINOPHEN 2 TABLET(S): 5; 325 TABLET ORAL at 02:15

## 2017-08-10 RX ADMIN — ERTAPENEM SODIUM 120 MILLIGRAM(S): 1 INJECTION, POWDER, LYOPHILIZED, FOR SOLUTION INTRAMUSCULAR; INTRAVENOUS at 18:31

## 2017-08-10 RX ADMIN — PANTOPRAZOLE SODIUM 40 MILLIGRAM(S): 20 TABLET, DELAYED RELEASE ORAL at 06:32

## 2017-08-10 RX ADMIN — Medication 1 APPLICATION(S): at 17:31

## 2017-08-10 NOTE — PROGRESS NOTE ADULT - SUBJECTIVE AND OBJECTIVE BOX
DON GOOD Patient is a 57y old  Male who presents with a chief complaint of foot swelling and pain (29 Jul 2017 12:54)     HPI:  Pt noted on Tuesday night, left foot medial aspect on the dorsum area of redness with pain and swelling. HE went to PMD and was given Clinda + Motrin for possible spider bite. Since then it has been worsening and increasing clinically. denies trauma/ fevers    PMH- HTN, Inguinal hernia repair right, noted from prior records- pancreatitis  SH- 41 yrs of smoking 1 ppd, no intention of quitting, quit alcohol 2 yrs ago, when in his teens - substance abuse  meds- amlodipine (29 Jul 2017 12:54)    The patient was seen and evaluated pancreatitis, panniculitis SBP  The patient is in no acute distress.  Denied any fever chest pain, palpitations, shortness of breath,      I&O's Summary    09 Aug 2017 07:01  -  10 Aug 2017 07:00  --------------------------------------------------------  IN: 0 mL / OUT: 300 mL / NET: -300 mL      Allergies    iodine (Swelling)    Intolerances      HEALTH ISSUES - PROBLEM Dx:  Ascites due to alcoholic cirrhosis: Ascites due to alcoholic cirrhosis  Pseudocyst, pancreas: Pseudocyst, pancreas  Alcohol-induced acute pancreatitis, unspecified complication status: Alcohol-induced acute pancreatitis, unspecified complication status  SBP (spontaneous bacterial peritonitis): SBP (spontaneous bacterial peritonitis)  Ascites: Ascites  Cellulitis of leg, left: Cellulitis of leg, left  Pancreatitis: Pancreatitis  Pauciarticular arthritis: Pauciarticular arthritis  Panniculitis: Panniculitis  Alcohol-induced chronic pancreatitis: Alcohol-induced chronic pancreatitis  Arthralgia of multiple joints: Arthralgia of multiple joints  Smoker: Smoker  Essential hypertension: Essential hypertension  Abscess or cellulitis of foot: Abscess or cellulitis of foot        PAST MEDICAL & SURGICAL HISTORY:  Ascites: may 2017  Hernia  Pancreatitis  Hypertension  TIA (transient ischemic attack)  S/P skin graft using Integra: right calf skin graft  Fracture of shaft of left femur: 1971 compound fracrture left femur  1980 left femur bone spur removed          Vital Signs Last 24 Hrs  T(C): 36.5 (10 Aug 2017 08:04), Max: 37.1 (10 Aug 2017 01:02)  T(F): 97.7 (10 Aug 2017 08:04), Max: 98.7 (10 Aug 2017 01:02)  HR: 76 (10 Aug 2017 08:04) (75 - 94)  BP: 125/70 (10 Aug 2017 08:04) (122/75 - 137/87)  BP(mean): --  RR: 18 (10 Aug 2017 08:04) (18 - 22)  SpO2: 96% (10 Aug 2017 08:04) (96% - 96%)T(C): 36.5 (10 Aug 2017 08:04), Max: 37.1 (10 Aug 2017 01:02)  HR: 76 (10 Aug 2017 08:04) (75 - 94)  BP: 125/70 (10 Aug 2017 08:04) (122/75 - 137/87)  RR: 18 (10 Aug 2017 08:04) (18 - 22)  SpO2: 96% (10 Aug 2017 08:04) (96% - 96%)  Wt(kg): --    PHYSICAL EXAM:    GENERAL: NAD, ill appearing cachectic   HEAD:  Atraumatic, Normocephalic  EYES: EOMI, conjunctiva and sclera clear  ENMT:  Moist mucous membranes,  No lesions  NECK: Supple,  NERVOUS SYSTEM:  Alert & Oriented X3, barely Moves upper and lower extremities; CNS-II-XII  CHEST/LUNG: Clear to auscultation bilaterally; No rales, rhonchi, wheezing,   HEART: Regular rate and rhythm; No murmurs,   ABDOMEN: Soft, Nontender, Nondistended; Bowel sounds present, ascites, non tender  EXTREMITIES:  Peripheral Pulses, No  cyanosis, or edema  SKIN: No rashes or lesions  psychiatry- mood and affect anxious    nicotine - 21 mG/24Hr(s) Patch 1 patch Transdermal daily  amLODIPine   Tablet 5 milliGRAM(s) Oral daily  lactobacillus acidophilus 2 Tablet(s) Oral two times a day with meals  acetaminophen   Tablet 650 milliGRAM(s) Oral every 6 hours PRN  gabapentin 300 milliGRAM(s) Oral every 8 hours  pantoprazole    Tablet 40 milliGRAM(s) Oral before breakfast  aluminum hydroxide/magnesium hydroxide/simethicone Suspension 30 milliLiter(s) Oral every 6 hours PRN  ondansetron Injectable 4 milliGRAM(s) IV Push every 4 hours PRN  psyllium Powder 1 Packet(s) Oral two times a day  hydrocortisone 2.5% Rectal Cream 1 Application(s) Rectal two times a day  spironolactone 100 milliGRAM(s) Oral daily  furosemide    Tablet 20 milliGRAM(s) Oral daily  docusate sodium 100 milliGRAM(s) Oral two times a day  ertapenem  IVPB   IV Intermittent   ertapenem  IVPB 1000 milliGRAM(s) IV Intermittent every 24 hours  oxyCODONE    5 mG/acetaminophen 325 mG 2 Tablet(s) Oral every 4 hours PRN  methylPREDNISolone  milliGRAM(s) Oral   methylPREDNISolone 4 milliGRAM(s) Oral before breakfast  methylPREDNISolone 4 milliGRAM(s) Oral after lunch  methylPREDNISolone 4 milliGRAM(s) Oral after dinner  methylPREDNISolone 8 milliGRAM(s) Oral at bedtime  metroNIDAZOLE    Tablet 500 milliGRAM(s) Oral every 8 hours  sodium polystyrene sulfonate Suspension 30 Gram(s) Oral once      LABS:                          11.7   37.6  )-----------( 563      ( 10 Aug 2017 07:37 )             35.3     08-10    131<L>  |  95<L>  |  38.0<H>  ----------------------------<  120<H>  5.7<H>   |  23.0  |  0.76    Ca    8.1<L>      10 Aug 2017 07:37    TPro  4.9<L>  /  Alb  2.1<L>  /  TBili  0.2<L>  /  DBili  x   /  AST  32  /  ALT  41<H>  /  AlkPhos  142<H>  08-10    LIVER FUNCTIONS - ( 10 Aug 2017 07:37 )  Alb: 2.1 g/dL / Pro: 4.9 g/dL / ALK PHOS: 142 U/L / ALT: 41 U/L / AST: 32 U/L / GGT: x                   CAPILLARY BLOOD GLUCOSE          RADIOLOGY & ADDITIONAL TESTS:      Consultant notes reviewed    Case discussed with consultant/provider/ family /patient

## 2017-08-10 NOTE — GOALS OF CARE CONVERSATION - PERSONAL ADVANCE DIRECTIVE - WHAT MATTERS MOST
Pt is concerned about prognosis.  Sister is concerned about Pt's prognosis and also his discharge plan.  Pt does not have a strong support system @ home.  Both were assured that discharge is not imminent.  [facility] Brooke also met with sister to reassure her that a safe plan would be in place.  Pt is processing his diagnosis and prognosis.

## 2017-08-10 NOTE — PROGRESS NOTE ADULT - ASSESSMENT
1. Marcus feet abscess/ panniculitis - with fat saponification - improved feel but also on hands now  -Leukocytosis with Steroids - derm- s/p skin Bx; steroids started. with good response. Rheum Kleiner called. skin Bx consistent with panniculitis due to pancreatitis. NOw tapered steroid PO steroids  taper over 2 weeks each dose.   2. Pain issues- on pain medications; pain management called.   3. HTN- Cont home medications  4.pancreatic pseudocysts and was recommended to f/u GI outpatient for EUS/ ERCP. d/w Dr. Briones- pt got EUS outpatient which showed chronic calcified pancreatitis likely etiology Alcoholic. However IgG was elevated abnormally suggesting autoimmune component. amylase/ lipase elevated chronically. and if needed can rpt MRI . Now high Amylase/ Lipase elevated, but consistent with prior elevated numbers. and same as outpatient numbers.  Rheum ordered panel of tests. sent out. when improved clinically to change to PO steroids and discharge.  5. constipation with abd distention: no response to lactulose.  6 DVT PPX Lovenox   7. Ascites with edema - add lasix and aldactone- discussed with GI - for IR US guided paracentesis  8. SBP- INVANZ repeat paracentesis will follow up fluid results and culture   9 constipation - lactulose, discussed with sx - flat and upright keisha

## 2017-08-10 NOTE — PROGRESS NOTE ADULT - SUBJECTIVE AND OBJECTIVE BOX
ID FOLLOW UP    WBC 40 K  C/O DIARRHEA  ABD NO CHANGE  ALL C/S NEG    ROS FEELS WELL EXCEPT DIARRHEA    UNEXPLAINED SEVERE LEUKOMOID RX - R/O CDIFF    STILL POSS STEROIDS  PLAN  INVANZ  PO FLAGYL PENDING C DIFF

## 2017-08-10 NOTE — PROGRESS NOTE ADULT - PROBLEM SELECTOR PLAN 1
Patient with acute on chronic pancreatitis with psuedocysts  Now with SBP on paracentesis. Cultures negative thus far. Pt is on INVANZ and ID is following.      I have contacted the lab to add amylase and lipase levels  to the ascitic fluid drawn yesterday   clear liquids for now.   Please contact rheumatology to see if steroids can be tappered more quickly as pt is retaining fluid secondary to steroids ( on aldactone 100 and lasix 20)  and would like to sherrill steroids more quickly in setting of SBP.

## 2017-08-10 NOTE — PROGRESS NOTE ADULT - SUBJECTIVE AND OBJECTIVE BOX
Patient is a 57y old  Male who presents with a chief complaint of foot swelling and pain (29 Jul 2017 12:54)      HPI : Patient with acute on chronic ETOH pancreatitis with pseuodocysts. Patient admitted with cellulitis and pauciarticular arthritis.      Currently. Pt has no abdominal pain, no fever. . He has paracentesis for ascites on 8/7 which was concerning for SBP. Had repeat paracentesis ( I believe about 1100 cc were removed)on 8/9. . The WBC count in ascites is higher ( 5363 , 90% polyps) . Pt is on INVANZ. . Cultures from ascites on 8/7band 8/9 are negative thus far. The lab did not run the amylase and lipase on the fluid as requested.  Serum WBC were up to 40,000 yesterday. ID following and feel it is combination of arthritis, SBP and steroids.       Patient with hx of rectal prolapse. Had large soft BM yesterday. State he felt a hemorrhoid, but no prolapse.           PMH- HTN, Inguinal hernia repair right, noted from prior records- pancreatitis  SH- 41 yrs of smoking 1 ppd, no intention of quitting, quit alcohol 2 yrs ago, when in his teens - substance abuse  meds- amlodipine (29 Jul 2017 12:54)      REVIEW OF SYSTEMS:  Constitutional: No fever, weight loss or fatigue  ENMT:  No difficulty hearing, tinnitus, vertigo; No sinus or throat pain  Respiratory: No cough, wheezing, chills or hemoptysis  Cardiovascular: No chest pain, palpitations, dizziness or leg swelling  Gastrointestinal: No abdominal or epigastric pain. No nausea, vomiting or hematemesis; No diarrhea or constipation. No melena or hematochezia.  Skin: No itching, burning, rashes or lesions   Musculoskeletal: No joint pain or swelling; No muscle, back or extremity pain    PAST MEDICAL & SURGICAL HISTORY:  Ascites: may 2017  Hernia  Pancreatitis  Hypertension  TIA (transient ischemic attack)  S/P skin graft using Integra: right calf skin graft  Fracture of shaft of left femur: 1971 compound fracrture left femur  1980 left femur bone spur removed      FAMILY HISTORY:  Family history of duodenal cancer      SOCIAL HISTORY:  Smoking Status: [ ] Current, [ ] Former, [ ] Never  Pack Years:  [  ] EtOH- stopped 2 years ago  [  ] IVDA    MEDICATIONS:  MEDICATIONS  (STANDING):  nicotine - 21 mG/24Hr(s) Patch 1 patch Transdermal daily  amLODIPine   Tablet 5 milliGRAM(s) Oral daily  lactobacillus acidophilus 2 Tablet(s) Oral two times a day with meals  gabapentin 300 milliGRAM(s) Oral every 8 hours  pantoprazole    Tablet 40 milliGRAM(s) Oral before breakfast  psyllium Powder 1 Packet(s) Oral two times a day  hydrocortisone 2.5% Rectal Cream 1 Application(s) Rectal two times a day  spironolactone 100 milliGRAM(s) Oral daily  furosemide    Tablet 20 milliGRAM(s) Oral daily  docusate sodium 100 milliGRAM(s) Oral two times a day  ertapenem  IVPB   IV Intermittent   ertapenem  IVPB 1000 milliGRAM(s) IV Intermittent every 24 hours  methylPREDNISolone  milliGRAM(s) Oral   methylPREDNISolone 4 milliGRAM(s) Oral before breakfast  methylPREDNISolone 4 milliGRAM(s) Oral after lunch  methylPREDNISolone 4 milliGRAM(s) Oral after dinner  methylPREDNISolone 8 milliGRAM(s) Oral at bedtime    MEDICATIONS  (PRN):  acetaminophen   Tablet 650 milliGRAM(s) Oral every 6 hours PRN For Temp greater than 38 C (100.4 F)  aluminum hydroxide/magnesium hydroxide/simethicone Suspension 30 milliLiter(s) Oral every 6 hours PRN Dyspepsia  ondansetron Injectable 4 milliGRAM(s) IV Push every 4 hours PRN Nausea and/or Vomiting  oxyCODONE    5 mG/acetaminophen 325 mG 2 Tablet(s) Oral every 4 hours PRN Severe Pain (7 - 10)      Allergies    iodine (Swelling)    Intolerances        Vital Signs Last 24 Hrs  T(C): 37.1 (10 Aug 2017 01:02), Max: 37.1 (10 Aug 2017 01:02)  T(F): 98.7 (10 Aug 2017 01:02), Max: 98.7 (10 Aug 2017 01:02)  HR: 75 (10 Aug 2017 06:32) (75 - 94)  BP: 130/72 (10 Aug 2017 06:32) (122/75 - 137/87)  BP(mean): --  RR: 22 (10 Aug 2017 01:02) (20 - 22)  SpO2: 96% (10 Aug 2017 01:02) (96% - 96%)    08-09 @ 07:01  -  08-10 @ 07:00  --------------------------------------------------------  IN: 0 mL / OUT: 300 mL / NET: -300 mL          PHYSICAL EXAM:    General: thin, temporal wasting  HEENT: MMM, conjunctiva and sclera clear  H- RRR  L- CTA  Gastrointestinal: Soft, non-tender moderate distension; Normal bowel sounds; No rebound or guarding  Extremities: Normal range of motion, No clubbing, cyanosis or edema  Neurological: Alert and oriented x3  Skin: Warm and dry. No obvious rash      LABS:                        13.9   40.7  )-----------( 663      ( 09 Aug 2017 08:09 )             41.5     09 Aug 2017 08:09    132    |  96     |  36.0   ----------------------------<  128    5.9     |  22.0   |  1.22     Ca    8.0        09 Aug 2017 08:09        Fluid Segmented Granulocytes: 90 % (08-09 @ 17:15)          RADIOLOGY & ADDITIONAL STUDIES:     < from: CT Abdomen and Pelvis No Cont (08.04.17 @ 20:31) >  MPRESSION:       1.  Edema and inflammatory changes around the pancreas consistent with   acute   pancreatitis.    2.  Calcifications in the pancreas consistent with chronic pancreatitis.   Dilatation of the pancreatic duct.    3.  Multiple fluid collections adjacent to pancreas consistent with   pancreatic   pseudocysts. Largest measures 4 cm. Previously it measured 3.7 cm.    4.  Small left pleural effusion. Smaller right pleural effusion.    5.  Large amount of ascites throughout the abdomen and pelvis.    6.  There are multiple loops of dilated small bowel with air-fluid   levels.    This may represent ileus or early small bowel obstruction.    7. Anasarca    < end of copied text >

## 2017-08-10 NOTE — GOALS OF CARE CONVERSATION - PERSONAL ADVANCE DIRECTIVE - CONVERSATION DETAILS
Mtg with Pt and sister to review Plan of Care.  Pt is alert and oriented. Pt verbalized that he wishes to complete HCP and MOLST form.  Both forms reviewed Pt verbalized that he understood both.  Sister Mahnaz Lopez named HCP. Molst form completed Pt is DNR/DNI.  Both forms completed and placed in chart.  Dr. Shukla notified of both.

## 2017-08-11 DIAGNOSIS — K59.01 SLOW TRANSIT CONSTIPATION: ICD-10-CM

## 2017-08-11 DIAGNOSIS — F41.9 ANXIETY DISORDER, UNSPECIFIED: ICD-10-CM

## 2017-08-11 DIAGNOSIS — R18.8 OTHER ASCITES: ICD-10-CM

## 2017-08-11 LAB
ALBUMIN SERPL ELPH-MCNC: 2 G/DL — LOW (ref 3.3–5.2)
ALP SERPL-CCNC: 147 U/L — HIGH (ref 40–120)
ALT FLD-CCNC: 49 U/L — HIGH
AMYLASE FLD-CCNC: 6990 U/L — SIGNIFICANT CHANGE UP
ANION GAP SERPL CALC-SCNC: 12 MMOL/L — SIGNIFICANT CHANGE UP (ref 5–17)
AST SERPL-CCNC: 45 U/L — HIGH
BILIRUB SERPL-MCNC: 0.3 MG/DL — LOW (ref 0.4–2)
BUN SERPL-MCNC: 25 MG/DL — HIGH (ref 8–20)
CALCIUM SERPL-MCNC: 8.1 MG/DL — LOW (ref 8.6–10.2)
CHLORIDE SERPL-SCNC: 96 MMOL/L — LOW (ref 98–107)
CO2 SERPL-SCNC: 24 MMOL/L — SIGNIFICANT CHANGE UP (ref 22–29)
CREAT SERPL-MCNC: 0.61 MG/DL — SIGNIFICANT CHANGE UP (ref 0.5–1.3)
GLUCOSE SERPL-MCNC: 111 MG/DL — SIGNIFICANT CHANGE UP (ref 70–115)
HCT VFR BLD CALC: 34.6 % — LOW (ref 42–52)
HGB BLD-MCNC: 11.5 G/DL — LOW (ref 14–18)
M TB TUBERC IFN-G BLD QL: 0 IU/ML — SIGNIFICANT CHANGE UP
M TB TUBERC IFN-G BLD QL: 0.03 IU/ML — SIGNIFICANT CHANGE UP
M TB TUBERC IFN-G BLD QL: ABNORMAL
MCHC RBC-ENTMCNC: 32.6 PG — HIGH (ref 27–31)
MCHC RBC-ENTMCNC: 33.2 G/DL — SIGNIFICANT CHANGE UP (ref 32–36)
MCV RBC AUTO: 98 FL — HIGH (ref 80–94)
MITOGEN IGNF BCKGRD COR BLD-ACNC: 0 IU/ML — SIGNIFICANT CHANGE UP
NON-GYN CYTOLOGY SPEC: SIGNIFICANT CHANGE UP
PLATELET # BLD AUTO: 595 K/UL — HIGH (ref 150–400)
POTASSIUM SERPL-MCNC: 5.8 MMOL/L — HIGH (ref 3.5–5.3)
POTASSIUM SERPL-SCNC: 5.8 MMOL/L — HIGH (ref 3.5–5.3)
PROT SERPL-MCNC: 5 G/DL — LOW (ref 6.6–8.7)
RBC # BLD: 3.53 M/UL — LOW (ref 4.6–6.2)
RBC # FLD: 15.6 % — SIGNIFICANT CHANGE UP (ref 11–15.6)
SODIUM SERPL-SCNC: 132 MMOL/L — LOW (ref 135–145)
WBC # BLD: 39.9 K/UL — HIGH (ref 4.8–10.8)
WBC # FLD AUTO: 39.9 K/UL — HIGH (ref 4.8–10.8)

## 2017-08-11 PROCEDURE — 99233 SBSQ HOSP IP/OBS HIGH 50: CPT

## 2017-08-11 PROCEDURE — 99253 IP/OBS CNSLTJ NEW/EST LOW 45: CPT

## 2017-08-11 PROCEDURE — 99239 HOSP IP/OBS DSCHRG MGMT >30: CPT

## 2017-08-11 PROCEDURE — 74182 MRI ABDOMEN W/CONTRAST: CPT | Mod: 26

## 2017-08-11 RX ORDER — MEROPENEM 1 G/30ML
1000 INJECTION INTRAVENOUS EVERY 8 HOURS
Qty: 0 | Refills: 0 | Status: DISCONTINUED | OUTPATIENT
Start: 2017-08-11 | End: 2017-08-15

## 2017-08-11 RX ORDER — POLYETHYLENE GLYCOL 3350 17 G/17G
17 POWDER, FOR SOLUTION ORAL AT BEDTIME
Qty: 0 | Refills: 0 | Status: DISCONTINUED | OUTPATIENT
Start: 2017-08-11 | End: 2017-08-15

## 2017-08-11 RX ORDER — SODIUM POLYSTYRENE SULFONATE 4.1 MEQ/G
30 POWDER, FOR SUSPENSION ORAL
Qty: 0 | Refills: 0 | Status: COMPLETED | OUTPATIENT
Start: 2017-08-11 | End: 2017-08-11

## 2017-08-11 RX ORDER — OXYCODONE HYDROCHLORIDE 5 MG/1
5 TABLET ORAL EVERY 4 HOURS
Qty: 0 | Refills: 0 | Status: DISCONTINUED | OUTPATIENT
Start: 2017-08-11 | End: 2017-08-13

## 2017-08-11 RX ORDER — ENOXAPARIN SODIUM 100 MG/ML
60 INJECTION SUBCUTANEOUS EVERY 12 HOURS
Qty: 0 | Refills: 0 | Status: DISCONTINUED | OUTPATIENT
Start: 2017-08-11 | End: 2017-08-15

## 2017-08-11 RX ORDER — OXYCODONE HYDROCHLORIDE 5 MG/1
10 TABLET ORAL EVERY 4 HOURS
Qty: 0 | Refills: 0 | Status: DISCONTINUED | OUTPATIENT
Start: 2017-08-11 | End: 2017-08-13

## 2017-08-11 RX ORDER — MIRTAZAPINE 45 MG/1
7.5 TABLET, ORALLY DISINTEGRATING ORAL AT BEDTIME
Qty: 0 | Refills: 0 | Status: DISCONTINUED | OUTPATIENT
Start: 2017-08-11 | End: 2017-08-15

## 2017-08-11 RX ORDER — SACCHAROMYCES BOULARDII 250 MG
500 POWDER IN PACKET (EA) ORAL
Qty: 0 | Refills: 0 | Status: DISCONTINUED | OUTPATIENT
Start: 2017-08-11 | End: 2017-08-15

## 2017-08-11 RX ADMIN — Medication 1 APPLICATION(S): at 06:19

## 2017-08-11 RX ADMIN — Medication 1 PATCH: at 12:33

## 2017-08-11 RX ADMIN — OXYCODONE AND ACETAMINOPHEN 2 TABLET(S): 5; 325 TABLET ORAL at 12:37

## 2017-08-11 RX ADMIN — Medication 4 MILLIGRAM(S): at 06:16

## 2017-08-11 RX ADMIN — MEROPENEM 200 MILLIGRAM(S): 1 INJECTION INTRAVENOUS at 22:03

## 2017-08-11 RX ADMIN — SPIRONOLACTONE 100 MILLIGRAM(S): 25 TABLET, FILM COATED ORAL at 06:16

## 2017-08-11 RX ADMIN — Medication 100 MILLIGRAM(S): at 17:30

## 2017-08-11 RX ADMIN — AMLODIPINE BESYLATE 5 MILLIGRAM(S): 2.5 TABLET ORAL at 06:16

## 2017-08-11 RX ADMIN — OXYCODONE HYDROCHLORIDE 10 MILLIGRAM(S): 5 TABLET ORAL at 23:40

## 2017-08-11 RX ADMIN — Medication 500 MILLIGRAM(S): at 06:16

## 2017-08-11 RX ADMIN — Medication 4 MILLIGRAM(S): at 12:33

## 2017-08-11 RX ADMIN — PANTOPRAZOLE SODIUM 40 MILLIGRAM(S): 20 TABLET, DELAYED RELEASE ORAL at 06:16

## 2017-08-11 RX ADMIN — Medication 4 MILLIGRAM(S): at 22:03

## 2017-08-11 RX ADMIN — OXYCODONE AND ACETAMINOPHEN 2 TABLET(S): 5; 325 TABLET ORAL at 17:27

## 2017-08-11 RX ADMIN — Medication 4 MILLIGRAM(S): at 17:31

## 2017-08-11 RX ADMIN — Medication 500 MILLIGRAM(S): at 17:31

## 2017-08-11 RX ADMIN — OXYCODONE HYDROCHLORIDE 10 MILLIGRAM(S): 5 TABLET ORAL at 17:37

## 2017-08-11 RX ADMIN — GABAPENTIN 300 MILLIGRAM(S): 400 CAPSULE ORAL at 22:03

## 2017-08-11 RX ADMIN — Medication 1 PATCH: at 07:20

## 2017-08-11 RX ADMIN — GABAPENTIN 300 MILLIGRAM(S): 400 CAPSULE ORAL at 06:16

## 2017-08-11 RX ADMIN — GABAPENTIN 300 MILLIGRAM(S): 400 CAPSULE ORAL at 12:37

## 2017-08-11 RX ADMIN — Medication 20 MILLIGRAM(S): at 06:16

## 2017-08-11 RX ADMIN — MEROPENEM 200 MILLIGRAM(S): 1 INJECTION INTRAVENOUS at 12:37

## 2017-08-11 RX ADMIN — SODIUM POLYSTYRENE SULFONATE 30 GRAM(S): 4.1 POWDER, FOR SUSPENSION ORAL at 12:34

## 2017-08-11 RX ADMIN — Medication 2 TABLET(S): at 12:33

## 2017-08-11 RX ADMIN — OXYCODONE HYDROCHLORIDE 10 MILLIGRAM(S): 5 TABLET ORAL at 17:31

## 2017-08-11 RX ADMIN — Medication 1 PATCH: at 17:26

## 2017-08-11 RX ADMIN — Medication 1 PACKET(S): at 06:32

## 2017-08-11 RX ADMIN — OXYCODONE AND ACETAMINOPHEN 2 TABLET(S): 5; 325 TABLET ORAL at 07:20

## 2017-08-11 RX ADMIN — OXYCODONE HYDROCHLORIDE 10 MILLIGRAM(S): 5 TABLET ORAL at 22:07

## 2017-08-11 RX ADMIN — POLYETHYLENE GLYCOL 3350 17 GRAM(S): 17 POWDER, FOR SOLUTION ORAL at 22:03

## 2017-08-11 RX ADMIN — MIRTAZAPINE 7.5 MILLIGRAM(S): 45 TABLET, ORALLY DISINTEGRATING ORAL at 22:03

## 2017-08-11 RX ADMIN — OXYCODONE AND ACETAMINOPHEN 2 TABLET(S): 5; 325 TABLET ORAL at 06:17

## 2017-08-11 NOTE — PROGRESS NOTE ADULT - SUBJECTIVE AND OBJECTIVE BOX
DON GOOD Patient is a 57y old  Male who presents with a chief complaint of foot swelling and pain (29 Jul 2017 12:54)     HPI:  Pt noted on Tuesday night, left foot medial aspect on the dorsum area of redness with pain and swelling. HE went to PMD and was given Clinda + Motrin for possible spider bite. Since then it has been worsening and increasing clinically. denies trauma/ fevers    PMH- HTN, Inguinal hernia repair right, noted from prior records- pancreatitis  SH- 41 yrs of smoking 1 ppd, no intention of quitting, quit alcohol 2 yrs ago, when in his teens - substance abuse  meds- amlodipine (29 Jul 2017 12:54)    The patient was seen and evaluated pancreatitis, possible SBP, panniculitis   The patient is in no acute distress.  Denied any fever chest pain, palpitations, shortness of breath, abdominal pain, fever, dysuria, cough, edema        I&O's Summary    10 Aug 2017 07:01  -  11 Aug 2017 07:00  --------------------------------------------------------  IN: 0 mL / OUT: 300 mL / NET: -300 mL      Allergies    iodine (Swelling)    Intolerances      HEALTH ISSUES - PROBLEM Dx:  Constipation by delayed colonic transit: Constipation by delayed colonic transit  Anxiety and depression: Anxiety and depression  Pancreatic ascites: Pancreatic ascites  Ascites due to alcoholic cirrhosis: Ascites due to alcoholic cirrhosis  Pseudocyst, pancreas: Pseudocyst, pancreas  Alcohol-induced acute pancreatitis, unspecified complication status: Alcohol-induced acute pancreatitis, unspecified complication status  SBP (spontaneous bacterial peritonitis): SBP (spontaneous bacterial peritonitis)  Ascites: Ascites  Cellulitis of leg, left: Cellulitis of leg, left  Pancreatitis: Pancreatitis  Pauciarticular arthritis: Pauciarticular arthritis  Panniculitis: Panniculitis  Alcohol-induced chronic pancreatitis: Alcohol-induced chronic pancreatitis  Arthralgia of multiple joints: Arthralgia of multiple joints  Smoker: Smoker  Essential hypertension: Essential hypertension  Abscess or cellulitis of foot: Abscess or cellulitis of foot        PAST MEDICAL & SURGICAL HISTORY:  Ascites: may 2017  Hernia  Pancreatitis  Hypertension  TIA (transient ischemic attack)  S/P skin graft using Integra: right calf skin graft  Fracture of shaft of left femur: 1971 compound fracrture left femur  1980 left femur bone spur removed          Vital Signs Last 24 Hrs  T(C): 36.8 (11 Aug 2017 15:20), Max: 37 (10 Aug 2017 23:55)  T(F): 98.3 (11 Aug 2017 15:20), Max: 98.6 (10 Aug 2017 23:55)  HR: 96 (11 Aug 2017 15:20) (74 - 96)  BP: 149/89 (11 Aug 2017 15:20) (126/72 - 149/89)  BP(mean): --  RR: 18 (11 Aug 2017 15:20) (18 - 20)  SpO2: 93% (11 Aug 2017 15:20) (93% - 97%)T(C): 36.8 (11 Aug 2017 15:20), Max: 37 (10 Aug 2017 23:55)  HR: 96 (11 Aug 2017 15:20) (74 - 96)  BP: 149/89 (11 Aug 2017 15:20) (126/72 - 149/89)  RR: 18 (11 Aug 2017 15:20) (18 - 20)  SpO2: 93% (11 Aug 2017 15:20) (93% - 97%)  Wt(kg): --    PHYSICAL EXAM:    GENERAL: NAD, cachectic, ill appearing but looks a LOT better than the beginning of the week   HEAD:  Atraumatic, Normocephalic  EYES: EOMI, PERRL, conjunctiva and sclera clear  ENMT:  Moist mucous membranes,  No lesions  NECK: Supple, No JVD, Normal thyroid  NERVOUS SYSTEM:  Alert & Oriented X3,  Moves upper and lower extremities; CNS-II-XII  CHEST/LUNG: Clear to auscultation bilaterally; No rales, rhonchi, wheezing,   HEART: Regular rate and rhythm; No murmurs,   ABDOMEN: Soft, Nontender, Nondistended; Bowel sounds present, ascited  EXTREMITIES:  Peripheral  edema small nodules red, much better   SKIN: No rashes or lesions  psychiatry- mood and affect appropriate Insight and judgement intact     nicotine - 21 mG/24Hr(s) Patch 1 patch Transdermal daily  amLODIPine   Tablet 5 milliGRAM(s) Oral daily  acetaminophen   Tablet 650 milliGRAM(s) Oral every 6 hours PRN  gabapentin 300 milliGRAM(s) Oral every 8 hours  pantoprazole    Tablet 40 milliGRAM(s) Oral before breakfast  aluminum hydroxide/magnesium hydroxide/simethicone Suspension 30 milliLiter(s) Oral every 6 hours PRN  ondansetron Injectable 4 milliGRAM(s) IV Push every 4 hours PRN  psyllium Powder 1 Packet(s) Oral two times a day  hydrocortisone 2.5% Rectal Cream 1 Application(s) Rectal two times a day  spironolactone 100 milliGRAM(s) Oral daily  furosemide    Tablet 20 milliGRAM(s) Oral daily  docusate sodium 100 milliGRAM(s) Oral two times a day  methylPREDNISolone  milliGRAM(s) Oral   methylPREDNISolone 4 milliGRAM(s) Oral before breakfast  methylPREDNISolone 4 milliGRAM(s) Oral after lunch  methylPREDNISolone 4 milliGRAM(s) Oral after dinner  methylPREDNISolone 4 milliGRAM(s) Oral at bedtime  meropenem IVPB 1000 milliGRAM(s) IV Intermittent every 8 hours  sodium polystyrene sulfonate Suspension 30 Gram(s) Oral every 2 hours  enoxaparin Injectable 60 milliGRAM(s) SubCutaneous every 12 hours  mirtazapine 7.5 milliGRAM(s) Oral at bedtime  oxyCODONE    IR 5 milliGRAM(s) Oral every 4 hours PRN  oxyCODONE    IR 10 milliGRAM(s) Oral every 4 hours PRN  polyethylene glycol 3350 17 Gram(s) Oral at bedtime  saccharomyces boulardii 500 milliGRAM(s) Oral two times a day      LABS:                          11.5   39.9  )-----------( 595      ( 11 Aug 2017 07:33 )             34.6     08-11    132<L>  |  96<L>  |  25.0<H>  ----------------------------<  111  5.8<H>   |  24.0  |  0.61    Ca    8.1<L>      11 Aug 2017 07:33    TPro  5.0<L>  /  Alb  2.0<L>  /  TBili  0.3<L>  /  DBili  x   /  AST  45<H>  /  ALT  49<H>  /  AlkPhos  147<H>  08-11    LIVER FUNCTIONS - ( 11 Aug 2017 07:33 )  Alb: 2.0 g/dL / Pro: 5.0 g/dL / ALK PHOS: 147 U/L / ALT: 49 U/L / AST: 45 U/L / GGT: x                   CAPILLARY BLOOD GLUCOSE          RADIOLOGY & ADDITIONAL TESTS:      Consultant notes reviewed    Case discussed with consultant/provider/ family /patient

## 2017-08-11 NOTE — PROGRESS NOTE ADULT - SUBJECTIVE AND OBJECTIVE BOX
Pt seen and examined. His repeat ascitic fluid aspirate showed increased wbc and a high amylase of roughly 11,000. Case and management d/w Dr. Marques Yates, an advanced endoscopist @ [facility] and he is requesting an MRCP which was ordered today. He states that the high ascitic fluid amylase is sometimes seen in pts. with chronic pancreatitis but he is requesting repeat pancreatic imaging especially imaging of the PD to assess for possible leaking pseudocyst. Pt. seen this AM and is in NAD when seen.      MEDICATIONS:  MEDICATIONS  (STANDING):  nicotine - 21 mG/24Hr(s) Patch 1 patch Transdermal daily  amLODIPine   Tablet 5 milliGRAM(s) Oral daily  lactobacillus acidophilus 2 Tablet(s) Oral two times a day with meals  gabapentin 300 milliGRAM(s) Oral every 8 hours  pantoprazole    Tablet 40 milliGRAM(s) Oral before breakfast  psyllium Powder 1 Packet(s) Oral two times a day  hydrocortisone 2.5% Rectal Cream 1 Application(s) Rectal two times a day  spironolactone 100 milliGRAM(s) Oral daily  furosemide    Tablet 20 milliGRAM(s) Oral daily  docusate sodium 100 milliGRAM(s) Oral two times a day  ertapenem  IVPB   IV Intermittent   ertapenem  IVPB 1000 milliGRAM(s) IV Intermittent every 24 hours  methylPREDNISolone  milliGRAM(s) Oral   methylPREDNISolone 4 milliGRAM(s) Oral before breakfast  methylPREDNISolone 4 milliGRAM(s) Oral after lunch  methylPREDNISolone 4 milliGRAM(s) Oral after dinner  methylPREDNISolone 4 milliGRAM(s) Oral at bedtime  metroNIDAZOLE    Tablet 500 milliGRAM(s) Oral every 8 hours    MEDICATIONS  (PRN):  acetaminophen   Tablet 650 milliGRAM(s) Oral every 6 hours PRN For Temp greater than 38 C (100.4 F)  aluminum hydroxide/magnesium hydroxide/simethicone Suspension 30 milliLiter(s) Oral every 6 hours PRN Dyspepsia  ondansetron Injectable 4 milliGRAM(s) IV Push every 4 hours PRN Nausea and/or Vomiting  oxyCODONE    5 mG/acetaminophen 325 mG 2 Tablet(s) Oral every 4 hours PRN Severe Pain (7 - 10)      Allergies    iodine (Swelling)    Intolerances        Vital Signs Last 24 Hrs  T(C): 37 (10 Aug 2017 23:55), Max: 37 (10 Aug 2017 23:55)  T(F): 98.6 (10 Aug 2017 23:55), Max: 98.6 (10 Aug 2017 23:55)  HR: 85 (11 Aug 2017 06:15) (74 - 91)  BP: 126/72 (11 Aug 2017 06:15) (126/72 - 147/79)  BP(mean): --  RR: 20 (10 Aug 2017 23:55) (20 - 20)  SpO2: 96% (10 Aug 2017 23:55) (96% - 96%)    08-10 @ 07:01  -  08-11 @ 07:00  --------------------------------------------------------  IN: 0 mL / OUT: 300 mL / NET: -300 mL        PHYSICAL EXAM:    General: Well developed; well nourished; in no acute distress  HEENT: MMM, conjunctiva pink and sclera anicteric.  Lungs: clear to auscultation and percussion.  Cor: RRR S1, S2 only.  Gastrointestinal: Abdomen: Soft non-tender, softly distended; Normal bowel sounds; No hepatosplenomegaly  Neuro: Pt. a + o x 3    LABS:      CBC Full  -  ( 10 Aug 2017 07:37 )  WBC Count : 37.6 K/uL  Hemoglobin : 11.7 g/dL  Hematocrit : 35.3 %  Platelet Count - Automated : 563 K/uL  Mean Cell Volume : 97.8 fl  Mean Cell Hemoglobin : 32.4 pg  Mean Cell Hemoglobin Concentration : 33.1 g/dL  Auto Neutrophil # : x  Auto Lymphocyte # : x  Auto Monocyte # : x  Auto Eosinophil # : x  Auto Basophil # : x  Auto Neutrophil % : 96.0 %  Auto Lymphocyte % : 2.0 %  Auto Monocyte % : 2.0 %  Auto Eosinophil % : x  Auto Basophil % : x    08-11    132<L>  |  96<L>  |  25.0<H>  ----------------------------<  111  5.8<H>   |  24.0  |  0.61    Ca    8.1<L>      11 Aug 2017 07:33    TPro  5.0<L>  /  Alb  2.0<L>  /  TBili  0.3<L>  /  DBili  x   /  AST  45<H>  /  ALT  49<H>  /  AlkPhos  147<H>  08-11                      RADIOLOGY & ADDITIONAL STUDIES (The following images were personally reviewed):

## 2017-08-11 NOTE — PROGRESS NOTE ADULT - SUBJECTIVE AND OBJECTIVE BOX
56y/o male was followed by podiatry for b/l foot swelling and right 4th digit pustule. Bedside I&D of pustule was performed and digit noted to be improved. Cellulitis and Edema in b/l noted to be improved. Pt was having cellulitis and swelling on his elbows also so there was a concern for systemic issue. Derm and Rheum were consulted and Pt was found to have panniculitis due to pancreatitis. Pt has other systemic issues as well. Pt is being followed by appropriate teams. Family is considering possible palliative care as prognosis is poor. Podiatry signing off as patient has no podiatric issues. Re-consult as needed.

## 2017-08-11 NOTE — PROGRESS NOTE ADULT - SUBJECTIVE AND OBJECTIVE BOX
[facility] [place] LUIS FRENCH MD FACP   KATYA URBAN MD  Diplomates American Board of Internal Medicine and Infectious Diseases      DON GOOD  MRN-[id number]  57y    INTERVAL HPI/OVERNIGHT EVENTS:    no improvement  wbc 40k  no diarrhea  paracentesis with increased wbc despite invanz  jose tb neg  tender abdomen    Vital Signs Last 24 Hrs  T(C): 36.7 (11 Aug 2017 08:28), Max: 37 (10 Aug 2017 23:55)  T(F): 98 (11 Aug 2017 08:28), Max: 98.6 (10 Aug 2017 23:55)  HR: 83 (11 Aug 2017 08:28) (74 - 91)  BP: 137/74 (11 Aug 2017 08:28) (126/72 - 147/79)  BP(mean): --  RR: 18 (11 Aug 2017 08:28) (18 - 20)  SpO2: 97% (11 Aug 2017 08:28) (96% - 97%)    ANTIBIOTICS  meropenem IVPB 1000 milliGRAM(s) IV Intermittent every 8 hours      Allergies    iodine (Swelling)    Intolerances        REVIEW OF SYSTEMS:ABD PAIN  PHYSICAL EXAM:  Vital Signs Last 24 Hrs  T(C): 36.7 (11 Aug 2017 08:28), Max: 37 (10 Aug 2017 23:55)  T(F): 98 (11 Aug 2017 08:28), Max: 98.6 (10 Aug 2017 23:55)  HR: 83 (11 Aug 2017 08:28) (74 - 91)  BP: 137/74 (11 Aug 2017 08:28) (126/72 - 147/79)  BP(mean): --  RR: 18 (11 Aug 2017 08:28) (18 - 20)  SpO2: 97% (11 Aug 2017 08:28) (96% - 97%)      GEN: NAD, pleasant  HEENT: normocephalic and atraumatic. EOMI. LUDIN. Moist mucosa. Clear Posterior pharynx.  NECK: Supple. No carotid bruits.  No lymphadenopathy or thyromegaly.  LUNGS: Clear to auscultation.  HEART: Regular rate and rhythm without murmur.  ABDOMEN: DISTENDED, NO REBOUND. POS BSS  EXTREMITIES: Without any cyanosis, clubbing, rash, lesions or edema.  NEUROLOGIC: Cranial nerves II through XII are grossly intact.  MUSCULOSKELETAL:  SKIN: No ulceration or induration present    LABS:                        11.5   39.9  )-----------( 595      ( 11 Aug 2017 07:33 )             34.6       08-11    132<L>  |  96<L>  |  25.0<H>  ----------------------------<  111  5.8<H>   |  24.0  |  0.61    Ca    8.1<L>      11 Aug 2017 07:33    TPro  5.0<L>  /  Alb  2.0<L>  /  TBili  0.3<L>  /  DBili  x   /  AST  45<H>  /  ALT  49<H>  /  AlkPhos  147<H>  08-11 08-11 @ 07:33  hct 34.6 % hgb 11.5 g/dL    08-11 @ 07:33  plat 595 K/uL wbc 39.9 K/uL    08-10 @ 07:37  hct 35.3 % hgb 11.7 g/dL    08-10 @ 07:37  plat 563 K/uL wbc 37.6 K/uL    08-09 @ 08:09  hct 41.5 % hgb 13.9 g/dL    08-09 @ 08:09  plat 663 K/uL wbc 40.7 K/uL      08-11-17  creat 0.61 mg/dL gfr 130 mL/min/1.73M2 bun 25.0 mg/dL k 5.8 mmol/L  08-10-17  creat 0.76 mg/dL gfr 119 mL/min/1.73M2 bun 38.0 mg/dL k 5.7 mmol/L  08-09-17  creat 1.22 mg/dL gfr 77 mL/min/1.73M2 bun 36.0 mg/dL k 5.9 mmol/L      MICROBIOLOGY:        RADIOLOGY & ADDITIONAL STUDIES:          BRIT FRENCH MD FACP

## 2017-08-11 NOTE — CONSULT NOTE ADULT - PROBLEM SELECTOR RECOMMENDATION 3
CARMEN'veronica percocet, switched to oxycodone 5-10mg PO without tylenol combination
needs smoking cessation

## 2017-08-11 NOTE — CONSULT NOTE ADULT - SUBJECTIVE AND OBJECTIVE BOX
INTERVAL HPI/OVERNIGHT EVENTS: 58 yo M cachectic, anxious and frustrated with prolonged hospitalization-not getting better...  He is experiencing moderate to severe pain in upper extremities-hands and lower extremities due to swelling-difficulty walking needs walker.  Insomnia due to nocturnal anxiety-discomfort. Chronic constipation problematic has caused painful hemorrhoids. Abdomen firm and distended  paracentesis puncture sites C/D no redness.. He denies N/V/D CP dizziness dysuria.    57y old  Male who presents with a chief complaint of foot swelling and pain (29 Jul 2017 12:54)      Present Symptoms:     Dyspnea: 0    Nausea/Vomiting:  No  Anxiety:  Yes   Depression: Yes   Fatigue: Yes   Loss of appetite: Yes     Pain: Pain in feet and hands from swelling            Character-            Duration-            Effect-            Factors-            Frequency-            Location-            Severity-moderate to severe    Review of Systems: Reviewed                     Negative:                     All others negative    MEDICATIONS  (STANDING):  nicotine - 21 mG/24Hr(s) Patch 1 patch Transdermal daily  amLODIPine   Tablet 5 milliGRAM(s) Oral daily  lactobacillus acidophilus 2 Tablet(s) Oral two times a day with meals  gabapentin 300 milliGRAM(s) Oral every 8 hours  pantoprazole    Tablet 40 milliGRAM(s) Oral before breakfast  psyllium Powder 1 Packet(s) Oral two times a day  hydrocortisone 2.5% Rectal Cream 1 Application(s) Rectal two times a day  spironolactone 100 milliGRAM(s) Oral daily  furosemide    Tablet 20 milliGRAM(s) Oral daily  docusate sodium 100 milliGRAM(s) Oral two times a day  methylPREDNISolone  milliGRAM(s) Oral   methylPREDNISolone 4 milliGRAM(s) Oral before breakfast  methylPREDNISolone 4 milliGRAM(s) Oral after lunch  methylPREDNISolone 4 milliGRAM(s) Oral after dinner  methylPREDNISolone 4 milliGRAM(s) Oral at bedtime  meropenem IVPB 1000 milliGRAM(s) IV Intermittent every 8 hours  sodium polystyrene sulfonate Suspension 30 Gram(s) Oral every 2 hours  mirtazapine 7.5 milliGRAM(s) Oral at bedtime  polyethylene glycol 3350 17 Gram(s) Oral at bedtime    MEDICATIONS  (PRN):  acetaminophen   Tablet 650 milliGRAM(s) Oral every 6 hours PRN For Temp greater than 38 C (100.4 F)  aluminum hydroxide/magnesium hydroxide/simethicone Suspension 30 milliLiter(s) Oral every 6 hours PRN Dyspepsia  ondansetron Injectable 4 milliGRAM(s) IV Push every 4 hours PRN Nausea and/or Vomiting  oxyCODONE    IR 5 milliGRAM(s) Oral every 4 hours PRN Moderate Pain (4 - 6)  oxyCODONE    IR 10 milliGRAM(s) Oral every 4 hours PRN Severe Pain (7 - 10)      PHYSICAL EXAM:    Vital Signs Last 24 Hrs  T(C): 36.7 (11 Aug 2017 08:28), Max: 37 (10 Aug 2017 23:55)  T(F): 98 (11 Aug 2017 08:28), Max: 98.6 (10 Aug 2017 23:55)  HR: 83 (11 Aug 2017 08:28) (74 - 91)  BP: 137/74 (11 Aug 2017 08:28) (126/72 - 147/79)  BP(mean): --  RR: 18 (11 Aug 2017 08:28) (18 - 20)  SpO2: 97% (11 Aug 2017 08:28) (96% - 97%)    General: alert  oriented x _3___ depressed and anxious frustrated                  cachexia      HEENT: normal  dry mouth          CV: normal  ta    GI: distended  tender      constipation  last BM: last night hemorrhoid inflammed    : normal      MSK:  weakness  edema             ambulatory TO BR     Skin: normal    no rash    LABS:                        11.5   39.9  )-----------( 595      ( 11 Aug 2017 07:33 )             34.6     08-11    132<L>  |  96<L>  |  25.0<H>  ----------------------------<  111  5.8<H>   |  24.0  |  0.61    Ca    8.1<L>      11 Aug 2017 07:33    TPro  5.0<L>  /  Alb  2.0<L>  /  TBili  0.3<L>  /  DBili  x   /  AST  45<H>  /  ALT  49<H>  /  AlkPhos  147<H>  08-11        I&O's Summary    10 Aug 2017 07:01  -  11 Aug 2017 07:00  --------------------------------------------------------  IN: 0 mL / OUT: 300 mL / NET: -300 mL    RADIOLOGY & ADDITIONAL STUDIES:    ADVANCE DIRECTIVES:   DNR YES   Completed on:                     DERRICK  YES    Completed on:  Living Will  NO   Completed on:

## 2017-08-12 DIAGNOSIS — I81 PORTAL VEIN THROMBOSIS: ICD-10-CM

## 2017-08-12 DIAGNOSIS — F32.9 MAJOR DEPRESSIVE DISORDER, SINGLE EPISODE, UNSPECIFIED: ICD-10-CM

## 2017-08-12 DIAGNOSIS — R18.8 OTHER ASCITES: ICD-10-CM

## 2017-08-12 DIAGNOSIS — Z29.9 ENCOUNTER FOR PROPHYLACTIC MEASURES, UNSPECIFIED: ICD-10-CM

## 2017-08-12 DIAGNOSIS — K56.7 ILEUS, UNSPECIFIED: ICD-10-CM

## 2017-08-12 LAB
ALBUMIN SERPL ELPH-MCNC: 2 G/DL — LOW (ref 3.3–5.2)
ALP SERPL-CCNC: 128 U/L — HIGH (ref 40–120)
ALT FLD-CCNC: 31 U/L — SIGNIFICANT CHANGE UP
AMYLASE P1 CFR SERPL: 2974 U/L — HIGH (ref 36–128)
ANION GAP SERPL CALC-SCNC: 13 MMOL/L — SIGNIFICANT CHANGE UP (ref 5–17)
ANISOCYTOSIS BLD QL: SLIGHT — SIGNIFICANT CHANGE UP
AST SERPL-CCNC: 30 U/L — SIGNIFICANT CHANGE UP
BASOPHILS # BLD AUTO: 0 K/UL — SIGNIFICANT CHANGE UP (ref 0–0.2)
BILIRUB SERPL-MCNC: 0.3 MG/DL — LOW (ref 0.4–2)
BUN SERPL-MCNC: 14 MG/DL — SIGNIFICANT CHANGE UP (ref 8–20)
CALCIUM SERPL-MCNC: 7.9 MG/DL — LOW (ref 8.6–10.2)
CHLORIDE SERPL-SCNC: 94 MMOL/L — LOW (ref 98–107)
CO2 SERPL-SCNC: 24 MMOL/L — SIGNIFICANT CHANGE UP (ref 22–29)
CREAT SERPL-MCNC: 0.45 MG/DL — LOW (ref 0.5–1.3)
CULTURE RESULTS: SIGNIFICANT CHANGE UP
GLUCOSE SERPL-MCNC: 90 MG/DL — SIGNIFICANT CHANGE UP (ref 70–115)
HCT VFR BLD CALC: 33.6 % — LOW (ref 42–52)
HGB BLD-MCNC: 11.2 G/DL — LOW (ref 14–18)
LIDOCAIN IGE QN: 2585 U/L — HIGH (ref 22–51)
LYMPHOCYTES # BLD AUTO: 0.8 K/UL — LOW (ref 1–4.8)
LYMPHOCYTES # BLD AUTO: 2.2 % — LOW (ref 20–55)
MACROCYTES BLD QL: SLIGHT — SIGNIFICANT CHANGE UP
MCHC RBC-ENTMCNC: 32.8 PG — HIGH (ref 27–31)
MCHC RBC-ENTMCNC: 33.3 G/DL — SIGNIFICANT CHANGE UP (ref 32–36)
MCV RBC AUTO: 98.5 FL — HIGH (ref 80–94)
MICROCYTES BLD QL: SLIGHT — SIGNIFICANT CHANGE UP
MONOCYTES # BLD AUTO: 1.1 K/UL — HIGH (ref 0–0.8)
MONOCYTES NFR BLD AUTO: 3.3 % — SIGNIFICANT CHANGE UP (ref 3–10)
NEUTROPHILS # BLD AUTO: 32.2 K/UL — HIGH (ref 1.8–8)
NEUTROPHILS NFR BLD AUTO: 94.1 % — HIGH (ref 37–73)
PLAT MORPH BLD: ABNORMAL
PLATELET # BLD AUTO: 600 K/UL — HIGH (ref 150–400)
PLATELET CLUMP BLD QL SMEAR: SIGNIFICANT CHANGE UP
POTASSIUM SERPL-MCNC: 4.7 MMOL/L — SIGNIFICANT CHANGE UP (ref 3.5–5.3)
POTASSIUM SERPL-SCNC: 4.7 MMOL/L — SIGNIFICANT CHANGE UP (ref 3.5–5.3)
PROT SERPL-MCNC: 4.8 G/DL — LOW (ref 6.6–8.7)
RBC # BLD: 3.41 M/UL — LOW (ref 4.6–6.2)
RBC # FLD: 15.5 % — SIGNIFICANT CHANGE UP (ref 11–15.6)
RBC BLD AUTO: ABNORMAL
SODIUM SERPL-SCNC: 131 MMOL/L — LOW (ref 135–145)
SPECIMEN SOURCE: SIGNIFICANT CHANGE UP
WBC # BLD: 34.3 K/UL — HIGH (ref 4.8–10.8)
WBC # FLD AUTO: 34.3 K/UL — HIGH (ref 4.8–10.8)

## 2017-08-12 PROCEDURE — 99232 SBSQ HOSP IP/OBS MODERATE 35: CPT

## 2017-08-12 PROCEDURE — 99233 SBSQ HOSP IP/OBS HIGH 50: CPT

## 2017-08-12 RX ORDER — LACTULOSE 10 G/15ML
15 SOLUTION ORAL THREE TIMES A DAY
Qty: 0 | Refills: 0 | Status: DISCONTINUED | OUTPATIENT
Start: 2017-08-12 | End: 2017-08-15

## 2017-08-12 RX ADMIN — Medication 20 MILLIGRAM(S): at 05:47

## 2017-08-12 RX ADMIN — Medication 4 MILLIGRAM(S): at 23:01

## 2017-08-12 RX ADMIN — Medication 500 MILLIGRAM(S): at 17:40

## 2017-08-12 RX ADMIN — ENOXAPARIN SODIUM 60 MILLIGRAM(S): 100 INJECTION SUBCUTANEOUS at 17:40

## 2017-08-12 RX ADMIN — OXYCODONE HYDROCHLORIDE 5 MILLIGRAM(S): 5 TABLET ORAL at 23:38

## 2017-08-12 RX ADMIN — OXYCODONE HYDROCHLORIDE 5 MILLIGRAM(S): 5 TABLET ORAL at 23:37

## 2017-08-12 RX ADMIN — ENOXAPARIN SODIUM 60 MILLIGRAM(S): 100 INJECTION SUBCUTANEOUS at 05:46

## 2017-08-12 RX ADMIN — Medication 4 MILLIGRAM(S): at 12:49

## 2017-08-12 RX ADMIN — Medication 1 APPLICATION(S): at 05:44

## 2017-08-12 RX ADMIN — AMLODIPINE BESYLATE 5 MILLIGRAM(S): 2.5 TABLET ORAL at 05:46

## 2017-08-12 RX ADMIN — OXYCODONE HYDROCHLORIDE 5 MILLIGRAM(S): 5 TABLET ORAL at 17:41

## 2017-08-12 RX ADMIN — GABAPENTIN 300 MILLIGRAM(S): 400 CAPSULE ORAL at 05:46

## 2017-08-12 RX ADMIN — PANTOPRAZOLE SODIUM 40 MILLIGRAM(S): 20 TABLET, DELAYED RELEASE ORAL at 06:14

## 2017-08-12 RX ADMIN — MEROPENEM 200 MILLIGRAM(S): 1 INJECTION INTRAVENOUS at 15:06

## 2017-08-12 RX ADMIN — Medication 500 MILLIGRAM(S): at 05:46

## 2017-08-12 RX ADMIN — Medication 100 MILLIGRAM(S): at 17:40

## 2017-08-12 RX ADMIN — Medication 1 APPLICATION(S): at 17:41

## 2017-08-12 RX ADMIN — Medication 4 MILLIGRAM(S): at 06:13

## 2017-08-12 RX ADMIN — MEROPENEM 200 MILLIGRAM(S): 1 INJECTION INTRAVENOUS at 05:45

## 2017-08-12 RX ADMIN — SPIRONOLACTONE 100 MILLIGRAM(S): 25 TABLET, FILM COATED ORAL at 05:46

## 2017-08-12 RX ADMIN — Medication 1 PATCH: at 12:49

## 2017-08-12 RX ADMIN — OXYCODONE HYDROCHLORIDE 5 MILLIGRAM(S): 5 TABLET ORAL at 18:20

## 2017-08-12 RX ADMIN — Medication 1 PACKET(S): at 06:13

## 2017-08-12 RX ADMIN — MEROPENEM 200 MILLIGRAM(S): 1 INJECTION INTRAVENOUS at 23:08

## 2017-08-12 RX ADMIN — GABAPENTIN 300 MILLIGRAM(S): 400 CAPSULE ORAL at 15:07

## 2017-08-12 RX ADMIN — GABAPENTIN 300 MILLIGRAM(S): 400 CAPSULE ORAL at 23:01

## 2017-08-12 NOTE — PROGRESS NOTE ADULT - PROBLEM SELECTOR PLAN 4
Agree with Lovenox therapy Liver spleen scan ordered for Monday to r/o cirrhosis which has not been evident on previous imaging here.

## 2017-08-12 NOTE — PROGRESS NOTE ADULT - SUBJECTIVE AND OBJECTIVE BOX
Patient is a 57y old  Male who presents with a chief complaint of foot swelling and pain (29 Jul 2017 12:54)      HEALTH ISSUES - PROBLEM Dx:  Portal vein thrombosis: Portal vein thrombosis  Other ascites: Other ascites  Constipation by delayed colonic transit: Constipation by delayed colonic transit  Anxiety and depression: Anxiety and depression  Pancreatic ascites: Pancreatic ascites  Ascites due to alcoholic cirrhosis: Ascites due to alcoholic cirrhosis  Pseudocyst, pancreas: Pseudocyst, pancreas  Alcohol-induced acute pancreatitis, unspecified complication status: Alcohol-induced acute pancreatitis, unspecified complication status  SBP (spontaneous bacterial peritonitis): SBP (spontaneous bacterial peritonitis)  Ascites: Ascites  Cellulitis of leg, left: Cellulitis of leg, left  Pancreatitis: Pancreatitis  Pauciarticular arthritis: Pauciarticular arthritis  Panniculitis: Panniculitis  Alcohol-induced chronic pancreatitis: Alcohol-induced chronic pancreatitis  Arthralgia of multiple joints: Arthralgia of multiple joints  Smoker: Smoker  Essential hypertension: Essential hypertension  Abscess or cellulitis of foot: Abscess or cellulitis of foot          INTERVAL HPI/OVERNIGHT EVENTS:  Patient seen and examined at bedside. No acute events overnight. Patient states he is feeling okay, continues to feel down about being in the hospital. Patient reports abdominal distension but he is able to tolerate it and is passing flatus and had a small bm last night. Patient denies chest pain, SOB, abd pain, N/V, fever, chills, dysuria or any other complaints. All remainder ROS negative.     Vital Signs Last 24 Hrs  T(C): 37.1 (12 Aug 2017 16:10), Max: 37.5 (12 Aug 2017 00:23)  T(F): 98.7 (12 Aug 2017 16:10), Max: 99.5 (12 Aug 2017 00:23)  HR: 100 (12 Aug 2017 16:10) (94 - 118)  BP: 132/76 (12 Aug 2017 16:10) (118/75 - 157/92)  BP(mean): --  RR: 18 (12 Aug 2017 16:10) (18 - 18)  SpO2: 94% (12 Aug 2017 16:10) (92% - 94%)        I&O's Summary    11 Aug 2017 07:01  -  12 Aug 2017 07:00  --------------------------------------------------------  IN: 0 mL / OUT: 700 mL / NET: -700 mL    12 Aug 2017 07:01  -  12 Aug 2017 19:09  --------------------------------------------------------  IN: 100 mL / OUT: 375 mL / NET: -275 mL        CONSTITUTIONAL: Thin, ill appearing, awake, alert and in no apparent distress  CARDIAC: Normal rate, regular rhythm.  Heart sounds S1, S2.  No murmurs, rubs or gallops   RESPIRATORY: Breath sounds clear and equal bilaterally. No wheezes, rhales or rhonchi  GASTROENTEROLOGY: Soft, distended, fluid +ve, NT, BS+ normoactive   EXTREMITIES: Peripheral edema +2, no cyanosis or deformity   NEUROLOGICAL: Alert and oriented, no focal deficits, no motor or sensory deficits.  SKIN: R 4th digit s/p abscess drainage healing well     MEDICATIONS  (STANDING):  nicotine - 21 mG/24Hr(s) Patch 1 patch Transdermal daily  amLODIPine   Tablet 5 milliGRAM(s) Oral daily  gabapentin 300 milliGRAM(s) Oral every 8 hours  pantoprazole    Tablet 40 milliGRAM(s) Oral before breakfast  psyllium Powder 1 Packet(s) Oral two times a day  hydrocortisone 2.5% Rectal Cream 1 Application(s) Rectal two times a day  spironolactone 100 milliGRAM(s) Oral daily  furosemide    Tablet 20 milliGRAM(s) Oral daily  docusate sodium 100 milliGRAM(s) Oral two times a day  methylPREDNISolone  milliGRAM(s) Oral   methylPREDNISolone 4 milliGRAM(s) Oral before breakfast  methylPREDNISolone 4 milliGRAM(s) Oral at bedtime  meropenem IVPB 1000 milliGRAM(s) IV Intermittent every 8 hours  enoxaparin Injectable 60 milliGRAM(s) SubCutaneous every 12 hours  mirtazapine 7.5 milliGRAM(s) Oral at bedtime  polyethylene glycol 3350 17 Gram(s) Oral at bedtime  saccharomyces boulardii 500 milliGRAM(s) Oral two times a day    MEDICATIONS  (PRN):  acetaminophen   Tablet 650 milliGRAM(s) Oral every 6 hours PRN For Temp greater than 38 C (100.4 F)  aluminum hydroxide/magnesium hydroxide/simethicone Suspension 30 milliLiter(s) Oral every 6 hours PRN Dyspepsia  ondansetron Injectable 4 milliGRAM(s) IV Push every 4 hours PRN Nausea and/or Vomiting  oxyCODONE    IR 5 milliGRAM(s) Oral every 4 hours PRN Moderate Pain (4 - 6)  oxyCODONE    IR 10 milliGRAM(s) Oral every 4 hours PRN Severe Pain (7 - 10)      LABS:                        11.2   34.3  )-----------( 600      ( 12 Aug 2017 08:26 )             33.6     08-12    131<L>  |  94<L>  |  14.0  ----------------------------<  90  4.7   |  24.0  |  0.45<L>    Ca    7.9<L>      12 Aug 2017 08:26    TPro  4.8<L>  /  Alb  2.0<L>  /  TBili  0.3<L>  /  DBili  x   /  AST  30  /  ALT  31  /  AlkPhos  128<H>  08-12        LIVER FUNCTIONS - ( 12 Aug 2017 08:26 )  Alb: 2.0 g/dL / Pro: 4.8 g/dL / ALK PHOS: 128 U/L / ALT: 31 U/L / AST: 30 U/L / GGT: x             RADIOLOGY & ADDITIONAL TESTS:  MRI abdomen:   Large volume ascites. Chronic pancreatitis, grossly stable.   Peripancreatic pseudocysts mildly decreased in size.  Right intrahepatic portal vein thrombosis, likely subacute/acute and new   from 5/2017.

## 2017-08-12 NOTE — PROGRESS NOTE ADULT - SUBJECTIVE AND OBJECTIVE BOX
[facility] [place] LUIS FRENCH MD FACP   KATYA URBAN MD  Diplomates American Board of Internal Medicine and Infectious Diseases      DON GOOD  MRN-[id number]  57y    INTERVAL HPI/OVERNIGHT EVENTS:    PT AWAKE ALERT AFEBRILE  NOW ON MERREM  WBC 34K    Vital Signs Last 24 Hrs  T(C): 36.7 (11 Aug 2017 08:28), Max: 37 (10 Aug 2017 23:55)  T(F): 98 (11 Aug 2017 08:28), Max: 98.6 (10 Aug 2017 23:55)  HR: 83 (11 Aug 2017 08:28) (74 - 91)  BP: 137/74 (11 Aug 2017 08:28) (126/72 - 147/79)  BP(mean): --  RR: 18 (11 Aug 2017 08:28) (18 - 20)  SpO2: 97% (11 Aug 2017 08:28) (96% - 97%)    ANTIBIOTICS  meropenem IVPB 1000 milliGRAM(s) IV Intermittent every 8 hours      Allergies    iodine (Swelling)    Intolerances        REVIEW OF SYSTEMS:ABD PAIN  PHYSICAL EXAM:  Vital Signs Last 24 Hrs  T(C): 36.7 (11 Aug 2017 08:28), Max: 37 (10 Aug 2017 23:55)  T(F): 98 (11 Aug 2017 08:28), Max: 98.6 (10 Aug 2017 23:55)  HR: 83 (11 Aug 2017 08:28) (74 - 91)  BP: 137/74 (11 Aug 2017 08:28) (126/72 - 147/79)  BP(mean): --  RR: 18 (11 Aug 2017 08:28) (18 - 20)  SpO2: 97% (11 Aug 2017 08:28) (96% - 97%)      GEN: NAD, pleasant  HEENT: normocephalic and atraumatic. EOMI. LUDIN. Moist mucosa. Clear Posterior pharynx.  NECK: Supple. No carotid bruits.  No lymphadenopathy or thyromegaly.  LUNGS: Clear to auscultation.  HEART: Regular rate and rhythm without murmur.  ABDOMEN: DISTENDED, NO REBOUND. POS BSS  EXTREMITIES: Without any cyanosis, clubbing, rash, lesions or edema.  NEUROLOGIC: Cranial nerves II through XII are grossly intact.  MUSCULOSKELETAL:  SKIN: No ulceration or induration present    LABS:                        11.5   39.9  )-----------( 595      ( 11 Aug 2017 07:33 )             34.6       08-11    132<L>  |  96<L>  |  25.0<H>  ----------------------------<  111  5.8<H>   |  24.0  |  0.61    Ca    8.1<L>      11 Aug 2017 07:33    TPro  5.0<L>  /  Alb  2.0<L>  /  TBili  0.3<L>  /  DBili  x   /  AST  45<H>  /  ALT  49<H>  /  AlkPhos  147<H>  08-11 08-11 @ 07:33  hct 34.6 % hgb 11.5 g/dL    08-11 @ 07:33  plat 595 K/uL wbc 39.9 K/uL    08-10 @ 07:37  hct 35.3 % hgb 11.7 g/dL    08-10 @ 07:37  plat 563 K/uL wbc 37.6 K/uL    08-09 @ 08:09  hct 41.5 % hgb 13.9 g/dL    08-09 @ 08:09  plat 663 K/uL wbc 40.7 K/uL      08-11-17  creat 0.61 mg/dL gfr 130 mL/min/1.73M2 bun 25.0 mg/dL k 5.8 mmol/L  08-10-17  creat 0.76 mg/dL gfr 119 mL/min/1.73M2 bun 38.0 mg/dL k 5.7 mmol/L  08-09-17  creat 1.22 mg/dL gfr 77 mL/min/1.73M2 bun 36.0 mg/dL k 5.9 mmol/L      MICROBIOLOGY:        RADIOLOGY & ADDITIONAL STUDIES:

## 2017-08-12 NOTE — PROGRESS NOTE ADULT - PROBLEM SELECTOR PLAN 4
-Distended abdomen with fluid shift   - As per GI elevated  ascitic fluid amylase likely secondary to chronic pancreatitis  -c/w lasix 20mg po qd   -c/w aldactone 100mg po qd  -rheum follow up for steroid taper which is unclear as per prior hospitalist noted will decrease after d/w rheumatologist   -pt may need paracentesis for volume removal

## 2017-08-12 NOTE — PROGRESS NOTE ADULT - SUBJECTIVE AND OBJECTIVE BOX
INTERVAL HPI/OVERNIGHT EVENTS:  Feeling "the same" overall, though he reports that the lesions on his B/L LE'shave been improving.    MEDICATIONS  (STANDING):  nicotine - 21 mG/24Hr(s) Patch 1 patch Transdermal daily  amLODIPine   Tablet 5 milliGRAM(s) Oral daily  gabapentin 300 milliGRAM(s) Oral every 8 hours  pantoprazole    Tablet 40 milliGRAM(s) Oral before breakfast  psyllium Powder 1 Packet(s) Oral two times a day  hydrocortisone 2.5% Rectal Cream 1 Application(s) Rectal two times a day  spironolactone 100 milliGRAM(s) Oral daily  furosemide    Tablet 20 milliGRAM(s) Oral daily  docusate sodium 100 milliGRAM(s) Oral two times a day  methylPREDNISolone  milliGRAM(s) Oral   methylPREDNISolone 4 milliGRAM(s) Oral before breakfast  methylPREDNISolone 4 milliGRAM(s) Oral at bedtime  meropenem IVPB 1000 milliGRAM(s) IV Intermittent every 8 hours  enoxaparin Injectable 60 milliGRAM(s) SubCutaneous every 12 hours  mirtazapine 7.5 milliGRAM(s) Oral at bedtime  polyethylene glycol 3350 17 Gram(s) Oral at bedtime  saccharomyces boulardii 500 milliGRAM(s) Oral two times a day  lactulose Syrup 15 Gram(s) Oral three times a day    MEDICATIONS  (PRN):  acetaminophen   Tablet 650 milliGRAM(s) Oral every 6 hours PRN For Temp greater than 38 C (100.4 F)  aluminum hydroxide/magnesium hydroxide/simethicone Suspension 30 milliLiter(s) Oral every 6 hours PRN Dyspepsia  ondansetron Injectable 4 milliGRAM(s) IV Push every 4 hours PRN Nausea and/or Vomiting  oxyCODONE    IR 5 milliGRAM(s) Oral every 4 hours PRN Moderate Pain (4 - 6)  oxyCODONE    IR 10 milliGRAM(s) Oral every 4 hours PRN Severe Pain (7 - 10)      Allergies    iodine (Swelling)    Vital Signs Last 24 Hrs  T(C): 37.1 (12 Aug 2017 16:10), Max: 37.5 (12 Aug 2017 00:23)  T(F): 98.7 (12 Aug 2017 16:10), Max: 99.5 (12 Aug 2017 00:23)  HR: 100 (12 Aug 2017 16:10) (94 - 118)  BP: 132/76 (12 Aug 2017 16:10) (118/75 - 157/92)  BP(mean): --  RR: 18 (12 Aug 2017 16:10) (18 - 18)  SpO2: 94% (12 Aug 2017 16:10) (92% - 94%)    PHYSICAL EXAM:      Constitutional: NAD    Respiratory: CTA B/L    Cardiovascular: RRR, nl S1, S2    Gastrointestinal: sot, distended, non-tender    Extremities: 2+ edema in B/L LE's    Skin:  multiple light brownish lesions on B/L LE's, non-tender, appear to be fading    Musculoskeletal:  (+)swelling/tenderness in several hand joints (multiple PIP's and right 1st CMC)          LABS:                        11.2   34.3  )-----------( 600      ( 12 Aug 2017 08:26 )             33.6     08    131<L>  |  94<L>  |  14.0  ----------------------------<  90  4.7   |  24.0  |  0.45<L>    Ca    7.9<L>      12 Aug 2017 08:26    TPro  4.8<L>  /  Alb  2.0<L>  /  TBili  0.3<L>  /  DBili  x   /  AST  30  /  ALT  31  /  AlkPhos  128<H>  0812    Double Stranded DNA Antibody (17 @ 22:54)    Double Stranded DNA Antibody: <12: Method: EIA            Reference Ranges            Interpretation            < 30      IU/mL     Negative            30 - 75  IU/mL     Borderline            > 75      IU/mL     Positive IU/mL    C3 Complement, Serum (17 @ 22:54)    C3 Complement, Serum: 128 mg/dL    C4 Complement, Serum (17 @ 22:54)    C4 Complement, Serum: 48 mg/dL    Sjogren&#x27;s Syndrome Antibodies (17 @ 22:54)    Anti SS-A Antibody: <0.2 AI    Anti SS-B Antibody: <0.2: Fluorescent Bead Immunoassay   Reference Ranges for SS-A AND SS-B:   <1.0 AI (negative)   > or =1.0 AI (positive)   Reference values apply  to all ages. AI    Cyclic Citrullinated Peptide AB (17 @ 22:54)    CCP Antibody IgG Interpretation: Negative: Method:  EIA  Cyclic Citrullinated Peptide Ab, IGG Reference Range:                 <20U          Negative                  20 - 39U  Weak Positive                   40 - 59U  Moderate Positive                  >60U         Strong Positive    Cyclic Cit Pep Result: <8 Units    Immunoglobulins Panel (17 @ 22:54)    Quantitative Ig mg/dL    Quantitative IgA: 269 mg/dL    Quantitative IgM: 104 mg/dL    [place]/Lambda Free Light Chain Ratio, Serum: 0.75 Ratio    IgG Subsets (17 @ 00:35)    IgG 1: 502 mg/dL    IgG 2: 287 mg/dL    IgG 3: 49.4 mg/dL    IgG 4: 101.0: Test Performed by:  [address] [address] mg/dL    IgG Subset Total: 899 mg/dL    Anticardiolipin IgM, Serum (17 @ 22:54)    Anticardiolipin IgM, Serum: 9.2: Method:FRANCIS                              Phospholipid Units    (APL) or (GPL) or (MPL)                              Negative                      <12.5                              Indeterminate            12.5-19.9                              Weak9.2: Positive           20.0-80.0                              Positive                        >80.0 MPL    Anticardiolipin IgG, Serum (17 @ 22:54)    Anticardiolipin IgG, Serum: 35.3 GPL    Antineutrophil Cytoplasmic Antibody (17 @ 00:20)    Atypical ANCA: Negative    Cytoplasmic (c-ANCA) Antibody: Negative    Perinuclear (p-ANCA) Antibody: Negative    Proteinase 3 Antibody Assay (17 @ 22:54)    Proteinase 3 Antibody Assay: <5.0 Units    Proteinase 3 Antibody Interpretation: Negative: Method EIA            Interpretation:                                               Units                   Negative               <= 20.0                   Weak Positive      21.0-30.0                   Positive                > 30.0    Myeloperoxidase Antibody Assay (17 @ 22:54)    Myeloperoxidase Antibody Assay: <5.0 Units    Myeloperoxidase Ab Interpretation: Negative: Method: EIA  Interpretation                                     Units                                <= 20.0          Negative                                21.0 - 30.0   Weak Positive                                 > 30.0          Positive    Angiotensin Converting Enzyme, Serum (17 @ 00:17)    Angiotensin Converting Enzyme, Serum: 27: Performed At: [facility] [address] [id number]  Reyes Araceli B MD Ph:[phone] U/L      RADIOLOGY & ADDITIONAL TESTS:    < from: Xray Hand 3 Views, Bilateral (17 @ 09:56) >  EXAM:  HAND-BILATERAL                          PROCEDURE DATE:  2017          INTERPRETATION:  Radiographs of the right and left hand         CLINICAL INFORMATION: Chronic pain. Assess for arthritis..    TECHNIQUE:  Frontal, oblique and lateral views of the hand were obtained.    FINDINGS:   No prior examinations are available for review.    The osseous structures of the hand are intact, without fracture or   malalignment.   Joint spaces appear intact.   No soft tissue   abnormalities are seen.  No radiopaque foreign body is seen.          IMPRESSION:   No acute radiographic osseous pathology..       < end of copied text >    < from: Xray Wrist 3 Views, Bilateral (17 @ 09:56) >  EXAM:  WRIST-BILATERAL                          PROCEDURE DATE:  2017          INTERPRETATION:  CLINICAL HISTORY: Chronic Pain, assess for arthritis.    TECHNIQUE: AP, lateral,scaphoid and oblique views of the right and left   wrist were obtained.    FINDINGS: There is no evidence of fracture, dislocation, or bone   destruction.       The remainder of the visualized bones and soft tissues are unremarkable.    IMPRESSION: No acute radiographic osseous pathology.    < end of copied text >      < from: MRI Abdomen w/Cont (17 @ 12:01) >  EXAM:  ABDOMEN (MRI) W CON                          PROCEDURE DATE:  2017          INTERPRETATION:  CLINICAL INFORMATION: Pt. with pancreatic ascites    evaluate pancreatic duct integrity, Pt with h/o chronic pancreatitis and   pseudocysts now with high ascitic amylase    COMPARISON: MRI 2017 and CT 2017    PROCEDURE:   MRI of the abdomen was performed with and without intravenous contrast.  6cc of Gadavist was administered intravenously. 1.5 cc were discarded.  MRCP was performed.    FINDINGS:    LOWER CHEST: Within normal limits.    LIVER: Within normal limits.  BILE DUCTS: Normal caliber.  GALLBLADDER: Within normal limits.  SPLEEN: Within normal limits.  PANCREAS: Hepatic and duodenal ligament peripancreatic pseudocyst   decreased in size now 3.7 x 3.4 cm. Remaining peripancreatic head   pseudocysts stable the largest 3.8 x 2.7 cm. Diffusely dilated main duct   with dilated sidebranch radicles, grossly stable from prior.  ADRENALS: Within normal limits.  KIDNEYS/URETERS: Within normal limits.    VISUALIZED PORTIONS:    BOWEL: Diffusely distended small bowel, likely ileus.   PERITONEUM: Large volume ascites, new from . Grossly stable from .  VESSELS: Subacute to acute thrombus in the right portal vein posterior   branch with cavernous transformation. Remaining vessels are patent.   Previous is seen soft tissue enhancement surrounding superior mesenteric   artery is no longer present.  RETROPERITONEUM: No lymphadenopathy.    ABDOMINAL WALL: Within normal limits.    IMPRESSION: Large volume ascites. Chronic pancreatitis, grossly stable.   Peripancreatic pseudocysts mildly decreased in size.    Right intrahepatic portal vein thrombosis, likely subacute/acute and new   from 2017.    < end of copied text >

## 2017-08-12 NOTE — PROGRESS NOTE ADULT - SUBJECTIVE AND OBJECTIVE BOX
Pt seen and examined. MRI results noted. Pt. with subacute or acute right intra-hepatic portal vein thrombosis and pancreatic findings essentially unchanged. PD remains markedly dilated and pseudocysts essentially unchanged from previous imaging. No carrie-pancreatic fluid collections or abscess or necrosis. Pt continues to have large volume ascites and may have a developing small bowel ileus. Started on Lovenox every 12 hours yesterday for the above PVT.    MEDICATIONS:  MEDICATIONS  (STANDING):  nicotine - 21 mG/24Hr(s) Patch 1 patch Transdermal daily  amLODIPine   Tablet 5 milliGRAM(s) Oral daily  gabapentin 300 milliGRAM(s) Oral every 8 hours  pantoprazole    Tablet 40 milliGRAM(s) Oral before breakfast  psyllium Powder 1 Packet(s) Oral two times a day  hydrocortisone 2.5% Rectal Cream 1 Application(s) Rectal two times a day  spironolactone 100 milliGRAM(s) Oral daily  furosemide    Tablet 20 milliGRAM(s) Oral daily  docusate sodium 100 milliGRAM(s) Oral two times a day  methylPREDNISolone  milliGRAM(s) Oral   methylPREDNISolone 4 milliGRAM(s) Oral before breakfast  methylPREDNISolone 4 milliGRAM(s) Oral after lunch  methylPREDNISolone 4 milliGRAM(s) Oral at bedtime  meropenem IVPB 1000 milliGRAM(s) IV Intermittent every 8 hours  enoxaparin Injectable 60 milliGRAM(s) SubCutaneous every 12 hours  mirtazapine 7.5 milliGRAM(s) Oral at bedtime  polyethylene glycol 3350 17 Gram(s) Oral at bedtime  saccharomyces boulardii 500 milliGRAM(s) Oral two times a day    MEDICATIONS  (PRN):  acetaminophen   Tablet 650 milliGRAM(s) Oral every 6 hours PRN For Temp greater than 38 C (100.4 F)  aluminum hydroxide/magnesium hydroxide/simethicone Suspension 30 milliLiter(s) Oral every 6 hours PRN Dyspepsia  ondansetron Injectable 4 milliGRAM(s) IV Push every 4 hours PRN Nausea and/or Vomiting  oxyCODONE    IR 5 milliGRAM(s) Oral every 4 hours PRN Moderate Pain (4 - 6)  oxyCODONE    IR 10 milliGRAM(s) Oral every 4 hours PRN Severe Pain (7 - 10)      Allergies    iodine (Swelling)    Intolerances        Vital Signs Last 24 Hrs  T(C): 36.8 (12 Aug 2017 08:33), Max: 37.5 (12 Aug 2017 00:23)  T(F): 98.2 (12 Aug 2017 08:33), Max: 99.5 (12 Aug 2017 00:23)  HR: 98 (12 Aug 2017 08:33) (94 - 98)  BP: 118/75 (12 Aug 2017 08:33) (118/75 - 157/92)  BP(mean): --  RR: 18 (12 Aug 2017 08:33) (18 - 18)  SpO2: 92% (12 Aug 2017 00:23) (92% - 93%)    08-11 @ 07:01  -  08-12 @ 07:00  --------------------------------------------------------  IN: 0 mL / OUT: 700 mL / NET: -700 mL        PHYSICAL EXAM:    General: Well developed; well nourished; in no acute distress  HEENT: MMM, conjunctiva pink and sclera anicteric.  Lungs: clear to auscultation and percussion.  Cor: RRR S1, S2 only.  Gastrointestinal: Abdomen: Soft non-tender softly distended; Normal bowel sounds; No hepatosplenomegaly  Extremities: no cyanosis, clubbing or edema.  Skin: Warm and dry. No obvious rash  Neuro: Pt. a + o x 3    LABS:      CBC Full  -  ( 12 Aug 2017 08:26 )  WBC Count : 34.3 K/uL  Hemoglobin : 11.2 g/dL  Hematocrit : 33.6 %  Platelet Count - Automated : 600 K/uL  Mean Cell Volume : 98.5 fl  Mean Cell Hemoglobin : 32.8 pg  Mean Cell Hemoglobin Concentration : 33.3 g/dL  Auto Neutrophil # : x  Auto Lymphocyte # : x  Auto Monocyte # : x  Auto Eosinophil # : x  Auto Basophil # : x  Auto Neutrophil % : x  Auto Lymphocyte % : x  Auto Monocyte % : x  Auto Eosinophil % : x  Auto Basophil % : x    08-12    131<L>  |  94<L>  |  14.0  ----------------------------<  90  4.7   |  24.0  |  0.45<L>    Ca    7.9<L>      12 Aug 2017 08:26    TPro  4.8<L>  /  Alb  2.0<L>  /  TBili  0.3<L>  /  DBili  x   /  AST  30  /  ALT  31  /  AlkPhos  128<H>  08-12                      RADIOLOGY & ADDITIONAL STUDIES (The following images were personally reviewed):

## 2017-08-12 NOTE — PROGRESS NOTE ADULT - ASSESSMENT
57 year old male admitted with acute on chronic pancreatitis and found to have panniculitis likely secondary to pancreatitis.  Lesions appear to be improving. Currently with worsening of pain in his hand joints, though steroids appear to have been decreased quickly (currently at 4mg QID). Will plan to taper steroids more slowly.  No other signs/symptoms to suggest underlying connective tissue disease.    - Change dose of methylprednisolone to 30mg once daily then decrease by 10mg/day qweekly if lesions continue to improve (recommend methylprednisolone rather than prednisone given liver disease, as prednisone needs to be broken down to prednisolone in the liver)     - Further care for pancreatitis as per primary team and GI.

## 2017-08-12 NOTE — PROGRESS NOTE ADULT - PROBLEM SELECTOR PLAN 5
-pt remains afebrile  but leukocytosis persists and high even for pt being on steroids   -c/w meropenem 1G IVPB q8hrs   -Fluid analysis shows 5363 total cells   -Gram stain negative for wbc/bacteria   -cytopath pending   -ID f/u noted and appreciated

## 2017-08-12 NOTE — PROGRESS NOTE ADULT - ASSESSMENT
PANNICULITIS   SBP VS SECONDARY TO PANCREATITIS     WBC DOWN TO 34K    ON MERREM    ? F/UP CT TO R/O PERF

## 2017-08-12 NOTE — PROGRESS NOTE ADULT - PROBLEM SELECTOR PLAN 1
No worsening pancreatitis seen. NO PD disruption or new carrie-pancreatic fluid collections noted which speaks against leaking pseudocyst. Elevated  ascitic fluid amylase may be secondary to chronic pancreatitis. Continue present management for now,

## 2017-08-12 NOTE — PROGRESS NOTE ADULT - PROBLEM SELECTOR PLAN 1
-repeat MRI abdomen as stated above  -chronic pancreatitis at baseline. No PD disruption or new carrie-pancreatic fluid collections noted.  -c/w pain mx

## 2017-08-12 NOTE — PROGRESS NOTE ADULT - ASSESSMENT
57 M from home with PMHx HTN and chronic alcohol induced pancreatitis admitted with b/l pedal cellulitis/abcess on the dorsal PIPJ of the fourth digit, for which poditatry was consulted and drained the wound. ID consulted and s/p abx therapy. Pt noted to have panniculitis likely secondary to pancreatitis, dermatology consulted on steroid taper. Course complicated by suspected SBP for which pt placed on meropenem and portal vein thrombosis and started on full dose lovenox. Pt also with recurrent ascites placed on lasix and aldactone and had paracentesis IR guided. Also with ileus on extensive bowel regimen.

## 2017-08-13 DIAGNOSIS — R14.0 ABDOMINAL DISTENSION (GASEOUS): ICD-10-CM

## 2017-08-13 LAB
ALBUMIN SERPL ELPH-MCNC: 1.8 G/DL — LOW (ref 3.3–5.2)
ALP SERPL-CCNC: 116 U/L — SIGNIFICANT CHANGE UP (ref 40–120)
ALT FLD-CCNC: 26 U/L — SIGNIFICANT CHANGE UP
AMYLASE P1 CFR SERPL: 3286 U/L — HIGH (ref 36–128)
ANION GAP SERPL CALC-SCNC: 14 MMOL/L — SIGNIFICANT CHANGE UP (ref 5–17)
ANISOCYTOSIS BLD QL: SLIGHT — SIGNIFICANT CHANGE UP
AST SERPL-CCNC: 25 U/L — SIGNIFICANT CHANGE UP
BILIRUB SERPL-MCNC: 0.4 MG/DL — SIGNIFICANT CHANGE UP (ref 0.4–2)
BUN SERPL-MCNC: 17 MG/DL — SIGNIFICANT CHANGE UP (ref 8–20)
CALCIUM SERPL-MCNC: 7.8 MG/DL — LOW (ref 8.6–10.2)
CHLORIDE SERPL-SCNC: 93 MMOL/L — LOW (ref 98–107)
CO2 SERPL-SCNC: 24 MMOL/L — SIGNIFICANT CHANGE UP (ref 22–29)
CREAT SERPL-MCNC: 0.48 MG/DL — LOW (ref 0.5–1.3)
GLUCOSE SERPL-MCNC: 100 MG/DL — SIGNIFICANT CHANGE UP (ref 70–115)
HCT VFR BLD CALC: 32.5 % — LOW (ref 42–52)
HGB BLD-MCNC: 11 G/DL — LOW (ref 14–18)
INR BLD: 1.53 RATIO — HIGH (ref 0.88–1.16)
LACTATE SERPL-SCNC: 1.2 MMOL/L — SIGNIFICANT CHANGE UP (ref 0.5–2)
LIDOCAIN IGE QN: 2611 U/L — HIGH (ref 22–51)
LYMPHOCYTES # BLD AUTO: 0.6 K/UL — LOW (ref 1–4.8)
LYMPHOCYTES # BLD AUTO: 1.5 % — LOW (ref 20–55)
MACROCYTES BLD QL: SLIGHT — SIGNIFICANT CHANGE UP
MCHC RBC-ENTMCNC: 32.8 PG — HIGH (ref 27–31)
MCHC RBC-ENTMCNC: 33.8 G/DL — SIGNIFICANT CHANGE UP (ref 32–36)
MCV RBC AUTO: 97 FL — HIGH (ref 80–94)
MONOCYTES # BLD AUTO: 1.3 K/UL — HIGH (ref 0–0.8)
MONOCYTES NFR BLD AUTO: 3.3 % — SIGNIFICANT CHANGE UP (ref 3–10)
NEUTROPHILS # BLD AUTO: 35.7 K/UL — HIGH (ref 1.8–8)
NEUTROPHILS NFR BLD AUTO: 94.7 % — HIGH (ref 37–73)
PLAT MORPH BLD: NORMAL — SIGNIFICANT CHANGE UP
PLATELET # BLD AUTO: 586 K/UL — HIGH (ref 150–400)
POTASSIUM SERPL-MCNC: 4.7 MMOL/L — SIGNIFICANT CHANGE UP (ref 3.5–5.3)
POTASSIUM SERPL-SCNC: 4.7 MMOL/L — SIGNIFICANT CHANGE UP (ref 3.5–5.3)
PROCALCITONIN SERPL-MCNC: 0.45 NG/ML — HIGH (ref 0–0.04)
PROT SERPL-MCNC: 4.7 G/DL — LOW (ref 6.6–8.7)
PROTHROM AB SERPL-ACNC: 17 SEC — HIGH (ref 9.8–12.7)
RBC # BLD: 3.35 M/UL — LOW (ref 4.6–6.2)
RBC # FLD: 15.7 % — HIGH (ref 11–15.6)
RBC BLD AUTO: ABNORMAL
SODIUM SERPL-SCNC: 131 MMOL/L — LOW (ref 135–145)
WBC # BLD: 37.8 K/UL — HIGH (ref 4.8–10.8)
WBC # FLD AUTO: 37.8 K/UL — HIGH (ref 4.8–10.8)

## 2017-08-13 PROCEDURE — 99233 SBSQ HOSP IP/OBS HIGH 50: CPT

## 2017-08-13 PROCEDURE — 99253 IP/OBS CNSLTJ NEW/EST LOW 45: CPT | Mod: GC,24

## 2017-08-13 PROCEDURE — 74000: CPT | Mod: 26

## 2017-08-13 RX ORDER — GLUCAGON INJECTION, SOLUTION 0.5 MG/.1ML
1 INJECTION, SOLUTION SUBCUTANEOUS ONCE
Qty: 0 | Refills: 0 | Status: DISCONTINUED | OUTPATIENT
Start: 2017-08-13 | End: 2017-08-15

## 2017-08-13 RX ORDER — FUROSEMIDE 40 MG
10 TABLET ORAL DAILY
Qty: 0 | Refills: 0 | Status: DISCONTINUED | OUTPATIENT
Start: 2017-08-14 | End: 2017-08-14

## 2017-08-13 RX ORDER — DEXTROSE 50 % IN WATER 50 %
1 SYRINGE (ML) INTRAVENOUS ONCE
Qty: 0 | Refills: 0 | Status: DISCONTINUED | OUTPATIENT
Start: 2017-08-13 | End: 2017-08-15

## 2017-08-13 RX ORDER — DEXTROSE 50 % IN WATER 50 %
25 SYRINGE (ML) INTRAVENOUS ONCE
Qty: 0 | Refills: 0 | Status: DISCONTINUED | OUTPATIENT
Start: 2017-08-13 | End: 2017-08-15

## 2017-08-13 RX ORDER — SODIUM CHLORIDE 9 MG/ML
1000 INJECTION, SOLUTION INTRAVENOUS
Qty: 0 | Refills: 0 | Status: DISCONTINUED | OUTPATIENT
Start: 2017-08-13 | End: 2017-08-15

## 2017-08-13 RX ORDER — DEXTROSE 50 % IN WATER 50 %
12.5 SYRINGE (ML) INTRAVENOUS ONCE
Qty: 0 | Refills: 0 | Status: DISCONTINUED | OUTPATIENT
Start: 2017-08-13 | End: 2017-08-15

## 2017-08-13 RX ADMIN — Medication 1 PATCH: at 11:56

## 2017-08-13 RX ADMIN — MEROPENEM 200 MILLIGRAM(S): 1 INJECTION INTRAVENOUS at 05:36

## 2017-08-13 RX ADMIN — PANTOPRAZOLE SODIUM 40 MILLIGRAM(S): 20 TABLET, DELAYED RELEASE ORAL at 05:34

## 2017-08-13 RX ADMIN — Medication 4 MILLIGRAM(S): at 05:35

## 2017-08-13 RX ADMIN — MEROPENEM 200 MILLIGRAM(S): 1 INJECTION INTRAVENOUS at 23:14

## 2017-08-13 RX ADMIN — ENOXAPARIN SODIUM 60 MILLIGRAM(S): 100 INJECTION SUBCUTANEOUS at 05:36

## 2017-08-13 RX ADMIN — MIRTAZAPINE 7.5 MILLIGRAM(S): 45 TABLET, ORALLY DISINTEGRATING ORAL at 02:23

## 2017-08-13 RX ADMIN — ENOXAPARIN SODIUM 60 MILLIGRAM(S): 100 INJECTION SUBCUTANEOUS at 17:43

## 2017-08-13 RX ADMIN — GABAPENTIN 300 MILLIGRAM(S): 400 CAPSULE ORAL at 05:35

## 2017-08-13 RX ADMIN — Medication 1 PATCH: at 11:53

## 2017-08-13 RX ADMIN — GABAPENTIN 300 MILLIGRAM(S): 400 CAPSULE ORAL at 15:21

## 2017-08-13 RX ADMIN — OXYCODONE HYDROCHLORIDE 5 MILLIGRAM(S): 5 TABLET ORAL at 12:30

## 2017-08-13 RX ADMIN — Medication 500 MILLIGRAM(S): at 05:33

## 2017-08-13 RX ADMIN — LACTULOSE 15 GRAM(S): 10 SOLUTION ORAL at 15:20

## 2017-08-13 RX ADMIN — Medication 1 APPLICATION(S): at 17:43

## 2017-08-13 RX ADMIN — Medication 1 APPLICATION(S): at 05:27

## 2017-08-13 RX ADMIN — AMLODIPINE BESYLATE 5 MILLIGRAM(S): 2.5 TABLET ORAL at 05:56

## 2017-08-13 RX ADMIN — MEROPENEM 200 MILLIGRAM(S): 1 INJECTION INTRAVENOUS at 15:21

## 2017-08-13 RX ADMIN — SPIRONOLACTONE 100 MILLIGRAM(S): 25 TABLET, FILM COATED ORAL at 05:34

## 2017-08-13 RX ADMIN — Medication 20 MILLIGRAM(S): at 20:36

## 2017-08-13 RX ADMIN — Medication 20 MILLIGRAM(S): at 05:35

## 2017-08-13 RX ADMIN — OXYCODONE HYDROCHLORIDE 5 MILLIGRAM(S): 5 TABLET ORAL at 11:54

## 2017-08-13 NOTE — CONSULT NOTE ADULT - PROBLEM SELECTOR RECOMMENDATION 9
1-Work up in progress; Rheumatology pending  2-Meds: Pain is not adequately controlled  -Start: Percocet 5/325mg 1-2tabs q4h/prn            Toradol 30mg/ivp-q6h/x 2dys            Gabapentin 300mg/q8h  3-Patient agrees with above plan, questions answered to his satisfaction verbalised understanding.  4-Discussed plan with Dr. Sam, and Nurse Chloe  5-Thank you for this Kind consult, will continue to follow
OPT repeat imaging in six months as suggested by his gastroenterologist, Dr. Briones. No need to repeat imaging now as inpt. Signing off. Reconsult as needed. Thank you
IV ABX changed to Meropenum today  Monitor labs
No acute surgical intervention at this time  Recommend NPO  Recommend placement of NGT for decompression per PA  Correct underlying cause of ileus-  Pt noted to be hyponatremic and hypochloremic, correct electrolytes as needed  Treat spontaneous bacterial peritonitis- c/w abx as per ID  Serial abdominal exams
d/c Cefepime  - start Cefazolin  - start Cleocin 600mg Q8H

## 2017-08-13 NOTE — CONSULT NOTE ADULT - ASSESSMENT
Acute rectal prolapse(2 inches)  was reduced with gentle external pressure over several minutes duration.  Sensation resolved.  No recurrence.    Chronic constipation:  Switched to DASH/ High fiber diet with Metamucil 1 packet BID.  Awaiting CR surgery eval.    Chronic calcific pancreatitis.  Status quo:  recheck Amylase/ Lipase.  Ensure pudding.   Ascites: unclear etiology:  check CT for cirrhosis.  Prior Hepatitis profiles negative.  + SLICK.
57 year old male with constipation and rectal prolapse which required manual reduction. Rectal prolapse is a chronic but intermittent issue. Aggressive bowel regimen with fiber and Miralax. Minimize narcotics. Agree with evaluation with CT scan given his abdominal distension and history of ascites/chronic pancreatitis.    I did discuss surgical management of rectal prolapse. Diet and bowel regimen will help symptoms but are not permanent solutions. However, will not recommend surgery in the acute setting this hospital stay unless prolapse becomes incarcerated or prolapses frequently with increasing symptoms. He is on high doses of steroids and would rather his acute reason for admission be resolved or improved and discuss a more elective management of rectal prolapse. Will follow.
68 y/o M w/ chronic pancreatitis with SBP now presenting with small bowel ileus
Impression:  57y Male With panniculitis secondary to pancreatitis.  Will obtain further serologies to evaluate further for other inflammatory autoimmune systemic diseases.    Suggestions/Plan:    1) CPK, ALDOLASE, CCP,, ANTI-DNA, RO/LA,,C3, C4, CARDIOLIPINS, BETA-2-GLYCOPROTEIN I, GA-3,MPO, THYROID FCN/TSH, IMMUNOGLOBULINS/IMMUNOFIXATION,IgG4  2) X-RAYS:   WRISTS, HANDS  3) Decrease Solu-Medrol 100 mg IV push once daily  in the mornings (Possible side effects explained including extensive discussion regarding risks, benefits, alternative treatments including AVN requiring joint replacement)  4) when patient is much improved, switch from IV Solu-Medrol to oral prednisone 60 mg once daily at end of breakfast  5) further treatment and evaluation of his pancreatitis as per GI and the team    Thank you for this consultation  Myron I, Kleiner, M.D.,FACR
SBP due to Pancreatitis  Pain from swelling in hands and feet  Anxiety depression  constipation  peripheral neuropathy
This 57 smoker with HTN, here for left and right foot cellulitis.   r/o gout.   check Uric acid
58 y/o male admitted via ED with left foot/ankle joint pain, swelling and redness two days ago. Pain and swelling has progressed to bilateral elbows, left wrist, and right ankle and foot.  Patient reports pain is constant, severe, excruciating 10/10 at it's worst, unable to eat as unable to move wrist, or hold utensils.  Current medication regimen of one (1) Percocet is not effective, unable to sleep, due to pain. Patient admits was treated by he PCP prior to admission with Clindamycin and Motrin for possible spider bite; without resolution. Patient denies fever, chills, nausea, vomiting, tick bite.

## 2017-08-13 NOTE — PROGRESS NOTE ADULT - SUBJECTIVE AND OBJECTIVE BOX
Addendum:    Pt's KUB this AM shows increased small bowel distention concerning for possible ileus. Will keep NPO today and obtain repeat FUA tomorrow AM. Addendum:    Pt's KUB this AM shows increased small bowel distention concerning for possible ileus. Will keep NPO today and obtain repeat FUA tomorrow AM. Would also insert NGT to LIS as well to hopefully decompress ileus. Addendum:    Pt's KUB this AM shows increased small bowel distention concerning for possible ileus. Will keep NPO today and obtain repeat FUA tomorrow AM. Would also insert NGT to LIS to hopefully decompress ileus. Diet change, NGT and repeat FUA ordered. Addendum:    Pt's KUB this AM shows increased small bowel distention concerning for possible ileus. Will keep NPO today and obtain repeat FUA tomorrow AM. Would insert NGT to LIS to hopefully decompress ileus. Diet change, NGT and repeat FUA ordered.

## 2017-08-13 NOTE — CONSULT NOTE ADULT - SUBJECTIVE AND OBJECTIVE BOX
HPI: 56 y/o M w/ pmh significant for chronic alcoholic calcific pancreatitis currently admitted for foot cellulitis and spontaneous bacterial peritonitis secondary to his chronic pancreatitis, surgery consulted for small bowel ileus. Pt endorses that he has had vomiting episodes since one week ago, however nursing staff reports vomiting episode yesterday. His last BM was yesterday which consisted of loose stool, and last flatus was yesterday. He currently c/o of epigastric abdominal/burning pain. He denies fevers, chills, chest pain, palpitations. He had a recent colonoscopy done by Dr. Briones which revealed 1 colon polyp but poor prep. Advised need for repeat. He also had recent EUS which showed finding consistent with chronic pancreatitis and multiple small pseudocysts. His MRI on 08/11 showed large volume ascites, chronic pancreatitis, grossly stable; Peripancreatic pseudocysts mildly decreased in size. He was tapped for his ascites two days ago, body fluid cultures have been negative for growth to date, currently on meropenem for persistent leukocytosis as per ID.     Allergies: Iodine (swelling)  PMH:  Ascites: may 2017  Hernia  Pancreatitis  Hypertension  TIA (transient ischemic attack)  S/P skin graft using Integra: right calf skin graft  Fracture of shaft of left femur: 1971 compound fracrture left femur  1980 left femur bone spur removed    SH- 41 yrs of smoking 1 ppd, no intention of quitting, quit alcohol 2 yrs ago, when in his teens - substance abuse  meds- amlodipine        MEDICATIONS  (STANDING):  nicotine - 21 mG/24Hr(s) Patch 1 patch Transdermal daily  amLODIPine   Tablet 5 milliGRAM(s) Oral daily  gabapentin 300 milliGRAM(s) Oral every 8 hours  pantoprazole    Tablet 40 milliGRAM(s) Oral before breakfast  psyllium Powder 1 Packet(s) Oral two times a day  hydrocortisone 2.5% Rectal Cream 1 Application(s) Rectal two times a day  spironolactone 100 milliGRAM(s) Oral daily  furosemide    Tablet 20 milliGRAM(s) Oral daily  docusate sodium 100 milliGRAM(s) Oral two times a day  meropenem IVPB 1000 milliGRAM(s) IV Intermittent every 8 hours  enoxaparin Injectable 60 milliGRAM(s) SubCutaneous every 12 hours  mirtazapine 7.5 milliGRAM(s) Oral at bedtime  polyethylene glycol 3350 17 Gram(s) Oral at bedtime  saccharomyces boulardii 500 milliGRAM(s) Oral two times a day  lactulose Syrup 15 Gram(s) Oral three times a day  dextrose 5%. 1000 milliLiter(s) (50 mL/Hr) IV Continuous <Continuous>  dextrose 50% Injectable 12.5 Gram(s) IV Push once  dextrose 50% Injectable 25 Gram(s) IV Push once  dextrose 50% Injectable 25 Gram(s) IV Push once    MEDICATIONS  (PRN):  acetaminophen   Tablet 650 milliGRAM(s) Oral every 6 hours PRN For Temp greater than 38 C (100.4 F)  aluminum hydroxide/magnesium hydroxide/simethicone Suspension 30 milliLiter(s) Oral every 6 hours PRN Dyspepsia  ondansetron Injectable 4 milliGRAM(s) IV Push every 4 hours PRN Nausea and/or Vomiting  oxyCODONE    IR 5 milliGRAM(s) Oral every 4 hours PRN Moderate Pain (4 - 6)  oxyCODONE    IR 10 milliGRAM(s) Oral every 4 hours PRN Severe Pain (7 - 10)  dextrose Gel 1 Dose(s) Oral once PRN Blood Glucose LESS THAN 70 milliGRAM(s)/deciLiter  glucagon  Injectable 1 milliGRAM(s) IntraMuscular once PRN Glucose <70 milliGRAM(s)/deciLiter      Vital Signs Last 24 Hrs  T(C): 37.3 (13 Aug 2017 07:44), Max: 37.3 (13 Aug 2017 07:44)  T(F): 99.2 (13 Aug 2017 07:44), Max: 99.2 (13 Aug 2017 07:44)  HR: 102 (13 Aug 2017 07:44) (99 - 102)  BP: 119/85 (13 Aug 2017 07:44) (119/85 - 141/77)  BP(mean): --  RR: 17 (13 Aug 2017 07:44) (17 - 18)  SpO2: 91% (13 Aug 2017 07:44) (91% - 95%)    Physical Exam:    Neurological:  NAD, GCS-15    HEENT: EOMI    Neck:  No JVD    Back: Normal spine flexure, No CVA tenderness, No deformity or limitation of movement    Respiratory: Breath Sounds equal & clear to auscultation, no accessory muscle use    Cardiovascular:  normal S1, S2    Gastrointestinal: Softly distended abdomen. TTP epigastrium, no rebound tenderness, no guarding. Tympanic on palpation. No rigidity.     Extremities: He has peripheral edema on bilateral lower extremities with outlined markings where he initially had erythema which is no longer present.    Vascular: Equal and normal pulses: 2+ peripheral pulses throughout    Musculoskeletal: No joint pain, swelling or deformity; no limitation of movement    Skin: No rashes      I&O's Detail    12 Aug 2017 07:01  -  13 Aug 2017 07:00  --------------------------------------------------------  IN:    Solution: 100 mL  Total IN: 100 mL    OUT:    Voided: 570 mL  Total OUT: 570 mL    Total NET: -470 mL          LABS:                        11.0   37.8  )-----------( 586      ( 13 Aug 2017 07:39 )             32.5     08-13    131<L>  |  93<L>  |  17.0  ----------------------------<  100  4.7   |  24.0  |  0.48<L>    Ca    7.8<L>      13 Aug 2017 07:39    TPro  4.7<L>  /  Alb  1.8<L>  /  TBili  0.4  /  DBili  x   /  AST  25  /  ALT  26  /  AlkPhos  116  08-13    PT/INR - ( 13 Aug 2017 07:39 )   PT: 17.0 sec;   INR: 1.53 ratio               RADIOLOGY & ADDITIONAL STUDIES:  08/04 CT A/P:1. Edema and inflammatory changes around the pancreas consistent with acute  pancreatitis.  2. Calcifications in the pancreas consistent with chronic pancreatitis.  Dilatation of the pancreatic duct.  3. Multiple fluid collections adjacent to pancreas consistent with  pancreatic  pseudocysts. Largest measures 4 cm. Previously it measured 3.7 cm.  4. Small left pleural effusion. Smaller right pleural effusion.  5. Large amount of ascites throughout the abdomen and pelvis.  6. There are multiple loops of dilated small bowel with air-fluid levels.  This may represent ileus or early small bowel obstruction.  7. Anasarca    08/09: AXR: A mid abdominal and left upper quadrant small bowel ileus noted. There is  haziness overlying the abdomen indicating ascites. Right upper quadrant  calcifications are noted in the region of the above the pancreas confirmed  by prior 8/4/2017 CT scan. There is no free intra-abdominal air.  The osseous structures are intact.    08/11 MRI of abdomen:   BOWEL: Diffusely distended small bowel, likely ileus.  PERITONEUM: Large volume ascites, new from 5/17. Grossly stable from 8/4.  VESSELS: Subacute to acute thrombus in the right portal vein posterior  branch with cavernous transformation. Remaining vessels are patent. Previous  is seen soft tissue enhancement surrounding superior mesenteric artery is no  longer present.  RETROPERITONEUM: No lymphadenopathy.  ABDOMINAL WALL: Within normal limits.    08/13:  KUB: Dilated left upper quadrant small bowel loops. No evidence of  colonic dilatation. Calcifications noted in the right paraspinal region at  the level of L2..

## 2017-08-13 NOTE — PROGRESS NOTE ADULT - PROBLEM SELECTOR PLAN 4
-rheumatology f/u noted and appreciated and recommended methylprednisolone 30mg po qd for one week and then taper by 10mg q weekly until it is discontinued  -s/p pedal abscess R 4th digit I&d  and cellulitis - Resolved and podiatry signed off the case- healing well -rheumatology f/u noted and appreciated and recommended methylprednisolone 30mg po qd for one week and then taper by 10mg q weekly until it is discontinued. Given that the pt has an NGT will c/w 30MG IV QD for now.   -s/p pedal abscess R 4th digit I&d  and cellulitis - Resolved and podiatry signed off the case- healing well

## 2017-08-13 NOTE — PROGRESS NOTE ADULT - ASSESSMENT
57 M from home with PMHx HTN and chronic alcohol induced pancreatitis admitted with b/l pedal cellulitis/abcess on the dorsal PIPJ of the fourth digit, for which poditatry was consulted and drained the wound. ID consulted and s/p abx therapy. Pt noted to have panniculitis likely secondary to pancreatitis, dermatology consulted on steroid taper. Course complicated by suspected SBP for which pt placed on meropenem and portal vein thrombosis and started on full dose lovenox. Pt also with recurrent ascites placed on lasix and aldactone and had paracentesis IR guided. Leukocytosis worsening and empirically being covered with meropenem. Now with Illeus/SBO, NGT placed for decompression and sx consulted.

## 2017-08-13 NOTE — CONSULT NOTE ADULT - ATTENDING COMMENTS
COUNSELING:    Face to face meeting to discuss Advanced Care Planning - Time Spent ______ Minutes.  See goals of care note.    More than 50% time spent in counseling and coordinating care. __70____ Minutes.     Thank you for the opportunity to assist with the care of this patient.   [facility] [phone].
I have read, reviewed, edited and approve of note above.  I have personally reviewed the films.  I think Mr. Guzmna has an ileus and would benefit from conservative management (NGT for decompression, correct electrolytes).  His nutritional labs are not ideal and given the potential for being NPO I would recommend feeding when possible (post pyloric dobhoff would be ideal to maintain mucosal integrity, TPN should also be considered).  No acute surgical intervention warranted at this time but will follow due to severity of pancreatitis c/ ascites.

## 2017-08-13 NOTE — PROGRESS NOTE ADULT - PROBLEM SELECTOR PLAN 5
-Distended abdomen with fluid shift now with ileus/SBO and + 3 peripheral edema   - As per GI elevated  ascitic fluid amylase likely secondary to chronic pancreatitis  -c/w lasix 20mg po qd   -c/w aldactone 100mg po qd  -pt will need recurrent paracentesis for fluid removal -Distended abdomen with fluid shift now with ileus/SBO and + 3 peripheral edema   - As per GI elevated  ascitic fluid amylase likely secondary to chronic pancreatitis  -changed lasix 20mg po qd to lasix 10mg IVqd until pts illeus improves   -c/w aldactone 100mg po qd  -pt will need recurrent paracentesis for fluid removal

## 2017-08-13 NOTE — PROGRESS NOTE ADULT - PROBLEM SELECTOR PLAN 8
-c/w mirtazapine 7.5m gpo qhs -c/w mirtazapine 7.5mg po qhs - on hold for now given pt has ngt in place

## 2017-08-13 NOTE — PROGRESS NOTE ADULT - PROBLEM SELECTOR PLAN 1
-Pt with distended abdomen, is passing flatus last bm yest   -KUB with possible SBO/ illeus   - GI f/u noted and appreciated and nena Rich the plan of care NGT to be placed to suction for decompression.   -Given multiple complications and complex diagnosis GI will consider potential transfer to [facility]  -Surgery consulted -Pt with distended abdomen, is passing flatus last bm yest   -c/w bowel rest   -KUB with possible SBO/ illeus   -d/c narcotics   - GI f/u noted and appreciated and d.w Dr. Rich the plan of care NGT to be placed to suction for decompression.   -Given multiple complications and complex diagnosis GI will consider potential transfer to [facility]  -Surgery consulted

## 2017-08-13 NOTE — PROGRESS NOTE ADULT - SUBJECTIVE AND OBJECTIVE BOX
Pt seen and examined. He states that he is nauseous and is c/o increasing abdominal distention. Rheumatology f/u note from yesterday appreciated. Steroid taper recommended and outlined. Pt. states that he did have a BM yesterday. For Liver/Spleen scan tomorrow to r/o possible cirrhosis which had not been evident on imaging studies here.    MEDICATIONS:  MEDICATIONS  (STANDING):  nicotine - 21 mG/24Hr(s) Patch 1 patch Transdermal daily  amLODIPine   Tablet 5 milliGRAM(s) Oral daily  gabapentin 300 milliGRAM(s) Oral every 8 hours  pantoprazole    Tablet 40 milliGRAM(s) Oral before breakfast  psyllium Powder 1 Packet(s) Oral two times a day  hydrocortisone 2.5% Rectal Cream 1 Application(s) Rectal two times a day  spironolactone 100 milliGRAM(s) Oral daily  furosemide    Tablet 20 milliGRAM(s) Oral daily  docusate sodium 100 milliGRAM(s) Oral two times a day  methylPREDNISolone  milliGRAM(s) Oral   methylPREDNISolone 4 milliGRAM(s) Oral before breakfast  methylPREDNISolone 4 milliGRAM(s) Oral at bedtime  meropenem IVPB 1000 milliGRAM(s) IV Intermittent every 8 hours  enoxaparin Injectable 60 milliGRAM(s) SubCutaneous every 12 hours  mirtazapine 7.5 milliGRAM(s) Oral at bedtime  polyethylene glycol 3350 17 Gram(s) Oral at bedtime  saccharomyces boulardii 500 milliGRAM(s) Oral two times a day  lactulose Syrup 15 Gram(s) Oral three times a day    MEDICATIONS  (PRN):  acetaminophen   Tablet 650 milliGRAM(s) Oral every 6 hours PRN For Temp greater than 38 C (100.4 F)  aluminum hydroxide/magnesium hydroxide/simethicone Suspension 30 milliLiter(s) Oral every 6 hours PRN Dyspepsia  ondansetron Injectable 4 milliGRAM(s) IV Push every 4 hours PRN Nausea and/or Vomiting  oxyCODONE    IR 5 milliGRAM(s) Oral every 4 hours PRN Moderate Pain (4 - 6)  oxyCODONE    IR 10 milliGRAM(s) Oral every 4 hours PRN Severe Pain (7 - 10)      Allergies    iodine (Swelling)    Intolerances        Vital Signs Last 24 Hrs  T(C): 37.3 (13 Aug 2017 07:44), Max: 37.3 (13 Aug 2017 07:44)  T(F): 99.2 (13 Aug 2017 07:44), Max: 99.2 (13 Aug 2017 07:44)  HR: 102 (13 Aug 2017 07:44) (99 - 118)  BP: 119/85 (13 Aug 2017 07:44) (119/85 - 141/77)  BP(mean): --  RR: 17 (13 Aug 2017 07:44) (17 - 18)  SpO2: 91% (13 Aug 2017 07:44) (91% - 95%)    08-12 @ 07:01  -  08-13 @ 07:00  --------------------------------------------------------  IN: 100 mL / OUT: 570 mL / NET: -470 mL        PHYSICAL EXAM:    General: Well developed; well nourished; in no acute distress  HEENT: MMM, conjunctiva pink and sclera anicteric.  Lungs: clear to auscultation and percussion.  Cor: RRR S1, S2 only.  Gastrointestinal: Abdomen: Distended with increased tympany to percussion, + hepatomegaly, mild diffuse tenderness.  Extremities: no cyanosis, clubbing or edema.  Neuro: Pt. a + o x 3    LABS:      CBC Full  -  ( 13 Aug 2017 07:39 )  WBC Count : 37.8 K/uL  Hemoglobin : 11.0 g/dL  Hematocrit : 32.5 %  Platelet Count - Automated : 586 K/uL  Mean Cell Volume : 97.0 fl  Mean Cell Hemoglobin : 32.8 pg  Mean Cell Hemoglobin Concentration : 33.8 g/dL  Auto Neutrophil # : 35.7 K/uL  Auto Lymphocyte # : 0.6 K/uL  Auto Monocyte # : 1.3 K/uL  Auto Eosinophil # : x  Auto Basophil # : x  Auto Neutrophil % : 94.7 %  Auto Lymphocyte % : 1.5 %  Auto Monocyte % : 3.3 %  Auto Eosinophil % : x  Auto Basophil % : x    08-13    131<L>  |  93<L>  |  17.0  ----------------------------<  100  4.7   |  24.0  |  0.48<L>    Ca    7.8<L>      13 Aug 2017 07:39    TPro  4.7<L>  /  Alb  1.8<L>  /  TBili  0.4  /  DBili  x   /  AST  25  /  ALT  26  /  AlkPhos  116  08-13    PT/INR - ( 13 Aug 2017 07:39 )   PT: 17.0 sec;   INR: 1.53 ratio                           RADIOLOGY & ADDITIONAL STUDIES (The following images were personally reviewed):

## 2017-08-13 NOTE — PROGRESS NOTE ADULT - PROBLEM SELECTOR PLAN 1
KUB ordered urgently to r/o possible ileus which was suggested on Friday's MRI. If pt. does have ileus would keep NPO.

## 2017-08-13 NOTE — CONSULT NOTE ADULT - CONSULT REASON
Pain Management
Acute unresolving rectal prolapse.
Bilateral foot pain
Evaluate rheumatic disease
H/O Pancreatic Cysts
Liver Cirrhosis
evaluate rheumatic disease
ileus
rash
rectal prolapse
cellulitis

## 2017-08-13 NOTE — PROGRESS NOTE ADULT - SUBJECTIVE AND OBJECTIVE BOX
Patient is a 57y old  Male who presents with a chief complaint of foot swelling and pain (29 Jul 2017 12:54)      HEALTH ISSUES - PROBLEM Dx:  Abdominal distention: Abdominal distention  Prophylactic measure: Prophylactic measure  Depression: Depression  Ileus: Ileus  Portal vein thrombosis: Portal vein thrombosis  Other ascites: Other ascites  Constipation by delayed colonic transit: Constipation by delayed colonic transit  Anxiety and depression: Anxiety and depression  Pancreatic ascites: Pancreatic ascites  Ascites due to alcoholic cirrhosis: Ascites due to alcoholic cirrhosis  Pseudocyst, pancreas: Pseudocyst, pancreas  Alcohol-induced acute pancreatitis, unspecified complication status: Alcohol-induced acute pancreatitis, unspecified complication status  SBP (spontaneous bacterial peritonitis): SBP (spontaneous bacterial peritonitis)  Ascites: Ascites  Cellulitis of leg, left: Cellulitis of leg, left  Pancreatitis: Pancreatitis  Pauciarticular arthritis: Pauciarticular arthritis  Panniculitis: Panniculitis  Alcohol-induced chronic pancreatitis: Alcohol-induced chronic pancreatitis  Arthralgia of multiple joints: Arthralgia of multiple joints  Smoker: Smoker  Essential hypertension: Essential hypertension  Abscess or cellulitis of foot: Abscess or cellulitis of foot      INTERVAL HPI/OVERNIGHT EVENTS:  Patient seen and examined at bedside. No acute events overnight. Patient states he is not feeling well, abdomen feels more distended. He does report that he is passing flatus and last bm was yest. Patient denies chest pain, SOB, abd pain, N/V, fever, chills, dysuria or any other complaints. All remainder ROS negative.     Vital Signs Last 24 Hrs  T(C): 37.3 (13 Aug 2017 07:44), Max: 37.3 (13 Aug 2017 07:44)  T(F): 99.2 (13 Aug 2017 07:44), Max: 99.2 (13 Aug 2017 07:44)  HR: 102 (13 Aug 2017 07:44) (99 - 102)  BP: 119/85 (13 Aug 2017 07:44) (119/85 - 141/77)  BP(mean): --  RR: 17 (13 Aug 2017 07:44) (17 - 18)  SpO2: 91% (13 Aug 2017 07:44) (91% - 95%)    CAPILLARY BLOOD GLUCOSE  119 (13 Aug 2017 11:51)          I&O's Summary    12 Aug 2017 07:01  -  13 Aug 2017 07:00  --------------------------------------------------------  IN: 100 mL / OUT: 570 mL / NET: -470 mL        CONSTITUTIONAL: Thin, ill appearing, awake, alert and in no apparent distress  CARDIAC: Normal rate, regular rhythm.  Heart sounds S1, S2.  No murmurs, rubs or gallops   RESPIRATORY: Breath sounds clear and equal bilaterally. No wheezes, rhales or rhonchi  GASTROENTEROLOGY: Soft, more distended, mild diffuse tenderness, hypoactive BS   EXTREMITIES: Peripheral edema +3, no cyanosis or deformity   NEUROLOGICAL: Alert and oriented, no focal deficits, no motor or sensory deficits.  SKIN: R 4th digit s/p abscess drainage healing well       MEDICATIONS  (STANDING):  nicotine - 21 mG/24Hr(s) Patch 1 patch Transdermal daily  amLODIPine   Tablet 5 milliGRAM(s) Oral daily  gabapentin 300 milliGRAM(s) Oral every 8 hours  pantoprazole    Tablet 40 milliGRAM(s) Oral before breakfast  psyllium Powder 1 Packet(s) Oral two times a day  hydrocortisone 2.5% Rectal Cream 1 Application(s) Rectal two times a day  spironolactone 100 milliGRAM(s) Oral daily  furosemide    Tablet 20 milliGRAM(s) Oral daily  docusate sodium 100 milliGRAM(s) Oral two times a day  meropenem IVPB 1000 milliGRAM(s) IV Intermittent every 8 hours  enoxaparin Injectable 60 milliGRAM(s) SubCutaneous every 12 hours  mirtazapine 7.5 milliGRAM(s) Oral at bedtime  polyethylene glycol 3350 17 Gram(s) Oral at bedtime  saccharomyces boulardii 500 milliGRAM(s) Oral two times a day  lactulose Syrup 15 Gram(s) Oral three times a day  dextrose 5%. 1000 milliLiter(s) (50 mL/Hr) IV Continuous <Continuous>  dextrose 50% Injectable 12.5 Gram(s) IV Push once  dextrose 50% Injectable 25 Gram(s) IV Push once  dextrose 50% Injectable 25 Gram(s) IV Push once    MEDICATIONS  (PRN):  acetaminophen   Tablet 650 milliGRAM(s) Oral every 6 hours PRN For Temp greater than 38 C (100.4 F)  aluminum hydroxide/magnesium hydroxide/simethicone Suspension 30 milliLiter(s) Oral every 6 hours PRN Dyspepsia  ondansetron Injectable 4 milliGRAM(s) IV Push every 4 hours PRN Nausea and/or Vomiting  oxyCODONE    IR 5 milliGRAM(s) Oral every 4 hours PRN Moderate Pain (4 - 6)  oxyCODONE    IR 10 milliGRAM(s) Oral every 4 hours PRN Severe Pain (7 - 10)  dextrose Gel 1 Dose(s) Oral once PRN Blood Glucose LESS THAN 70 milliGRAM(s)/deciLiter  glucagon  Injectable 1 milliGRAM(s) IntraMuscular once PRN Glucose <70 milliGRAM(s)/deciLiter      LABS:                        11.0   37.8  )-----------( 586      ( 13 Aug 2017 07:39 )             32.5     08-13    131<L>  |  93<L>  |  17.0  ----------------------------<  100  4.7   |  24.0  |  0.48<L>    Ca    7.8<L>      13 Aug 2017 07:39    TPro  4.7<L>  /  Alb  1.8<L>  /  TBili  0.4  /  DBili  x   /  AST  25  /  ALT  26  /  AlkPhos  116  08-13    PT/INR - ( 13 Aug 2017 07:39 )   PT: 17.0 sec;   INR: 1.53 ratio             LIVER FUNCTIONS - ( 13 Aug 2017 07:39 )  Alb: 1.8 g/dL / Pro: 4.7 g/dL / ALK PHOS: 116 U/L / ALT: 26 U/L / AST: 25 U/L / GGT: x             RADIOLOGY & ADDITIONAL TESTS:

## 2017-08-13 NOTE — CONSULT NOTE ADULT - PROBLEM SELECTOR PROBLEM 1
Abscess or cellulitis of foot
Alcohol-induced chronic pancreatitis
Arthralgia of multiple joints
Ileus
Panniculitis
SBP (spontaneous bacterial peritonitis)

## 2017-08-13 NOTE — PROGRESS NOTE ADULT - PROBLEM SELECTOR PLAN 4
No change in pancreas or PD or pseudocyst findings as documented on Friday's MRI  of abdomen and MRCP. Repeat amylase and lipase ordered for tomorrow AM.

## 2017-08-14 DIAGNOSIS — K86.1 OTHER CHRONIC PANCREATITIS: ICD-10-CM

## 2017-08-14 DIAGNOSIS — K86.89 OTHER SPECIFIED DISEASES OF PANCREAS: ICD-10-CM

## 2017-08-14 DIAGNOSIS — K56.69 OTHER INTESTINAL OBSTRUCTION: ICD-10-CM

## 2017-08-14 LAB
ALBUMIN SERPL ELPH-MCNC: 1.8 G/DL — LOW (ref 3.3–5.2)
ALP SERPL-CCNC: 107 U/L — SIGNIFICANT CHANGE UP (ref 40–120)
ALT FLD-CCNC: 27 U/L — SIGNIFICANT CHANGE UP
AMYLASE P1 CFR SERPL: 3622 U/L — HIGH (ref 36–128)
ANION GAP SERPL CALC-SCNC: 14 MMOL/L — SIGNIFICANT CHANGE UP (ref 5–17)
ANISOCYTOSIS BLD QL: SLIGHT — SIGNIFICANT CHANGE UP
AST SERPL-CCNC: 28 U/L — SIGNIFICANT CHANGE UP
BILIRUB SERPL-MCNC: 0.3 MG/DL — LOW (ref 0.4–2)
BUN SERPL-MCNC: 31 MG/DL — HIGH (ref 8–20)
CALCIUM SERPL-MCNC: 7.7 MG/DL — LOW (ref 8.6–10.2)
CHLORIDE SERPL-SCNC: 95 MMOL/L — LOW (ref 98–107)
CO2 SERPL-SCNC: 23 MMOL/L — SIGNIFICANT CHANGE UP (ref 22–29)
CREAT SERPL-MCNC: 0.67 MG/DL — SIGNIFICANT CHANGE UP (ref 0.5–1.3)
CULTURE RESULTS: SIGNIFICANT CHANGE UP
GLUCOSE SERPL-MCNC: 137 MG/DL — HIGH (ref 70–115)
HCT VFR BLD CALC: 31.2 % — LOW (ref 42–52)
HGB BLD-MCNC: 10.6 G/DL — LOW (ref 14–18)
HYPOCHROMIA BLD QL: SLIGHT — SIGNIFICANT CHANGE UP
LIDOCAIN IGE QN: 2950 U/L — HIGH (ref 22–51)
LIPASE FLD-CCNC: SIGNIFICANT CHANGE UP
LYMPHOCYTES # BLD AUTO: 1 % — LOW (ref 20–55)
MACROCYTES BLD QL: SLIGHT — SIGNIFICANT CHANGE UP
MCHC RBC-ENTMCNC: 33 PG — HIGH (ref 27–31)
MCHC RBC-ENTMCNC: 34 G/DL — SIGNIFICANT CHANGE UP (ref 32–36)
MCV RBC AUTO: 97.2 FL — HIGH (ref 80–94)
MICROCYTES BLD QL: SLIGHT — SIGNIFICANT CHANGE UP
MONOCYTES NFR BLD AUTO: 1 % — LOW (ref 3–10)
NEUTROPHILS NFR BLD AUTO: 98 % — HIGH (ref 37–73)
OVALOCYTES BLD QL SMEAR: SLIGHT — SIGNIFICANT CHANGE UP
PLAT MORPH BLD: NORMAL — SIGNIFICANT CHANGE UP
PLATELET # BLD AUTO: 556 K/UL — HIGH (ref 150–400)
POIKILOCYTOSIS BLD QL AUTO: SLIGHT — SIGNIFICANT CHANGE UP
POTASSIUM SERPL-MCNC: 5.1 MMOL/L — SIGNIFICANT CHANGE UP (ref 3.5–5.3)
POTASSIUM SERPL-SCNC: 5.1 MMOL/L — SIGNIFICANT CHANGE UP (ref 3.5–5.3)
PROT SERPL-MCNC: 4.8 G/DL — LOW (ref 6.6–8.7)
RBC # BLD: 3.21 M/UL — LOW (ref 4.6–6.2)
RBC # FLD: 15.7 % — HIGH (ref 11–15.6)
RBC BLD AUTO: ABNORMAL
SODIUM SERPL-SCNC: 132 MMOL/L — LOW (ref 135–145)
SPECIMEN SOURCE: SIGNIFICANT CHANGE UP
WBC # BLD: 38.9 K/UL — HIGH (ref 4.8–10.8)
WBC # FLD AUTO: 38.9 K/UL — HIGH (ref 4.8–10.8)

## 2017-08-14 PROCEDURE — 74020: CPT | Mod: 26

## 2017-08-14 PROCEDURE — 99233 SBSQ HOSP IP/OBS HIGH 50: CPT

## 2017-08-14 PROCEDURE — 99232 SBSQ HOSP IP/OBS MODERATE 35: CPT

## 2017-08-14 PROCEDURE — 78215 LVR&SPLEEN IMG STATIC ONLY: CPT | Mod: 26

## 2017-08-14 RX ORDER — HYDROMORPHONE HYDROCHLORIDE 2 MG/ML
0.5 INJECTION INTRAMUSCULAR; INTRAVENOUS; SUBCUTANEOUS EVERY 4 HOURS
Qty: 0 | Refills: 0 | Status: DISCONTINUED | OUTPATIENT
Start: 2017-08-14 | End: 2017-08-15

## 2017-08-14 RX ORDER — SODIUM CHLORIDE 9 MG/ML
1000 INJECTION, SOLUTION INTRAVENOUS
Qty: 0 | Refills: 0 | Status: DISCONTINUED | OUTPATIENT
Start: 2017-08-14 | End: 2017-08-15

## 2017-08-14 RX ORDER — FENTANYL CITRATE 50 UG/ML
1 INJECTION INTRAVENOUS
Qty: 0 | Refills: 0 | Status: DISCONTINUED | OUTPATIENT
Start: 2017-08-14 | End: 2017-08-15

## 2017-08-14 RX ORDER — FUROSEMIDE 40 MG
40 TABLET ORAL DAILY
Qty: 0 | Refills: 0 | Status: DISCONTINUED | OUTPATIENT
Start: 2017-08-14 | End: 2017-08-15

## 2017-08-14 RX ORDER — KETOROLAC TROMETHAMINE 30 MG/ML
30 SYRINGE (ML) INJECTION ONCE
Qty: 0 | Refills: 0 | Status: DISCONTINUED | OUTPATIENT
Start: 2017-08-14 | End: 2017-08-14

## 2017-08-14 RX ORDER — HYDROMORPHONE HYDROCHLORIDE 2 MG/ML
0.5 INJECTION INTRAMUSCULAR; INTRAVENOUS; SUBCUTANEOUS EVERY 6 HOURS
Qty: 0 | Refills: 0 | Status: DISCONTINUED | OUTPATIENT
Start: 2017-08-14 | End: 2017-08-15

## 2017-08-14 RX ORDER — MORPHINE SULFATE 50 MG/1
2 CAPSULE, EXTENDED RELEASE ORAL EVERY 8 HOURS
Qty: 0 | Refills: 0 | Status: DISCONTINUED | OUTPATIENT
Start: 2017-08-14 | End: 2017-08-14

## 2017-08-14 RX ADMIN — FENTANYL CITRATE 1 PATCH: 50 INJECTION INTRAVENOUS at 18:54

## 2017-08-14 RX ADMIN — Medication 1 PATCH: at 11:06

## 2017-08-14 RX ADMIN — MORPHINE SULFATE 2 MILLIGRAM(S): 50 CAPSULE, EXTENDED RELEASE ORAL at 13:45

## 2017-08-14 RX ADMIN — POLYETHYLENE GLYCOL 3350 17 GRAM(S): 17 POWDER, FOR SOLUTION ORAL at 22:14

## 2017-08-14 RX ADMIN — MIRTAZAPINE 7.5 MILLIGRAM(S): 45 TABLET, ORALLY DISINTEGRATING ORAL at 22:09

## 2017-08-14 RX ADMIN — Medication 10 MILLIGRAM(S): at 06:08

## 2017-08-14 RX ADMIN — GABAPENTIN 300 MILLIGRAM(S): 400 CAPSULE ORAL at 22:09

## 2017-08-14 RX ADMIN — ENOXAPARIN SODIUM 60 MILLIGRAM(S): 100 INJECTION SUBCUTANEOUS at 18:54

## 2017-08-14 RX ADMIN — SODIUM CHLORIDE 30 MILLILITER(S): 9 INJECTION, SOLUTION INTRAVENOUS at 11:08

## 2017-08-14 RX ADMIN — ENOXAPARIN SODIUM 60 MILLIGRAM(S): 100 INJECTION SUBCUTANEOUS at 06:08

## 2017-08-14 RX ADMIN — Medication 30 MILLIGRAM(S): at 05:15

## 2017-08-14 RX ADMIN — MEROPENEM 200 MILLIGRAM(S): 1 INJECTION INTRAVENOUS at 22:09

## 2017-08-14 RX ADMIN — MORPHINE SULFATE 2 MILLIGRAM(S): 50 CAPSULE, EXTENDED RELEASE ORAL at 11:08

## 2017-08-14 RX ADMIN — MEROPENEM 200 MILLIGRAM(S): 1 INJECTION INTRAVENOUS at 14:53

## 2017-08-14 RX ADMIN — Medication 30 MILLIGRAM(S): at 02:25

## 2017-08-14 RX ADMIN — Medication 30 MILLIGRAM(S): at 06:08

## 2017-08-14 RX ADMIN — Medication 1 PATCH: at 11:08

## 2017-08-14 RX ADMIN — MEROPENEM 200 MILLIGRAM(S): 1 INJECTION INTRAVENOUS at 06:09

## 2017-08-14 NOTE — PROGRESS NOTE ADULT - SUBJECTIVE AND OBJECTIVE BOX
Patient seen and examined;  chart reviewed.  NGT in place since yesterday with minimal output.  AXR today shows no change in ileus pattern.  No flatus or BM.  Bile in NGT.  Liver spleen scan results are noted;  Colloid shift C/w cirrhosis.  Abdominal distention less pronounced than last week.      Merepenem persists.  Flagyl dced.      PAST MEDICAL & SURGICAL HISTORY:  Ascites: may 2017  Hernia  Pancreatitis  Hypertension  TIA (transient ischemic attack)  S/P skin graft using Integra: right calf skin graft  Fracture of shaft of left femur: 1971 compound fracrture left femur  1980 left femur bone spur removed      ROS:  No Heartburn, regurgitation, dysphagia, odynophagia.  No dyspepsia  No abdominal pain.    No Nausea, vomiting.  No Bleeding.  No hematemesis.   No diarrhea.    No hematochesia.  No weight loss, anorexia.  No edema.      MEDICATIONS  (STANDING):  nicotine - 21 mG/24Hr(s) Patch 1 patch Transdermal daily  amLODIPine   Tablet 5 milliGRAM(s) Oral daily  gabapentin 300 milliGRAM(s) Oral every 8 hours  pantoprazole    Tablet 40 milliGRAM(s) Oral before breakfast  psyllium Powder 1 Packet(s) Oral two times a day  hydrocortisone 2.5% Rectal Cream 1 Application(s) Rectal two times a day  spironolactone 100 milliGRAM(s) Oral daily  docusate sodium 100 milliGRAM(s) Oral two times a day  meropenem IVPB 1000 milliGRAM(s) IV Intermittent every 8 hours  enoxaparin Injectable 60 milliGRAM(s) SubCutaneous every 12 hours  mirtazapine 7.5 milliGRAM(s) Oral at bedtime  polyethylene glycol 3350 17 Gram(s) Oral at bedtime  saccharomyces boulardii 500 milliGRAM(s) Oral two times a day  lactulose Syrup 15 Gram(s) Oral three times a day  dextrose 5%. 1000 milliLiter(s) (50 mL/Hr) IV Continuous <Continuous>  dextrose 50% Injectable 12.5 Gram(s) IV Push once  dextrose 50% Injectable 25 Gram(s) IV Push once  dextrose 50% Injectable 25 Gram(s) IV Push once  methylPREDNISolone sodium succinate Injectable 30 milliGRAM(s) IV Push daily  furosemide   Injectable 10 milliGRAM(s) IV Push daily  dextrose 5% + sodium chloride 0.9%. 1000 milliLiter(s) (30 mL/Hr) IV Continuous <Continuous>    MEDICATIONS  (PRN):  acetaminophen   Tablet 650 milliGRAM(s) Oral every 6 hours PRN For Temp greater than 38 C (100.4 F)  aluminum hydroxide/magnesium hydroxide/simethicone Suspension 30 milliLiter(s) Oral every 6 hours PRN Dyspepsia  ondansetron Injectable 4 milliGRAM(s) IV Push every 4 hours PRN Nausea and/or Vomiting  dextrose Gel 1 Dose(s) Oral once PRN Blood Glucose LESS THAN 70 milliGRAM(s)/deciLiter  glucagon  Injectable 1 milliGRAM(s) IntraMuscular once PRN Glucose <70 milliGRAM(s)/deciLiter  morphine  - Injectable 2 milliGRAM(s) IV Push every 8 hours PRN Severe Pain (7 - 10)      Allergies    iodine (Swelling)    Intolerances        Vital Signs Last 24 Hrs  T(C): 36.4 (14 Aug 2017 07:45), Max: 37.1 (13 Aug 2017 23:23)  T(F): 97.6 (14 Aug 2017 07:45), Max: 98.7 (13 Aug 2017 23:23)  HR: 86 (14 Aug 2017 11:06) (86 - 103)  BP: 133/78 (14 Aug 2017 11:06) (133/78 - 148/94)  BP(mean): --  RR: 18 (14 Aug 2017 11:06) (18 - 19)  SpO2: 93% (14 Aug 2017 11:06) (91% - 96%)    PHYSICAL EXAM:    GENERAL: NAD, well-groomed, well-developed  HEAD:  Atraumatic, Normocephalic  EYES: EOMI, PERRLA, conjunctiva and sclera clear  ENMT: No tonsillar erythema, exudates, or enlargement; Moist mucous membranes, Good dentition, No lesions  NECK: Supple, No JVD, Normal thyroid  CHEST/LUNG: Clear to percussion bilaterally; No rales, rhonchi, wheezing, or rubs  HEART: Regular rate and rhythm; No murmurs, rubs, or gallops  ABDOMEN: Soft, Nontender, moderate distention. ; Bowel sounds present.  No HSM.  No MTR.    EXTREMITIES:  2+ Peripheral Pulses, No clubbing, cyanosis, or edema  LYMPH: No lymphadenopathy noted  SKIN: No rashes or lesions      LABS:                        10.6   38.9  )-----------( 556      ( 14 Aug 2017 06:08 )             31.2     08-14    132<L>  |  95<L>  |  31.0<H>  ----------------------------<  137<H>  5.1   |  23.0  |  0.67    Ca    7.7<L>      14 Aug 2017 06:08    TPro  4.8<L>  /  Alb  1.8<L>  /  TBili  0.3<L>  /  DBili  x   /  AST  28  /  ALT  27  /  AlkPhos  107  08-14    PT/INR - ( 13 Aug 2017 07:39 )   PT: 17.0 sec;   INR: 1.53 ratio                  RADIOLOGY & ADDITIONAL STUDIES:

## 2017-08-14 NOTE — PROGRESS NOTE ADULT - ASSESSMENT
Chronic pancreatitis; pseudocysts.  Ascites with culture neg SBP.  On empiric antibiotics.  Cirrhosis/   PVT/  Ileus.  On anticoagulation.  Not bleeding.    Plan to continue NGT suction and monitor for improvement.  Taper off of steroids gradually.  ? transfer to [facility].

## 2017-08-14 NOTE — PROGRESS NOTE ADULT - PROBLEM SELECTOR PLAN 4
-rheumatology f/u noted and appreciated and recommended methylprednisolone 30mg po qd for one week and then taper by 10mg q weekly until it is discontinued. Given that the pt has an NGT will c/w 30MG IV QD for now.     -s/p pedal abscess R 4th digit I&d  and cellulitis - Resolved and podiatry signed off the case- healing well

## 2017-08-14 NOTE — PROGRESS NOTE ADULT - PROBLEM SELECTOR PLAN 1
-Pt with distended abdomen, is passing flatus, no bm.   -c/w bowel rest   -repeat abd x ray shows progressive SBO   - GI f/u noted and appreciated   -Given multiple complications and complex diagnosis GI will consider potential transfer to [facility] if pt is accepted   -Surgery f/u noted and appreciated

## 2017-08-14 NOTE — PROGRESS NOTE ADULT - ASSESSMENT
Pt is a 57M w/ acute on chronic pancreatitis w/ pseudocyst, Portal vein thrombosis and cellulitis. Patient also likely has a small bowel ileus. Still no surgical intervention indicate, continue with conservative management.  - NPO  - NGT decompression, f/u output  - serial abdominal exams  - correct electrolyte abnormalities  - treat underlying disease processes Pt is a 57M w/ acute on chronic pancreatitis w/ pseudocyst, Portal vein thrombosis and cellulitis. Patient also likely has a small bowel ileus. Still no surgical intervention indicate, continue with conservative management.  - NPO  - NGT decompression, f/u output  - serial abdominal exams  - correct electrolyte abnormalities  - treat underlying disease processes  - anti coagulation for PV thrombosis.

## 2017-08-14 NOTE — PROGRESS NOTE ADULT - SUBJECTIVE AND OBJECTIVE BOX
Patient is a 57y old  Male who presents with a chief complaint of foot swelling and pain (29 Jul 2017 12:54)      HEALTH ISSUES - PROBLEM Dx:  Pain from pancreas: Pain from pancreas  Chronic pancreatitis, unspecified pancreatitis type: Chronic pancreatitis, unspecified pancreatitis type  Abdominal distention: Abdominal distention  Prophylactic measure: Prophylactic measure  Depression: Depression  Ileus: Ileus  Portal vein thrombosis: Portal vein thrombosis  Other ascites: Other ascites  Constipation by delayed colonic transit: Constipation by delayed colonic transit  Anxiety and depression: Anxiety and depression  Pancreatic ascites: Pancreatic ascites  Ascites due to alcoholic cirrhosis: Ascites due to alcoholic cirrhosis  Pseudocyst, pancreas: Pseudocyst, pancreas  Alcohol-induced acute pancreatitis, unspecified complication status: Alcohol-induced acute pancreatitis, unspecified complication status  SBP (spontaneous bacterial peritonitis): SBP (spontaneous bacterial peritonitis)  Ascites: Ascites  Cellulitis of leg, left: Cellulitis of leg, left  Pancreatitis: Pancreatitis  Pauciarticular arthritis: Pauciarticular arthritis  Panniculitis: Panniculitis  Alcohol-induced chronic pancreatitis: Alcohol-induced chronic pancreatitis  Arthralgia of multiple joints: Arthralgia of multiple joints  Smoker: Smoker  Essential hypertension: Essential hypertension  Abscess or cellulitis of foot: Abscess or cellulitis of foot      INTERVAL HPI/OVERNIGHT EVENTS:  Patient seen and examined at bedside. No acute events overnight. Patient states he is feeling ill, he continues to feel pain and wants to continue a pain medication regimen. He understands that his health is declining and he wants further information to be reported to his sister Mahnaz. Palliative team, case management and Mahnaz at bedside. Left the bedside to have a meeting with Mahnaz in regards to the patient's quality of life and continuous medical deterioration. She understands how ill her brother is and the extent of the complications secondary to his pancreatitis. I expressed to her that we are symptomatically trying to manage him but overall he continues to decline and develop further complications.      Vital Signs Last 24 Hrs  T(C): 36.4 (14 Aug 2017 07:45), Max: 37.1 (13 Aug 2017 23:23)  T(F): 97.6 (14 Aug 2017 07:45), Max: 98.7 (13 Aug 2017 23:23)  HR: 86 (14 Aug 2017 11:06) (86 - 103)  BP: 133/78 (14 Aug 2017 11:06) (133/78 - 148/94)  BP(mean): --  RR: 18 (14 Aug 2017 11:06) (18 - 19)  SpO2: 93% (14 Aug 2017 11:06) (91% - 96%)    CAPILLARY BLOOD GLUCOSE  152 (14 Aug 2017 13:43)  143 (14 Aug 2017 06:10)  128 (13 Aug 2017 23:10)  114 (13 Aug 2017 18:34)        I&O's Summary    13 Aug 2017 07:01  -  14 Aug 2017 07:00  --------------------------------------------------------  IN: 0 mL / OUT: 640 mL / NET: -640 mL    14 Aug 2017 07:01  -  14 Aug 2017 16:36  --------------------------------------------------------  IN: 235 mL / OUT: 400 mL / NET: -165 mL            CONSTITUTIONAL: Thin, ill appearing, awake, alert and in no apparent distress  HEENT: NGT in place to suction   CARDIAC: Normal rate, regular rhythm.  Heart sounds S1, S2.  No murmurs, rubs or gallops   RESPIRATORY: Breath sounds clear and equal bilaterally. No wheezes, rhales or rhonchi  GASTROENTEROLOGY: Soft, less distended, mild diffuse tenderness, hypoactive BS   EXTREMITIES: Peripheral edema +3, no cyanosis or deformity   NEUROLOGICAL: Alert and oriented, no focal deficits, no motor or sensory deficits.  SKIN: R 4th digit s/p abscess drainage healing well     MEDICATIONS  (STANDING):  nicotine - 21 mG/24Hr(s) Patch 1 patch Transdermal daily  amLODIPine   Tablet 5 milliGRAM(s) Oral daily  gabapentin 300 milliGRAM(s) Oral every 8 hours  pantoprazole    Tablet 40 milliGRAM(s) Oral before breakfast  psyllium Powder 1 Packet(s) Oral two times a day  hydrocortisone 2.5% Rectal Cream 1 Application(s) Rectal two times a day  spironolactone 100 milliGRAM(s) Oral daily  docusate sodium 100 milliGRAM(s) Oral two times a day  meropenem IVPB 1000 milliGRAM(s) IV Intermittent every 8 hours  enoxaparin Injectable 60 milliGRAM(s) SubCutaneous every 12 hours  mirtazapine 7.5 milliGRAM(s) Oral at bedtime  polyethylene glycol 3350 17 Gram(s) Oral at bedtime  saccharomyces boulardii 500 milliGRAM(s) Oral two times a day  lactulose Syrup 15 Gram(s) Oral three times a day  dextrose 5%. 1000 milliLiter(s) (50 mL/Hr) IV Continuous <Continuous>  dextrose 50% Injectable 12.5 Gram(s) IV Push once  dextrose 50% Injectable 25 Gram(s) IV Push once  dextrose 50% Injectable 25 Gram(s) IV Push once  methylPREDNISolone sodium succinate Injectable 30 milliGRAM(s) IV Push daily  furosemide   Injectable 10 milliGRAM(s) IV Push daily  dextrose 5% + sodium chloride 0.9%. 1000 milliLiter(s) (30 mL/Hr) IV Continuous <Continuous>  fentaNYL   Patch  25 MICROgram(s)/Hr 1 Patch Transdermal every 72 hours    MEDICATIONS  (PRN):  acetaminophen   Tablet 650 milliGRAM(s) Oral every 6 hours PRN For Temp greater than 38 C (100.4 F)  aluminum hydroxide/magnesium hydroxide/simethicone Suspension 30 milliLiter(s) Oral every 6 hours PRN Dyspepsia  ondansetron Injectable 4 milliGRAM(s) IV Push every 4 hours PRN Nausea and/or Vomiting  dextrose Gel 1 Dose(s) Oral once PRN Blood Glucose LESS THAN 70 milliGRAM(s)/deciLiter  glucagon  Injectable 1 milliGRAM(s) IntraMuscular once PRN Glucose <70 milliGRAM(s)/deciLiter  HYDROmorphone  Injectable 0.5 milliGRAM(s) IV Push every 6 hours PRN Moderate Pain (4 - 6)  HYDROmorphone  Injectable 0.5 milliGRAM(s) IV Push every 4 hours PRN breakthough pain      LABS:                        10.6   38.9  )-----------( 556      ( 14 Aug 2017 06:08 )             31.2     08-14    132<L>  |  95<L>  |  31.0<H>  ----------------------------<  137<H>  5.1   |  23.0  |  0.67    Ca    7.7<L>      14 Aug 2017 06:08    TPro  4.8<L>  /  Alb  1.8<L>  /  TBili  0.3<L>  /  DBili  x   /  AST  28  /  ALT  27  /  AlkPhos  107  08-14    PT/INR - ( 13 Aug 2017 07:39 )   PT: 17.0 sec;   INR: 1.53 ratio             LIVER FUNCTIONS - ( 14 Aug 2017 06:08 )  Alb: 1.8 g/dL / Pro: 4.8 g/dL / ALK PHOS: 107 U/L / ALT: 27 U/L / AST: 28 U/L / GGT: x             RADIOLOGY & ADDITIONAL TESTS:  Liver scan: Mild hepatomegaly.  Colloid shift to the bone marrow suggestive of biliary cirrhosis.    Abd x ray:   Impression:  Small bowel obstruction with increasing small bowel dilatation compared   with 8/9/2017..

## 2017-08-14 NOTE — PROGRESS NOTE ADULT - PROBLEM SELECTOR PLAN 5
-Distended abdomen with fluid shift now with SBO and + 3 peripheral edema   - As per GI elevated  ascitic fluid amylase likely secondary to chronic pancreatitis  -c/w lasix 10mg IVqd until pts SBO improves will use IV form   -aldactone 100mg po qd on hold secondary to NPO status   -pt will need recurrent paracentesis for fluid removal

## 2017-08-14 NOTE — PROGRESS NOTE ADULT - SUBJECTIVE AND OBJECTIVE BOX
INTERVAL HPI/OVERNIGHT EVENTS: This is a  56yo M who is alert and oriented x 3 NPO NGT to [facility] with bileous returns. NO BS or flatus.  Scanned this morning-No change in ileus at this time. Patient is frustrated and depressed-"getting sicker with no improvement since I'm in the hospital"  CC: Moderate to severe pain in feet, B/L hands and joints. Denies N/V/D CP Palpitations dysuria, dizziness. Feeling weak and listless.    57y old  Male who presents with a chief complaint of foot swelling and pain (29 Jul 2017 12:54)    PAST MEDICAL & SURGICAL HISTORY:  Ascites: may 2017  Hernia  Pancreatitis  Hypertension  TIA (transient ischemic attack)  S/P skin graft using Integra: right calf skin graft  Fracture of shaft of left femur: 1971 compound fracrture left femur  1980 left femur bone spur removed    Present Symptoms:   Dyspnea: 0 -1   Nausea/Vomiting:  No  Anxiety:  Yes   Depression: Yes   Fatigue: Yes  Loss of appetite: Yes     Pain: aching tingling sharp stabbing            Character-            Duration-            Effect-            Factors-            Frequency-            Location-Hands-joints and feet            Severity-    Review of Systems: Reviewed                    All others negative    MEDICATIONS  (STANDING):  nicotine - 21 mG/24Hr(s) Patch 1 patch Transdermal daily  amLODIPine   Tablet 5 milliGRAM(s) Oral daily  gabapentin 300 milliGRAM(s) Oral every 8 hours  pantoprazole    Tablet 40 milliGRAM(s) Oral before breakfast  psyllium Powder 1 Packet(s) Oral two times a day  hydrocortisone 2.5% Rectal Cream 1 Application(s) Rectal two times a day  spironolactone 100 milliGRAM(s) Oral daily  docusate sodium 100 milliGRAM(s) Oral two times a day  meropenem IVPB 1000 milliGRAM(s) IV Intermittent every 8 hours  enoxaparin Injectable 60 milliGRAM(s) SubCutaneous every 12 hours  mirtazapine 7.5 milliGRAM(s) Oral at bedtime  polyethylene glycol 3350 17 Gram(s) Oral at bedtime  saccharomyces boulardii 500 milliGRAM(s) Oral two times a day  lactulose Syrup 15 Gram(s) Oral three times a day  dextrose 5%. 1000 milliLiter(s) (50 mL/Hr) IV Continuous <Continuous>  dextrose 50% Injectable 12.5 Gram(s) IV Push once  dextrose 50% Injectable 25 Gram(s) IV Push once  dextrose 50% Injectable 25 Gram(s) IV Push once  methylPREDNISolone sodium succinate Injectable 30 milliGRAM(s) IV Push daily  furosemide   Injectable 10 milliGRAM(s) IV Push daily  dextrose 5% + sodium chloride 0.9%. 1000 milliLiter(s) (30 mL/Hr) IV Continuous <Continuous>  fentaNYL   Patch  25 MICROgram(s)/Hr 1 Patch Transdermal every 72 hours    MEDICATIONS  (PRN):  acetaminophen   Tablet 650 milliGRAM(s) Oral every 6 hours PRN For Temp greater than 38 C (100.4 F)  aluminum hydroxide/magnesium hydroxide/simethicone Suspension 30 milliLiter(s) Oral every 6 hours PRN Dyspepsia  ondansetron Injectable 4 milliGRAM(s) IV Push every 4 hours PRN Nausea and/or Vomiting  dextrose Gel 1 Dose(s) Oral once PRN Blood Glucose LESS THAN 70 milliGRAM(s)/deciLiter  glucagon  Injectable 1 milliGRAM(s) IntraMuscular once PRN Glucose <70 milliGRAM(s)/deciLiter  HYDROmorphone  Injectable 0.5 milliGRAM(s) IV Push every 6 hours PRN Moderate Pain (4 - 6)  HYDROmorphone  Injectable 0.5 milliGRAM(s) IV Push every 4 hours PRN breakthough pain      PHYSICAL EXAM:    Vital Signs Last 24 Hrs  T(C): 36.4 (14 Aug 2017 07:45), Max: 37.1 (13 Aug 2017 23:23)  T(F): 97.6 (14 Aug 2017 07:45), Max: 98.7 (13 Aug 2017 23:23)  HR: 86 (14 Aug 2017 11:06) (86 - 103)  BP: 133/78 (14 Aug 2017 11:06) (133/78 - 148/94)  BP(mean): --  RR: 18 (14 Aug 2017 11:06) (18 - 19)  SpO2: 93% (14 Aug 2017 11:06) (91% - 96%)    General: alert  oriented x _3___                   cachexia          HEENT  dry mouth      Lungs: comfortable      CV: normal      GI: nor distended  tender  no BS                 constipation  No BM    : normal      MSK: normal  weakness  edema             ambulatory  to BR-not up today    Skin:  no rash    LABS:                        10.6   38.9  )-----------( 556      ( 14 Aug 2017 06:08 )             31.2     08-14    132<L>  |  95<L>  |  31.0<H>  ----------------------------<  137<H>  5.1   |  23.0  |  0.67    Ca    7.7<L>      14 Aug 2017 06:08    TPro  4.8<L>  /  Alb  1.8<L>  /  TBili  0.3<L>  /  DBili  x   /  AST  28  /  ALT  27  /  AlkPhos  107  08-14    PT/INR - ( 13 Aug 2017 07:39 )   PT: 17.0 sec;   INR: 1.53 ratio             I&O's Summary    13 Aug 2017 07:01  -  14 Aug 2017 07:00  --------------------------------------------------------  IN: 0 mL / OUT: 640 mL / NET: -640 mL    14 Aug 2017 07:01  -  14 Aug 2017 15:53  --------------------------------------------------------  IN: 235 mL / OUT: 400 mL / NET: -165 mL        RADIOLOGY & ADDITIONAL STUDIES:    ADVANCE DIRECTIVES:   DNR YES  Completed on:                     DERRICK  YES    Completed on:  Living Will  NO   Completed on:

## 2017-08-14 NOTE — PROGRESS NOTE ADULT - SUBJECTIVE AND OBJECTIVE BOX
INTERVAL HPI/OVERNIGHT EVENTS:  Patient was seen and examined at bedside this AM. No acute events overnight. NGT in place with 50cc of drainage overnight. NGT was flushed to remove any blockage. Abdomen still softly distended. Electrolytes still not ideal. Pt reports no BM or flatus. Denies fever, chills, SOB, chest pain.           MEDICATIONS  (STANDING):  nicotine - 21 mG/24Hr(s) Patch 1 patch Transdermal daily  amLODIPine   Tablet 5 milliGRAM(s) Oral daily  gabapentin 300 milliGRAM(s) Oral every 8 hours  pantoprazole    Tablet 40 milliGRAM(s) Oral before breakfast  psyllium Powder 1 Packet(s) Oral two times a day  hydrocortisone 2.5% Rectal Cream 1 Application(s) Rectal two times a day  spironolactone 100 milliGRAM(s) Oral daily  docusate sodium 100 milliGRAM(s) Oral two times a day  meropenem IVPB 1000 milliGRAM(s) IV Intermittent every 8 hours  enoxaparin Injectable 60 milliGRAM(s) SubCutaneous every 12 hours  mirtazapine 7.5 milliGRAM(s) Oral at bedtime  polyethylene glycol 3350 17 Gram(s) Oral at bedtime  saccharomyces boulardii 500 milliGRAM(s) Oral two times a day  lactulose Syrup 15 Gram(s) Oral three times a day  dextrose 5%. 1000 milliLiter(s) (50 mL/Hr) IV Continuous <Continuous>  dextrose 50% Injectable 12.5 Gram(s) IV Push once  dextrose 50% Injectable 25 Gram(s) IV Push once  dextrose 50% Injectable 25 Gram(s) IV Push once  methylPREDNISolone sodium succinate Injectable 30 milliGRAM(s) IV Push daily  furosemide   Injectable 10 milliGRAM(s) IV Push daily  dextrose 5% + sodium chloride 0.9%. 1000 milliLiter(s) (30 mL/Hr) IV Continuous <Continuous>    MEDICATIONS  (PRN):  acetaminophen   Tablet 650 milliGRAM(s) Oral every 6 hours PRN For Temp greater than 38 C (100.4 F)  aluminum hydroxide/magnesium hydroxide/simethicone Suspension 30 milliLiter(s) Oral every 6 hours PRN Dyspepsia  ondansetron Injectable 4 milliGRAM(s) IV Push every 4 hours PRN Nausea and/or Vomiting  dextrose Gel 1 Dose(s) Oral once PRN Blood Glucose LESS THAN 70 milliGRAM(s)/deciLiter  glucagon  Injectable 1 milliGRAM(s) IntraMuscular once PRN Glucose <70 milliGRAM(s)/deciLiter  morphine  - Injectable 2 milliGRAM(s) IV Push every 8 hours PRN Severe Pain (7 - 10)      Vital Signs Last 24 Hrs  T(C): 36.4 (14 Aug 2017 07:45), Max: 37.1 (13 Aug 2017 23:23)  T(F): 97.6 (14 Aug 2017 07:45), Max: 98.7 (13 Aug 2017 23:23)  HR: 86 (14 Aug 2017 11:06) (86 - 103)  BP: 133/78 (14 Aug 2017 11:06) (133/78 - 148/94)  BP(mean): --  RR: 18 (14 Aug 2017 11:06) (18 - 19)  SpO2: 93% (14 Aug 2017 11:06) (91% - 96%)    Physical Exam:  Gen: NAD  Neurological:  No sensory/motor deficits  HEENT: PERRLA, EOMI  Respiratory: Breath Sounds equal & CTA bilaterally, no accessory muscle use  Cardiovascular: Regular rate & rhythm, normal S1, S2; no murmurs, gallops or rubs  Gastrointestinal: Softly distended, mildly tender  Vascular: Equal and normal pulses: 2+ peripheral pulses throughout  Musculoskeletal: No joint pain, swelling or deformity; no limitation of movement  Skin: No rashes      I&O's Detail    13 Aug 2017 07:01  -  14 Aug 2017 07:00  --------------------------------------------------------  IN:  Total IN: 0 mL    OUT:    Voided: 640 mL  Total OUT: 640 mL    Total NET: -640 mL          LABS:                        10.6   38.9  )-----------( 556      ( 14 Aug 2017 06:08 )             31.2     08-14    132<L>  |  95<L>  |  31.0<H>  ----------------------------<  137<H>  5.1   |  23.0  |  0.67    Ca    7.7<L>      14 Aug 2017 06:08    TPro  4.8<L>  /  Alb  1.8<L>  /  TBili  0.3<L>  /  DBili  x   /  AST  28  /  ALT  27  /  AlkPhos  107  08-14    PT/INR - ( 13 Aug 2017 07:39 )   PT: 17.0 sec;   INR: 1.53 ratio               RADIOLOGY & ADDITIONAL STUDIES:

## 2017-08-14 NOTE — PROGRESS NOTE ADULT - ASSESSMENT
56YO M Cachectic with chronic Pancreatitis  New diagnosis of Cirrhosis (Liver Spleen Scan Today)  ILEUS  NGT_Suction decopmpression  Acute on Chronic Pain  Fentanyl 25mcg/hr patch + Dilaudid 0.5mg  4 doses ATC>prn continue steroids

## 2017-08-15 ENCOUNTER — INPATIENT (INPATIENT)
Facility: HOSPITAL | Age: 57
LOS: 7 days | Discharge: HOME | DRG: 405 | End: 2017-08-23
Attending: HOSPITALIST | Admitting: HOSPITALIST
Payer: COMMERCIAL

## 2017-08-15 ENCOUNTER — TRANSCRIPTION ENCOUNTER (OUTPATIENT)
Age: 57
End: 2017-08-15

## 2017-08-15 VITALS
RESPIRATION RATE: 18 BRPM | OXYGEN SATURATION: 99 % | HEART RATE: 75 BPM | WEIGHT: 136.69 LBS | DIASTOLIC BLOOD PRESSURE: 72 MMHG | TEMPERATURE: 98 F | SYSTOLIC BLOOD PRESSURE: 128 MMHG | HEIGHT: 71 IN

## 2017-08-15 VITALS
TEMPERATURE: 98 F | DIASTOLIC BLOOD PRESSURE: 70 MMHG | SYSTOLIC BLOOD PRESSURE: 119 MMHG | HEART RATE: 82 BPM | RESPIRATION RATE: 19 BRPM

## 2017-08-15 DIAGNOSIS — Z98.890 OTHER SPECIFIED POSTPROCEDURAL STATES: Chronic | ICD-10-CM

## 2017-08-15 DIAGNOSIS — K86.81 EXOCRINE PANCREATIC INSUFFICIENCY: ICD-10-CM

## 2017-08-15 DIAGNOSIS — K65.2 SPONTANEOUS BACTERIAL PERITONITIS: ICD-10-CM

## 2017-08-15 DIAGNOSIS — K56.7 ILEUS, UNSPECIFIED: ICD-10-CM

## 2017-08-15 DIAGNOSIS — Z71.89 OTHER SPECIFIED COUNSELING: ICD-10-CM

## 2017-08-15 DIAGNOSIS — R18.8 OTHER ASCITES: ICD-10-CM

## 2017-08-15 DIAGNOSIS — K85.90 ACUTE PANCREATITIS WITHOUT NECROSIS OR INFECTION, UNSPECIFIED: ICD-10-CM

## 2017-08-15 DIAGNOSIS — M79.3 PANNICULITIS, UNSPECIFIED: ICD-10-CM

## 2017-08-15 DIAGNOSIS — I25.10 ATHEROSCLEROTIC HEART DISEASE OF NATIVE CORONARY ARTERY WITHOUT ANGINA PECTORIS: ICD-10-CM

## 2017-08-15 DIAGNOSIS — S72.302A UNSPECIFIED FRACTURE OF SHAFT OF LEFT FEMUR, INITIAL ENCOUNTER FOR CLOSED FRACTURE: Chronic | ICD-10-CM

## 2017-08-15 DIAGNOSIS — I81 PORTAL VEIN THROMBOSIS: ICD-10-CM

## 2017-08-15 DIAGNOSIS — Z94.5 SKIN TRANSPLANT STATUS: Chronic | ICD-10-CM

## 2017-08-15 LAB
ALBUMIN SERPL ELPH-MCNC: 2.2 G/DL — LOW (ref 3.3–5.2)
ALP SERPL-CCNC: 125 U/L — HIGH (ref 40–120)
ALT FLD-CCNC: 40 U/L — SIGNIFICANT CHANGE UP
AMYLASE P1 CFR SERPL: 2878 U/L — HIGH (ref 36–128)
ANION GAP SERPL CALC-SCNC: 12 MMOL/L — SIGNIFICANT CHANGE UP (ref 5–17)
ANISOCYTOSIS BLD QL: SLIGHT — SIGNIFICANT CHANGE UP
AST SERPL-CCNC: 51 U/L — HIGH
BILIRUB SERPL-MCNC: 0.3 MG/DL — LOW (ref 0.4–2)
BUN SERPL-MCNC: 24 MG/DL — HIGH (ref 8–20)
CALCIUM SERPL-MCNC: 8.1 MG/DL — LOW (ref 8.6–10.2)
CHLORIDE SERPL-SCNC: 97 MMOL/L — LOW (ref 98–107)
CO2 SERPL-SCNC: 25 MMOL/L — SIGNIFICANT CHANGE UP (ref 22–29)
CREAT SERPL-MCNC: 0.38 MG/DL — LOW (ref 0.5–1.3)
GLUCOSE SERPL-MCNC: 109 MG/DL — SIGNIFICANT CHANGE UP (ref 70–115)
HCT VFR BLD CALC: 33.2 % — LOW (ref 42–52)
HGB BLD-MCNC: 11.1 G/DL — LOW (ref 14–18)
HYPOCHROMIA BLD QL: SLIGHT — SIGNIFICANT CHANGE UP
INR BLD: 1.4 RATIO — HIGH (ref 0.88–1.16)
LIDOCAIN IGE QN: 1678 U/L — HIGH (ref 22–51)
LYMPHOCYTES # BLD AUTO: 1 % — LOW (ref 20–55)
MACROCYTES BLD QL: SLIGHT — SIGNIFICANT CHANGE UP
MCHC RBC-ENTMCNC: 33 PG — HIGH (ref 27–31)
MCHC RBC-ENTMCNC: 33.4 G/DL — SIGNIFICANT CHANGE UP (ref 32–36)
MCV RBC AUTO: 98.8 FL — HIGH (ref 80–94)
MICROCYTES BLD QL: SLIGHT — SIGNIFICANT CHANGE UP
MONOCYTES NFR BLD AUTO: 1 % — LOW (ref 3–10)
NEUTROPHILS NFR BLD AUTO: 98 % — HIGH (ref 37–73)
NON-GYN CYTOLOGY SPEC: SIGNIFICANT CHANGE UP
PLAT MORPH BLD: NORMAL — SIGNIFICANT CHANGE UP
PLATELET # BLD AUTO: 576 K/UL — HIGH (ref 150–400)
PLATELET CLUMP BLD QL SMEAR: SIGNIFICANT CHANGE UP
POIKILOCYTOSIS BLD QL AUTO: SLIGHT — SIGNIFICANT CHANGE UP
POTASSIUM SERPL-MCNC: 5 MMOL/L — SIGNIFICANT CHANGE UP (ref 3.5–5.3)
POTASSIUM SERPL-SCNC: 5 MMOL/L — SIGNIFICANT CHANGE UP (ref 3.5–5.3)
PROT SERPL-MCNC: 5.2 G/DL — LOW (ref 6.6–8.7)
PROTHROM AB SERPL-ACNC: 15.5 SEC — HIGH (ref 9.8–12.7)
RBC # BLD: 3.36 M/UL — LOW (ref 4.6–6.2)
RBC # FLD: 16 % — HIGH (ref 11–15.6)
RBC BLD AUTO: ABNORMAL
SODIUM SERPL-SCNC: 134 MMOL/L — LOW (ref 135–145)
WBC # BLD: 35.1 K/UL — HIGH (ref 4.8–10.8)
WBC # FLD AUTO: 35.1 K/UL — HIGH (ref 4.8–10.8)

## 2017-08-15 PROCEDURE — 84157 ASSAY OF PROTEIN OTHER: CPT

## 2017-08-15 PROCEDURE — 85652 RBC SED RATE AUTOMATED: CPT

## 2017-08-15 PROCEDURE — 86334 IMMUNOFIX E-PHORESIS SERUM: CPT

## 2017-08-15 PROCEDURE — 99239 HOSP IP/OBS DSCHRG MGMT >30: CPT

## 2017-08-15 PROCEDURE — 97116 GAIT TRAINING THERAPY: CPT

## 2017-08-15 PROCEDURE — 84480 ASSAY TRIIODOTHYRONINE (T3): CPT

## 2017-08-15 PROCEDURE — 73110 X-RAY EXAM OF WRIST: CPT

## 2017-08-15 PROCEDURE — 87205 SMEAR GRAM STAIN: CPT

## 2017-08-15 PROCEDURE — 86431 RHEUMATOID FACTOR QUANT: CPT

## 2017-08-15 PROCEDURE — 73130 X-RAY EXAM OF HAND: CPT

## 2017-08-15 PROCEDURE — 93970 EXTREMITY STUDY: CPT

## 2017-08-15 PROCEDURE — 82787 IGG 1 2 3 OR 4 EACH: CPT

## 2017-08-15 PROCEDURE — 82150 ASSAY OF AMYLASE: CPT

## 2017-08-15 PROCEDURE — 87075 CULTR BACTERIA EXCEPT BLOOD: CPT

## 2017-08-15 PROCEDURE — 99233 SBSQ HOSP IP/OBS HIGH 50: CPT

## 2017-08-15 PROCEDURE — 84550 ASSAY OF BLOOD/URIC ACID: CPT

## 2017-08-15 PROCEDURE — 86480 TB TEST CELL IMMUN MEASURE: CPT

## 2017-08-15 PROCEDURE — 87116 MYCOBACTERIA CULTURE: CPT

## 2017-08-15 PROCEDURE — 89051 BODY FLUID CELL COUNT: CPT

## 2017-08-15 PROCEDURE — 83735 ASSAY OF MAGNESIUM: CPT

## 2017-08-15 PROCEDURE — 85027 COMPLETE CBC AUTOMATED: CPT

## 2017-08-15 PROCEDURE — 86038 ANTINUCLEAR ANTIBODIES: CPT

## 2017-08-15 PROCEDURE — 88112 CYTOPATH CELL ENHANCE TECH: CPT

## 2017-08-15 PROCEDURE — 82164 ANGIOTENSIN I ENZYME TEST: CPT

## 2017-08-15 PROCEDURE — 97110 THERAPEUTIC EXERCISES: CPT

## 2017-08-15 PROCEDURE — 85610 PROTHROMBIN TIME: CPT

## 2017-08-15 PROCEDURE — 80048 BASIC METABOLIC PNL TOTAL CA: CPT

## 2017-08-15 PROCEDURE — 83690 ASSAY OF LIPASE: CPT

## 2017-08-15 PROCEDURE — 82550 ASSAY OF CK (CPK): CPT

## 2017-08-15 PROCEDURE — A9541: CPT

## 2017-08-15 PROCEDURE — 80076 HEPATIC FUNCTION PANEL: CPT

## 2017-08-15 PROCEDURE — 36415 COLL VENOUS BLD VENIPUNCTURE: CPT

## 2017-08-15 PROCEDURE — 87070 CULTURE OTHR SPECIMN AEROBIC: CPT

## 2017-08-15 PROCEDURE — 93975 VASCULAR STUDY: CPT

## 2017-08-15 PROCEDURE — 84443 ASSAY THYROID STIM HORMONE: CPT

## 2017-08-15 PROCEDURE — 81001 URINALYSIS AUTO W/SCOPE: CPT

## 2017-08-15 PROCEDURE — 84145 PROCALCITONIN (PCT): CPT

## 2017-08-15 PROCEDURE — 86160 COMPLEMENT ANTIGEN: CPT

## 2017-08-15 PROCEDURE — 76942 ECHO GUIDE FOR BIOPSY: CPT

## 2017-08-15 PROCEDURE — 74176 CT ABD & PELVIS W/O CONTRAST: CPT

## 2017-08-15 PROCEDURE — 83615 LACTATE (LD) (LDH) ENZYME: CPT

## 2017-08-15 PROCEDURE — 78215 LVR&SPLEEN IMG STATIC ONLY: CPT

## 2017-08-15 PROCEDURE — 78803 RP LOCLZJ TUM SPECT 1 AREA: CPT

## 2017-08-15 PROCEDURE — 87086 URINE CULTURE/COLONY COUNT: CPT

## 2017-08-15 PROCEDURE — 87102 FUNGUS ISOLATION CULTURE: CPT

## 2017-08-15 PROCEDURE — 83516 IMMUNOASSAY NONANTIBODY: CPT

## 2017-08-15 PROCEDURE — 82085 ASSAY OF ALDOLASE: CPT

## 2017-08-15 PROCEDURE — 93975 VASCULAR STUDY: CPT | Mod: 26

## 2017-08-15 PROCEDURE — 82945 GLUCOSE OTHER FLUID: CPT

## 2017-08-15 PROCEDURE — 87040 BLOOD CULTURE FOR BACTERIA: CPT

## 2017-08-15 PROCEDURE — 88305 TISSUE EXAM BY PATHOLOGIST: CPT

## 2017-08-15 PROCEDURE — 71045 X-RAY EXAM CHEST 1 VIEW: CPT

## 2017-08-15 PROCEDURE — 97163 PT EVAL HIGH COMPLEX 45 MIN: CPT

## 2017-08-15 PROCEDURE — 74020: CPT

## 2017-08-15 PROCEDURE — 74018 RADEX ABDOMEN 1 VIEW: CPT

## 2017-08-15 PROCEDURE — 87206 SMEAR FLUORESCENT/ACID STAI: CPT

## 2017-08-15 PROCEDURE — 99223 1ST HOSP IP/OBS HIGH 75: CPT | Mod: GC

## 2017-08-15 PROCEDURE — 86146 BETA-2 GLYCOPROTEIN ANTIBODY: CPT

## 2017-08-15 PROCEDURE — 93306 TTE W/DOPPLER COMPLETE: CPT

## 2017-08-15 PROCEDURE — 86200 CCP ANTIBODY: CPT

## 2017-08-15 PROCEDURE — 86225 DNA ANTIBODY NATIVE: CPT

## 2017-08-15 PROCEDURE — 85730 THROMBOPLASTIN TIME PARTIAL: CPT

## 2017-08-15 PROCEDURE — 82784 ASSAY IGA/IGD/IGG/IGM EACH: CPT

## 2017-08-15 PROCEDURE — 86036 ANCA SCREEN EACH ANTIBODY: CPT

## 2017-08-15 PROCEDURE — 96374 THER/PROPH/DIAG INJ IV PUSH: CPT

## 2017-08-15 PROCEDURE — 82042 OTHER SOURCE ALBUMIN QUAN EA: CPT

## 2017-08-15 PROCEDURE — 74000: CPT | Mod: 26

## 2017-08-15 PROCEDURE — 73630 X-RAY EXAM OF FOOT: CPT

## 2017-08-15 PROCEDURE — 83605 ASSAY OF LACTIC ACID: CPT

## 2017-08-15 PROCEDURE — 84436 ASSAY OF TOTAL THYROXINE: CPT

## 2017-08-15 PROCEDURE — 99497 ADVNCD CARE PLAN 30 MIN: CPT | Mod: 25,GC

## 2017-08-15 PROCEDURE — 86147 CARDIOLIPIN ANTIBODY EA IG: CPT

## 2017-08-15 PROCEDURE — 73700 CT LOWER EXTREMITY W/O DYE: CPT

## 2017-08-15 PROCEDURE — 86140 C-REACTIVE PROTEIN: CPT

## 2017-08-15 PROCEDURE — 83880 ASSAY OF NATRIURETIC PEPTIDE: CPT

## 2017-08-15 PROCEDURE — 80074 ACUTE HEPATITIS PANEL: CPT

## 2017-08-15 PROCEDURE — 74182 MRI ABDOMEN W/CONTRAST: CPT

## 2017-08-15 PROCEDURE — 80053 COMPREHEN METABOLIC PANEL: CPT

## 2017-08-15 PROCEDURE — C1729: CPT

## 2017-08-15 PROCEDURE — 87015 SPECIMEN INFECT AGNT CONCNTJ: CPT

## 2017-08-15 PROCEDURE — 86235 NUCLEAR ANTIGEN ANTIBODY: CPT

## 2017-08-15 PROCEDURE — 99285 EMERGENCY DEPT VISIT HI MDM: CPT | Mod: 25

## 2017-08-15 RX ORDER — SPIRONOLACTONE 25 MG/1
4 TABLET, FILM COATED ORAL
Qty: 0 | Refills: 0 | COMMUNITY
Start: 2017-08-15

## 2017-08-15 RX ORDER — HYDROMORPHONE HYDROCHLORIDE 2 MG/ML
0.5 INJECTION INTRAMUSCULAR; INTRAVENOUS; SUBCUTANEOUS EVERY 6 HOURS
Qty: 0 | Refills: 0 | Status: DISCONTINUED | OUTPATIENT
Start: 2017-08-15 | End: 2017-08-21

## 2017-08-15 RX ORDER — SACCHAROMYCES BOULARDII 250 MG
2 POWDER IN PACKET (EA) ORAL
Qty: 0 | Refills: 0 | COMMUNITY
Start: 2017-08-15

## 2017-08-15 RX ORDER — DOCUSATE SODIUM 100 MG
1 CAPSULE ORAL
Qty: 0 | Refills: 0 | COMMUNITY
Start: 2017-08-15

## 2017-08-15 RX ORDER — MEROPENEM 1 G/30ML
1000 INJECTION INTRAVENOUS
Qty: 0 | Refills: 0 | COMMUNITY
Start: 2017-08-15

## 2017-08-15 RX ORDER — ENOXAPARIN SODIUM 100 MG/ML
60 INJECTION SUBCUTANEOUS EVERY 12 HOURS
Qty: 0 | Refills: 0 | Status: DISCONTINUED | OUTPATIENT
Start: 2017-08-15 | End: 2017-08-16

## 2017-08-15 RX ORDER — GABAPENTIN 400 MG/1
1 CAPSULE ORAL
Qty: 0 | Refills: 0 | COMMUNITY
Start: 2017-08-15

## 2017-08-15 RX ORDER — ENOXAPARIN SODIUM 100 MG/ML
60 INJECTION SUBCUTANEOUS
Qty: 0 | Refills: 0 | COMMUNITY
Start: 2017-08-15

## 2017-08-15 RX ORDER — FUROSEMIDE 40 MG
40 TABLET ORAL
Qty: 0 | Refills: 0 | COMMUNITY
Start: 2017-08-15

## 2017-08-15 RX ORDER — AMLODIPINE BESYLATE 2.5 MG/1
0.5 TABLET ORAL
Qty: 0 | Refills: 0 | COMMUNITY

## 2017-08-15 RX ORDER — FUROSEMIDE 40 MG
40 TABLET ORAL DAILY
Qty: 0 | Refills: 0 | Status: DISCONTINUED | OUTPATIENT
Start: 2017-08-15 | End: 2017-08-15

## 2017-08-15 RX ORDER — POLYETHYLENE GLYCOL 3350 17 G/17G
17 POWDER, FOR SOLUTION ORAL
Qty: 0 | Refills: 0 | COMMUNITY
Start: 2017-08-15

## 2017-08-15 RX ORDER — LACTULOSE 10 G/15ML
22.5 SOLUTION ORAL
Qty: 0 | Refills: 0 | COMMUNITY
Start: 2017-08-15

## 2017-08-15 RX ORDER — PSYLLIUM SEED (WITH DEXTROSE)
1 POWDER (GRAM) ORAL
Qty: 0 | Refills: 0 | COMMUNITY
Start: 2017-08-15

## 2017-08-15 RX ORDER — FUROSEMIDE 40 MG
40 TABLET ORAL DAILY
Qty: 0 | Refills: 0 | Status: DISCONTINUED | OUTPATIENT
Start: 2017-08-16 | End: 2017-08-18

## 2017-08-15 RX ORDER — AMLODIPINE BESYLATE 2.5 MG/1
1 TABLET ORAL
Qty: 0 | Refills: 0 | COMMUNITY
Start: 2017-08-15

## 2017-08-15 RX ORDER — FENTANYL CITRATE 50 UG/ML
1 INJECTION INTRAVENOUS
Qty: 0 | Refills: 0 | COMMUNITY
Start: 2017-08-15

## 2017-08-15 RX ORDER — IBUPROFEN 200 MG
1 TABLET ORAL
Qty: 0 | Refills: 0 | COMMUNITY

## 2017-08-15 RX ORDER — HYDROMORPHONE HYDROCHLORIDE 2 MG/ML
0.5 INJECTION INTRAMUSCULAR; INTRAVENOUS; SUBCUTANEOUS
Qty: 0 | Refills: 0 | COMMUNITY
Start: 2017-08-15

## 2017-08-15 RX ORDER — MIRTAZAPINE 45 MG/1
1 TABLET, ORALLY DISINTEGRATING ORAL
Qty: 0 | Refills: 0 | COMMUNITY
Start: 2017-08-15

## 2017-08-15 RX ORDER — NICOTINE POLACRILEX 2 MG
1 GUM BUCCAL DAILY
Qty: 0 | Refills: 0 | Status: DISCONTINUED | OUTPATIENT
Start: 2017-08-15 | End: 2017-08-23

## 2017-08-15 RX ORDER — PANTOPRAZOLE SODIUM 20 MG/1
40 TABLET, DELAYED RELEASE ORAL DAILY
Qty: 0 | Refills: 0 | Status: DISCONTINUED | OUTPATIENT
Start: 2017-08-15 | End: 2017-08-21

## 2017-08-15 RX ORDER — MEROPENEM 1 G/30ML
1000 INJECTION INTRAVENOUS EVERY 8 HOURS
Qty: 0 | Refills: 0 | Status: DISCONTINUED | OUTPATIENT
Start: 2017-08-15 | End: 2017-08-18

## 2017-08-15 RX ORDER — HYDROCORTISONE 1 %
1 OINTMENT (GRAM) TOPICAL
Qty: 0 | Refills: 0 | COMMUNITY
Start: 2017-08-15

## 2017-08-15 RX ORDER — HYDROMORPHONE HYDROCHLORIDE 2 MG/ML
0.5 INJECTION INTRAMUSCULAR; INTRAVENOUS; SUBCUTANEOUS EVERY 4 HOURS
Qty: 0 | Refills: 0 | Status: DISCONTINUED | OUTPATIENT
Start: 2017-08-15 | End: 2017-08-21

## 2017-08-15 RX ORDER — FENTANYL CITRATE 50 UG/ML
1 INJECTION INTRAVENOUS
Qty: 0 | Refills: 0 | Status: DISCONTINUED | OUTPATIENT
Start: 2017-08-15 | End: 2017-08-15

## 2017-08-15 RX ORDER — ONDANSETRON 8 MG/1
4 TABLET, FILM COATED ORAL
Qty: 0 | Refills: 0 | COMMUNITY
Start: 2017-08-15

## 2017-08-15 RX ORDER — FENTANYL CITRATE 50 UG/ML
1 INJECTION INTRAVENOUS
Qty: 0 | Refills: 0 | Status: DISCONTINUED | OUTPATIENT
Start: 2017-08-15 | End: 2017-08-20

## 2017-08-15 RX ORDER — NICOTINE POLACRILEX 2 MG
1 GUM BUCCAL
Qty: 0 | Refills: 0 | COMMUNITY
Start: 2017-08-15

## 2017-08-15 RX ORDER — PANTOPRAZOLE SODIUM 20 MG/1
1 TABLET, DELAYED RELEASE ORAL
Qty: 0 | Refills: 0 | COMMUNITY
Start: 2017-08-15

## 2017-08-15 RX ADMIN — MEROPENEM 200 MILLIGRAM(S): 1 INJECTION INTRAVENOUS at 21:36

## 2017-08-15 RX ADMIN — HYDROMORPHONE HYDROCHLORIDE 0.5 MILLIGRAM(S): 2 INJECTION INTRAMUSCULAR; INTRAVENOUS; SUBCUTANEOUS at 22:35

## 2017-08-15 RX ADMIN — HYDROMORPHONE HYDROCHLORIDE 0.5 MILLIGRAM(S): 2 INJECTION INTRAMUSCULAR; INTRAVENOUS; SUBCUTANEOUS at 05:31

## 2017-08-15 RX ADMIN — MEROPENEM 200 MILLIGRAM(S): 1 INJECTION INTRAVENOUS at 05:30

## 2017-08-15 RX ADMIN — ENOXAPARIN SODIUM 60 MILLIGRAM(S): 100 INJECTION SUBCUTANEOUS at 17:50

## 2017-08-15 RX ADMIN — ENOXAPARIN SODIUM 60 MILLIGRAM(S): 100 INJECTION SUBCUTANEOUS at 05:30

## 2017-08-15 RX ADMIN — MEROPENEM 200 MILLIGRAM(S): 1 INJECTION INTRAVENOUS at 14:21

## 2017-08-15 RX ADMIN — Medication 40 MILLIGRAM(S): at 05:30

## 2017-08-15 RX ADMIN — Medication 30 MILLIGRAM(S): at 05:30

## 2017-08-15 RX ADMIN — Medication 1 PATCH: at 12:43

## 2017-08-15 RX ADMIN — HYDROMORPHONE HYDROCHLORIDE 0.5 MILLIGRAM(S): 2 INJECTION INTRAMUSCULAR; INTRAVENOUS; SUBCUTANEOUS at 21:32

## 2017-08-15 RX ADMIN — PANTOPRAZOLE SODIUM 40 MILLIGRAM(S): 20 TABLET, DELAYED RELEASE ORAL at 21:36

## 2017-08-15 NOTE — PROGRESS NOTE ADULT - NSHPATTENDINGPLANDISCUSS_GEN_ALL_CORE
Dr. Orlando and Sister (HCP)
Dr. Orlando
patient 's sister
patient ,
ravin RN
patient 's sister
Patient
Patient
Patient, Dr. Huey Joya
Patient
staff
staff

## 2017-08-15 NOTE — DISCHARGE NOTE ADULT - HOSPITAL COURSE
57 M from home with PMHx HTN and chronic alcohol induced pancreatitis admitted with b/l pedal cellulitis/abcess on the dorsal PIPJ of the fourth digit, for which poditatry was consulted s/p I&D healing well. ID consulted and s/p abx therapy. Pt noted to have generalized body rash with althralgias which was found to be due to panniculitis likely secondary to pancreatitis, dermatology/rheumatology consulted and the pt is on a prolonged steroid taper. Course complicated by suspected SBP, recurrent ascites placed on lasix and aldactone with minimal improvement and had paracentesis IR guided 8/11/17 which revealed elevated cells but gram stain was negative for bacteria, given worsening leukocytosis and high risk for bacterial translocation pt empirically being covered with meropenem as per ID. Repeat imaging with MRI abdomen showed no change in prior pseudocysts but did show a new complication of portal vein thrombosis and the pt was started on full dose lovenox.  Course complicated by the pt developing an illeus/worsening SBO, NGT placed for decompression and sx was consulted. No surgical intervention indicated at this time. Progressive worsening of the patients SBO. Pt is cachetic with temporal wasting and has had extensive complications from his pancreatitis. GI consulted Dr. Yates in [facility] who is willing to accept the patient for further management and care which includes MRCP w/ secretin and possible ERCP with stent placement for symptomatic relief. During the past few days palliative care was following the patient for pain secondary to his pancreatitis and d/w the patient and his sister Mahnaz (RN) via phone [phone] have been initiated for goals of care/quality of life. Pt is DNR/DNI and was being evaluated by hospice for ongoing discussions in regards to his quality of life. Some of his medications have been held secondary to being NPO and adjusted to IV form.     Discharge planning took 45 minutes

## 2017-08-15 NOTE — H&P ADULT - NSHPSOCIALHISTORY_GEN_ALL_CORE
Social Hx: current smoker, 1PPD for 40 years. Former ETOH abuse 6 pack of beer daily for 30 years. Quit 2 years ago. Drug abuse in 70's, but no current drugs of abuse.

## 2017-08-15 NOTE — PROGRESS NOTE ADULT - PROBLEM SELECTOR PROBLEM 1
Ileus
SBO (small bowel obstruction)
SBO (small bowel obstruction)
Abdominal distention
Alcohol-induced acute pancreatitis, unspecified complication status
Alcohol-induced chronic pancreatitis
Arthralgia of multiple joints
Ascites due to alcoholic cirrhosis
Pancreatitis
Pseudocyst, pancreas
SBO (small bowel obstruction)
Alcohol-induced chronic pancreatitis
Ascites
Ileus
Pancreatitis
SBP (spontaneous bacterial peritonitis)
Chronic pancreatitis, unspecified pancreatitis type

## 2017-08-15 NOTE — PROGRESS NOTE ADULT - PROBLEM SELECTOR PROBLEM 4
Panniculitis
Alcohol-induced chronic pancreatitis
Ascites due to alcoholic cirrhosis
Portal vein thrombosis
Ascites
Ascites
Pauciarticular arthritis
Ileus

## 2017-08-15 NOTE — H&P ADULT - NSHPLABSRESULTS_GEN_ALL_CORE
LABS   CBC                       11.1   35.1  )-----------( 576      ( 15 Aug 2017 07:24 )             33.2     CMP 08-15    134<L>  |  97<L>  |  24.0<H>  ----------------------------<  109  5.0   |  25.0  |  0.38<L>    Ca    8.1<L>      15 Aug 2017 07:24    TPro  5.2<L>  /  Alb  2.2<L>  /  TBili  0.3<L>  /  DBili  x   /  AST  51<H>  /  ALT  40  /  AlkPhos  125<H>  08-15    PT/INR - ( 15 Aug 2017 07:24 )   PT: 15.5 sec;   INR: 1.40 ratio             Radiology:   < from: MRI Abdomen w/Cont (08.11.17 @ 12:01) >    IMPRESSION: Large volume ascites. Chronic pancreatitis, grossly stable.   Peripancreatic pseudocysts mildly decreased in size.    Right intrahepatic portal vein thrombosis, likely subacute/acute and new   from 5/2017.    < end of copied text >  < from: Xray Abdomen Three Position (08.14.17 @ 13:04) >    Impression:  Small bowel obstruction with increasing small bowel dilatation compared   with 8/9/2017..    < end of copied text >    < from: Xray Kidney Ureter Bladder (08.15.17 @ 05:38) >    IMPRESSION:    No bowel obstruction..    < end of copied text >  < from: US Doppler Portal/Hepatic Vein (08.15.17 @ 12:28) >    IMPRESSION:    1.  Linear echoes within the posterior branch of the right portal vein,   possibly sequela of prior portal vein thrombus. No acute or obstructive   thrombus.  2.  Multiple complex lesions in the region of the pancreatic head, better   characterized on MRI from August 11, 2017.    < end of copied text > LABS   CBC                       11.1   35.1  )-----------( 576      ( 15 Aug 2017 07:24 )             33.2     CMP 08-15    134<L>  |  97<L>  |  24.0<H>  ----------------------------<  109  5.0   |  25.0  |  0.38<L>    Ca    8.1<L>      15 Aug 2017 07:24    TPro  5.2<L>  /  Alb  2.2<L>  /  TBili  0.3<L>  /  DBili  x   /  AST  51<H>  /  ALT  40  /  AlkPhos  125<H>  08-15    PT/INR - ( 15 Aug 2017 07:24 )   PT: 15.5 sec;   INR: 1.40 ratio             Radiology:   < from: MRI Abdomen w/Cont (08.11.17 @ 12:01) >    IMPRESSION: Large volume ascites. Chronic pancreatitis, grossly stable.   Peripancreatic pseudocysts mildly decreased in size.    Right intrahepatic portal vein thrombosis, likely subacute/acute and new   from 5/2017.    < end of copied text >  < from: Xray Abdomen Three Position (08.14.17 @ 13:04) >    Impression:  Small bowel obstruction with increasing small bowel dilatation compared   with 8/9/2017..    < end of copied text >    < from: Xray Kidney Ureter Bladder (08.15.17 @ 05:38) >    IMPRESSION:    No bowel obstruction..    < end of copied text >  < from: US Doppler Portal/Hepatic Vein (08.15.17 @ 12:28) >    IMPRESSION:    1.  Linear echoes within the posterior branch of the right portal vein,   possibly sequela of prior portal vein thrombus. No acute or obstructive   thrombus.  2.  Multiple complex lesions in the region of the pancreatic head, better   characterized on MRI from August 11, 2017.    < end of copied text >    EKG personally reviewed : LUIS ENRIQUE

## 2017-08-15 NOTE — H&P ADULT - PROBLEM SELECTOR PLAN 1
amylase and lipase slightly improved from yesterday. Imaging showing multiple stable pancreatic cysts. Consult advanced endoscopy for possible ERCP guided stent placement and drainage of cysts. Will start patient on moderate fluids, but will be careful given ascites.  Unclear as to what caused acute exacerbation during initial hospitalization as pt denies drinking. Will further investigate.

## 2017-08-15 NOTE — PROGRESS NOTE ADULT - PROBLEM SELECTOR PLAN 1
-Pt with less distended abdomen, is passing flatus, no bm.   -c/w bowel rest   -repeat abd x ray shows progressive SBO  and repeat ordered for today   - GI f/u noted and appreciated   -Given multiple complications and complex diagnosis GI d/w [facility] and pt to be transferred to Dr. Yates GI but under hospitalist service.   -Surgery f/u noted and appreciated

## 2017-08-15 NOTE — H&P ADULT - PROBLEM SELECTOR PLAN 5
MRI showed concern for acute/subacute portal vein thrombosis and patient started on therapeutic lovenox. Abd doppler from today did not show thrombus, but sequela from prior thrombus. Will continue lovenox.

## 2017-08-15 NOTE — PROGRESS NOTE ADULT - ATTENDING COMMENTS
COUNSELING:    Face to face meeting to discuss Advanced Care Planning - Time Spent ______ Minutes.    Met with sister (HCP)at bedside, then left with Zo Almeida RN and Dr. Orlando and Sister for goals of care conversation  May consider transfer to [facility]  More than 50% time spent in counseling and coordinating care. __50____ Minutes.     Thank you for the opportunity to assist with the care of this patient.   [facility] Consult [facility] [phone].
spoke at length to mother at bedside palliative requested
spoke at length to Mahnaz patient's sister who is an ED RN and she understands his poor prognosis - palliative requested
Patient' s prognosis is guarded, high risk for deterioration
Patient' s prognosis is guarded, high risk for deterioration. D/w the sister Mahnaz and the palliative team and will get hospice services involved.
Patient' s prognosis is guarded, high risk for deterioration. Palliative team and the hospice team continue to follow the patient. D/w the sister Mahnaz and pt to be transferred to [facility] for possible MRCP/ERCP accepting physician is Dr. Yates as per GI Dr. Arzate.
Patient' s prognosis is guarded

## 2017-08-15 NOTE — H&P ADULT - PROBLEM SELECTOR PLAN 2
pt state he passes gas, but has not had bowel movement in 5 days. He has been NPO with NGT to suction for 2 days. NGT on suction and continuing to drain bile. Will limit opioid use. Will place patient on maintenance fluids. Abdomen non tender. Consider surgery consult for further management. Nutrition consult.

## 2017-08-15 NOTE — PROGRESS NOTE ADULT - PROBLEM SELECTOR PLAN 3
- c/w full dose lovenox 60mg sq q12hrs
Likely worsened by IV steroids. Rheumatology re-evaluation for steroid tapering which would  hopefully lessen pt's fluid retention and ascites.
Same Lovenox every 12 hours for now. Liver/spleen scan ordered for tomorrow to r/o alcoholic cirrhosis not evident on recent imaging. Repeat CMP ordered for tomorrow AM.
-c/w steroid taper, unclear as per prior rheumatology notes that correct steroid taper is being followed. Prior hospitalist note is unclear.  -rheumatology f/u requested for further information in regards to steroid taper which may help with ascites
CHR
continue abx and await labs
Steroids-slowly tapering  intermittent Toradol

## 2017-08-15 NOTE — H&P ADULT - PSH
Fracture of shaft of left femur  1971 compound fracrture left femur  1980 left femur bone spur removed  History of inguinal hernia repair    S/P skin graft using Integra  right calf skin graft

## 2017-08-15 NOTE — PROGRESS NOTE ADULT - PROBLEM SELECTOR PLAN 1
-monitor NGT output  -monitor for bowel function  -not a surgical candidate  -serial abdominal exam  -continue NPO, IVF  -continue IV antibiotics

## 2017-08-15 NOTE — PROGRESS NOTE ADULT - PROBLEM SELECTOR PROBLEM 6
SBP (spontaneous bacterial peritonitis)
Ileus

## 2017-08-15 NOTE — PROGRESS NOTE ADULT - SUBJECTIVE AND OBJECTIVE BOX
Patient is a 57y old  Male who presents with a chief complaint of Foot swellign and pain (15 Aug 2017 12:01)      HEALTH ISSUES - PROBLEM Dx:  SBO (small bowel obstruction): SBO (small bowel obstruction)  Pain from pancreas: Pain from pancreas  Chronic pancreatitis, unspecified pancreatitis type: Chronic pancreatitis, unspecified pancreatitis type  Abdominal distention: Abdominal distention  Prophylactic measure: Prophylactic measure  Depression: Depression  Ileus: Ileus  Portal vein thrombosis: Portal vein thrombosis  Other ascites: Other ascites  Constipation by delayed colonic transit: Constipation by delayed colonic transit  Anxiety and depression: Anxiety and depression  Pancreatic ascites: Pancreatic ascites  Ascites due to alcoholic cirrhosis: Ascites due to alcoholic cirrhosis  Pseudocyst, pancreas: Pseudocyst, pancreas  Alcohol-induced acute pancreatitis, unspecified complication status: Alcohol-induced acute pancreatitis, unspecified complication status  SBP (spontaneous bacterial peritonitis): SBP (spontaneous bacterial peritonitis)  Ascites: Ascites  Cellulitis of leg, left: Cellulitis of leg, left  Pancreatitis: Pancreatitis  Pauciarticular arthritis: Pauciarticular arthritis  Panniculitis: Panniculitis  Alcohol-induced chronic pancreatitis: Alcohol-induced chronic pancreatitis  Arthralgia of multiple joints: Arthralgia of multiple joints  Smoker: Smoker  Essential hypertension: Essential hypertension  Abscess or cellulitis of foot: Abscess or cellulitis of foot          INTERVAL HPI/OVERNIGHT EVENTS:  Patient seen and examined at bedside. No acute events overnight. Patient remains NPO and is passing flatus and has not passed a bm. He understands the extent of his illness and his plan of care and is aware in regards to the transfer and is in agreement. D/w his sister Mahnaz as well who agrees with the plan of care. Patient denies chest pain, SOB, N/V, fever, chills, dysuria or any other complaints. All remainder ROS negative.     Vital Signs Last 24 Hrs  T(C): 36.8 (15 Aug 2017 08:03), Max: 37 (15 Aug 2017 00:43)  T(F): 98.2 (15 Aug 2017 08:03), Max: 98.6 (15 Aug 2017 00:43)  HR: 82 (15 Aug 2017 08:03) (82 - 90)  BP: 119/70 (15 Aug 2017 08:03) (113/64 - 136/81)  BP(mean): --  RR: 19 (15 Aug 2017 08:03) (18 - 19)  SpO2: 96% (15 Aug 2017 05:24) (87% - 96%)    CAPILLARY BLOOD GLUCOSE  123 (15 Aug 2017 11:05)  115 (15 Aug 2017 06:20)  123 (15 Aug 2017 00:02)  135 (14 Aug 2017 18:45)  152 (14 Aug 2017 13:43)      I&O's Summary    14 Aug 2017 07:01  -  15 Aug 2017 07:00  --------------------------------------------------------  IN: 695 mL / OUT: 400 mL / NET: 295 mL    15 Aug 2017 07:01  -  15 Aug 2017 12:28  --------------------------------------------------------  IN: 0 mL / OUT: 900 mL / NET: -900 mL        CONSTITUTIONAL: Cachectic, ill appearing, awake, alert and in no apparent distress  HEENT: NGT in place to suction, temporal wasting   CARDIAC: Normal rate, regular rhythm.  Heart sounds S1, S2.  No murmurs, rubs or gallops   RESPIRATORY: Breath sounds clear and equal bilaterally. No wheezes, rhales or rhonchi  GASTROENTEROLOGY: Soft, less distended, mild diffuse tenderness, hypoactive BS, typanic   EXTREMITIES: Peripheral edema +3, no cyanosis or deformity   NEUROLOGICAL: Alert and oriented, no focal deficits, no motor or sensory deficits.  SKIN: R 4th digit s/p abscess drainage healing well     MEDICATIONS  (STANDING):  nicotine - 21 mG/24Hr(s) Patch 1 patch Transdermal daily  amLODIPine   Tablet 5 milliGRAM(s) Oral daily  gabapentin 300 milliGRAM(s) Oral every 8 hours  pantoprazole    Tablet 40 milliGRAM(s) Oral before breakfast  psyllium Powder 1 Packet(s) Oral two times a day  hydrocortisone 2.5% Rectal Cream 1 Application(s) Rectal two times a day  spironolactone 100 milliGRAM(s) Oral daily  docusate sodium 100 milliGRAM(s) Oral two times a day  meropenem IVPB 1000 milliGRAM(s) IV Intermittent every 8 hours  enoxaparin Injectable 60 milliGRAM(s) SubCutaneous every 12 hours  mirtazapine 7.5 milliGRAM(s) Oral at bedtime  polyethylene glycol 3350 17 Gram(s) Oral at bedtime  saccharomyces boulardii 500 milliGRAM(s) Oral two times a day  lactulose Syrup 15 Gram(s) Oral three times a day  dextrose 5%. 1000 milliLiter(s) (50 mL/Hr) IV Continuous <Continuous>  dextrose 50% Injectable 12.5 Gram(s) IV Push once  dextrose 50% Injectable 25 Gram(s) IV Push once  dextrose 50% Injectable 25 Gram(s) IV Push once  methylPREDNISolone sodium succinate Injectable 30 milliGRAM(s) IV Push daily  dextrose 5% + sodium chloride 0.9%. 1000 milliLiter(s) (30 mL/Hr) IV Continuous <Continuous>  fentaNYL   Patch  25 MICROgram(s)/Hr 1 Patch Transdermal every 72 hours  furosemide   Injectable 40 milliGRAM(s) IV Push daily    MEDICATIONS  (PRN):  acetaminophen   Tablet 650 milliGRAM(s) Oral every 6 hours PRN For Temp greater than 38 C (100.4 F)  aluminum hydroxide/magnesium hydroxide/simethicone Suspension 30 milliLiter(s) Oral every 6 hours PRN Dyspepsia  ondansetron Injectable 4 milliGRAM(s) IV Push every 4 hours PRN Nausea and/or Vomiting  dextrose Gel 1 Dose(s) Oral once PRN Blood Glucose LESS THAN 70 milliGRAM(s)/deciLiter  glucagon  Injectable 1 milliGRAM(s) IntraMuscular once PRN Glucose <70 milliGRAM(s)/deciLiter  HYDROmorphone  Injectable 0.5 milliGRAM(s) IV Push every 6 hours PRN Moderate Pain (4 - 6)  HYDROmorphone  Injectable 0.5 milliGRAM(s) IV Push every 4 hours PRN breakthough pain      LABS:                        11.1   35.1  )-----------( 576      ( 15 Aug 2017 07:24 )             33.2     08-15    134<L>  |  97<L>  |  24.0<H>  ----------------------------<  109  5.0   |  25.0  |  0.38<L>    Ca    8.1<L>      15 Aug 2017 07:24    TPro  5.2<L>  /  Alb  2.2<L>  /  TBili  0.3<L>  /  DBili  x   /  AST  51<H>  /  ALT  40  /  AlkPhos  125<H>  08-15    PT/INR - ( 15 Aug 2017 07:24 )   PT: 15.5 sec;   INR: 1.40 ratio             LIVER FUNCTIONS - ( 15 Aug 2017 07:24 )  Alb: 2.2 g/dL / Pro: 5.2 g/dL / ALK PHOS: 125 U/L / ALT: 40 U/L / AST: 51 U/L / GGT: x             RADIOLOGY & ADDITIONAL TESTS:  No new imaging studies

## 2017-08-15 NOTE — H&P ADULT - ASSESSMENT
56 yo M hx chronic pancreatitis, HTN, recent hernia repeair initially presented to OSH for joint pains found to have acute on chronic pancreatitis and panniculitis now improving on steroids, course complicated by pancreatic ascites with SBP, portal vein thrombosis and Ileus, transferred to [facility] for further care and possible ERCP.

## 2017-08-15 NOTE — PROGRESS NOTE ADULT - PROBLEM SELECTOR PLAN 10
DVT ppx  covered with full dose lovenox  GI ppx while on steroids
DVT ppx  covered with full dose lovenox  GI ppx while on steroids    Advanced directives DNR DNI
DVT ppx  covered with full dose lovenox  GI ppx while on steroids    Advanced directives DNR DNI
DVT ppx  covered with full dose lovenox  GI ppx while on steroids

## 2017-08-15 NOTE — DISCHARGE NOTE ADULT - PATIENT PORTAL LINK FT
“You can access the [employer] Patient Portal, offered by [facility], by registering with the following website: [email or web address]”

## 2017-08-15 NOTE — H&P ADULT - NSHPPHYSICALEXAM_GEN_ALL_CORE
Vital Signs Last 24 Hrs  T(C): 36.4 (15 Aug 2017 21:02), Max: 37 (15 Aug 2017 00:43)  T(F): 97.5 (15 Aug 2017 21:02), Max: 98.6 (15 Aug 2017 00:43)  HR: 78 (15 Aug 2017 21:02) (75 - 90)  BP: 127/68 (15 Aug 2017 21:02) (113/64 - 128/72)  BP(mean): --  RR: 18 (15 Aug 2017 21:02) (18 - 19)  SpO2: 90% (15 Aug 2017 21:02) (90% - 99%)    PHYSICAL EXAM:    Constitutional: Cachectic, frail. AAOx3.   Eyes: PERRL; EOMI  ENMT: Normal oropharynx, no oral lesions, no erythema, no exudates  Neck:  Supple; no JVD; normal thyroid gland  MSK/Back: normal shape; ROM intact; strength intact, no CVA tenderness. Normal strength in all ext, No joint swelling, no joint tenderness, no joint erythema, no effusions  Respiratory: airway patent; breath sounds equal; good air movement, no wheezing, no crackles, no rhonchi. no increase in WOB  Cardiovascular: regular rate and rhythm  no rub , no murmur, no gallops.   Gastrointestinal: abdominal distention with fluid wave indicative of ascites. No tenderness to palpation. +BS.   Extremities: b/l peripheral edema 2+ pitting to knee. Nodules on DIPs of both hand and MCP's of feet. mild tenderness on erythema.   Neurological: alert and oriented x 3; responds to pain; responds to verbal commands; sensation intact: CN nerves grossly intact.   Skin: warm and dry; color normal: subcutaneous nodules on lower extremities.   Psychiatric: Calm, no SI/HI

## 2017-08-15 NOTE — PROGRESS NOTE ADULT - PROBLEM SELECTOR PLAN 7
-bp stable  -c/w norvasc 5 mg po qd
-bp stable if need will give IV meds   -holding norvasc 5 mg po qd on hold for now give npo status
-bp stable off of meds   -holding norvasc 5 mg po qd on hold for now give npo status
-bp stable  -c/w norvasc 5 mg po qd

## 2017-08-15 NOTE — H&P ADULT - NSHPREVIEWOFSYSTEMS_GEN_ALL_CORE
REVIEW OF SYSTEMS:    CONSTITUTIONAL: +WEAKNESS No fevers or chills  EYES/ENT: No visual changes;  No vertigo or throat pain   NECK: No pain or stiffness  RESPIRATORY: No cough, wheezing, hemoptysis; No shortness of breath  CARDIOVASCULAR: No chest pain or palpitations  GASTROINTESTINAL: See HPI  GENITOURINARY: No dysuria, frequency or hematuria  NEUROLOGICAL: No numbness or weakness  SKIN: See HPI  All other review of systems is negative unless indicated above.

## 2017-08-15 NOTE — PROGRESS NOTE ADULT - PROVIDER SPECIALTY LIST ADULT
Colorectal Surgery
Dermatology
Gastroenterology
Hospitalist
Infectious Disease
Internal Medicine
Pain Medicine
Podiatry
Rheumatology
Trauma Surgery
Trauma Surgery
Palliative Care
Gastroenterology
Infectious Disease

## 2017-08-15 NOTE — DISCHARGE NOTE ADULT - CARE PROVIDERS DIRECT ADDRESSES
[email or web address]

## 2017-08-15 NOTE — H&P ADULT - PROBLEM SELECTOR PLAN 6
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 Lesions have been improving with steroids. Currently on methylprednisone 30mg daily. Will continue to taper by 10 weekly.

## 2017-08-15 NOTE — DISCHARGE NOTE ADULT - MEDICATION SUMMARY - MEDICATIONS TO STOP TAKING
I will STOP taking the medications listed below when I get home from the hospital:    Motrin  mg oral tablet  -- 1 tab(s) by mouth every 6 hours

## 2017-08-15 NOTE — CHART NOTE - NSCHARTNOTEFT_GEN_A_CORE
Patient is a 57y old  Male who presents with a chief complaint of transfer from [place] for GI workup and poss ERCP      HPI:  57M hx chronic pancreatitis (dx 2002), ETOH abuse (last drink 2 years ago), HTN, inguinal hernia repair initially presented to [facility] for worsening foot pain refractory to antibiotics. Patient was found to have acute on chronic pancreatitis. 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 Patient has been started on steroid taper with improvement of lesions and pain. Patient has had multiple complications throughout his course. He was found to have worsening abdominal ascites, which was tapped. SAAG <1.2 and amylase greatly elevated indicating likely pancreatic ascites. Pt started on aldactone and lasix Nucleated cells were 1500 with 90% PMN's indicative of SBP. Patient initially started on INVANZ, but had worsening leukocytosis and was started on Meropenem. Ascites cultures have been negative.  Patient amylase/lipase was initially down trending with fluids, but then came back up. MRI showed stable pancreatic pseudocysts, but found acute/subacute portal vein thrombosis. Patient started on therapeutic lovenox. Pt has since abdominal US with doppler which shows no thrombus.  2 days prior to transfer patient had worsening abdominal distention and constipation. Abdominal xray was concerning for SBO however patient has continued to pass gas and denies vomiting. Subsequent US showed only colonic dilation. Surgery consulted and believe it is Ileus. NGT was placed to suction and patient made NPO for bowel rest. NGT has been draining bile since. During course palliative care was consulted due to worsening condition and patient agreed to be DNR/DNI. Due to worsening patient condition patient was transferred to [facility] for MRCP with secretin and possible ERCP with pseudocyts drainage for symptomatic relief.     PMHx/PSHx:   PAST MEDICAL & SURGICAL HISTORY:  Ascites: may 2017  Hernia  Pancreatitis  Hypertension  TIA (transient ischemic attack)  S/P skin graft using Integra: right calf skin graft  Fracture of shaft of left femur: 1971 compound fracrture left femur  1980 left femur bone spur removed      Social Hx: current smoker, 1PPD for 40 years. Former ETOH abuse 6 pack of beer daily for 30 years. Quit 2 years ago. Drug abuse in 70's, but no current drugs of abuse.     Allergies: Allergies    iodine (Swelling)    Intolerances        Medications Standing   MEDICATIONS  (STANDING):  fentaNYL   Patch  25 MICROgram(s)/Hr 1 Patch Transdermal every 72 hours  enoxaparin Injectable 60 milliGRAM(s) SubCutaneous every 12 hours  meropenem IVPB 1000 milliGRAM(s) IV Intermittent every 8 hours  pantoprazole  Injectable 40 milliGRAM(s) IV Push daily  nicotine - 21 mG/24Hr(s) Patch 1 patch Transdermal daily      Medications PRN   MEDICATIONS  (PRN):  HYDROmorphone  Injectable 0.5 milliGRAM(s) IV Push every 6 hours PRN Moderate Pain (4 - 6)  HYDROmorphone  Injectable 0.5 milliGRAM(s) IV Push every 4 hours PRN Breakthrough pain      Physical Exam:   General: NAD   HEENT: EOMI, PEERL, Nl OP, Nl thyroid gland, No lymphadenopathy, No JVD   CVS: RRR, No murmurs/gallops/regurg  Resp: CTA bilaterally, no rhonchi, rales, wheeze  Abd: Nl BS, soft, NT, ND   :   Ext:     LABS   CBC                       11.1   35.1  )-----------( 576      ( 15 Aug 2017 07:24 )             33.2     CMP 08-15    134<L>  |  97<L>  |  24.0<H>  ----------------------------<  109  5.0   |  25.0  |  0.38<L>    Ca    8.1<L>      15 Aug 2017 07:24    TPro  5.2<L>  /  Alb  2.2<L>  /  TBili  0.3<L>  /  DBili  x   /  AST  51<H>  /  ALT  40  /  AlkPhos  125<H>  08-15    PT/INR - ( 15 Aug 2017 07:24 )   PT: 15.5 sec;   INR: 1.40 ratio             Radiology:   < from: MRI Abdomen w/Cont (08.11.17 @ 12:01) >    IMPRESSION: Large volume ascites. Chronic pancreatitis, grossly stable.   Peripancreatic pseudocysts mildly decreased in size.    Right intrahepatic portal vein thrombosis, likely subacute/acute and new   from 5/2017.    < end of copied text >        Assessment:     Plan: Patient is a 57y old  Male who presents with a chief complaint of transfer from [place] for GI workup and poss ERCP      HPI:  57M hx chronic pancreatitis (dx 2002), ETOH abuse (last drink 2 years ago), HTN, inguinal hernia repair initially presented to [facility] for worsening foot pain refractory to antibiotics. Patient was found to have acute on chronic pancreatitis. 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 Patient has been started on steroid taper with improvement of lesions and pain. Patient has had multiple complications throughout his course. He was found to have worsening abdominal ascites, which was tapped. SAAG <1.2 and amylase greatly elevated indicating likely pancreatic ascites. Pt started on aldactone and lasix Nucleated cells were 1500 with 90% PMN's indicative of SBP. Patient initially started on INVANZ, but had worsening leukocytosis and was started on Meropenem. Ascites cultures have been negative.  Patient amylase/lipase was initially down trending with fluids, but then came back up. MRI showed stable pancreatic pseudocysts, but found acute/subacute portal vein thrombosis. Patient started on therapeutic lovenox. Pt has since abdominal US with doppler which shows no thrombus.  2 days prior to transfer patient had worsening abdominal distention and constipation. Abdominal xray was concerning for SBO however patient has continued to pass gas and denies vomiting. Subsequent US showed only colonic dilation. Surgery consulted and believe it is Ileus. NGT was placed to suction and patient made NPO for bowel rest. NGT has been draining bile since. During course palliative care was consulted due to worsening condition and patient agreed to be DNR/DNI. Due to worsening patient condition patient was transferred to [facility] for MRCP with secretin and possible ERCP with pseudocyts drainage for symptomatic relief.     PMHx/PSHx:   PAST MEDICAL & SURGICAL HISTORY:  Ascites: may 2017  Hernia  Pancreatitis  Hypertension  TIA (transient ischemic attack)  S/P skin graft using Integra: right calf skin graft  Fracture of shaft of left femur: 1971 compound fracrture left femur  1980 left femur bone spur removed      Social Hx: current smoker, 1PPD for 40 years. Former ETOH abuse 6 pack of beer daily for 30 years. Quit 2 years ago. Drug abuse in 70's, but no current drugs of abuse.     Allergies: Allergies    iodine (Swelling)    Intolerances        Medications Standing   MEDICATIONS  (STANDING):  fentaNYL   Patch  25 MICROgram(s)/Hr 1 Patch Transdermal every 72 hours  enoxaparin Injectable 60 milliGRAM(s) SubCutaneous every 12 hours  meropenem IVPB 1000 milliGRAM(s) IV Intermittent every 8 hours  pantoprazole  Injectable 40 milliGRAM(s) IV Push daily  nicotine - 21 mG/24Hr(s) Patch 1 patch Transdermal daily      Medications PRN   MEDICATIONS  (PRN):  HYDROmorphone  Injectable 0.5 milliGRAM(s) IV Push every 6 hours PRN Moderate Pain (4 - 6)  HYDROmorphone  Injectable 0.5 milliGRAM(s) IV Push every 4 hours PRN Breakthrough pain      Physical Exam:   General: NAD   HEENT: EOMI, PEERL, Nl OP, Nl thyroid gland, No lymphadenopathy, No JVD   CVS: RRR, No murmurs/gallops/regurg  Resp: CTA bilaterally, no rhonchi, rales, wheeze  Abd: Nl BS, soft, NT, ND   :   Ext:     LABS   CBC                       11.1   35.1  )-----------( 576      ( 15 Aug 2017 07:24 )             33.2     CMP 08-15    134<L>  |  97<L>  |  24.0<H>  ----------------------------<  109  5.0   |  25.0  |  0.38<L>    Ca    8.1<L>      15 Aug 2017 07:24    TPro  5.2<L>  /  Alb  2.2<L>  /  TBili  0.3<L>  /  DBili  x   /  AST  51<H>  /  ALT  40  /  AlkPhos  125<H>  08-15    PT/INR - ( 15 Aug 2017 07:24 )   PT: 15.5 sec;   INR: 1.40 ratio             Radiology:   < from: MRI Abdomen w/Cont (08.11.17 @ 12:01) >    IMPRESSION: Large volume ascites. Chronic pancreatitis, grossly stable.   Peripancreatic pseudocysts mildly decreased in size.    Right intrahepatic portal vein thrombosis, likely subacute/acute and new   from 5/2017.    < end of copied text >  < from: Xray Abdomen Three Position (08.14.17 @ 13:04) >    Impression:  Small bowel obstruction with increasing small bowel dilatation compared   with 8/9/2017..    < end of copied text >    < from: Xray Kidney Ureter Bladder (08.15.17 @ 05:38) >    IMPRESSION:    No bowel obstruction..    < end of copied text >  < from: US Doppler Portal/Hepatic Vein (08.15.17 @ 12:28) >    IMPRESSION:    1.  Linear echoes within the posterior branch of the right portal vein,   possibly sequela of prior portal vein thrombus. No acute or obstructive   thrombus.  2.  Multiple complex lesions in the region of the pancreatic head, better   characterized on MRI from August 11, 2017.    < end of copied text >            Assessment:   58 yo M hx chronic pancreatitis, HTN, recent hernia repeair initially presented to OSH for joint pains found to have acute on chronic pancreatitis and panniculitis now improving on steroids, course complicated by pancreatic ascites with SBP, portal vein thrombosis and Ileus, transferred to [facility] for further care and possible ERCP.     Plan:    #Acute on Chronic pancreatitis- amylase and lipase slightly improved from yesterday. Imaging showing multiple stable pancreatic cysts. Consult advanced endoscopy for possible ERCP guided stent placement and drainage of cysts. Will start patient on moderate fluids, but will be careful given ascites.     #SBO/Ileus- pt state he passes gas, but has not had bowel movement in 5 days. He has been NPO with NGT to suction for 2 days. NGT on suction and continuing to drain bile. Will limit opioid use. Will place patient on maintenance fluids. Abdomen non tender. Consider surgery consult for further management. Nutrition consult.     #SBP- Pt has had paracentesis x2, both showing elevated PMN's concerning for SBP. Initially treated with INVANZ and now with Meropenem. Cultures remain negative. Will continue to treat. Consider ID consult    #Pancreatic ascites- patient has two therapeutic taps draining about 1.2 L each time. Amylase greatly elevated in both with SAAG < 1.2. Will continue on Lasix 40 IV. Hold aldactone as patient is NPO.     #Portal vein thrombosis- MRI showed concern for acute/subacute portal vein thrombosis and patient started on therapeutic lovenox. Abd doppler from today did not show thrombus, but sequela from prior thrombus. Will continue lovenox.     #GOC- guarded prognosis. Patient has agreed to DNR/DNI. consider palliative consult or possible hospice care for discharge planning. Patient is a 57y old  Male who presents with a chief complaint of transfer from [place] for GI workup and poss ERCP      HPI:  57M hx chronic pancreatitis (dx 2002), ETOH abuse (last drink 2 years ago), HTN, inguinal hernia repair initially presented to [facility] for worsening foot pain refractory to antibiotics. Patient was found to have acute on chronic pancreatitis. 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 Patient has been started on steroid taper with improvement of lesions and pain. Patient has had multiple complications throughout his course. He was found to have worsening abdominal ascites, which was tapped. SAAG <1.2 and amylase greatly elevated indicating likely pancreatic ascites. Pt started on aldactone and lasix Nucleated cells were 1500 with 90% PMN's indicative of SBP. Patient initially started on INVANZ, but had worsening leukocytosis and was started on Meropenem. Ascites cultures have been negative.  Patient amylase/lipase was initially down trending with fluids, but then came back up. MRI showed stable pancreatic pseudocysts, but found acute/subacute portal vein thrombosis. Patient started on therapeutic lovenox. Pt has since abdominal US with doppler which shows no thrombus.  2 days prior to transfer patient had worsening abdominal distention and constipation. Abdominal xray was concerning for SBO however patient has continued to pass gas and denies vomiting. Subsequent US showed only colonic dilation. Surgery consulted and believe it is Ileus. NGT was placed to suction and patient made NPO for bowel rest. NGT has been draining bile since. During course palliative care was consulted due to worsening condition and patient agreed to be DNR/DNI. Due to worsening patient condition patient was transferred to [facility] for MRCP with secretin and possible ERCP with pseudocyts drainage for symptomatic relief.     PMHx/PSHx:   PAST MEDICAL & SURGICAL HISTORY:  Ascites: may 2017  Hernia  Pancreatitis  Hypertension  TIA (transient ischemic attack)  S/P skin graft using Integra: right calf skin graft  Fracture of shaft of left femur: 1971 compound fracrture left femur  1980 left femur bone spur removed      Social Hx: current smoker, 1PPD for 40 years. Former ETOH abuse 6 pack of beer daily for 30 years. Quit 2 years ago. Drug abuse in 70's, but no current drugs of abuse.     Allergies: Allergies    iodine (Swelling)    Intolerances        Medications Standing   MEDICATIONS  (STANDING):  fentaNYL   Patch  25 MICROgram(s)/Hr 1 Patch Transdermal every 72 hours  enoxaparin Injectable 60 milliGRAM(s) SubCutaneous every 12 hours  meropenem IVPB 1000 milliGRAM(s) IV Intermittent every 8 hours  pantoprazole  Injectable 40 milliGRAM(s) IV Push daily  nicotine - 21 mG/24Hr(s) Patch 1 patch Transdermal daily      Medications PRN   MEDICATIONS  (PRN):  HYDROmorphone  Injectable 0.5 milliGRAM(s) IV Push every 6 hours PRN Moderate Pain (4 - 6)  HYDROmorphone  Injectable 0.5 milliGRAM(s) IV Push every 4 hours PRN Breakthrough pain      Physical Exam:   General: NAD   HEENT: EOMI, PEERL, Nl OP, Nl thyroid gland, No lymphadenopathy, No JVD   CVS: RRR, No murmurs/gallops/regurg  Resp: CTA bilaterally, no rhonchi, rales, wheeze  Abd: Nl BS, soft, NT, ND   :   Ext:     LABS   CBC                       11.1   35.1  )-----------( 576      ( 15 Aug 2017 07:24 )             33.2     CMP 08-15    134<L>  |  97<L>  |  24.0<H>  ----------------------------<  109  5.0   |  25.0  |  0.38<L>    Ca    8.1<L>      15 Aug 2017 07:24    TPro  5.2<L>  /  Alb  2.2<L>  /  TBili  0.3<L>  /  DBili  x   /  AST  51<H>  /  ALT  40  /  AlkPhos  125<H>  08-15    PT/INR - ( 15 Aug 2017 07:24 )   PT: 15.5 sec;   INR: 1.40 ratio             Radiology:   < from: MRI Abdomen w/Cont (08.11.17 @ 12:01) >    IMPRESSION: Large volume ascites. Chronic pancreatitis, grossly stable.   Peripancreatic pseudocysts mildly decreased in size.    Right intrahepatic portal vein thrombosis, likely subacute/acute and new   from 5/2017.    < end of copied text >  < from: Xray Abdomen Three Position (08.14.17 @ 13:04) >    Impression:  Small bowel obstruction with increasing small bowel dilatation compared   with 8/9/2017..    < end of copied text >    < from: Xray Kidney Ureter Bladder (08.15.17 @ 05:38) >    IMPRESSION:    No bowel obstruction..    < end of copied text >  < from: US Doppler Portal/Hepatic Vein (08.15.17 @ 12:28) >    IMPRESSION:    1.  Linear echoes within the posterior branch of the right portal vein,   possibly sequela of prior portal vein thrombus. No acute or obstructive   thrombus.  2.  Multiple complex lesions in the region of the pancreatic head, better   characterized on MRI from August 11, 2017.    < end of copied text >            Assessment:   58 yo M hx chronic pancreatitis, HTN, recent hernia repeair initially presented to OSH for joint pains found to have acute on chronic pancreatitis and panniculitis now improving on steroids, course complicated by pancreatic ascites with SBP, portal vein thrombosis and Ileus, transferred to [facility] for further care and possible ERCP.     Plan:    #Acute on Chronic pancreatitis- amylase and lipase slightly improved from yesterday. Imaging showing multiple stable pancreatic cysts. Consult advanced endoscopy for possible ERCP guided stent placement and drainage of cysts. Will start patient on moderate fluids, but will be careful given ascites.     #SBO/Ileus- pt state he passes gas, but has not had bowel movement in 5 days. He has been NPO with NGT to suction for 2 days. NGT on suction and continuing to drain bile. Will limit opioid use. Will place patient on maintenance fluids. Abdomen non tender. Consider surgery consult for further management. Nutrition consult.     #SBP- Pt has had paracentesis x2, both showing elevated PMN's concerning for SBP. Initially treated with INVANZ and now with Meropenem. Cultures remain negative. Will continue to treat. Consider ID consult    #Pancreatic ascites- patient has two therapeutic taps draining about 1.2 L each time. Amylase greatly elevated in both with SAAG < 1.2. Will continue on Lasix 40 IV. Hold aldactone as patient is NPO.     #Portal vein thrombosis- MRI showed concern for acute/subacute portal vein thrombosis and patient started on therapeutic lovenox. Abd doppler from today did not show thrombus, but sequela from prior thrombus. Will continue lovenox.     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 Lesions have been improving with steroids. Currently on methylprednisone 30mg daily. Will continue to taper by 10 weekly.     #GOC- guarded prognosis. Patient has agreed to DNR/DNI. consider palliative consult or possible hospice care for discharge planning. Patient is a 57y old  Male who presents with a chief complaint of transfer from [place] for GI workup and poss ERCP      HPI:  57M hx chronic pancreatitis (dx 2002), ETOH abuse (last drink 2 years ago), HTN, inguinal hernia repair initially presented to [facility] for worsening foot pain refractory to antibiotics. Patient was found to have acute on chronic pancreatitis. 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 Patient has been started on steroid taper with improvement of lesions and pain. Patient has had multiple complications throughout his course. He was found to have worsening abdominal ascites, which was tapped. SAAG <1.2 and amylase greatly elevated indicating likely pancreatic ascites. Pt started on aldactone and lasix Nucleated cells were 1500 with 90% PMN's indicative of SBP. Patient initially started on INVANZ, but had worsening leukocytosis and was started on Meropenem. Ascites cultures have been negative.  Patient amylase/lipase was initially down trending with fluids, but then came back up. MRI showed stable pancreatic pseudocysts, but found acute/subacute portal vein thrombosis. Patient started on therapeutic lovenox. Pt has since abdominal US with doppler which shows no thrombus.  2 days prior to transfer patient had worsening abdominal distention and constipation. Abdominal xray was concerning for SBO however patient has continued to pass gas and denies vomiting. Subsequent US showed only colonic dilation. Surgery consulted and believe it is Ileus. NGT was placed to suction and patient made NPO for bowel rest. NGT has been draining bile since. During course palliative care was consulted due to worsening condition and patient agreed to be DNR/DNI. Due to worsening patient condition patient was transferred to [facility] for MRCP with secretin and possible ERCP with pseudocyts drainage for symptomatic relief.     PMHx/PSHx:   PAST MEDICAL & SURGICAL HISTORY:  Ascites: may 2017  Hernia  Pancreatitis  Hypertension  TIA (transient ischemic attack)  S/P skin graft using Integra: right calf skin graft  Fracture of shaft of left femur: 1971 compound fracrture left femur  1980 left femur bone spur removed      Social Hx: current smoker, 1PPD for 40 years. Former ETOH abuse 6 pack of beer daily for 30 years. Quit 2 years ago. Drug abuse in 70's, but no current drugs of abuse.     Allergies: Allergies    iodine (Swelling)    Intolerances        Medications Standing   MEDICATIONS  (STANDING):  fentaNYL   Patch  25 MICROgram(s)/Hr 1 Patch Transdermal every 72 hours  enoxaparin Injectable 60 milliGRAM(s) SubCutaneous every 12 hours  meropenem IVPB 1000 milliGRAM(s) IV Intermittent every 8 hours  pantoprazole  Injectable 40 milliGRAM(s) IV Push daily  nicotine - 21 mG/24Hr(s) Patch 1 patch Transdermal daily      Medications PRN   MEDICATIONS  (PRN):  HYDROmorphone  Injectable 0.5 milliGRAM(s) IV Push every 6 hours PRN Moderate Pain (4 - 6)  HYDROmorphone  Injectable 0.5 milliGRAM(s) IV Push every 4 hours PRN Breakthrough pain      Physical Exam:   General: NAD   HEENT: EOMI, PEERL, Nl OP, Nl thyroid gland, No lymphadenopathy, No JVD   CVS: RRR, No murmurs/gallops/regurg  Resp: CTA bilaterally, no rhonchi, rales, wheeze  Abd: Nl BS, soft, NT, ND   :   Ext:     LABS   CBC                       11.1   35.1  )-----------( 576      ( 15 Aug 2017 07:24 )             33.2     CMP 08-15    134<L>  |  97<L>  |  24.0<H>  ----------------------------<  109  5.0   |  25.0  |  0.38<L>    Ca    8.1<L>      15 Aug 2017 07:24    TPro  5.2<L>  /  Alb  2.2<L>  /  TBili  0.3<L>  /  DBili  x   /  AST  51<H>  /  ALT  40  /  AlkPhos  125<H>  08-15    PT/INR - ( 15 Aug 2017 07:24 )   PT: 15.5 sec;   INR: 1.40 ratio             Radiology:   < from: MRI Abdomen w/Cont (08.11.17 @ 12:01) >    IMPRESSION: Large volume ascites. Chronic pancreatitis, grossly stable.   Peripancreatic pseudocysts mildly decreased in size.    Right intrahepatic portal vein thrombosis, likely subacute/acute and new   from 5/2017.    < end of copied text >  < from: Xray Abdomen Three Position (08.14.17 @ 13:04) >    Impression:  Small bowel obstruction with increasing small bowel dilatation compared   with 8/9/2017..    < end of copied text >    < from: Xray Kidney Ureter Bladder (08.15.17 @ 05:38) >    IMPRESSION:    No bowel obstruction..    < end of copied text >  < from: US Doppler Portal/Hepatic Vein (08.15.17 @ 12:28) >    IMPRESSION:    1.  Linear echoes within the posterior branch of the right portal vein,   possibly sequela of prior portal vein thrombus. No acute or obstructive   thrombus.  2.  Multiple complex lesions in the region of the pancreatic head, better   characterized on MRI from August 11, 2017.    < end of copied text >            Assessment:   56 yo M hx chronic pancreatitis, HTN, recent hernia repeair initially presented to OSH for joint pains found to have acute on chronic pancreatitis and panniculitis now improving on steroids, course complicated by pancreatic ascites with SBP, portal vein thrombosis and Ileus, transferred to [facility] for further care and possible ERCP.     Plan:    #Acute on Chronic pancreatitis- amylase and lipase slightly improved from yesterday. Imaging showing multiple stable pancreatic cysts. Consult advanced endoscopy for possible ERCP guided stent placement and drainage of cysts. Will start patient on moderate fluids, but will be careful given ascites.     #SBO/Ileus- pt state he passes gas, but has not had bowel movement in 5 days. He has been NPO with NGT to suction for 2 days. NGT on suction and continuing to drain bile. Will limit opioid use. Will place patient on maintenance fluids. Abdomen non tender. Consider surgery consult for further management. Nutrition consult.     #SBP- Pt has had paracentesis x2, both showing elevated PMN's concerning for SBP. Initially treated with INVANZ and Flagyl and now with Meropenem. Cultures remain negative. Will continue to treat. Consider ID consult    #Pancreatic ascites- patient has two therapeutic taps draining about 1.2 L each time. Amylase greatly elevated in both with SAAG < 1.2. Will continue on Lasix 40 IV. Hold aldactone as patient is NPO.     #Portal vein thrombosis- MRI showed concern for acute/subacute portal vein thrombosis and patient started on therapeutic lovenox. Abd doppler from today did not show thrombus, but sequela from prior thrombus. Will continue lovenox.     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 Lesions have been improving with steroids. Currently on methylprednisone 30mg daily. Will continue to taper by 10 weekly.     #GOC- guarded prognosis. Patient has agreed to DNR/DNI. consider palliative consult or possible hospice care for discharge planning.

## 2017-08-15 NOTE — PROGRESS NOTE ADULT - PROBLEM SELECTOR PROBLEM 5
Ascites
SBP (spontaneous bacterial peritonitis)
SBP (spontaneous bacterial peritonitis)
Ascites due to alcoholic cirrhosis

## 2017-08-15 NOTE — DISCHARGE NOTE ADULT - SECONDARY DIAGNOSIS.
Alcohol-induced chronic pancreatitis Ascites Portal vein thrombosis Essential hypertension Smoker Depression

## 2017-08-15 NOTE — PROGRESS NOTE ADULT - SUBJECTIVE AND OBJECTIVE BOX
INTERVAL HPI/OVERNIGHT EVENTS: Patient seen and examined at bedside. No acute events overnight. NGT still in place with minimal output. Patient reports passing flatus, but no bowel movements. Patient has been NPO with IVF. He is still on IV antibiotics for SBP. Patient denies fever, chills, nausea, vomiting, chest pain, shortness of breath or palpitations.       MEDICATIONS  (STANDING):  nicotine - 21 mG/24Hr(s) Patch 1 patch Transdermal daily  amLODIPine   Tablet 5 milliGRAM(s) Oral daily  gabapentin 300 milliGRAM(s) Oral every 8 hours  pantoprazole    Tablet 40 milliGRAM(s) Oral before breakfast  psyllium Powder 1 Packet(s) Oral two times a day  hydrocortisone 2.5% Rectal Cream 1 Application(s) Rectal two times a day  spironolactone 100 milliGRAM(s) Oral daily  docusate sodium 100 milliGRAM(s) Oral two times a day  meropenem IVPB 1000 milliGRAM(s) IV Intermittent every 8 hours  enoxaparin Injectable 60 milliGRAM(s) SubCutaneous every 12 hours  mirtazapine 7.5 milliGRAM(s) Oral at bedtime  polyethylene glycol 3350 17 Gram(s) Oral at bedtime  saccharomyces boulardii 500 milliGRAM(s) Oral two times a day  lactulose Syrup 15 Gram(s) Oral three times a day  dextrose 5%. 1000 milliLiter(s) (50 mL/Hr) IV Continuous <Continuous>  dextrose 50% Injectable 12.5 Gram(s) IV Push once  dextrose 50% Injectable 25 Gram(s) IV Push once  dextrose 50% Injectable 25 Gram(s) IV Push once  methylPREDNISolone sodium succinate Injectable 30 milliGRAM(s) IV Push daily  dextrose 5% + sodium chloride 0.9%. 1000 milliLiter(s) (30 mL/Hr) IV Continuous <Continuous>  fentaNYL   Patch  25 MICROgram(s)/Hr 1 Patch Transdermal every 72 hours  furosemide   Injectable 40 milliGRAM(s) IV Push daily    MEDICATIONS  (PRN):  acetaminophen   Tablet 650 milliGRAM(s) Oral every 6 hours PRN For Temp greater than 38 C (100.4 F)  aluminum hydroxide/magnesium hydroxide/simethicone Suspension 30 milliLiter(s) Oral every 6 hours PRN Dyspepsia  ondansetron Injectable 4 milliGRAM(s) IV Push every 4 hours PRN Nausea and/or Vomiting  dextrose Gel 1 Dose(s) Oral once PRN Blood Glucose LESS THAN 70 milliGRAM(s)/deciLiter  glucagon  Injectable 1 milliGRAM(s) IntraMuscular once PRN Glucose <70 milliGRAM(s)/deciLiter  HYDROmorphone  Injectable 0.5 milliGRAM(s) IV Push every 6 hours PRN Moderate Pain (4 - 6)  HYDROmorphone  Injectable 0.5 milliGRAM(s) IV Push every 4 hours PRN breakthough pain      Vital Signs Last 24 Hrs  T(C): 36.8 (15 Aug 2017 08:03), Max: 37 (15 Aug 2017 00:43)  T(F): 98.2 (15 Aug 2017 08:03), Max: 98.6 (15 Aug 2017 00:43)  HR: 82 (15 Aug 2017 08:03) (82 - 90)  BP: 119/70 (15 Aug 2017 08:03) (113/64 - 136/81)  BP(mean): --  RR: 19 (15 Aug 2017 08:03) (18 - 19)  SpO2: 96% (15 Aug 2017 05:24) (87% - 96%)    Physical Exam:  General: cachectic patient resting comfortably in bed; no acute distress  Respiratory: Breath Sounds equal & clear to auscultation, no accessory muscle use  Cardiovascular: Regular rate & rhythm, normal S1, S2; no murmurs, gallops or rubs  Gastrointestinal: Soft, non-tender, normal bowel sounds  Extremities: No peripheral edema, No cyanosis, clubbing   Vascular: Equal and normal pulses: 2+ peripheral pulses throughout  Musculoskeletal: No joint pain, swelling or deformity; no limitation of movement  Skin: No rashes      I&O's Detail    14 Aug 2017 07:01  -  15 Aug 2017 07:00  --------------------------------------------------------  IN:    dextrose 5% + sodium chloride 0.9%.: 495 mL    Solution: 200 mL  Total IN: 695 mL    OUT:    Voided: 400 mL  Total OUT: 400 mL    Total NET: 295 mL      15 Aug 2017 07:01  -  15 Aug 2017 13:11  --------------------------------------------------------  IN:  Total IN: 0 mL    OUT:    Voided: 900 mL  Total OUT: 900 mL    Total NET: -900 mL          LABS:                        11.1   35.1  )-----------( 576      ( 15 Aug 2017 07:24 )             33.2     08-15    134<L>  |  97<L>  |  24.0<H>  ----------------------------<  109  5.0   |  25.0  |  0.38<L>    Ca    8.1<L>      15 Aug 2017 07:24    TPro  5.2<L>  /  Alb  2.2<L>  /  TBili  0.3<L>  /  DBili  x   /  AST  51<H>  /  ALT  40  /  AlkPhos  125<H>  08-15    PT/INR - ( 15 Aug 2017 07:24 )   PT: 15.5 sec;   INR: 1.40 ratio               RADIOLOGY & ADDITIONAL STUDIES:

## 2017-08-15 NOTE — PROGRESS NOTE ADULT - ASSESSMENT
57 M from home with PMHx HTN and chronic alcohol induced pancreatitis admitted with b/l pedal cellulitis/abcess on the dorsal PIPJ of the fourth digit, for which poditatry was consulted and drained the wound. ID consulted and s/p abx therapy. Pt noted to have panniculitis likely secondary to pancreatitis, dermatology consulted on steroid taper. Course complicated by suspected SBP for which pt placed on meropenem and portal vein thrombosis and started on full dose lovenox. Pt also with recurrent ascites placed on lasix and aldactone and had paracentesis IR guided which revealed elevated cells but gram stain negative for bacteria, given worsening leukocytosis and high risk for bacterial translocation pt empirically being covered with meropenem. Course complicated by pt developing SBO, NGT placed for decompression and sx consulted. Repeat x ray shows worsening SBO. D/w GI and [facility] Dr. Milan MCKEON willing to do MRCP w/ secretin/ERCP for further management and work up possible pancreatic stent. Patient to be transferred to [facility] for further management and care under hospitalist service.

## 2017-08-15 NOTE — DISCHARGE NOTE ADULT - ADDITIONAL INSTRUCTIONS
Please continue with management as per physician and gastroenterology team at [facility]. Patient for transfer for MRCP with secretin and possible ERCP. Patient is medically optimized for transfer.

## 2017-08-15 NOTE — DISCHARGE NOTE ADULT - CARE PLAN
Principal Discharge DX:	SBO (small bowel obstruction)  Goal:	Prevent worsening and further decline  Instructions for follow-up, activity and diet:	Continue with NPO and NGT for decompression and continue to follow with the gastroenterology and surgical team at [place]. Serial abdominal exams and x rays.  Secondary Diagnosis:	Alcohol-induced chronic pancreatitis  Instructions for follow-up, activity and diet:	Continue with managing symptoms and recurrent complications from pancreatitis. Continue with care as per GI at [facility] and possible MRCP/ERCP.  Secondary Diagnosis:	Ascites  Instructions for follow-up, activity and diet:	High risk for SBP, empirically being covered with meropenem. Persistently elevated wbc >30.  Secondary Diagnosis:	Portal vein thrombosis  Instructions for follow-up, activity and diet:	Continue with full dose lovenox  Secondary Diagnosis:	Essential hypertension  Instructions for follow-up, activity and diet:	BP have been held because patient is unable to tolerate po intake and bp has remained stable off of norvasc 5mg po qd.  Secondary Diagnosis:	Smoker  Instructions for follow-up, activity and diet:	Continue with nicotine patch.  Secondary Diagnosis:	Depression  Instructions for follow-up, activity and diet:	Mirtazapine is on hold b/c the patient cannot take po given his SBO. Can restart if SBO resolves. Principal Discharge DX:	SBO (small bowel obstruction)  Goal:	Prevent worsening and further decline  Instructions for follow-up, activity and diet:	Continue with NPO and NGT for decompression and continue to follow with the gastroenterology and surgical team at [place]. Serial abdominal exams and x rays.  Secondary Diagnosis:	Alcohol-induced chronic pancreatitis  Instructions for follow-up, activity and diet:	Continue with managing symptoms and recurrent complications from pancreatitis. Continue with care as per GI at [facility] and possible MRCP/ERCP.  Secondary Diagnosis:	Ascites  Instructions for follow-up, activity and diet:	High risk for SBP, empirically being covered with meropenem. Persistently elevated wbc >30.  Secondary Diagnosis:	Portal vein thrombosis  Instructions for follow-up, activity and diet:	Continue with full dose lovenox  Secondary Diagnosis:	Essential hypertension  Instructions for follow-up, activity and diet:	BP have been held because patient is unable to tolerate po intake and bp has remained stable off of norvasc 5mg po qd.  Secondary Diagnosis:	Smoker  Instructions for follow-up, activity and diet:	Continue with nicotine patch.  Secondary Diagnosis:	Depression  Instructions for follow-up, activity and diet:	Mirtazapine is on hold b/c the patient cannot take po given his SBO. Can restart if SBO resolves.

## 2017-08-15 NOTE — PROGRESS NOTE ADULT - SUBJECTIVE AND OBJECTIVE BOX
Patient is a 57y old  Male who presents with a chief complaint of foot swelling and pain (29 Jul 2017 12:54)      HPI: Patient with pancreatic ascites. - /SBP- high WBC on ascitec fluid, but cultures negative . on meripenum. high amylase on ascitic fluid. Continues to have high amylase and lipse in serum.  ( amylase 3622, lipase 2920). [place]iwth ileus. NGT to drainage. KUB show same SB dilation. NGT to suction. The pt is now starting to pass flatus. No BM. Vague abdominal discomfort. No fevers. Liver spleen scan is consistant with cirrhosis ( even CT did not show cirrhosis)         PMH- HTN, Inguinal hernia repair right, noted from prior records- pancreatitis  SH- 41 yrs of smoking 1 ppd, no intention of quitting, quit alcohol 2 yrs ago, when in his teens - substance abuse  meds- amlodipine (29 Jul 2017 12:54)      REVIEW OF SYSTEMS:  Constitutional: No fever, weight loss or fatigue  ENMT:  No difficulty hearing, tinnitus, vertigo; No sinus or throat pain  Respiratory: No cough, wheezing, chills or hemoptysis  Cardiovascular: No chest pain, palpitations, dizziness or leg swelling  Gastrointestinal:vague abdominal discomfrot No nausea, vomiting or hematemesis; No diarrhea or constipation. No melena or hematochezia.  Skin: No itching, burning, rashes or lesions   Musculoskeletal: No joint pain or swelling; No muscle, back or extremity pain    PAST MEDICAL & SURGICAL HISTORY:  Ascites: may 2017  Hernia  Pancreatitis  Hypertension  TIA (transient ischemic attack)  S/P skin graft using Integra: right calf skin graft  Fracture of shaft of left femur: 1971 compound fracrture left femur  1980 left femur bone spur removed      FAMILY HISTORY:  Family history of duodenal cancer      SOCIAL HISTORY:  Smoking Status: [ ] Current, [ ] Former, [ ] Never  Pack Years:  [  ] EtOH- stopped 2 years ago  [  ] IVDA    MEDICATIONS:  MEDICATIONS  (STANDING):  nicotine - 21 mG/24Hr(s) Patch 1 patch Transdermal daily  amLODIPine   Tablet 5 milliGRAM(s) Oral daily  gabapentin 300 milliGRAM(s) Oral every 8 hours  pantoprazole    Tablet 40 milliGRAM(s) Oral before breakfast  psyllium Powder 1 Packet(s) Oral two times a day  hydrocortisone 2.5% Rectal Cream 1 Application(s) Rectal two times a day  spironolactone 100 milliGRAM(s) Oral daily  docusate sodium 100 milliGRAM(s) Oral two times a day  meropenem IVPB 1000 milliGRAM(s) IV Intermittent every 8 hours  enoxaparin Injectable 60 milliGRAM(s) SubCutaneous every 12 hours  mirtazapine 7.5 milliGRAM(s) Oral at bedtime  polyethylene glycol 3350 17 Gram(s) Oral at bedtime  saccharomyces boulardii 500 milliGRAM(s) Oral two times a day  lactulose Syrup 15 Gram(s) Oral three times a day  dextrose 5%. 1000 milliLiter(s) (50 mL/Hr) IV Continuous <Continuous>  dextrose 50% Injectable 12.5 Gram(s) IV Push once  dextrose 50% Injectable 25 Gram(s) IV Push once  dextrose 50% Injectable 25 Gram(s) IV Push once  methylPREDNISolone sodium succinate Injectable 30 milliGRAM(s) IV Push daily  dextrose 5% + sodium chloride 0.9%. 1000 milliLiter(s) (30 mL/Hr) IV Continuous <Continuous>  fentaNYL   Patch  25 MICROgram(s)/Hr 1 Patch Transdermal every 72 hours  furosemide   Injectable 40 milliGRAM(s) IV Push daily    MEDICATIONS  (PRN):  acetaminophen   Tablet 650 milliGRAM(s) Oral every 6 hours PRN For Temp greater than 38 C (100.4 F)  aluminum hydroxide/magnesium hydroxide/simethicone Suspension 30 milliLiter(s) Oral every 6 hours PRN Dyspepsia  ondansetron Injectable 4 milliGRAM(s) IV Push every 4 hours PRN Nausea and/or Vomiting  dextrose Gel 1 Dose(s) Oral once PRN Blood Glucose LESS THAN 70 milliGRAM(s)/deciLiter  glucagon  Injectable 1 milliGRAM(s) IntraMuscular once PRN Glucose <70 milliGRAM(s)/deciLiter  HYDROmorphone  Injectable 0.5 milliGRAM(s) IV Push every 6 hours PRN Moderate Pain (4 - 6)  HYDROmorphone  Injectable 0.5 milliGRAM(s) IV Push every 4 hours PRN breakthough pain      Allergies    iodine (Swelling)    Intolerances        Vital Signs Last 24 Hrs  T(C): 36.7 (15 Aug 2017 05:24), Max: 37 (15 Aug 2017 00:43)  T(F): 98 (15 Aug 2017 05:24), Max: 98.6 (15 Aug 2017 00:43)  HR: 90 (15 Aug 2017 05:24) (86 - 90)  BP: 120/62 (15 Aug 2017 05:24) (113/64 - 148/94)  BP(mean): --  RR: 18 (15 Aug 2017 05:24) (18 - 19)  SpO2: 96% (15 Aug 2017 05:24) (87% - 96%)    08-14 @ 07:01  -  08-15 @ 07:00  --------------------------------------------------------  IN: 695 mL / OUT: 400 mL / NET: 295 mL          PHYSICAL EXAM:    General: Well developed; well nourished; in no acute distress  HEENT: MMM, conjunctiva and sclera clear  Gastrointestinal: Soft, non-tender midly istended; decreased  bowel sounds; No rebound or guarding  Extremities: Normal range of motion, No clubbing, cyanosis or edema  Neurological: Alert and oriented x3  Skin: Warm and dry. No obvious rash      LABS:                        10.6   38.9  )-----------( 556      ( 14 Aug 2017 06:08 )             31.2     14 Aug 2017 06:08    132    |  95     |  31.0   ----------------------------<  137    5.1     |  23.0   |  0.67     Ca    7.7        14 Aug 2017 06:08    TPro  4.8    /  Alb  1.8    /  TBili  0.3    /  DBili  x      /  AST  28     /  ALT  27     /  AlkPhos  107    / Amylase 3622   /Lipase 2950   14 Aug 2017 06:08              RADIOLOGY & ADDITIONAL STUDIES:     < from: MRI Abdomen w/Cont (08.11.17 @ 12:01) >  MPRESSION: Large volume ascites. Chronic pancreatitis, grossly stable.   Peripancreatic pseudocysts mildly decreased in size.    Right intrahepatic portal vein thrombosis, likely subacute/acute and new   from 5/2017.      < end of copied text >

## 2017-08-15 NOTE — H&P ADULT - ATTENDING COMMENTS
58 yo M w/ history of recurrent pancreatitis,  recent hernia repair  who presented to [facility] for migratory arthritis and inflammatory nodules further complicated by recurrent pancreatitis and panniculitis s/p skin biopsy c/w pancreatic source, currently on steroids, also with ascites and portal vein thrombosis currently on Antibiotics and LMWH, recent diagnosis of  Ileus necessitating NG tube placement imaging with pancreatic psuedocysts; patient was transferred to [facility] for further work up involving advance endoscopy and MRCP; Will follow up GI recommendations, in the meantime will continue full dose AC and steroids , as well as ABX as recommended by ID , will f/u further recommendations by rheumatology, ID and Dermatology. Patient also followed by surgery for ileus.     I personally spent 16 minutes discussing advance care planning with patient , patient would like to maintain a DNR/DNI order , HCP is patients  daughter , DERRICK signed and in chart

## 2017-08-15 NOTE — PROGRESS NOTE ADULT - PROBLEM SELECTOR PLAN 9
-c/w nicotine patch   -pt counseled on smoking cessation

## 2017-08-15 NOTE — PROGRESS NOTE ADULT - PROBLEM SELECTOR PLAN 6
-pt remains afebrile  but leukocytosis persists and high even for pt being on steroids. High risk for bacterial translocation and SBP    -c/w meropenem 1G IVPB q8hrs empirically   -Fluid analysis shows 5363 total cells but gram stain negative for wbc/bacteria   -cytopath pending   -ID f/u noted and appreciated
-pt remains afebrile  but leukocytosis persists and high even for pt being on steroids. High risk for bacterial translocation and SBP    -c/w meropenem 1G IVPB q8hrs empirically   -Fluid analysis shows 5363 total cells but gram stain negative for wbc/bacteria   -cytopath pending   -ID f/u noted and appreciated
-pt remains afebrile  but leukocytosis persists and high even for pt being on steroids. High risk for bacterial translocation and SBP    -c/w meropenem 1G IVPB q8hrs empirically   -Fluid analysis shows 5363 total cells but gram stain negative for wbc/bacteria   -cytopath pending   -ID f/u noted and appreciated and d/w Dr. Amin the plan of care
- c/w miralax 17 G po qd  -restarted lactulose 15g po tid titrate to 3 bms a day

## 2017-08-15 NOTE — H&P ADULT - PROBLEM SELECTOR PLAN 7
Patient has agreed to DNR/DNI. consider palliative consult or possible hospice care for discharge planning.

## 2017-08-15 NOTE — H&P ADULT - HISTORY OF PRESENT ILLNESS
HPI:  57M hx chronic pancreatitis (dx 2002), ETOH abuse (last drink 2 years ago), HTN, inguinal hernia repair initially presented to [facility] for worsening foot pain refractory to antibiotics. Patient was found to have acute on chronic pancreatitis. 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 Patient has been started on steroid taper with improvement of lesions and pain. Patient has had multiple complications throughout his course. He was found to have worsening abdominal ascites, which was tapped. SAAG <1.2 and amylase greatly elevated indicating likely pancreatic ascites. Pt started on aldactone and lasix Nucleated cells were 1500 with 90% PMN's indicative of SBP. Patient initially started on INVANZ, but had worsening leukocytosis and was started on Meropenem. Ascites cultures have been negative.  Patient amylase/lipase was initially down trending with fluids, but then came back up. MRI showed stable pancreatic pseudocysts, but found acute/subacute portal vein thrombosis. Patient started on therapeutic lovenox. Pt has since abdominal US with doppler which shows no thrombus.  2 days prior to transfer patient had worsening abdominal distention and constipation. Abdominal xray was concerning for SBO however patient has continued to pass gas and denies vomiting. Subsequent US showed only colonic dilation. Surgery consulted and believe it is Ileus. NGT was placed to suction and patient made NPO for bowel rest. NGT has been draining bile since. During course palliative care was consulted due to worsening condition and patient agreed to be DNR/DNI. Due to worsening patient condition patient was transferred to [facility] for MRCP with secretin and possible ERCP with pseudocyts drainage for symptomatic relief.     Pt current complains are some hand and foot pain as well as hunger. He is passing gas. Vitals are stable upon arrival to floor.

## 2017-08-15 NOTE — H&P ADULT - PROBLEM SELECTOR PLAN 4
patient has two therapeutic taps draining about 1.2 L each time. Amylase greatly elevated in both with SAAG < 1.2. Will continue on Lasix 40 IV. Hold aldactone as patient is NPO.

## 2017-08-15 NOTE — PROGRESS NOTE ADULT - PROBLEM SELECTOR PROBLEM 2
Alcohol-induced chronic pancreatitis
Chronic pancreatitis, unspecified pancreatitis type
Pancreatic ascites
Pancreatic ascites
Panniculitis
Pseudocyst, pancreas
SBP (spontaneous bacterial peritonitis)
Alcohol-induced chronic pancreatitis
Cellulitis of leg, left
Pancreatitis
Portal vein thrombosis
Portal vein thrombosis
Pain from pancreas

## 2017-08-15 NOTE — PROGRESS NOTE ADULT - PROBLEM SELECTOR PROBLEM 3
Portal vein thrombosis
Cellulitis of leg, left
Other ascites
Portal vein thrombosis
SBP (spontaneous bacterial peritonitis)
SBP (spontaneous bacterial peritonitis)
Cellulitis of leg, left
Pancreatitis
Panniculitis
SBP (spontaneous bacterial peritonitis)
Pauciarticular arthritis

## 2017-08-15 NOTE — DISCHARGE NOTE ADULT - CARE PROVIDER_API CALL
Kimi Arzate (DO), Gastroenterology; Internal Medicine  [address]  [address]  [place] [address]  Phone: [phone]  Fax: [phone]    Kleiner, Myron I (MD), Internal Medicine; Rheumatology  [address] [place] [address]  Phone: [phone]  Fax: [phone]    Kendell Amin), Infectious Disease; Internal Medicine  [address] [place] [address] [place] [address]  Phone: [phone]  Fax: [phone]    Tammy Bates), Surgery  [address] [place] [address]  Phone: [phone]  Fax: [phone]

## 2017-08-15 NOTE — DISCHARGE NOTE ADULT - PLAN OF CARE
Prevent worsening and further decline Continue with NPO and NGT for decompression and continue to follow with the gastroenterology and surgical team at [place]. Serial abdominal exams and x rays. Continue with managing symptoms and recurrent complications from pancreatitis. Continue with care as per GI at [facility] and possible MRCP/ERCP. High risk for SBP, empirically being covered with meropenem. Persistently elevated wbc >30. Continue with full dose lovenox BP have been held because patient is unable to tolerate po intake and bp has remained stable off of norvasc 5mg po qd. Continue with nicotine patch. Mirtazapine is on hold b/c the patient cannot take po given his SBO. Can restart if SBO resolves.

## 2017-08-16 DIAGNOSIS — D72.829 ELEVATED WHITE BLOOD CELL COUNT, UNSPECIFIED: ICD-10-CM

## 2017-08-16 LAB
ALBUMIN SERPL ELPH-MCNC: 2.2 G/DL — LOW (ref 3.3–5)
ALP SERPL-CCNC: 107 U/L — SIGNIFICANT CHANGE UP (ref 40–120)
ALT FLD-CCNC: 37 U/L RC — SIGNIFICANT CHANGE UP (ref 10–45)
AMYLASE P1 CFR SERPL: 2961 U/L — HIGH (ref 25–125)
ANION GAP SERPL CALC-SCNC: 12 MMOL/L — SIGNIFICANT CHANGE UP (ref 5–17)
APTT BLD: 26.9 SEC — LOW (ref 27.5–37.4)
AST SERPL-CCNC: 34 U/L — SIGNIFICANT CHANGE UP (ref 10–40)
BASOPHILS # BLD AUTO: 0 K/UL — SIGNIFICANT CHANGE UP (ref 0–0.2)
BILIRUB SERPL-MCNC: 0.3 MG/DL — SIGNIFICANT CHANGE UP (ref 0.2–1.2)
BLD GP AB SCN SERPL QL: NEGATIVE — SIGNIFICANT CHANGE UP
BUN SERPL-MCNC: 24 MG/DL — HIGH (ref 7–23)
CALCIUM SERPL-MCNC: 8.2 MG/DL — LOW (ref 8.4–10.5)
CHLORIDE SERPL-SCNC: 100 MMOL/L — SIGNIFICANT CHANGE UP (ref 96–108)
CHOLEST SERPL-MCNC: 105 MG/DL — SIGNIFICANT CHANGE UP (ref 10–199)
CO2 SERPL-SCNC: 24 MMOL/L — SIGNIFICANT CHANGE UP (ref 22–31)
CREAT SERPL-MCNC: 0.35 MG/DL — LOW (ref 0.5–1.3)
EOSINOPHIL # BLD AUTO: 0 K/UL — SIGNIFICANT CHANGE UP (ref 0–0.5)
GLUCOSE SERPL-MCNC: 88 MG/DL — SIGNIFICANT CHANGE UP (ref 70–99)
HCT VFR BLD CALC: 33 % — LOW (ref 39–50)
HDLC SERPL-MCNC: 15 MG/DL — LOW (ref 40–125)
HGB BLD-MCNC: 10.6 G/DL — LOW (ref 13–17)
INR BLD: 1.23 RATIO — HIGH (ref 0.88–1.16)
LIDOCAIN IGE QN: 1880 U/L — HIGH (ref 7–60)
LIPID PNL WITH DIRECT LDL SERPL: 66 MG/DL — SIGNIFICANT CHANGE UP
LYMPHOCYTES # BLD AUTO: 1.2 K/UL — SIGNIFICANT CHANGE UP (ref 1–3.3)
LYMPHOCYTES # BLD AUTO: 4 % — LOW (ref 13–44)
MAGNESIUM SERPL-MCNC: 2 MG/DL — SIGNIFICANT CHANGE UP (ref 1.6–2.6)
MCHC RBC-ENTMCNC: 32.2 GM/DL — SIGNIFICANT CHANGE UP (ref 32–36)
MCHC RBC-ENTMCNC: 34.2 PG — HIGH (ref 27–34)
MCV RBC AUTO: 106 FL — HIGH (ref 80–100)
MONOCYTES # BLD AUTO: 1.1 K/UL — HIGH (ref 0–0.9)
MONOCYTES NFR BLD AUTO: 1 % — LOW (ref 2–14)
NEUTROPHILS # BLD AUTO: 29.9 K/UL — HIGH (ref 1.8–7.4)
NEUTROPHILS NFR BLD AUTO: 92 % — HIGH (ref 43–77)
PHOSPHATE SERPL-MCNC: 3.2 MG/DL — SIGNIFICANT CHANGE UP (ref 2.5–4.5)
PLATELET # BLD AUTO: 433 K/UL — HIGH (ref 150–400)
POTASSIUM SERPL-MCNC: 5 MMOL/L — SIGNIFICANT CHANGE UP (ref 3.5–5.3)
POTASSIUM SERPL-SCNC: 5 MMOL/L — SIGNIFICANT CHANGE UP (ref 3.5–5.3)
PROT SERPL-MCNC: 4.8 G/DL — LOW (ref 6–8.3)
PROTHROM AB SERPL-ACNC: 13.5 SEC — HIGH (ref 9.8–12.7)
RBC # BLD: 3.11 M/UL — LOW (ref 4.2–5.8)
RBC # FLD: 14.6 % — HIGH (ref 10.3–14.5)
RH IG SCN BLD-IMP: POSITIVE — SIGNIFICANT CHANGE UP
SODIUM SERPL-SCNC: 136 MMOL/L — SIGNIFICANT CHANGE UP (ref 135–145)
TOTAL CHOLESTEROL/HDL RATIO MEASUREMENT: 7 RATIO — SIGNIFICANT CHANGE UP (ref 3.4–9.6)
TRIGL SERPL-MCNC: 119 MG/DL — SIGNIFICANT CHANGE UP (ref 10–149)
WBC # BLD: 32.2 K/UL — HIGH (ref 3.8–10.5)
WBC # FLD AUTO: 32.2 K/UL — HIGH (ref 3.8–10.5)

## 2017-08-16 PROCEDURE — 99233 SBSQ HOSP IP/OBS HIGH 50: CPT | Mod: GC

## 2017-08-16 PROCEDURE — 99253 IP/OBS CNSLTJ NEW/EST LOW 45: CPT | Mod: GC

## 2017-08-16 RX ADMIN — Medication 40 MILLIGRAM(S): at 06:43

## 2017-08-16 RX ADMIN — HYDROMORPHONE HYDROCHLORIDE 0.5 MILLIGRAM(S): 2 INJECTION INTRAMUSCULAR; INTRAVENOUS; SUBCUTANEOUS at 20:40

## 2017-08-16 RX ADMIN — HYDROMORPHONE HYDROCHLORIDE 0.5 MILLIGRAM(S): 2 INJECTION INTRAMUSCULAR; INTRAVENOUS; SUBCUTANEOUS at 07:57

## 2017-08-16 RX ADMIN — Medication 1 PATCH: at 14:48

## 2017-08-16 RX ADMIN — PANTOPRAZOLE SODIUM 40 MILLIGRAM(S): 20 TABLET, DELAYED RELEASE ORAL at 14:48

## 2017-08-16 RX ADMIN — MEROPENEM 200 MILLIGRAM(S): 1 INJECTION INTRAVENOUS at 14:47

## 2017-08-16 RX ADMIN — MEROPENEM 200 MILLIGRAM(S): 1 INJECTION INTRAVENOUS at 21:48

## 2017-08-16 RX ADMIN — Medication 30 MILLIGRAM(S): at 06:43

## 2017-08-16 RX ADMIN — HYDROMORPHONE HYDROCHLORIDE 0.5 MILLIGRAM(S): 2 INJECTION INTRAMUSCULAR; INTRAVENOUS; SUBCUTANEOUS at 08:15

## 2017-08-16 RX ADMIN — ENOXAPARIN SODIUM 60 MILLIGRAM(S): 100 INJECTION SUBCUTANEOUS at 06:44

## 2017-08-16 RX ADMIN — HYDROMORPHONE HYDROCHLORIDE 0.5 MILLIGRAM(S): 2 INJECTION INTRAMUSCULAR; INTRAVENOUS; SUBCUTANEOUS at 20:58

## 2017-08-16 RX ADMIN — MEROPENEM 200 MILLIGRAM(S): 1 INJECTION INTRAVENOUS at 06:44

## 2017-08-16 NOTE — CONSULT NOTE ADULT - SUBJECTIVE AND OBJECTIVE BOX
Chief Complaint:  Patient is a 57y old  Male who presents with a chief complaint of transfer for possible ERCP. (15 Aug 2017 21:38)      HPI: 57M w/ pmhx chronic pancreatitis, ETOH abuse (last drink 2 years ago) initially presented to [facility] for worsening foot pain refractory to antibiotics. Patient was found to have acute on chronic pancreatitis. 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 Patient has been started on steroid taper with improvement of lesions and pain. Patient has had multiple complications throughout his course. He was found to have worsening abdominal ascites, which was tapped. SAAG <1.2 and amylase greatly elevated indicating likely pancreatic ascites. fluid studies also suggestive of bacterial peritonitis. MRI showed stable pancreatic pseudocysts, but found acute/subacute portal vein thrombosis. Patient started on therapeutic lovenox. Pt has since abdominal US with doppler which shows no thrombus.  2 days prior to transfer patient had worsening abdominal distention and constipation. Found with ileus,  improving with NG tube decompression.     Allergies:  iodine (Swelling)      Home Medications:    Hospital Medications:  HYDROmorphone  Injectable 0.5 milliGRAM(s) IV Push every 6 hours PRN  HYDROmorphone  Injectable 0.5 milliGRAM(s) IV Push every 4 hours PRN  fentaNYL   Patch  25 MICROgram(s)/Hr 1 Patch Transdermal every 72 hours  furosemide   Injectable 40 milliGRAM(s) IV Push daily  enoxaparin Injectable 60 milliGRAM(s) SubCutaneous every 12 hours  meropenem IVPB 1000 milliGRAM(s) IV Intermittent every 8 hours  methylPREDNISolone sodium succinate Injectable 30 milliGRAM(s) IV Push daily  pantoprazole  Injectable 40 milliGRAM(s) IV Push daily  nicotine - 21 mG/24Hr(s) Patch 1 patch Transdermal daily      PMHX/PSHX:  Ascites  Hernia  Pancreatitis  Hypertension  TIA (transient ischemic attack)  No pertinent past medical history  History of inguinal hernia repair  S/P skin graft using Integra  Fracture of shaft of left femur  No significant past surgical history      Family history:  Family history of duodenal cancer (Father)  No pertinent family history in first degree relatives      Social History:     ROS: as per HPI       PHYSICAL EXAM:   Vital Signs:  Vital Signs Last 24 Hrs  T(C): 36.7 (16 Aug 2017 04:25), Max: 36.7 (15 Aug 2017 16:45)  T(F): 98.1 (16 Aug 2017 04:25), Max: 98.1 (16 Aug 2017 04:25)  HR: 80 (16 Aug 2017 04:25) (75 - 80)  BP: 124/74 (16 Aug 2017 04:25) (124/74 - 128/72)  BP(mean): --  RR: 18 (16 Aug 2017 04:25) (18 - 18)  SpO2: 91% (16 Aug 2017 04:25) (90% - 99%)  Daily Height in cm: 180.34 (15 Aug 2017 16:45)    Daily Weight in k.1 (16 Aug 2017 08:30)    GENERAL:  NAD, NG tube draining bilious fluid   HEENT:  sclera -anicteric  CHEST:  breath sounds clear  HEART:  Regular rhythm  ABDOMEN:  Soft, non-tender, distended, hypoactive bowel sounds  SKIN:  No jaundice   NEURO:  Alert, oriented    LABS:                        10.6   32.2  )-----------( 433      ( 16 Aug 2017 05:30 )             33.0         136  |  100  |  24<H>  ----------------------------<  88  5.0   |  24  |  0.35<L>    Ca    8.2<L>      16 Aug 2017 05:30  Phos  3.2       Mg     2.0         TPro  4.8<L>  /  Alb  2.2<L>  /  TBili  0.3  /  DBili  x   /  AST  34  /  ALT  37  /  AlkPhos  107  16    LIVER FUNCTIONS - ( 16 Aug 2017 05:30 )  Alb: 2.2 g/dL / Pro: 4.8 g/dL / ALK PHOS: 107 U/L / ALT: 37 U/L RC / AST: 34 U/L / GGT: x           PT/INR - ( 16 Aug 2017 05:30 )   PT: 13.5 sec;   INR: 1.23 ratio         PTT - ( 16 Aug 2017 05:30 )  PTT:26.9 sec    Amylase [id number]      Lipase [id number]       Ammonia--      Imaging:       EXAM:  CT ABDOMEN AND PELVIS                          PROCEDURE DATE:  2017          INTERPRETATION:  [facility] RADIOLOGIST PRELIMINARY REPORT    EXAM:    CT Abdomen and Pelvis Without Intravenous Contrast    CLINICAL HISTORY:    57 years old, male; Signs and symptoms; Constipation    TECHNIQUE:    Axial computed tomography images of the abdomen and pelvis without   intravenous contrast.    Coronal and sagittal reformatted images were created and reviewed.    COMPARISON:    [facility] CT ABDOMEN AND PELVIS OC  2017 9:47:38 PM    FINDINGS:    Lower thorax:  Small left pleural effusion. Smaller right pleural   effusion.    Anasarca: Pleural effusions, ascites, subcutaneous edema  Moderate hiatal   hernia.     ABDOMEN:    Liver:  Unremarkable.    Gallbladder and bile ducts:  Unremarkable.  No calcified stones.  No   ductal   dilation.    Pancreas:  Edema and inflammatory changes around the pancreas   consistent with   acute pancreatitis.  Calcifications in the pancreas consistent with   chronic   pancreatitis. Dilatation of the pancreatic duct.  Multiple fluid   collections   adjacent to pancreas consistent with pancreatic pseudocysts. Largest   measures 4   cm. Previously it measured 3.7 cm.    Spleen:  Unremarkable.  No splenomegaly.    Adrenals:  Unremarkable.  No mass.    Kidneys and ureters:  Unremarkable.  No obstructing stones.  No   hydronephrosis.    Stomach and bowel:  There are multiple loops of dilated small bowel   with   air-fluid levels.  This may represent ileus or early small bowel   obstruction.    Findings consistent with constipation.  Large amount of constipation in   the   right colon.    Appendix:  No findings to suggest acute appendicitis.     PELVIS:    Bladder:  Unremarkable.  No stones.    Reproductive:  Unremarkable as visualized.     ABDOMEN and PELVIS:    Intraperitoneal space:  Large amount of ascites throughout the abdomen   and   pelvis.    Bones/joints:  No acute fracture.  No dislocation.    Soft tissues:  Edema in the subcutaneous fat.    Vasculature:  Unremarkable.  No abdominal aortic aneurysm.    Lymph nodes:  Unremarkable.  No enlarged lymph nodes.    IMPRESSION:       1.  Edema and inflammatory changes around the pancreas consistent with   acute   pancreatitis.    2.  Calcifications in the pancreas consistent with chronic pancreatitis.   Dilatation of the pancreatic duct.    3.  Multiple fluid collections adjacent to pancreas consistent with   pancreatic   pseudocysts. Largest measures 4 cm. Previously it measured 3.7 cm.    4.  Small left pleural effusion. Smaller right pleural effusion.    5.  Large amount of ascites throughout the abdomen and pelvis.    6.  There are multiple loops of dilated small bowel with air-fluid   levels.    This may represent ileus or early small bowel obstruction.    7. Rhett YUSUF M.D., ATTENDING RADIOLOGIST  This document has been electronically signed. Aug  5 2017  8:08AM

## 2017-08-16 NOTE — PROGRESS NOTE ADULT - PROBLEM SELECTOR PLAN 3
Pt has had paracentesis x2, both showing elevated PMN's concerning for SBP. Initially treated with INVANZ and Flagyl and now with Meropenem. Cultures remain negative. Will continue to treat. Consider ID consult Pt has had paracentesis x2, both showing elevated PMN's concerning for SBP. - Cultures remain negative to date  - Currently on Meropenem Pt has had paracentesis x2, both showing elevated PMN's consistent with SBP. - Cultures remain negative to date  - c/w  Meropenem

## 2017-08-16 NOTE — PROGRESS NOTE ADULT - PROBLEM SELECTOR PLAN 5
MRI showed concern for acute/subacute portal vein thrombosis and patient started on therapeutic lovenox. Abd doppler from today did not show thrombus, but sequela from prior thrombus. Will continue lovenox. Patient has two therapeutic taps draining about 1.2 L each time. Amylase greatly elevated in both with SAAG < 1.2. With significant hypoalbuminemia.   - Will continue on Lasix 40 IV  - Hold aldactone as patient is NPO.

## 2017-08-16 NOTE — PROGRESS NOTE ADULT - PROBLEM SELECTOR PLAN 4
patient has two therapeutic taps draining about 1.2 L each time. Amylase greatly elevated in both with SAAG < 1.2. Will continue on Lasix 40 IV. Hold aldactone as patient is NPO. .  Has been > 30 with neutrophilic predominance.  Likely multifactorial in etiology, including acute on chronic pancreatitis, SBP, and steroid therapy for panniculitis.   - Overall downtrending   - Currently afebrile and hemodynamically stable.   - Will continue to f/u

## 2017-08-16 NOTE — CONSULT NOTE ADULT - ASSESSMENT
IMP:    Acute on chronic pancreatitis c/b pancreatic ascites concerning for PD leak     PLAN:  - NPO, IVF, antibiotics  - NG tube for decompression  - plan for ERCP today with pancreatic duct stenting IMP:    Acute on chronic pancreatitis c/b pancreatic ascites concerning for PD leak     PLAN:  - NPO, IVF, antibiotics  - NG tube for decompression  - Hold anticoagulation for planned procedure tomorrow   - plan for ERCP tomorrow with pancreatic duct stenting

## 2017-08-16 NOTE — PROGRESS NOTE ADULT - PROBLEM SELECTOR PLAN 1
amylase and lipase slightly improved from yesterday. Imaging showing multiple stable pancreatic cysts. Consult advanced endoscopy for possible ERCP guided stent placement and drainage of cysts. Will start patient on moderate fluids, but will be careful given ascites.  Unclear as to what caused acute exacerbation during initial hospitalization as pt denies drinking. Will further investigate. .  Imaging showing multiple stable pancreatic cysts and main pancreatic duct dilation.  - GI on board, recs appreciated  - plan for ERCP tomorrow  - Lovenox held for tonight

## 2017-08-16 NOTE — PROGRESS NOTE ADULT - PROBLEM SELECTOR PLAN 7
Patient has agreed to DNR/DNI. consider palliative consult or possible hospice care for discharge planning. 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 Lesions have been improving with steroids.   - Currently on methylprednisone 30mg daily.   - Will continue to taper by 10 mg weekly.

## 2017-08-16 NOTE — PROGRESS NOTE ADULT - PROBLEM SELECTOR PLAN 8
.  - Patient has agreed to DNR/DNI.   - Patient for home with home hospice .  - Patient has agreed to DNR/DNI.   - Patient for home with home hospice but family meeting keron  - prognosis guarded

## 2017-08-16 NOTE — PROGRESS NOTE ADULT - SUBJECTIVE AND OBJECTIVE BOX
Patient is a 57 year old male with acute on chronic pancreatitis transferred from [facility] for possible ERCP.       SUBJECTIVE / OVERNIGHT EVENTS:    HPI:  57M hx chronic pancreatitis (dx 2002), ETOH abuse (last drink 2 years ago), HTN, inguinal hernia repair initially presented to [facility] for worsening foot pain refractory to antibiotics. Patient was found to have acute on chronic pancreatitis. 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 improved with course of steroid taper. Patient has had multiple complications throughout his course. He was found to have worsening abdominal ascites, which was tapped. SAAG <1.2 and amylase greatly elevated indicating likely pancreatic ascites. Pt started on aldactone and lasix. Nucleated cells were 1500 with 90% PMN's indicative of SBP. Patient initially started on INVANZ, but had worsening leukocytosis and was started on Meropenem. Ascites cultures have been negative. MRI Abdomen showed stable pancreatic pseudocysts, dilated main pancreatic duct as well as acute/subacute portal vein thrombosis. Patient started on therapeutic lovenox with  repeat abdominal US with doppler which shows no thrombus.  Two days prior to transfer patient had worsening abdominal distention and constipation. Abdominal xray was concerning for SBO however patient, although pt continued to pass gas and denies vomiting. Subsequent US showed only colonic dilation. Surgery consulted and believe it is Ileus. NGT was placed to suction and patient made NPO for bowel rest. NGT has been draining bile since. During course palliative care was consulted due to worsening condition and patient agreed to be DNR/DNI. Due to worsening patient condition patient was transferred to [facility] for MRCP with secretin and possible ERCP for stent placement.       Interval History:   Patient currently with no complaints. Abdominal pain and nausea well controlled. NGT actively draining. Minimal pain from lesions on hand and foot. He is passing gas. Vitals are stable upon arrival to floor. (15 Aug 2017 21:38)      MEDICATIONS  (STANDING):  fentaNYL   Patch  25 MICROgram(s)/Hr 1 Patch Transdermal every 72 hours  furosemide   Injectable 40 milliGRAM(s) IV Push daily  meropenem IVPB 1000 milliGRAM(s) IV Intermittent every 8 hours  methylPREDNISolone sodium succinate Injectable 30 milliGRAM(s) IV Push daily  pantoprazole  Injectable 40 milliGRAM(s) IV Push daily  nicotine - 21 mG/24Hr(s) Patch 1 patch Transdermal daily    MEDICATIONS  (PRN):  HYDROmorphone  Injectable 0.5 milliGRAM(s) IV Push every 6 hours PRN Moderate Pain (4 - 6)  HYDROmorphone  Injectable 0.5 milliGRAM(s) IV Push every 4 hours PRN Breakthrough pain      Vital Signs Last 24 Hrs  T(C): 36.3 (16 Aug 2017 14:22), Max: 36.7 (16 Aug 2017 04:25)  T(F): 97.3 (16 Aug 2017 14:22), Max: 98.1 (16 Aug 2017 04:25)  HR: 88 (16 Aug 2017 14:22) (78 - 88)  BP: 113/73 (16 Aug 2017 14:22) (113/73 - 127/68)  BP(mean): --  RR: 18 (16 Aug 2017 14:22) (18 - 18)  SpO2: 90% (16 Aug 2017 14:22) (90% - 91%)  CAPILLARY BLOOD GLUCOSE        I&O's Summary    15 Aug 2017 07:01  -  16 Aug 2017 07:00  --------------------------------------------------------  IN: 0 mL / OUT: 850 mL / NET: -850 mL    16 Aug 2017 07:01  -  16 Aug 2017 16:54  --------------------------------------------------------  IN: 100 mL / OUT: 1600 mL / NET: -1500 mL        PHYSICAL EXAM:  GENERAL: NAD, cachectic  HEAD:  Atraumatic, Normocephalic  EYES: EOMI, PERRLA, conjunctiva and sclera clear  NECK: Supple, No JVD  CHEST/LUNG: Clear to auscultation bilaterally; No wheeze  HEART: Regular rate and rhythm; No murmurs, rubs, or gallops  ABDOMEN: Soft, +BS, Nontender, Mild distention  EXTREMITIES:  Blanching, erythematous, minimally tender lesions 2+ Peripheral Pulses, No clubbing, cyanosis, or edema  PSYCH: AAOx3  NEUROLOGY: non-focal  SKIN: No rashes or lesions    LABS:                        10.6   32.2  )-----------( 433      ( 16 Aug 2017 05:30 )             33.0     08-16    136  |  100  |  24<H>  ----------------------------<  88  5.0   |  24  |  0.35<L>    Ca    8.2<L>      16 Aug 2017 05:30  Phos  3.2     08-16  Mg     2.0     08-16    TPro  4.8<L>  /  Alb  2.2<L>  /  TBili  0.3  /  DBili  x   /  AST  34  /  ALT  37  /  AlkPhos  107  08-16    PT/INR - ( 16 Aug 2017 05:30 )   PT: 13.5 sec;   INR: 1.23 ratio         PTT - ( 16 Aug 2017 05:30 )  PTT:26.9 sec          RADIOLOGY & ADDITIONAL TESTS:    Imaging Personally Reviewed:    Consultant(s) Notes Reviewed:      Care Discussed with Consultants/Other Providers: Patient is a 57 year old male with acute on chronic pancreatitis transferred from [facility] for possible ERCP.       SUBJECTIVE / OVERNIGHT EVENTS:    HPI:  57M hx chronic pancreatitis (dx 2002), ETOH abuse (last drink 2 years ago), HTN, inguinal hernia repair initially presented to [facility] for worsening foot pain refractory to antibiotics. Patient was found to have acute on chronic pancreatitis. 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 improved with course of steroid taper. Patient has had multiple complications throughout his course. He was found to have worsening abdominal ascites, which was tapped. SAAG <1.2 and amylase greatly elevated indicating likely pancreatic ascites. Pt started on aldactone and lasix. Nucleated cells were 1500 with 90% PMN's indicative of SBP. Patient initially started on INVANZ, but had worsening leukocytosis and was started on Meropenem. Ascites cultures have been negative. MRI Abdomen showed stable pancreatic pseudocysts, dilated main pancreatic duct as well as acute/subacute portal vein thrombosis. Patient started on therapeutic lovenox with  repeat abdominal US with doppler which shows no thrombus.  Two days prior to transfer patient had worsening abdominal distention and constipation. Abdominal xray was concerning for SBO however patient, although pt continued to pass gas and denies vomiting. Subsequent US showed only colonic dilation. Surgery consulted and believe it is Ileus. NGT was placed to suction and patient made NPO for bowel rest. NGT has been draining bile since. During course palliative care was consulted due to worsening condition and patient agreed to be DNR/DNI. Due to worsening patient condition patient was transferred to [facility] for MRCP with secretin and possible ERCP for stent placement.       Interval History:   Patient currently with no complaints. Abdominal pain and nausea well controlled. NGT actively draining. Minimal pain from lesions on hand and foot. Able to pass gas. Last BM 4 days ago.     MEDICATIONS  (STANDING):  fentaNYL   Patch  25 MICROgram(s)/Hr 1 Patch Transdermal every 72 hours  furosemide   Injectable 40 milliGRAM(s) IV Push daily  meropenem IVPB 1000 milliGRAM(s) IV Intermittent every 8 hours  methylPREDNISolone sodium succinate Injectable 30 milliGRAM(s) IV Push daily  pantoprazole  Injectable 40 milliGRAM(s) IV Push daily  nicotine - 21 mG/24Hr(s) Patch 1 patch Transdermal daily    MEDICATIONS  (PRN):  HYDROmorphone  Injectable 0.5 milliGRAM(s) IV Push every 6 hours PRN Moderate Pain (4 - 6)  HYDROmorphone  Injectable 0.5 milliGRAM(s) IV Push every 4 hours PRN Breakthrough pain      Vital Signs Last 24 Hrs  T(C): 36.3 (16 Aug 2017 14:22), Max: 36.7 (16 Aug 2017 04:25)  T(F): 97.3 (16 Aug 2017 14:22), Max: 98.1 (16 Aug 2017 04:25)  HR: 88 (16 Aug 2017 14:22) (78 - 88)  BP: 113/73 (16 Aug 2017 14:22) (113/73 - 127/68)  BP(mean): --  RR: 18 (16 Aug 2017 14:22) (18 - 18)  SpO2: 90% (16 Aug 2017 14:22) (90% - 91%)  CAPILLARY BLOOD GLUCOSE        I&O's Summary    15 Aug 2017 07:01  -  16 Aug 2017 07:00  --------------------------------------------------------  IN: 0 mL / OUT: 850 mL / NET: -850 mL    16 Aug 2017 07:01  -  16 Aug 2017 16:54  --------------------------------------------------------  IN: 100 mL / OUT: 1600 mL / NET: -1500 mL        PHYSICAL EXAM:  GENERAL: NAD, cachectic  HEAD:  Atraumatic, Normocephalic. NGT in place with bilious drainage  EYES: EOMI, PERRLA, conjunctiva and sclera clear  NECK: Supple, No JVD  CHEST/LUNG: Diminished breath sounds throughout, otherwise clear. Fentanyl patch in place.  HEART: RRR; No murmurs.   ABDOMEN: Soft, +BS, Nontender, Mild distention  EXTREMITIES:  2+ Peripheral Pulses. 3+ pitting edema up to the knees.   PSYCH: AAOx3  NEUROLOGY: non-focal  SKIN: Blanching, erythematous, minimally tender lesions on radial aspect of left hand and ulnar aspect of the right hand. No erythema noted.     LABS:                        10.6   32.2  )-----------( 433      ( 16 Aug 2017 05:30 )             33.0     08-16    136  |  100  |  24<H>  ----------------------------<  88  5.0   |  24  |  0.35<L>    Ca    8.2<L>      16 Aug 2017 05:30  Phos  3.2     08-16  Mg     2.0     08-16    TPro  4.8<L>  /  Alb  2.2<L>  /  TBili  0.3  /  DBili  x   /  AST  34  /  ALT  37  /  AlkPhos  107  08-16    PT/INR - ( 16 Aug 2017 05:30 )   PT: 13.5 sec;   INR: 1.23 ratio         PTT - ( 16 Aug 2017 05:30 )  PTT:26.9 sec          RADIOLOGY & ADDITIONAL TESTS:    Imaging Personally Reviewed:    Abdominal MRI  8/11/17  IMPRESSION: Large volume ascites. Chronic pancreatitis, grossly stable. Main pancreatic duct dilated.   Peripancreatic pseudocysts mildly decreased in size.    Right intrahepatic portal vein thrombosis, likely subacute/acute and new   from 5/2017.      Hepatic/Portal Vein Duplex: 8/15/17  IMPRESSION:    1.  Linear echoes within the posterior branch of the right portal vein,   possibly sequela of prior portal vein thrombus. No acute or obstructive   thrombus.  2.  Multiple complex lesions in the region of the pancreatic head, better   characterized on MRI from August 11, 2017.      Xray Abdomen 8/14/17 Comparison: 8/9/2017.  Findings: A nasogastric tube is noted with the tip in the region of the   descending duodenum. Significantly dilated small bowel loops   demonstrating increasing dilatation compared with 8/9/2017. No evidence   of colonic dilatation..      Consultant(s) Notes Reviewed:   GI: Plan for procedure tomorrow. Off of Lovenox.       Care Discussed with Consultants/Other Providers:  Surgery aware of pt regarding SBO. Patient is a 57 year old male with acute on chronic pancreatitis transferred from [facility] for possible ERCP.       SUBJECTIVE / OVERNIGHT EVENTS:    HPI:  57M hx chronic pancreatitis (dx 2002), ETOH abuse (last drink 2 years ago), HTN, inguinal hernia repair initially presented to [facility] for worsening foot pain refractory to antibiotics. Patient was found to have acute on chronic pancreatitis. 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 improved with course of steroid taper. Patient has had multiple complications throughout his course. He was found to have worsening abdominal ascites, which was tapped. SAAG <1.2 and amylase greatly elevated indicating likely pancreatic ascites. Pt started on aldactone and lasix. Nucleated cells were 1500 with 90% PMN's indicative of SBP. Patient initially started on INVANZ, but had worsening leukocytosis and was started on Meropenem. Ascites cultures have been negative. MRI Abdomen showed stable pancreatic pseudocysts, dilated main pancreatic duct as well as acute/subacute portal vein thrombosis. Patient started on therapeutic lovenox with  repeat abdominal US with doppler which shows no thrombus.  Two days prior to transfer patient had worsening abdominal distention and constipation. Abdominal xray was concerning for SBO however patient, although pt continued to pass gas and denies vomiting. Subsequent US showed only colonic dilation. Surgery consulted and believe it is Ileus. NGT was placed to suction and patient made NPO for bowel rest. NGT has been draining bile since. During course palliative care was consulted due to worsening condition and patient agreed to be DNR/DNI. Due to worsening patient condition patient was transferred to [facility] for MRCP with secretin and possible ERCP for stent placement.       Interval History:   Patient currently with no complaints. Abdominal pain and nausea well controlled. NGT actively draining. Minimal pain from lesions on hand and foot. Able to pass gas. Last BM 4 days ago.     MEDICATIONS  (STANDING):  fentaNYL   Patch  25 MICROgram(s)/Hr 1 Patch Transdermal every 72 hours  furosemide   Injectable 40 milliGRAM(s) IV Push daily  meropenem IVPB 1000 milliGRAM(s) IV Intermittent every 8 hours  methylPREDNISolone sodium succinate Injectable 30 milliGRAM(s) IV Push daily  pantoprazole  Injectable 40 milliGRAM(s) IV Push daily  nicotine - 21 mG/24Hr(s) Patch 1 patch Transdermal daily    MEDICATIONS  (PRN):  HYDROmorphone  Injectable 0.5 milliGRAM(s) IV Push every 6 hours PRN Moderate Pain (4 - 6)  HYDROmorphone  Injectable 0.5 milliGRAM(s) IV Push every 4 hours PRN Breakthrough pain      Vital Signs Last 24 Hrs  T(C): 36.3 (16 Aug 2017 14:22), Max: 36.7 (16 Aug 2017 04:25)  T(F): 97.3 (16 Aug 2017 14:22), Max: 98.1 (16 Aug 2017 04:25)  HR: 88 (16 Aug 2017 14:22) (78 - 88)  BP: 113/73 (16 Aug 2017 14:22) (113/73 - 127/68)  BP(mean): --  RR: 18 (16 Aug 2017 14:22) (18 - 18)  SpO2: 90% (16 Aug 2017 14:22) (90% - 91%)  CAPILLARY BLOOD GLUCOSE        I&O's Summary    15 Aug 2017 07:01  -  16 Aug 2017 07:00  --------------------------------------------------------  IN: 0 mL / OUT: 850 mL / NET: -850 mL    16 Aug 2017 07:01  -  16 Aug 2017 16:54  --------------------------------------------------------  IN: 100 mL / OUT: 1600 mL / NET: -1500 mL        PHYSICAL EXAM:  GENERAL: NAD, cachectic  HEAD:  Atraumatic, Normocephalic. NGT in place with bilious drainage  EYES: EOMI, PERRLA, conjunctiva and sclera clear  NECK: Supple, No JVD  CHEST/LUNG: Diminished breath sounds throughout, otherwise clear. Fentanyl patch in place.  HEART: RRR; No murmurs.   ABDOMEN: Soft, +BS, Nontender, Mild distention  EXTREMITIES:  2+ Peripheral Pulses. 3+ pitting edema up to the knees.   PSYCH: AAOx3  NEUROLOGY: non-focal  SKIN: Blanching, erythematous, minimally tender lesions on radial aspect of left hand and ulnar aspect of the right hand. No erythema noted.     LABS:                        10.6   32.2  )-----------( 433      ( 16 Aug 2017 05:30 )             33.0     08-16    136  |  100  |  24<H>  ----------------------------<  88  5.0   |  24  |  0.35<L>    Ca    8.2<L>      16 Aug 2017 05:30  Phos  3.2     08-16  Mg     2.0     08-16    TPro  4.8<L>  /  Alb  2.2<L>  /  TBili  0.3  /  DBili  x   /  AST  34  /  ALT  37  /  AlkPhos  107  08-16    PT/INR - ( 16 Aug 2017 05:30 )   PT: 13.5 sec;   INR: 1.23 ratio         PTT - ( 16 Aug 2017 05:30 )  PTT:26.9 sec          RADIOLOGY & ADDITIONAL TESTS:    Imaging Personally Reviewed:    Abdominal MRI  8/11/17  IMPRESSION: Large volume ascites. Chronic pancreatitis, grossly stable. Main pancreatic duct dilated.   Peripancreatic pseudocysts mildly decreased in size.    Right intrahepatic portal vein thrombosis, likely subacute/acute and new   from 5/2017.      Hepatic/Portal Vein Duplex: 8/15/17  IMPRESSION:    1.  Linear echoes within the posterior branch of the right portal vein,   possibly sequela of prior portal vein thrombus. No acute or obstructive   thrombus.  2.  Multiple complex lesions in the region of the pancreatic head, better   characterized on MRI from August 11, 2017.      personally reviewed Xray Abdomen 8/14/17 Comparison: 8/9/2017.  Findings: A nasogastric tube is noted with the tip in the region of the   descending duodenum. Significantly dilated small bowel loops   demonstrating increasing dilatation compared with 8/9/2017. No evidence   of colonic dilatation..      Consultant(s) Notes Reviewed:   GI: Plan for procedure tomorrow. hold lovenox.       Care Discussed with Consultants/Other Providers:  Surgery aware of pt regarding SBO., will give recs Patient is a 57 year old male with acute on chronic pancreatitis transferred from [facility] for possible ERCP.       SUBJECTIVE / OVERNIGHT EVENTS:    HPI:  57M hx chronic pancreatitis (dx 2002), ETOH abuse (last drink 2 years ago), HTN, inguinal hernia repair initially presented to [facility] for worsening foot pain refractory to antibiotics. Patient was found to have acute on chronic pancreatitis. 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 improved with course of steroid taper. Patient has had multiple complications throughout his course. He was found to have worsening abdominal ascites, which was tapped. SAAG <1.2 and amylase greatly elevated indicating likely pancreatic ascites. Pt started on aldactone and lasix. Nucleated cells were 1500 with 90% PMN's indicative of SBP. Patient initially started on INVANZ, but had worsening leukocytosis and was started on Meropenem. Ascites cultures have been negative. MRI Abdomen showed stable pancreatic pseudocysts, dilated main pancreatic duct as well as acute/subacute portal vein thrombosis. Patient started on therapeutic lovenox with  repeat abdominal US with doppler which shows no thrombus.  Two days prior to transfer patient had worsening abdominal distention and constipation. Abdominal xray was concerning for SBO however patient, although pt continued to pass gas and denies vomiting. Subsequent US showed only colonic dilation. Surgery consulted and believe it is Ileus. NGT was placed to suction and patient made NPO for bowel rest. NGT has been draining bile since. During course palliative care was consulted due to worsening condition and patient agreed to be DNR/DNI. Due to worsening patient condition patient was transferred to [facility] for MRCP with secretin and possible ERCP for stent placement.       Interval History:   Patient currently with no complaints. Abdominal pain and nausea well controlled. NGT actively draining. Minimal pain from lesions on hand and foot. Able to pass gas. Last BM 4 days ago.     MEDICATIONS  (STANDING):  fentaNYL   Patch  25 MICROgram(s)/Hr 1 Patch Transdermal every 72 hours  furosemide   Injectable 40 milliGRAM(s) IV Push daily  meropenem IVPB 1000 milliGRAM(s) IV Intermittent every 8 hours  methylPREDNISolone sodium succinate Injectable 30 milliGRAM(s) IV Push daily  pantoprazole  Injectable 40 milliGRAM(s) IV Push daily  nicotine - 21 mG/24Hr(s) Patch 1 patch Transdermal daily    MEDICATIONS  (PRN):  HYDROmorphone  Injectable 0.5 milliGRAM(s) IV Push every 6 hours PRN Moderate Pain (4 - 6)  HYDROmorphone  Injectable 0.5 milliGRAM(s) IV Push every 4 hours PRN Breakthrough pain      Vital Signs Last 24 Hrs  T(C): 36.3 (16 Aug 2017 14:22), Max: 36.7 (16 Aug 2017 04:25)  T(F): 97.3 (16 Aug 2017 14:22), Max: 98.1 (16 Aug 2017 04:25)  HR: 88 (16 Aug 2017 14:22) (78 - 88)  BP: 113/73 (16 Aug 2017 14:22) (113/73 - 127/68)  BP(mean): --  RR: 18 (16 Aug 2017 14:22) (18 - 18)  SpO2: 90% (16 Aug 2017 14:22) (90% - 91%)  CAPILLARY BLOOD GLUCOSE        I&O's Summary    15 Aug 2017 07:01  -  16 Aug 2017 07:00  --------------------------------------------------------  IN: 0 mL / OUT: 850 mL / NET: -850 mL    16 Aug 2017 07:01  -  16 Aug 2017 16:54  --------------------------------------------------------  IN: 100 mL / OUT: 1600 mL / NET: -1500 mL        PHYSICAL EXAM:  GENERAL: NAD, cachectic  HEAD:  Atraumatic, Normocephalic. NGT in place with bilious drainage  EYES: EOMI, PERRLA, conjunctiva and sclera clear  NECK: Supple, No JVD  CHEST/LUNG: Diminished breath sounds throughout, otherwise clear. Fentanyl patch in place.  HEART: RRR; No murmurs.   ABDOMEN: Soft, , Nontender, Mild distention, sluggish bs+  EXTREMITIES:  2+ Peripheral Pulses. 3+ pitting edema up to the knees.   PSYCH: AAOx3  NEUROLOGY: non-focal  SKIN: Blanching, erythematous, minimally tender lesions on radial aspect of left hand and ulnar aspect of the right hand. No erythema noted.     LABS:                        10.6   32.2  )-----------( 433      ( 16 Aug 2017 05:30 )             33.0     08-16    136  |  100  |  24<H>  ----------------------------<  88  5.0   |  24  |  0.35<L>    Ca    8.2<L>      16 Aug 2017 05:30  Phos  3.2     08-16  Mg     2.0     08-16    TPro  4.8<L>  /  Alb  2.2<L>  /  TBili  0.3  /  DBili  x   /  AST  34  /  ALT  37  /  AlkPhos  107  08-16    PT/INR - ( 16 Aug 2017 05:30 )   PT: 13.5 sec;   INR: 1.23 ratio         PTT - ( 16 Aug 2017 05:30 )  PTT:26.9 sec          RADIOLOGY & ADDITIONAL TESTS:    Imaging Personally Reviewed:    Abdominal MRI  8/11/17  IMPRESSION: Large volume ascites. Chronic pancreatitis, grossly stable. Main pancreatic duct dilated.   Peripancreatic pseudocysts mildly decreased in size.    Right intrahepatic portal vein thrombosis, likely subacute/acute and new   from 5/2017.      Hepatic/Portal Vein Duplex: 8/15/17  IMPRESSION:    1.  Linear echoes within the posterior branch of the right portal vein,   possibly sequela of prior portal vein thrombus. No acute or obstructive   thrombus.  2.  Multiple complex lesions in the region of the pancreatic head, better   characterized on MRI from August 11, 2017.      personally reviewed Xray Abdomen 8/14/17 Comparison: 8/9/2017.  Findings: A nasogastric tube is noted with the tip in the region of the   descending duodenum. Significantly dilated small bowel loops   demonstrating increasing dilatation compared with 8/9/2017. No evidence   of colonic dilatation..      Consultant(s) Notes Reviewed:   GI: Plan for procedure tomorrow. hold lovenox.       Care Discussed with Consultants/Other Providers:  Surgery aware of pt regarding SBO., will give recs

## 2017-08-16 NOTE — PROGRESS NOTE ADULT - PROBLEM SELECTOR PLAN 6
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 Lesions have been improving with steroids. Currently on methylprednisone 30mg daily. Will continue to taper by 10 weekly. MRI showed concern for acute/subacute portal vein thrombosis and patient started on therapeutic lovenox.   - Repeat abdominal U/S showed no thrombus.   - Will follow up with GI if Lovenox needs to be continued MRI showed concern for acute/subacute portal vein thrombosis and patient started on therapeutic lovenox.   - Repeat abdominal U/S showed no thrombus.   - Will follow up with GI if Lovenox needs to be continued post pancreatic duct as thrombus not seen on most recent Abd duplex

## 2017-08-16 NOTE — PROGRESS NOTE ADULT - SUBJECTIVE AND OBJECTIVE BOX
Patient is a 57y old  Male who presents with a chief complaint of transfer for possible ERCP. (15 Aug 2017 21:38)      SUBJECTIVE / OVERNIGHT EVENTS:      HPI:  57M hx chronic pancreatitis (dx 2002), ETOH abuse (last drink 2 years ago), HTN, inguinal hernia repair initially presented to [facility] for worsening foot pain refractory to antibiotics. Patient was found to have acute on chronic pancreatitis. 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 Patient has been started on steroid taper with improvement of lesions and pain. Patient has had multiple complications throughout his course. He was found to have worsening abdominal ascites, which was tapped. SAAG <1.2 and amylase greatly elevated indicating likely pancreatic ascites. Pt started on aldactone and lasix Nucleated cells were 1500 with 90% PMN's indicative of SBP. Patient initially started on INVANZ, but had worsening leukocytosis and was started on Meropenem. Ascites cultures have been negative.  Patient amylase/lipase was initially down trending with fluids, but then came back up. MRI showed stable pancreatic pseudocysts, but found acute/subacute portal vein thrombosis. Patient started on therapeutic lovenox. Pt has since abdominal US with doppler which shows no thrombus.  2 days prior to transfer patient had worsening abdominal distention and constipation. Abdominal xray was concerning for SBO however patient has continued to pass gas and denies vomiting. Subsequent US showed only colonic dilation. Surgery consulted and believe it is Ileus. NGT was placed to suction and patient made NPO for bowel rest. NGT has been draining bile since. During course palliative care was consulted due to worsening condition and patient agreed to be DNR/DNI. Due to worsening patient condition patient was transferred to [facility] for MRCP with secretin and possible ERCP with pseudocyts drainage for symptomatic relief.     Pt current complains are some hand and foot pain as well as hunger. He is passing gas. Vitals are stable upon arrival to floor. (15 Aug 2017 21:38)      MEDICATIONS  (STANDING):  fentaNYL   Patch  25 MICROgram(s)/Hr 1 Patch Transdermal every 72 hours  furosemide   Injectable 40 milliGRAM(s) IV Push daily  meropenem IVPB 1000 milliGRAM(s) IV Intermittent every 8 hours  methylPREDNISolone sodium succinate Injectable 30 milliGRAM(s) IV Push daily  pantoprazole  Injectable 40 milliGRAM(s) IV Push daily  nicotine - 21 mG/24Hr(s) Patch 1 patch Transdermal daily    MEDICATIONS  (PRN):  HYDROmorphone  Injectable 0.5 milliGRAM(s) IV Push every 6 hours PRN Moderate Pain (4 - 6)  HYDROmorphone  Injectable 0.5 milliGRAM(s) IV Push every 4 hours PRN Breakthrough pain      Vital Signs Last 24 Hrs  T(C): 36.3 (16 Aug 2017 14:22), Max: 36.7 (16 Aug 2017 04:25)  T(F): 97.3 (16 Aug 2017 14:22), Max: 98.1 (16 Aug 2017 04:25)  HR: 88 (16 Aug 2017 14:22) (78 - 88)  BP: 113/73 (16 Aug 2017 14:22) (113/73 - 127/68)  BP(mean): --  RR: 18 (16 Aug 2017 14:22) (18 - 18)  SpO2: 90% (16 Aug 2017 14:22) (90% - 91%)  CAPILLARY BLOOD GLUCOSE        I&O's Summary    15 Aug 2017 07:01  -  16 Aug 2017 07:00  --------------------------------------------------------  IN: 0 mL / OUT: 850 mL / NET: -850 mL    16 Aug 2017 07:01  -  16 Aug 2017 17:13  --------------------------------------------------------  IN: 100 mL / OUT: 1600 mL / NET: -1500 mL        PHYSICAL EXAM:  GENERAL: NAD, well-developed  HEAD:  Atraumatic, Normocephalic  EYES: EOMI, PERRLA, conjunctiva and sclera clear  NECK: Supple, No JVD  CHEST/LUNG: Clear to auscultation bilaterally; No wheeze  HEART: Regular rate and rhythm; No murmurs, rubs, or gallops  ABDOMEN: Soft, Nontender, Nondistended; Bowel sounds present  EXTREMITIES:  2+ Peripheral Pulses, No clubbing, cyanosis, or edema  PSYCH: AAOx3  NEUROLOGY: non-focal  SKIN: No rashes or lesions    LABS:                        10.6   32.2  )-----------( 433      ( 16 Aug 2017 05:30 )             33.0     08-16    136  |  100  |  24<H>  ----------------------------<  88  5.0   |  24  |  0.35<L>    Ca    8.2<L>      16 Aug 2017 05:30  Phos  3.2     08-16  Mg     2.0     08-16    TPro  4.8<L>  /  Alb  2.2<L>  /  TBili  0.3  /  DBili  x   /  AST  34  /  ALT  37  /  AlkPhos  107  08-16    PT/INR - ( 16 Aug 2017 05:30 )   PT: 13.5 sec;   INR: 1.23 ratio         PTT - ( 16 Aug 2017 05:30 )  PTT:26.9 sec          RADIOLOGY & ADDITIONAL TESTS:    Imaging Personally Reviewed:    Consultant(s) Notes Reviewed:      Care Discussed with Consultants/Other Providers:

## 2017-08-16 NOTE — PROGRESS NOTE ADULT - PROBLEM SELECTOR PLAN 2
pt state he passes gas, but has not had bowel movement in 5 days. He has been NPO with NGT to suction for 2 days. NGT on suction and continuing to drain bile. Will limit opioid use. Will place patient on maintenance fluids. Abdomen non tender. Consider surgery consult for further management. Nutrition consult. .  KUB on 8/13/17 initially with concern for SBO. Repeat on 8/15 now with normal bowel gas pattern. Currently passing gas, but no BM in 5 days  - NPO with NGT to suction with bilious drainage  - hold IVF for now given overall fluid overload state  - Will follow daily abdominal flat plate  - Surgery consult for further management .  KUB on 8/13/17 initially with concern for SBO. Repeat on 8/15 now with normal bowel gas pattern. Currently passing gas, but no BM in 5 days  - NPO with NGT to suction with bilious drainage  - hold IVF for now given overall fluid overload state  - Will follow daily abdominal flat plate  - Surgery consult for further management, recs pending

## 2017-08-16 NOTE — PROGRESS NOTE ADULT - ASSESSMENT
56 YO M  hx of chronic pancreatitis (dx 2002), HTN, recent inguinal hernia repair (June 2017) initially presented to [facility] for panniculitis 2/2 pancreatitis, resolbing with steroid taper. His hospital course was complicated by acute on chronic portal vein thrombosis (on Lovenox) and SBP now on Meropenem with peritoneal cultures negative to date. Recent worsening abdominal distention with concern for pancreatitic duct blockage vs SBO.

## 2017-08-17 DIAGNOSIS — Z71.89 OTHER SPECIFIED COUNSELING: ICD-10-CM

## 2017-08-17 LAB
ALBUMIN SERPL ELPH-MCNC: 2.6 G/DL — LOW (ref 3.3–5)
ALP SERPL-CCNC: 120 U/L — SIGNIFICANT CHANGE UP (ref 40–120)
ALT FLD-CCNC: 35 U/L RC — SIGNIFICANT CHANGE UP (ref 10–45)
ANION GAP SERPL CALC-SCNC: 15 MMOL/L — SIGNIFICANT CHANGE UP (ref 5–17)
APTT BLD: 26.6 SEC — LOW (ref 27.5–37.4)
AST SERPL-CCNC: 31 U/L — SIGNIFICANT CHANGE UP (ref 10–40)
BILIRUB SERPL-MCNC: 0.4 MG/DL — SIGNIFICANT CHANGE UP (ref 0.2–1.2)
BLD GP AB SCN SERPL QL: NEGATIVE — SIGNIFICANT CHANGE UP
BUN SERPL-MCNC: 25 MG/DL — HIGH (ref 7–23)
CALCIUM SERPL-MCNC: 8.9 MG/DL — SIGNIFICANT CHANGE UP (ref 8.4–10.5)
CHLORIDE SERPL-SCNC: 97 MMOL/L — SIGNIFICANT CHANGE UP (ref 96–108)
CO2 SERPL-SCNC: 25 MMOL/L — SIGNIFICANT CHANGE UP (ref 22–31)
CREAT SERPL-MCNC: 0.39 MG/DL — LOW (ref 0.5–1.3)
GLUCOSE SERPL-MCNC: 92 MG/DL — SIGNIFICANT CHANGE UP (ref 70–99)
HCT VFR BLD CALC: 37.6 % — LOW (ref 39–50)
HGB BLD-MCNC: 12.2 G/DL — LOW (ref 13–17)
INR BLD: 1.25 RATIO — HIGH (ref 0.88–1.16)
MAGNESIUM SERPL-MCNC: 2 MG/DL — SIGNIFICANT CHANGE UP (ref 1.6–2.6)
MCHC RBC-ENTMCNC: 32.5 GM/DL — SIGNIFICANT CHANGE UP (ref 32–36)
MCHC RBC-ENTMCNC: 34.7 PG — HIGH (ref 27–34)
MCV RBC AUTO: 107 FL — HIGH (ref 80–100)
PHOSPHATE SERPL-MCNC: 3.4 MG/DL — SIGNIFICANT CHANGE UP (ref 2.5–4.5)
PLATELET # BLD AUTO: 483 K/UL — HIGH (ref 150–400)
POTASSIUM SERPL-MCNC: 4.6 MMOL/L — SIGNIFICANT CHANGE UP (ref 3.5–5.3)
POTASSIUM SERPL-SCNC: 4.6 MMOL/L — SIGNIFICANT CHANGE UP (ref 3.5–5.3)
PROT SERPL-MCNC: 5.6 G/DL — LOW (ref 6–8.3)
PROTHROM AB SERPL-ACNC: 13.7 SEC — HIGH (ref 9.8–12.7)
RBC # BLD: 3.52 M/UL — LOW (ref 4.2–5.8)
RBC # FLD: 14.3 % — SIGNIFICANT CHANGE UP (ref 10.3–14.5)
RH IG SCN BLD-IMP: POSITIVE — SIGNIFICANT CHANGE UP
SODIUM SERPL-SCNC: 137 MMOL/L — SIGNIFICANT CHANGE UP (ref 135–145)
WBC # BLD: 26.6 K/UL — HIGH (ref 3.8–10.5)
WBC # FLD AUTO: 26.6 K/UL — HIGH (ref 3.8–10.5)

## 2017-08-17 PROCEDURE — 74000: CPT | Mod: 26

## 2017-08-17 PROCEDURE — 74330 X-RAY BILE/PANC ENDOSCOPY: CPT | Mod: 26,GC

## 2017-08-17 PROCEDURE — 99497 ADVNCD CARE PLAN 30 MIN: CPT | Mod: 25

## 2017-08-17 PROCEDURE — 99233 SBSQ HOSP IP/OBS HIGH 50: CPT | Mod: GC

## 2017-08-17 PROCEDURE — 43274 ERCP DUCT STENT PLACEMENT: CPT | Mod: GC

## 2017-08-17 RX ADMIN — PANTOPRAZOLE SODIUM 40 MILLIGRAM(S): 20 TABLET, DELAYED RELEASE ORAL at 13:11

## 2017-08-17 RX ADMIN — Medication 30 MILLIGRAM(S): at 05:33

## 2017-08-17 RX ADMIN — Medication 1 PATCH: at 13:17

## 2017-08-17 RX ADMIN — MEROPENEM 200 MILLIGRAM(S): 1 INJECTION INTRAVENOUS at 22:13

## 2017-08-17 RX ADMIN — HYDROMORPHONE HYDROCHLORIDE 0.5 MILLIGRAM(S): 2 INJECTION INTRAMUSCULAR; INTRAVENOUS; SUBCUTANEOUS at 10:20

## 2017-08-17 RX ADMIN — HYDROMORPHONE HYDROCHLORIDE 0.5 MILLIGRAM(S): 2 INJECTION INTRAMUSCULAR; INTRAVENOUS; SUBCUTANEOUS at 02:42

## 2017-08-17 RX ADMIN — Medication 40 MILLIGRAM(S): at 05:34

## 2017-08-17 RX ADMIN — HYDROMORPHONE HYDROCHLORIDE 0.5 MILLIGRAM(S): 2 INJECTION INTRAMUSCULAR; INTRAVENOUS; SUBCUTANEOUS at 02:16

## 2017-08-17 RX ADMIN — MEROPENEM 200 MILLIGRAM(S): 1 INJECTION INTRAVENOUS at 13:11

## 2017-08-17 RX ADMIN — HYDROMORPHONE HYDROCHLORIDE 0.5 MILLIGRAM(S): 2 INJECTION INTRAMUSCULAR; INTRAVENOUS; SUBCUTANEOUS at 23:32

## 2017-08-17 RX ADMIN — HYDROMORPHONE HYDROCHLORIDE 0.5 MILLIGRAM(S): 2 INJECTION INTRAMUSCULAR; INTRAVENOUS; SUBCUTANEOUS at 09:27

## 2017-08-17 RX ADMIN — Medication 1 PATCH: at 13:11

## 2017-08-17 RX ADMIN — HYDROMORPHONE HYDROCHLORIDE 0.5 MILLIGRAM(S): 2 INJECTION INTRAMUSCULAR; INTRAVENOUS; SUBCUTANEOUS at 13:11

## 2017-08-17 RX ADMIN — HYDROMORPHONE HYDROCHLORIDE 0.5 MILLIGRAM(S): 2 INJECTION INTRAMUSCULAR; INTRAVENOUS; SUBCUTANEOUS at 14:00

## 2017-08-17 RX ADMIN — FENTANYL CITRATE 1 PATCH: 50 INJECTION INTRAVENOUS at 22:13

## 2017-08-17 RX ADMIN — HYDROMORPHONE HYDROCHLORIDE 0.5 MILLIGRAM(S): 2 INJECTION INTRAMUSCULAR; INTRAVENOUS; SUBCUTANEOUS at 06:05

## 2017-08-17 RX ADMIN — HYDROMORPHONE HYDROCHLORIDE 0.5 MILLIGRAM(S): 2 INJECTION INTRAMUSCULAR; INTRAVENOUS; SUBCUTANEOUS at 05:35

## 2017-08-17 RX ADMIN — MEROPENEM 200 MILLIGRAM(S): 1 INJECTION INTRAVENOUS at 05:33

## 2017-08-17 NOTE — PROGRESS NOTE ADULT - ASSESSMENT
56 YO M  hx of chronic pancreatitis (dx 2002), HTN, recent inguinal hernia repair (June 2017) initially presented to [facility] for panniculitis 2/2 pancreatitis, resolbing with steroid taper. His hospital course was complicated by acute on chronic portal vein thrombosis (on Lovenox) and SBP now on Meropenem with peritoneal cultures negative to date. Recent worsening abdominal distention with concern for pancreatitic duct blockage vs SBO. 56 YO M  hx of chronic pancreatitis (dx 2002), HTN, recent inguinal hernia repair (June 2017) initially presented to [facility] for panniculitis 2/2 pancreatitis, resolbing with steroid taper. His hospital course was complicated by acute on chronic portal vein thrombosis (on Lovenox) and SBP now on Meropenem with peritoneal cultures negative to date. Recent worsening abdominal distention with concern for pancreatitic duct blockage vs SBO, pending ERCP today

## 2017-08-17 NOTE — PROGRESS NOTE ADULT - SUBJECTIVE AND OBJECTIVE BOX
Red surgery Progress Note     Subjective: patient off floor in endoscopy suite     PE:    Vital Signs Last 24 Hrs  T(C): 36.4 (17 Aug 2017 14:13), Max: 36.9 (17 Aug 2017 04:41)  T(F): 97.5 (17 Aug 2017 14:13), Max: 98.5 (17 Aug 2017 04:41)  HR: 90 (17 Aug 2017 13:56) (85 - 91)  BP: 129/82 (17 Aug 2017 13:56) (115/68 - 129/82)  BP(mean): --  RR: 18 (17 Aug 2017 04:41) (18 - 20)  SpO2: 94% (17 Aug 2017 13:56) (91% - 94%)    I&O's Detail    16 Aug 2017 07:01  -  17 Aug 2017 07:00  --------------------------------------------------------  IN:    IV PiggyBack: 100 mL  Total IN: 100 mL    OUT:    Nasoenteral Tube: 700 mL    Voided: 2800 mL  Total OUT: 3500 mL    Total NET: -3400 mL      17 Aug 2017 07:01  -  17 Aug 2017 16:38  --------------------------------------------------------  IN:  Total IN: 0 mL    OUT:    Voided: 1875 mL  Total OUT: 1875 mL    Total NET: -1875 mL          Daily     Daily Weight in k (17 Aug 2017 12:28)    MEDICATIONS  (STANDING):  fentaNYL   Patch  25 MICROgram(s)/Hr 1 Patch Transdermal every 72 hours  furosemide   Injectable 40 milliGRAM(s) IV Push daily  meropenem IVPB 1000 milliGRAM(s) IV Intermittent every 8 hours  methylPREDNISolone sodium succinate Injectable 30 milliGRAM(s) IV Push daily  pantoprazole  Injectable 40 milliGRAM(s) IV Push daily  nicotine - 21 mG/24Hr(s) Patch 1 patch Transdermal daily    MEDICATIONS  (PRN):  HYDROmorphone  Injectable 0.5 milliGRAM(s) IV Push every 6 hours PRN Moderate Pain (4 - 6)  HYDROmorphone  Injectable 0.5 milliGRAM(s) IV Push every 4 hours PRN Breakthrough pain      LABS:                        12.2   26.6  )-----------( 483      ( 17 Aug 2017 07:00 )             37.6         137  |  97  |  25<H>  ----------------------------<  92  4.6   |  25  |  0.39<L>    Ca    8.9      17 Aug 2017 07:00  Phos  3.4       Mg     2.0         TPro  5.6<L>  /  Alb  2.6<L>  /  TBili  0.4  /  DBili  x   /  AST  31  /  ALT  35  /  AlkPhos  120      PT/INR - ( 17 Aug 2017 07:00 )   PT: 13.7 sec;   INR: 1.25 ratio         PTT - ( 17 Aug 2017 07:00 )  PTT:26.6 sec      RADIOLOGY & ADDITIONAL STUDIES:

## 2017-08-17 NOTE — PROGRESS NOTE ADULT - PROBLEM SELECTOR PLAN 4
.  Has been > 30 with neutrophilic predominance.  Likely multifactorial in etiology, including acute on chronic pancreatitis, SBP, and steroid therapy for panniculitis.   - Overall downtrending   - Currently afebrile and hemodynamically stable.   - Will continue to f/u .  Has been > 30 with neutrophilic predominance.  Likely multifactorial in etiology, including acute on chronic pancreatitis, SBP, and steroid therapy for panniculitis.   - Overall downtrending, today down to 26.6  - Currently afebrile and hemodynamically stable.   - Will continue to f/u

## 2017-08-17 NOTE — PHYSICAL THERAPY INITIAL EVALUATION ADULT - PERTINENT HX OF CURRENT PROBLEM, REHAB EVAL
Pt is a 57 y.o. male with hx chronic pancreatitis, HTN, recent hernia repeair initially presented to [facility]SH for joint pains found to have acute on chronic pancreatitis and panniculitis now improving on steroids, course complicated by pancreatic ascites with SBP, portal vein thrombosis and Ileus, transferred to [facility] for further care and possible ERCP.

## 2017-08-17 NOTE — PROGRESS NOTE ADULT - SUBJECTIVE AND OBJECTIVE BOX
Pre-Endoscopy Evaluation      Referring Physician: Alexy Mcdonald MD                               Procedure: ERCP    Indication for Procedure: Acute Pancreatitis    Pertinent History: 57M w/ pmhx chronic pancreatitis, ETOH abuse (last drink 2 years ago) initially presented to [facility] for worsening foot pain refractory to antibiotics. Patient was found to have acute on chronic pancreatitis. 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 Patient has been started on steroid taper with improvement of lesions and pain. Patient has had multiple complications throughout his course. He was found to have worsening abdominal ascites, which was tapped. SAAG <1.2 and amylase greatly elevated indicating likely pancreatic ascites. fluid studies also suggestive of bacterial peritonitis. MRI showed stable pancreatic pseudocysts, but found acute/subacute portal vein thrombosis. Patient started on therapeutic lovenox. Pt has since abdominal US with doppler which shows no thrombus.  2 days prior to transfer patient had worsening abdominal distention and constipation. Found with ileus,  improving with NG tube decompression.          PAST MEDICAL & SURGICAL HISTORY:  Ascites: may 2017  Hernia  Pancreatitis  Hypertension  TIA (transient ischemic attack)  History of inguinal hernia repair  S/P skin graft using Integra: right calf skin graft  Fracture of shaft of left femur:  compound fracrture left femur   left femur bone spur removed      PMH of Gastroparesis [ ]  Gastric Surgery [ ]  Gastric Outlet Obstruction [ ] none    Allergies:    iodine (Swelling)    Intolerances: none    Latex allergy: [ ] yes [x] no    Medications:MEDICATIONS  (STANDING):  fentaNYL   Patch  25 MICROgram(s)/Hr 1 Patch Transdermal every 72 hours  furosemide   Injectable 40 milliGRAM(s) IV Push daily  meropenem IVPB 1000 milliGRAM(s) IV Intermittent every 8 hours  methylPREDNISolone sodium succinate Injectable 30 milliGRAM(s) IV Push daily  pantoprazole  Injectable 40 milliGRAM(s) IV Push daily  nicotine - 21 mG/24Hr(s) Patch 1 patch Transdermal daily    MEDICATIONS  (PRN):  HYDROmorphone  Injectable 0.5 milliGRAM(s) IV Push every 6 hours PRN Moderate Pain (4 - 6)  HYDROmorphone  Injectable 0.5 milliGRAM(s) IV Push every 4 hours PRN Breakthrough pain      Smoking: [ ] yes  [X] no    AICD/PPM: [ ] yes   [X] no    Pertinent lab data:                        12.2   26.6  )-----------( 483      ( 17 Aug 2017 07:00 )             37.6     08-    137  |  97  |  25<H>  ----------------------------<  92  4.6   |  25  |  0.39<L>    Ca    8.9      17 Aug 2017 07:00  Phos  3.4       Mg     2.0         TPro  5.6<L>  /  Alb  2.6<L>  /  TBili  0.4  /  DBili  x   /  AST  31  /  ALT  35  /  AlkPhos  120      PT/INR - ( 17 Aug 2017 07:00 )   PT: 13.7 sec;   INR: 1.25 ratio         PTT - ( 17 Aug 2017 07:00 )  PTT:26.6 sec          Date of Exam:        2017 11:02:35 AM  Sonographer:         Taty Cohen  Referring Physician: Sylvia Sam MD     Procedure:     2D Echo/Doppler/Color DopplerComplete.  Indications:   Acute and subacute endocarditis, unspecified - I33.9  Diagnosis:     Acute and subacute endocarditis, unspecified - I33.9  Study Details: Technically difficult study. Study quality was adversely   affected                 due to body habitus.           2D AND M-MODE MEASUREMENTS (normal ranges within parentheses):  Left Ventricle:                 Normal    Aorta/Left Atrium:             Normal  IVSd (2D):              0.83 cm (0.7-1.1) Aortic Root (Mmode): 3.40 cm   (2.4-3.7)  LVPWd (2D):             0.89 cm (0.7-1.1) AoV Cusp Separation: 2.10 cm   (1.5-2.6)  LVIDd (2D):             4.60 cm (3.4-5.7)  LVIDs (2D):             3.33 cm  LV FS (2D):             27.6 %  (>25%)  Relative Wall Thickness 0.39    (<0.42)     LV SYSTOLIC FUNCTION BY 2D PLANIMETRY (MOD):  EF-[id number] View: 60.7 %     LV DIASTOLIC FUNCTION:  MV Peak E: 0.64 m/s e', MV Shantal: 0.11 m/s  MV Peak A: 0.80 m/s E/e' Ratio: 6.05  E/A Ratio: 0.80     Decel Time: 313 msec     SPECTRAL DOPPLER ANALYSIS (whereapplicable):  Mitral Valve:  MV P1/2 Time: 90.77 msec  MV Area, PHT: 2.42 cm²        PHYSICIAN INTERPRETATION:  Left Ventricle: The left ventricular internal cavity size is normal. Left   ventricular wall thickness is normal.  Global LV systolic function was normal. Left ventricular ejection   fraction, by visual estimation, is 60 to 65%. Spectral Doppler shows   normal pattern of LV diastolic filling.  Right Ventricle: Normal right ventricular size and function.  Left Atrium: The left atrium is normal in size. Normal left atrial size.  Right Atrium: The right atrium is normal in size. The right atrium is   normal in size.  Pericardium: Trivial pericardial effusion is present. The pericardial   effusion is surrounding the apex. There is a significant pericardial fat   pad present.  Mitral Valve: The mitral valve is normal in structure. Thickening of the   anterior and posterior mitral valve leaflets. Trace mitral valve   regurgitation is seen.  Tricuspid Valve: The tricuspid valve is normal in structure. Trivial   tricuspid regurgitation is visualized. Adequate TR velocity was not   obtained to accurately assess RVSP.  Aortic Valve: Sclerotic aortic valve with normal opening. No evidence of   aortic valve regurgitation is seen.  Pulmonic Valve: The pulmonic valve was not well visualized. Trace   pulmonic valve regurgitation.  Aorta: The aorta was not well visualized.  Pulmonary Artery: The pulmonary artery is not well seen.  Venous: A normal flow pattern is recorded from the rightupper pulmonary   vein. The inferior vena cava was normal sized, with respiratory size   variation greater than 50%.  In comparison to the previous echocardiogram(s): There are no prior   studies on this patient for comparison purposes.        Summary:   1. Sigmoid septum without evidence of obstruction.   2. Technically difficult study.   3. Normal left ventricular internal cavity size.   4. Normal global left ventricular systolic function.   5. Left ventricular ejection fraction, by visual estimation, is 60 to   65%.   6. Normal right ventricular size and function.   7. The left atrium is normal in size.   8. The right atrium is normal in size.   9. Sclerotic aortic valve with normal opening.  10. Thickening of the anterior and posterior mitral valve leaflets.  11. Trivial pericardial effusion.  12. No evidence of vegetation on difficult study. Recommend QUETA if   clinically indicated and high suspicion for endocarditis.      Physical Examination:  Daily     Daily Weight in k (17 Aug 2017 12:28)  Vital Signs Last 24 Hrs  T(C): 36.4 (17 Aug 2017 14:13), Max: 36.9 (17 Aug 2017 04:41)  T(F): 97.5 (17 Aug 2017 14:13), Max: 98.5 (17 Aug 2017 04:41)  HR: 90 (17 Aug 2017 13:56) (85 - 91)  BP: 129/82 (17 Aug 2017 13:56) (115/68 - 129/82)  BP(mean): --  RR: 18 (17 Aug 2017 04:41) (18 - 20)  SpO2: 94% (17 Aug 2017 13:56) (91% - 94%)    Constitutional: NAD    HEENT: PERRLA, EOMI,       Neck:  No JVD    Respiratory: CTAB/L    Cardiovascular: S1 and S2    Gastrointestinal: BS+, soft, NT/ND    Extremities: No peripheral edema    Comments:    ASA Class: I [ ]  II [ ]  III [ ]  IV [X]    The patient is a suitable candidate for the planned procedure unless box checked [ ]  No, explain:

## 2017-08-17 NOTE — CONSULT NOTE ADULT - ASSESSMENT
56 y/o male with acute on chronic pancreatitis; now with resolving ileus    - C/w NGT for now. D/w patient to minimize ice chips to allow for more accurate assessment of NGT output.   - Monitor GI fxn  - Serial abd exams; once patient continues to have GI fxn & distention improves will plan to d/c NGT  - Abd XR   - D/w Dr. Mitchell 58 y/o male with acute on chronic pancreatitis; now with resolving ileus    - C/w NGT for now. D/w patient to minimize ice chips to allow for more accurate assessment of NGT output.   - Monitor GI fxn  - Serial abd exams; once patient continues to have GI fxn & distention improves will plan to d/c NGT  - Abd XR   -if any concerns may need rpt ct  - D/w Dr. Mitchell

## 2017-08-17 NOTE — DIETITIAN INITIAL EVALUATION ADULT. - NS AS NUTRI INTERV MEALS SNACK
1) When medically feasible advance diet as tolerated to clear fluids c ensure clear and promote shakes.

## 2017-08-17 NOTE — PROGRESS NOTE ADULT - SUBJECTIVE AND OBJECTIVE BOX
Patient is a 57y old  Male who presents with a chief complaint of transfer for possible ERCP. (15 Aug 2017 21:38)      SUBJECTIVE / OVERNIGHT EVENTS:  No acute events overnight. Denies F/C/SOB/abdominal pain. NGT draining.       MEDICATIONS  (STANDING):  fentaNYL   Patch  25 MICROgram(s)/Hr 1 Patch Transdermal every 72 hours  furosemide   Injectable 40 milliGRAM(s) IV Push daily  meropenem IVPB 1000 milliGRAM(s) IV Intermittent every 8 hours  methylPREDNISolone sodium succinate Injectable 30 milliGRAM(s) IV Push daily  pantoprazole  Injectable 40 milliGRAM(s) IV Push daily  nicotine - 21 mG/24Hr(s) Patch 1 patch Transdermal daily    MEDICATIONS  (PRN):  HYDROmorphone  Injectable 0.5 milliGRAM(s) IV Push every 6 hours PRN Moderate Pain (4 - 6)  HYDROmorphone  Injectable 0.5 milliGRAM(s) IV Push every 4 hours PRN Breakthrough pain      Vital Signs Last 24 Hrs  T(C): 36.9 (17 Aug 2017 04:41), Max: 36.9 (17 Aug 2017 04:41)  T(F): 98.5 (17 Aug 2017 04:41), Max: 98.5 (17 Aug 2017 04:41)  HR: 91 (17 Aug 2017 04:41) (85 - 91)  BP: 115/68 (17 Aug 2017 04:41) (113/73 - 127/74)  BP(mean): --  RR: 18 (17 Aug 2017 04:41) (18 - 20)  SpO2: 91% (17 Aug 2017 04:41) (90% - 92%)  CAPILLARY BLOOD GLUCOSE        I&O's Summary    15 Aug 2017 07:01  -  16 Aug 2017 07:00  --------------------------------------------------------  IN: 0 mL / OUT: 850 mL / NET: -850 mL    16 Aug 2017 07:01  -  17 Aug 2017 06:24  --------------------------------------------------------  IN: 100 mL / OUT: 1800 mL / NET: -1700 mL        PHYSICAL EXAM:  GENERAL: NAD, well-developed  HEAD:  Atraumatic, Normocephalic  EYES: EOMI, PERRLA, conjunctiva and sclera clear  NECK: Supple, No JVD  CHEST/LUNG: Clear to auscultation bilaterally; No wheeze  HEART: Regular rate and rhythm; No murmurs, rubs, or gallops  ABDOMEN: Soft, Nontender, Nondistended; Bowel sounds present  EXTREMITIES:  2+ Peripheral Pulses, No clubbing, cyanosis, or edema  PSYCH: AAOx3  NEUROLOGY: non-focal  SKIN: No rashes or lesions    LABS:                        10.6   32.2  )-----------( 433      ( 16 Aug 2017 05:30 )             33.0     08-16    136  |  100  |  24<H>  ----------------------------<  88  5.0   |  24  |  0.35<L>    Ca    8.2<L>      16 Aug 2017 05:30  Phos  3.2     08-16  Mg     2.0     08-16    TPro  4.8<L>  /  Alb  2.2<L>  /  TBili  0.3  /  DBili  x   /  AST  34  /  ALT  37  /  AlkPhos  107  08-16    PT/INR - ( 16 Aug 2017 05:30 )   PT: 13.5 sec;   INR: 1.23 ratio         PTT - ( 16 Aug 2017 05:30 )  PTT:26.9 sec          RADIOLOGY & ADDITIONAL TESTS:    Imaging Personally Reviewed:    GI: Plan for sphincterotomy today    Consultant(s) Notes Reviewed:      Care Discussed with Consultants/Other Providers: Patient is a 57y old  Male who presents with a chief complaint of transfer for possible ERCP. (15 Aug 2017 21:38)      SUBJECTIVE / OVERNIGHT EVENTS:  No acute events overnight. Denies F/C/SOB/abdominal pain. NGT draining.       MEDICATIONS  (STANDING):  fentaNYL   Patch  25 MICROgram(s)/Hr 1 Patch Transdermal every 72 hours  furosemide   Injectable 40 milliGRAM(s) IV Push daily  meropenem IVPB 1000 milliGRAM(s) IV Intermittent every 8 hours  methylPREDNISolone sodium succinate Injectable 30 milliGRAM(s) IV Push daily  pantoprazole  Injectable 40 milliGRAM(s) IV Push daily  nicotine - 21 mG/24Hr(s) Patch 1 patch Transdermal daily    MEDICATIONS  (PRN):  HYDROmorphone  Injectable 0.5 milliGRAM(s) IV Push every 6 hours PRN Moderate Pain (4 - 6)  HYDROmorphone  Injectable 0.5 milliGRAM(s) IV Push every 4 hours PRN Breakthrough pain      Vital Signs Last 24 Hrs  T(C): 36.9 (17 Aug 2017 04:41), Max: 36.9 (17 Aug 2017 04:41)  T(F): 98.5 (17 Aug 2017 04:41), Max: 98.5 (17 Aug 2017 04:41)  HR: 91 (17 Aug 2017 04:41) (85 - 91)  BP: 115/68 (17 Aug 2017 04:41) (113/73 - 127/74)  BP(mean): --  RR: 18 (17 Aug 2017 04:41) (18 - 20)  SpO2: 91% (17 Aug 2017 04:41) (90% - 92%)  CAPILLARY BLOOD GLUCOSE        I&O's Summary    15 Aug 2017 07:01  -  16 Aug 2017 07:00  --------------------------------------------------------  IN: 0 mL / OUT: 850 mL / NET: -850 mL    16 Aug 2017 07:01  -  17 Aug 2017 06:24  --------------------------------------------------------  IN: 100 mL / OUT: 1800 mL / NET: -1700 mL      PHYSICAL EXAM:  GENERAL: NAD, cachectic  HEAD:  Atraumatic, Normocephalic. NGT in place with bilious drainage  EYES: EOMI, PERRLA, conjunctiva and sclera clear  NECK: Supple, No JVD  CHEST/LUNG: Diminished breath sounds throughout, otherwise clear. Fentanyl patch in place.  HEART: RRR; No murmurs.   ABDOMEN: Soft, , Nontender, Mild distention, sluggish bs+  EXTREMITIES:  2+ Peripheral Pulses. 3+ pitting edema up to the knees.   PSYCH: AAOx3  NEUROLOGY: non-focal  SKIN: Blanching, erythematous, minimally tender lesions on radial aspect of left hand and ulnar aspect of the right hand. No erythema noted.     LABS:                        10.6   32.2  )-----------( 433      ( 16 Aug 2017 05:30 )             33.0     08-16    136  |  100  |  24<H>  ----------------------------<  88  5.0   |  24  |  0.35<L>    Ca    8.2<L>      16 Aug 2017 05:30  Phos  3.2     08-16  Mg     2.0     08-16    TPro  4.8<L>  /  Alb  2.2<L>  /  TBili  0.3  /  DBili  x   /  AST  34  /  ALT  37  /  AlkPhos  107  08-16    PT/INR - ( 16 Aug 2017 05:30 )   PT: 13.5 sec;   INR: 1.23 ratio         PTT - ( 16 Aug 2017 05:30 )  PTT:26.9 sec          RADIOLOGY & ADDITIONAL TESTS:    Imaging Personally Reviewed:    GI: Plan for sphincterotomy today    Consultant(s) Notes Reviewed:      Care Discussed with Consultants/Other Providers: Patient is a 57y old  Male who presents with a chief complaint of transfer for possible ERCP. (15 Aug 2017 21:38)      SUBJECTIVE / OVERNIGHT EVENTS:  No acute events overnight. Denies F/C/SOB/abdominal pain. Put out good urine volume overnight. NGT draining. Fentanyl patch due for change today      MEDICATIONS  (STANDING):  fentaNYL   Patch  25 MICROgram(s)/Hr 1 Patch Transdermal every 72 hours  furosemide   Injectable 40 milliGRAM(s) IV Push daily  meropenem IVPB 1000 milliGRAM(s) IV Intermittent every 8 hours  methylPREDNISolone sodium succinate Injectable 30 milliGRAM(s) IV Push daily  pantoprazole  Injectable 40 milliGRAM(s) IV Push daily  nicotine - 21 mG/24Hr(s) Patch 1 patch Transdermal daily    MEDICATIONS  (PRN):  HYDROmorphone  Injectable 0.5 milliGRAM(s) IV Push every 6 hours PRN Moderate Pain (4 - 6)  HYDROmorphone  Injectable 0.5 milliGRAM(s) IV Push every 4 hours PRN Breakthrough pain      Vital Signs Last 24 Hrs  T(C): 36.9 (17 Aug 2017 04:41), Max: 36.9 (17 Aug 2017 04:41)  T(F): 98.5 (17 Aug 2017 04:41), Max: 98.5 (17 Aug 2017 04:41)  HR: 91 (17 Aug 2017 04:41) (85 - 91)  BP: 115/68 (17 Aug 2017 04:41) (113/73 - 127/74)  BP(mean): --  RR: 18 (17 Aug 2017 04:41) (18 - 20)  SpO2: 91% (17 Aug 2017 04:41) (90% - 92%)  CAPILLARY BLOOD GLUCOSE        I&O's Summary    15 Aug 2017 07:01  -  16 Aug 2017 07:00  --------------------------------------------------------  IN: 0 mL / OUT: 850 mL / NET: -850 mL    16 Aug 2017 07:01  -  17 Aug 2017 06:24  --------------------------------------------------------  IN: 100 mL / OUT: 1800 mL / NET: -1700 mL      PHYSICAL EXAM:  GENERAL: NAD, cachectic  HEAD:  Atraumatic, Normocephalic. NGT in place with bilious drainage  EYES: EOMI, PERRLA, conjunctiva and sclera clear  NECK: Supple, No JVD  CHEST/LUNG: Diminished breath sounds throughout, otherwise clear. Fentanyl patch in place.  HEART: RRR; No murmurs.   ABDOMEN: Soft, , Nontender, Mild distention, sluggish bs+  EXTREMITIES:  2+ Peripheral Pulses. 2+ pitting edema up to the knees.   PSYCH: AAOx3  NEUROLOGY: non-focal  SKIN: Blanching, erythematous, minimally tender lesions on radial aspect of left hand and ulnar aspect of the right hand. No erythema noted.     LABS:    AUG 17 2017                12.2                     26.6  >-----------< 483                   37.6                      Comprehensive Metabolic Panel (08.16.17 @ 05:30)    Sodium, Serum: 136 mmol/L    Potassium, Serum: 5.0 mmol/L    Chloride, Serum: 100 mmol/L    Carbon Dioxide, Serum: 24 mmol/L    Anion Gap, Serum: 12 mmol/L    Blood Urea Nitrogen, Serum: 24 mg/dL    Creatinine, Serum: 0.35 mg/dL    Glucose, Serum: 88 mg/dL    Calcium, Total Serum: 8.2 mg/dL    Protein Total, Serum: 4.8 g/dL    Albumin, Serum: 2.2 g/dL    Bilirubin Total, Serum: 0.3 mg/dL    LIVER FUNCTIONS - ( 16 Aug 2017 05:30 )  Alb: 2.2 g/dL / Pro: 4.8 g/dL / ALK PHOS: 107 U/L / ALT: 37 U/L RC / AST: 34 U/L / GGT: x           PT/INR - ( 17 Aug 2017 07:00 )   PT: 13.7 sec;   INR: 1.25 ratio         PTT - ( 17 Aug 2017 07:00 )  PTT:26.6 sec                        10.6   32.2  )-----------( 433      ( 16 Aug 2017 05:30 )             33.0     08-16    136  |  100  |  24<H>  ----------------------------<  88  5.0   |  24  |  0.35<L>    Ca    8.2<L>      16 Aug 2017 05:30  Phos  3.2     08-16  Mg     2.0     08-16    TPro  4.8<L>  /  Alb  2.2<L>  /  TBili  0.3  /  DBili  x   /  AST  34  /  ALT  37  /  AlkPhos  107  08-16    PT/INR - ( 16 Aug 2017 05:30 )   PT: 13.5 sec;   INR: 1.23 ratio         PTT - ( 16 Aug 2017 05:30 )  PTT:26.9 sec          RADIOLOGY & ADDITIONAL TESTS:    Imaging Personally Reviewed:    GI: Plan for sphincterotomy today    Consultant(s) Notes Reviewed:      Care Discussed with Consultants/Other Providers: Patient is a 57y old  Male who presents with a chief complaint of transfer for possible ERCP. (15 Aug 2017 21:38)      SUBJECTIVE / OVERNIGHT EVENTS:  No acute events overnight. Denies F/C/SOB/abdominal pain. Put out good urine volume overnight. NGT draining bilious fluid.. Fentanyl patch due for change today      MEDICATIONS  (STANDING):  fentaNYL   Patch  25 MICROgram(s)/Hr 1 Patch Transdermal every 72 hours  furosemide   Injectable 40 milliGRAM(s) IV Push daily  meropenem IVPB 1000 milliGRAM(s) IV Intermittent every 8 hours  methylPREDNISolone sodium succinate Injectable 30 milliGRAM(s) IV Push daily  pantoprazole  Injectable 40 milliGRAM(s) IV Push daily  nicotine - 21 mG/24Hr(s) Patch 1 patch Transdermal daily    MEDICATIONS  (PRN):  HYDROmorphone  Injectable 0.5 milliGRAM(s) IV Push every 6 hours PRN Moderate Pain (4 - 6)  HYDROmorphone  Injectable 0.5 milliGRAM(s) IV Push every 4 hours PRN Breakthrough pain      Vital Signs Last 24 Hrs  T(C): 36.9 (17 Aug 2017 04:41), Max: 36.9 (17 Aug 2017 04:41)  T(F): 98.5 (17 Aug 2017 04:41), Max: 98.5 (17 Aug 2017 04:41)  HR: 91 (17 Aug 2017 04:41) (85 - 91)  BP: 115/68 (17 Aug 2017 04:41) (113/73 - 127/74)  BP(mean): --  RR: 18 (17 Aug 2017 04:41) (18 - 20)  SpO2: 91% (17 Aug 2017 04:41) (90% - 92%)  CAPILLARY BLOOD GLUCOSE        I&O's Summary    15 Aug 2017 07:01  -  16 Aug 2017 07:00  --------------------------------------------------------  IN: 0 mL / OUT: 850 mL / NET: -850 mL    16 Aug 2017 07:01  -  17 Aug 2017 06:24  --------------------------------------------------------  IN: 100 mL / OUT: 1800 mL / NET: -1700 mL      PHYSICAL EXAM:  GENERAL: NAD, cachectic  HEAD:  Atraumatic, Normocephalic. NGT in place with bilious drainage  EYES: EOMI, PERRLA, conjunctiva and sclera clear  NECK: Supple, No JVD  CHEST/LUNG: Diminished breath sounds throughout, otherwise clear. Fentanyl patch in place.  HEART: RRR; No murmurs.   ABDOMEN: Soft, , Nontender, Mild distention, sluggish bs+  EXTREMITIES:  2+ Peripheral Pulses. 2+ pitting edema up to the knees but imrproved from yesterday  PSYCH: AAOx3  NEUROLOGY: non-focal  SKIN: Blanching, erythematous, minimally tender lesions on radial aspect of left hand and ulnar aspect of the right hand. No erythema noted.     LABS:    AUG 17 2017                12.2                     26.6  >-----------< 483                   37.6                      Comprehensive Metabolic Panel (08.16.17 @ 05:30)    Sodium, Serum: 136 mmol/L    Potassium, Serum: 5.0 mmol/L    Chloride, Serum: 100 mmol/L    Carbon Dioxide, Serum: 24 mmol/L    Anion Gap, Serum: 12 mmol/L    Blood Urea Nitrogen, Serum: 24 mg/dL    Creatinine, Serum: 0.35 mg/dL    Glucose, Serum: 88 mg/dL    Calcium, Total Serum: 8.2 mg/dL    Protein Total, Serum: 4.8 g/dL    Albumin, Serum: 2.2 g/dL    Bilirubin Total, Serum: 0.3 mg/dL    LIVER FUNCTIONS - ( 16 Aug 2017 05:30 )  Alb: 2.2 g/dL / Pro: 4.8 g/dL / ALK PHOS: 107 U/L / ALT: 37 U/L RC / AST: 34 U/L / GGT: x           PT/INR - ( 17 Aug 2017 07:00 )   PT: 13.7 sec;   INR: 1.25 ratio         PTT - ( 17 Aug 2017 07:00 )  PTT:26.6 sec                        10.6   32.2  )-----------( 433      ( 16 Aug 2017 05:30 )             33.0     08-16    136  |  100  |  24<H>  ----------------------------<  88  5.0   |  24  |  0.35<L>    Ca    8.2<L>      16 Aug 2017 05:30  Phos  3.2     08-16  Mg     2.0     08-16    TPro  4.8<L>  /  Alb  2.2<L>  /  TBili  0.3  /  DBili  x   /  AST  34  /  ALT  37  /  AlkPhos  107  08-16    PT/INR - ( 16 Aug 2017 05:30 )   PT: 13.5 sec;   INR: 1.23 ratio         PTT - ( 16 Aug 2017 05:30 )  PTT:26.9 sec          RADIOLOGY & ADDITIONAL TESTS:    Imaging Personally Reviewed: Abdominal Xray :Nonobstructive bowel gas pattern without evidence of free air. Ileus   cannot be ruled out.      Consultant(s) Notes Reviewed:  GI, surgery    Care Discussed with Consultants/Other Providers: GI: Plan for sphincterotomy today

## 2017-08-17 NOTE — CHART NOTE - NSCHARTNOTEFT_GEN_A_CORE
Upon Nutritional Assessment by the Registered Dietitian your patient was determined to meet criteria / has evidence of the following diagnosis/diagnoses:          [ ]  Mild Protein Calorie Malnutrition        [ ]  Moderate Protein Calorie Malnutrition        [x] Severe Protein Calorie Malnutrition        [ ] Unspecified Protein Calorie Malnutrition        [ ] Underweight / BMI <19        [ ] Morbid Obesity / BMI > 40      Findings as based on:  [x] Comprehensive nutrition assessment   [ ] Nutrition Focused Physical Exam  [x] Other: unintentional 26lb Wt loss in 3 months, minimal po intake PTA      Nutrition Plan/Recommendations:    1) when medically feasible advance diet as tolerate to clear fluids with 2 ensure clear daily      PROVIDER Section:     By signing this assessment you are acknowledging and agree with the diagnosis/diagnoses assigned by the Registered Dietitian    Comments:

## 2017-08-17 NOTE — PROGRESS NOTE ADULT - ATTENDING COMMENTS
Advanced Care Planning: Face to Face 40 minutes: -had family meeting today with residents, patient sister, son, daugher and daughters : discussed all active medical problems and overall attempts for treatment, family and patient want all interventions, understand guarded prognosis at this time. Family also wants further discussion with GI team    DNR/DNI  prognosis guarded Advanced Care Planning: Face to Face 30 minutes: -had family meeting today with residents, patient sister, son, daugher and daughters : discussed all active medical problems and overall attempts for treatment, family and patient want all interventions, understand guarded prognosis at this time. Family also wants further discussion with GI team    DNR/DNI  prognosis guarded

## 2017-08-17 NOTE — PROGRESS NOTE ADULT - PROBLEM SELECTOR PLAN 2
.  KUB on 8/13/17 initially with concern for SBO. Repeat on 8/15 now with normal bowel gas pattern. Currently passing gas, but no BM in 5 days  - NPO with NGT to suction with bilious drainage  - hold IVF for now given overall fluid overload state  - Will follow daily abdominal flat plate  - Surgery consult for further management, recs pending .  KUB on 8/13/17 initially with concern for SBO. Repeat on 8/17 unchanged Currently passing gas, but no BM in 5 days  - NPO with NGT to suction with bilious drainage  - hold IVF for now given overall fluid overload state  - appreciate surgery recs

## 2017-08-17 NOTE — CONSULT NOTE ADULT - SUBJECTIVE AND OBJECTIVE BOX
CC: Patient is a 57y old  Male who presents with a chief complaint of transfer for possible ERCP. (15 Aug 2017 21:38)    HPI:  58y/o male transferred from [facility] for management of acute on chronic pancreatitis in setting of h/o prior Etoh abuse now p/w ileus vs SBO. Pt had an NGT placed at outside hospital after having progressively increasing abdominal pain & distention 5 days prior. NGT placed 4 days ago. Pt states that today he is now passing gas however he remains distended, no bowel movements yet. He currently does not have any abdominal pain & feels overall improved compared to at the time his NGT was placed.    PMH  Ascites  Hernia  Pancreatitis  Hypertension  TIA (transient ischemic attack)  No pertinent past medical history    PSH  History of inguinal hernia repair  S/P skin graft using Integra  Fracture of shaft of left femur  No significant past surgical history    MEDS  MEDICATIONS  (STANDING):  fentaNYL   Patch  25 MICROgram(s)/Hr 1 Patch Transdermal every 72 hours  furosemide   Injectable 40 milliGRAM(s) IV Push daily  meropenem IVPB 1000 milliGRAM(s) IV Intermittent every 8 hours  methylPREDNISolone sodium succinate Injectable 30 milliGRAM(s) IV Push daily  pantoprazole  Injectable 40 milliGRAM(s) IV Push daily  nicotine - 21 mG/24Hr(s) Patch 1 patch Transdermal daily    MEDICATIONS  (PRN):  HYDROmorphone  Injectable 0.5 milliGRAM(s) IV Push every 6 hours PRN Moderate Pain (4 - 6)  HYDROmorphone  Injectable 0.5 milliGRAM(s) IV Push every 4 hours PRN Breakthrough pain    Allergies  iodine (Swelling)    Social  h/o Etoh abuse      Physical Exam  T(C): 36.9 (08-17-17 @ 04:41), Max: 36.9 (08-17-17 @ 04:41)  HR: 91 (08-17-17 @ 04:41) (85 - 91)  BP: 115/68 (08-17-17 @ 04:41) (113/73 - 127/74)  RR: 18 (08-17-17 @ 04:41) (18 - 20)  SpO2: 91% (08-17-17 @ 04:41) (90% - 92%)  Wt(kg): --  Tmax: T(C): , Max: 36.9 (08-17-17 @ 04:41)  Wt(kg): --    Gen: NAD  HEENT: normocephalic, atraumatic, no scleral icterus  CV: S1, S2, RRR  Pulm: CTA B/L  Abd: Soft, moderately distended with no TTP. No rebound/guarding. NGT in place; dark bilious contents collected  Ext: warm, no edema, palp dp/pt    08-16-17  -  08-17-17  --------------------------------------------------------  IN:    IV PiggyBack: 100 mL  Total IN: 100 mL    OUT:    Nasoenteral Tube: 700 mL    Voided: 2800 mL  Total OUT: 3500 mL    Total NET: -3400 mL      08-17-17  -  08-17-17  --------------------------------------------------------  IN:  Total IN: 0 mL    OUT:    Voided: 1000 mL  Total OUT: 1000 mL    Total NET: -1000 mL          Labs:                        12.2   26.6  )-----------( 483      ( 17 Aug 2017 07:00 )             37.6     08-17    137  |  97  |  25<H>  ----------------------------<  92  4.6   |  25  |  0.39<L>    Ca    8.9      17 Aug 2017 07:00  Phos  3.4     08-17  Mg     2.0     08-17    TPro  5.6<L>  /  Alb  2.6<L>  /  TBili  0.4  /  DBili  x   /  AST  31  /  ALT  35  /  AlkPhos  120  08-17    PT/INR - ( 17 Aug 2017 07:00 )   PT: 13.7 sec;   INR: 1.25 ratio         PTT - ( 17 Aug 2017 07:00 )  PTT:26.6 sec

## 2017-08-17 NOTE — PROGRESS NOTE ADULT - PROBLEM SELECTOR PLAN 7
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 Lesions have been improving with steroids.   - Currently on methylprednisone 30mg daily.   - Will continue to taper by 10 mg weekly.

## 2017-08-17 NOTE — PROGRESS NOTE ADULT - PROBLEM SELECTOR PLAN 5
Patient has two therapeutic taps draining about 1.2 L each time. Amylase greatly elevated in both with SAAG < 1.2. With significant hypoalbuminemia.   - Will continue on Lasix 40 IV  - Hold aldactone as patient is NPO.

## 2017-08-17 NOTE — PROGRESS NOTE ADULT - PROBLEM SELECTOR PLAN 1
.  Imaging showing multiple stable pancreatic cysts and main pancreatic duct dilation.  - GI on board, recs appreciated  - plan for ERCP today  - Lovenox held last night for procedure today .  Imaging showing multiple stable pancreatic cysts and main pancreatic duct dilation.  - GI on board, recs appreciated  - plan for ERCP today with possible stent placement  - Lovenox held last night for procedure today

## 2017-08-17 NOTE — DIETITIAN INITIAL EVALUATION ADULT. - PROBLEM SELECTOR PLAN 3
Pt has had paracentesis x2, both showing elevated PMN's concerning for SBP. Initially treated with INVANZ and Flagyl and now with Meropenem. Cultures remain negative. Will continue to treat. Consider ID consult

## 2017-08-17 NOTE — PHYSICAL THERAPY INITIAL EVALUATION ADULT - ADDITIONAL COMMENTS
Pt states he lives in a apt in a private home alone with 0 steps. Pt states his family is available to assist when needed.

## 2017-08-17 NOTE — DIETITIAN INITIAL EVALUATION ADULT. - ENERGY NEEDS
ht: 71 inches, wt: 136.9 pounds, BMI: 19.1 kg/m2, IBW: 172 pounds (+/- 10%), 80 %IBW  Edema: 1+ R foot, 2+ L foot. Skin: Stage 1 sacral pressure ulcer.  Other pertinent information: 56 y/o male transferred to [facility] with pancreatitis c/b SBO vs ileus. Plan for ERCP today.

## 2017-08-17 NOTE — DIETITIAN INITIAL EVALUATION ADULT. - ADHERENCE
Was on clear fluids at [facility]. Regular diet before hospitalization. Reports allergy to iodine so avoids shellfish. States he takes a daily MVI PTA./n/a

## 2017-08-17 NOTE — PROGRESS NOTE ADULT - ASSESSMENT
58 yo male with chronic pancreatitis  - await ERCP results  -trend labs  -will follow with you    Jana HALEY   Red Sx [id number] 56 yo male with chronic pancreatitis/ileus  - await ERCP results  -trend labs  - f/u return of gi fxn, consider rpt ct if no improvement  -will follow with you    Jana HALEY   Red Sx [id number]

## 2017-08-17 NOTE — PROGRESS NOTE ADULT - PROBLEM SELECTOR PLAN 3
Pt has had paracentesis x2, both showing elevated PMN's consistent with SBP. Cultures remain negative to date  - c/w  Meropenem Pt has had paracentesis x2, both showing elevated PMN's consistent with SBP. Cultures remain negative to date  - c/w Meropenem

## 2017-08-17 NOTE — PROGRESS NOTE ADULT - PROBLEM SELECTOR PLAN 6
MRI showed concern for acute/subacute portal vein thrombosis and patient started on therapeutic lovenox.   - Repeat abdominal U/S showed no thrombus.   - Will follow up with GI if Lovenox needs to be continued post pancreatic duct stent placement as thrombus not seen on most recent Abd duplex

## 2017-08-17 NOTE — DIETITIAN INITIAL EVALUATION ADULT. - OTHER INFO
Nutrition consult received for nausea/vomiting greater or equal to 3 days PTA. Pt reports UBW 165lbs, per previous RD note (8/4/17) Pt weighed 140lbs. Current Wt is 136.9lbs. Per chart last BM 8/12, denies any nausea since admission to [facility]. Pt states he was able to tolerate the Ensure clears but prefers thicker shake. Patient made aware RD remains available for review/reinforcement of diet education once advanced to appropriate diet.

## 2017-08-17 NOTE — PROGRESS NOTE ADULT - PROBLEM SELECTOR PLAN 8
.  - Patient has agreed to DNR/DNI.   - Patient for home with home hospice but family meeting keron  - prognosis guarded .  -had family meeting today with patient sister, son, daugher and daughters : discussed all active medical problems and overall attempts for treatment, family and patient want all interventions, understand guarded prognosis at this time but if patient would decompensate acutely, then would want comfort as patient is DNR/DNI  - Patient has agreed to DNR/DNI.   - prognosis guarded .  -face to face 30 minutes for advanced care planning, had family meeting today with patient sister, son, daugher and daughters : discussed all active medical problems and overall attempts for treatment, family and patient want all interventions, understand guarded prognosis at this time but if patient would decompensate acutely, then would want comfort as patient is DNR/DNI  - Patient has agreed to DNR/DNI.   - prognosis guarded

## 2017-08-18 ENCOUNTER — APPOINTMENT (OUTPATIENT)
Dept: GASTROENTEROLOGY | Facility: CLINIC | Age: 57
End: 2017-08-18

## 2017-08-18 ENCOUNTER — TRANSCRIPTION ENCOUNTER (OUTPATIENT)
Age: 57
End: 2017-08-18

## 2017-08-18 LAB
ALBUMIN SERPL ELPH-MCNC: 2.4 G/DL — LOW (ref 3.3–5)
ALP SERPL-CCNC: 127 U/L — HIGH (ref 40–120)
ALT FLD-CCNC: 30 U/L RC — SIGNIFICANT CHANGE UP (ref 10–45)
ANION GAP SERPL CALC-SCNC: 15 MMOL/L — SIGNIFICANT CHANGE UP (ref 5–17)
AST SERPL-CCNC: 29 U/L — SIGNIFICANT CHANGE UP (ref 10–40)
BILIRUB SERPL-MCNC: 0.4 MG/DL — SIGNIFICANT CHANGE UP (ref 0.2–1.2)
BUN SERPL-MCNC: 24 MG/DL — HIGH (ref 7–23)
CALCIUM SERPL-MCNC: 8.4 MG/DL — SIGNIFICANT CHANGE UP (ref 8.4–10.5)
CHLORIDE SERPL-SCNC: 96 MMOL/L — SIGNIFICANT CHANGE UP (ref 96–108)
CO2 SERPL-SCNC: 25 MMOL/L — SIGNIFICANT CHANGE UP (ref 22–31)
CREAT SERPL-MCNC: 0.42 MG/DL — LOW (ref 0.5–1.3)
CULTURE RESULTS: SIGNIFICANT CHANGE UP
GLUCOSE SERPL-MCNC: 98 MG/DL — SIGNIFICANT CHANGE UP (ref 70–99)
HCT VFR BLD CALC: 37.3 % — LOW (ref 39–50)
HGB BLD-MCNC: 12.2 G/DL — LOW (ref 13–17)
INR BLD: 1.17 RATIO — HIGH (ref 0.88–1.16)
MCHC RBC-ENTMCNC: 32.7 GM/DL — SIGNIFICANT CHANGE UP (ref 32–36)
MCHC RBC-ENTMCNC: 34.7 PG — HIGH (ref 27–34)
MCV RBC AUTO: 106 FL — HIGH (ref 80–100)
PLATELET # BLD AUTO: 469 K/UL — HIGH (ref 150–400)
POTASSIUM SERPL-MCNC: 4.7 MMOL/L — SIGNIFICANT CHANGE UP (ref 3.5–5.3)
POTASSIUM SERPL-SCNC: 4.7 MMOL/L — SIGNIFICANT CHANGE UP (ref 3.5–5.3)
PROT SERPL-MCNC: 5.5 G/DL — LOW (ref 6–8.3)
PROTHROM AB SERPL-ACNC: 12.7 SEC — SIGNIFICANT CHANGE UP (ref 9.8–12.7)
RBC # BLD: 3.51 M/UL — LOW (ref 4.2–5.8)
RBC # FLD: 14.3 % — SIGNIFICANT CHANGE UP (ref 10.3–14.5)
SODIUM SERPL-SCNC: 136 MMOL/L — SIGNIFICANT CHANGE UP (ref 135–145)
SPECIMEN SOURCE: SIGNIFICANT CHANGE UP
WBC # BLD: 28.7 K/UL — HIGH (ref 3.8–10.5)
WBC # FLD AUTO: 28.7 K/UL — HIGH (ref 3.8–10.5)

## 2017-08-18 PROCEDURE — 99233 SBSQ HOSP IP/OBS HIGH 50: CPT | Mod: GC

## 2017-08-18 PROCEDURE — 99232 SBSQ HOSP IP/OBS MODERATE 35: CPT | Mod: GC

## 2017-08-18 PROCEDURE — 74000: CPT | Mod: 26

## 2017-08-18 RX ORDER — ENOXAPARIN SODIUM 100 MG/ML
60 INJECTION SUBCUTANEOUS EVERY 12 HOURS
Qty: 0 | Refills: 0 | Status: DISCONTINUED | OUTPATIENT
Start: 2017-08-18 | End: 2017-08-21

## 2017-08-18 RX ORDER — FUROSEMIDE 40 MG
20 TABLET ORAL DAILY
Qty: 0 | Refills: 0 | Status: DISCONTINUED | OUTPATIENT
Start: 2017-08-19 | End: 2017-08-19

## 2017-08-18 RX ORDER — PHENYLEPHRINE-SHARK LIVER OIL-MINERAL OIL-PETROLATUM RECTAL OINTMENT
1 OINTMENT (GRAM) RECTAL DAILY
Qty: 0 | Refills: 0 | Status: DISCONTINUED | OUTPATIENT
Start: 2017-08-18 | End: 2017-08-23

## 2017-08-18 RX ADMIN — MEROPENEM 200 MILLIGRAM(S): 1 INJECTION INTRAVENOUS at 05:22

## 2017-08-18 RX ADMIN — PANTOPRAZOLE SODIUM 40 MILLIGRAM(S): 20 TABLET, DELAYED RELEASE ORAL at 11:29

## 2017-08-18 RX ADMIN — Medication 1 PATCH: at 11:33

## 2017-08-18 RX ADMIN — Medication 30 MILLIGRAM(S): at 05:22

## 2017-08-18 RX ADMIN — Medication 40 MILLIGRAM(S): at 05:22

## 2017-08-18 RX ADMIN — ENOXAPARIN SODIUM 60 MILLIGRAM(S): 100 INJECTION SUBCUTANEOUS at 17:01

## 2017-08-18 RX ADMIN — MEROPENEM 200 MILLIGRAM(S): 1 INJECTION INTRAVENOUS at 14:23

## 2017-08-18 RX ADMIN — HYDROMORPHONE HYDROCHLORIDE 0.5 MILLIGRAM(S): 2 INJECTION INTRAMUSCULAR; INTRAVENOUS; SUBCUTANEOUS at 07:00

## 2017-08-18 RX ADMIN — HYDROMORPHONE HYDROCHLORIDE 0.5 MILLIGRAM(S): 2 INJECTION INTRAMUSCULAR; INTRAVENOUS; SUBCUTANEOUS at 06:45

## 2017-08-18 RX ADMIN — HYDROMORPHONE HYDROCHLORIDE 0.5 MILLIGRAM(S): 2 INJECTION INTRAMUSCULAR; INTRAVENOUS; SUBCUTANEOUS at 20:56

## 2017-08-18 RX ADMIN — HYDROMORPHONE HYDROCHLORIDE 0.5 MILLIGRAM(S): 2 INJECTION INTRAMUSCULAR; INTRAVENOUS; SUBCUTANEOUS at 21:26

## 2017-08-18 RX ADMIN — HYDROMORPHONE HYDROCHLORIDE 0.5 MILLIGRAM(S): 2 INJECTION INTRAMUSCULAR; INTRAVENOUS; SUBCUTANEOUS at 00:02

## 2017-08-18 RX ADMIN — Medication 1 PATCH: at 11:31

## 2017-08-18 NOTE — DISCHARGE NOTE ADULT - ADDITIONAL INSTRUCTIONS
Please follow up with gastroenterology for removal of the stent placement. Please also avoid alcohol as it may trigger an exacerbation of pancreatitis. Please follow up with gastroenterology for removal of the stent placement. Also follow up with your Primary Care Physician for post-hospital follow up and management of new medications. Please also avoid alcohol as it may trigger an exacerbation of pancreatitis. Please also avoid smoking as it can damage the lungs. Please follow up with gastroenterology in 3-4 weeks for removal of the stent placement. Also follow up with your Primary Care Physician next week for post-hospital follow up and management of new medications. Please also avoid alcohol as it may trigger an exacerbation of pancreatitis. Please also avoid smoking as it can damage the lungs. Please follow up with gastroenterology (Dr. Marques Yates) in 3-4 weeks. You will need to have a repeat ERCP in 6-8 weeks for stent removal. Also follow up with your Primary Care Physician (Dr. Khoi Ch) next week for post-hospital follow up and management of new medications. Please also avoid alcohol as it may trigger an exacerbation of pancreatitis. Please also avoid smoking as it can damage the lungs.

## 2017-08-18 NOTE — PROGRESS NOTE ADULT - PROBLEM SELECTOR PLAN 8
.  -face to face 30 minutes for advanced care planning, had family meeting today with patient sister, son, daugher and daughters : discussed all active medical problems and overall attempts for treatment, family and patient want all interventions, understand guarded prognosis at this time but if patient would decompensate acutely, then would want comfort as patient is DNR/DNI  - Patient has agreed to DNR/DNI.   - prognosis guarded .  Discussion with family on 8/17/18. Face to face 30 minutes for advanced care planning, had family meeting today with patient sister, son, daughter and daughter's : discussed all active medical problems and overall attempts for treatment, family and patient want all interventions, understand guarded prognosis at this time but if patient would decompensate acutely, then would want comfort as patient is DNR/DNI  - Patient has agreed to DNR/DNI.   - prognosis guarded DNR/DNI but wants all other aggressive medical therapies  - Patient has agreed to DNR/DNI.   - prognosis guarded DNR/DNI but wants all other aggressive medical therapies  - prognosis guarded

## 2017-08-18 NOTE — PROGRESS NOTE ADULT - PROBLEM SELECTOR PLAN 3
Pt has had paracentesis x2, both showing elevated PMN's consistent with SBP. Cultures NTD > 48 hrs. Had been afebrile, HSS.   - c/w Meropenem per GI Pt has had paracentesis x2, both showing elevated PMN's consistent with SBP. Cultures NTD > 48 hrs. Had been afebrile, HSS.   - Day 7 of Meropenem  - Per GI, can monitor patient off Meropenem as low suspicion for SBP at present.   - If any s/s of infection fever, abdominal pain, septic features, will restart Meropenem Pt has had paracentesis x2, both showing elevated PMN's consistent with SBP. Cultures NTD > 48 hrs. Had been afebrile, HSS.   - Day 7 of Meropenem  - Per GI, can monitor patient off Meropenem as low suspicion for SBP at present and already completed 7 days of meropenem today  - If any s/s of infection fever, abdominal pain, septic features, will restart Meropenem

## 2017-08-18 NOTE — PROGRESS NOTE ADULT - SUBJECTIVE AND OBJECTIVE BOX
Patient is a 57y old  Male who presents with a chief complaint of transfer for possible ERCP. (15 Aug 2017 21:38)      SUBJECTIVE / OVERNIGHT EVENTS:  Underwent ERCP yesterday uneventfully.       MEDICATIONS  (STANDING):  fentaNYL   Patch  25 MICROgram(s)/Hr 1 Patch Transdermal every 72 hours  furosemide   Injectable 40 milliGRAM(s) IV Push daily  meropenem IVPB 1000 milliGRAM(s) IV Intermittent every 8 hours  methylPREDNISolone sodium succinate Injectable 30 milliGRAM(s) IV Push daily  pantoprazole  Injectable 40 milliGRAM(s) IV Push daily  nicotine - 21 mG/24Hr(s) Patch 1 patch Transdermal daily    MEDICATIONS  (PRN):  HYDROmorphone  Injectable 0.5 milliGRAM(s) IV Push every 6 hours PRN Moderate Pain (4 - 6)  HYDROmorphone  Injectable 0.5 milliGRAM(s) IV Push every 4 hours PRN Breakthrough pain      Vital Signs Last 24 Hrs  T(C): 36.8 (18 Aug 2017 05:00), Max: 36.8 (18 Aug 2017 05:00)  T(F): 98.3 (18 Aug 2017 05:00), Max: 98.3 (18 Aug 2017 05:00)  HR: 82 (18 Aug 2017 05:00) (82 - 90)  BP: 137/73 (18 Aug 2017 05:00) (129/82 - 151/78)  BP(mean): --  RR: 18 (18 Aug 2017 05:00) (18 - 18)  SpO2: 95% (18 Aug 2017 05:00) (94% - 95%)  CAPILLARY BLOOD GLUCOSE        I&O's Summary    16 Aug 2017 07:01  -  17 Aug 2017 07:00  --------------------------------------------------------  IN: 100 mL / OUT: 3500 mL / NET: -3400 mL    17 Aug 2017 07:01  -  18 Aug 2017 06:52  --------------------------------------------------------  IN: 0 mL / OUT: 2275 mL / NET: -2275 mL        PHYSICAL EXAM:  GENERAL: NAD, cachectic  HEAD:  Atraumatic, Normocephalic.   EYES: EOMI, PERRLA, conjunctiva and sclera clear  NECK: Supple, No JVD  CHEST/LUNG: Diminished breath sounds throughout, otherwise clear. Fentanyl patch in place.  HEART: RRR; No murmurs.   ABDOMEN: Soft, , Nontender, Mild distention, sluggish bs+  EXTREMITIES:  2+ Peripheral Pulses. 2+ pitting edema up to the knees but improved from yesterday  PSYCH: AAOx3  NEUROLOGY: non-focal  SKIN: Blanching, erythematous, minimally tender lesions on radial aspect of left hand and ulnar aspect of the right hand.     LABS:                        12.2   26.6  )-----------( 483      ( 17 Aug 2017 07:00 )             37.6     08-17    137  |  97  |  25<H>  ----------------------------<  92  4.6   |  25  |  0.39<L>    Ca    8.9      17 Aug 2017 07:00  Phos  3.4     08-17  Mg     2.0     08-17    TPro  5.6<L>  /  Alb  2.6<L>  /  TBili  0.4  /  DBili  x   /  AST  31  /  ALT  35  /  AlkPhos  120  08-17    PT/INR - ( 17 Aug 2017 07:00 )   PT: 13.7 sec;   INR: 1.25 ratio         PTT - ( 17 Aug 2017 07:00 )  PTT:26.6 sec          RADIOLOGY & ADDITIONAL TESTS:    Imaging Personally Reviewed:    Consultant(s) Notes Reviewed:    GI   Surgery    ERCP: 8/18/17  Impression:    - Dilated and tortuous pancreatic duct without evidence of leak                       - s/p pancreatic sphincterotomy and placement of 7Fr x 9cm single pigtail                        pancreatic stent  Recommendation:                            - Return patient to hospital [facility] for ongoing care.                       - Trial of clear liquid diet.                       - Continue with antibiotics.                       - ERCP with stent removal in next 6-8 weeks    Care Discussed with Consultants/Other Providers: Patient is a 57y old  Male who presents with a chief complaint of transfer for possible ERCP. (15 Aug 2017 21:38)      SUBJECTIVE / OVERNIGHT EVENTS:  Underwent ERCP yesterday uneventfully.       MEDICATIONS  (STANDING):  fentaNYL   Patch  25 MICROgram(s)/Hr 1 Patch Transdermal every 72 hours  furosemide   Injectable 40 milliGRAM(s) IV Push daily  meropenem IVPB 1000 milliGRAM(s) IV Intermittent every 8 hours  methylPREDNISolone sodium succinate Injectable 30 milliGRAM(s) IV Push daily  pantoprazole  Injectable 40 milliGRAM(s) IV Push daily  nicotine - 21 mG/24Hr(s) Patch 1 patch Transdermal daily    MEDICATIONS  (PRN):  HYDROmorphone  Injectable 0.5 milliGRAM(s) IV Push every 6 hours PRN Moderate Pain (4 - 6)  HYDROmorphone  Injectable 0.5 milliGRAM(s) IV Push every 4 hours PRN Breakthrough pain      Vital Signs Last 24 Hrs  T(C): 36.8 (18 Aug 2017 05:00), Max: 36.8 (18 Aug 2017 05:00)  T(F): 98.3 (18 Aug 2017 05:00), Max: 98.3 (18 Aug 2017 05:00)  HR: 82 (18 Aug 2017 05:00) (82 - 90)  BP: 137/73 (18 Aug 2017 05:00) (129/82 - 151/78)  BP(mean): --  RR: 18 (18 Aug 2017 05:00) (18 - 18)  SpO2: 95% (18 Aug 2017 05:00) (94% - 95%)  CAPILLARY BLOOD GLUCOSE        I&O's Summary    16 Aug 2017 07:01  -  17 Aug 2017 07:00  --------------------------------------------------------  IN: 100 mL / OUT: 3500 mL / NET: -3400 mL    17 Aug 2017 07:01  -  18 Aug 2017 06:52  --------------------------------------------------------  IN: 0 mL / OUT: 2275 mL / NET: -2275 mL        PHYSICAL EXAM:  GENERAL: NAD, cachectic  HEAD:  Atraumatic, Normocephalic.   EYES: EOMI, PERRLA, conjunctiva and sclera clear  NECK: Supple, No JVD  CHEST/LUNG: Diminished breath sounds throughout, otherwise clear. Fentanyl patch in place.  HEART: RRR; No murmurs.   ABDOMEN: Soft, , Nontender, Mild distention, sluggish bs+  EXTREMITIES:  2+ Peripheral Pulses. 2+ pitting edema up to the knees but improved from yesterday  PSYCH: AAOx3  NEUROLOGY: non-focal  SKIN: Blanching, erythematous, minimally tender lesions on radial aspect of left hand and ulnar aspect of the right hand.     LABS:                12.2                           28.7  >-----------< 469                       37.3                           Comprehensive Metabolic Panel in AM (08.18.17 @ 07:14)    Sodium, Serum: 136 mmol/L    Potassium, Serum: 4.7 mmol/L    Chloride, Serum: 96 mmol/L    Carbon Dioxide, Serum: 25 mmol/L    Anion Gap, Serum: 15 mmol/L    Blood Urea Nitrogen, Serum: 24 mg/dL    Creatinine, Serum: 0.42 mg/dL    Glucose, Serum: 98 mg/dL    Calcium, Total Serum: 8.4 mg/dL    Protein Total, Serum: 5.5 g/dL    Albumin, Serum: 2.4 g/dL    Bilirubin Total, Serum: 0.4 mg/dL    Alkaline Phosphatase, Serum: 127 U/L    Aspartate Aminotransferase (AST/SGOT): 29 U/L    Alanine Aminotransferase (ALT/SGPT): 30 U/L RC    INR: 1.17                            12.2   26.6  )-----------( 483      ( 17 Aug 2017 07:00 )             37.6     08-17    137  |  97  |  25<H>  ----------------------------<  92  4.6   |  25  |  0.39<L>    Ca    8.9      17 Aug 2017 07:00  Phos  3.4     08-17  Mg     2.0     08-17    TPro  5.6<L>  /  Alb  2.6<L>  /  TBili  0.4  /  DBili  x   /  AST  31  /  ALT  35  /  AlkPhos  120  08-17    PT/INR - ( 17 Aug 2017 07:00 )   PT: 13.7 sec;   INR: 1.25 ratio         PTT - ( 17 Aug 2017 07:00 )  PTT:26.6 sec          RADIOLOGY & ADDITIONAL TESTS:    Imaging Personally Reviewed:    Consultant(s) Notes Reviewed:    GI   Surgery    ERCP: 8/18/17  Impression:    - Dilated and tortuous pancreatic duct without evidence of leak                       - s/p pancreatic sphincterotomy and placement of 7Fr x 9cm single pigtail                        pancreatic stent  Recommendation:                            - Return patient to hospital [facility] for ongoing care.                       - Trial of clear liquid diet.                       - Continue with antibiotics.                       - ERCP with stent removal in next 6-8 weeks    Care Discussed with Consultants/Other Providers: Patient is a 57y old  Male who presents with a chief complaint of transfer for possible ERCP. (15 Aug 2017 21:38)      SUBJECTIVE / OVERNIGHT EVENTS: Underwent ERCP with pancreatic stent placement yesterday uneventfully. NG tube removed by GI, started on clear liquid diet. No BM yet but having flatus, no fever.       MEDICATIONS  (STANDING):  fentaNYL   Patch  25 MICROgram(s)/Hr 1 Patch Transdermal every 72 hours  furosemide   Injectable 40 milliGRAM(s) IV Push daily  meropenem IVPB 1000 milliGRAM(s) IV Intermittent every 8 hours  methylPREDNISolone sodium succinate Injectable 30 milliGRAM(s) IV Push daily  pantoprazole  Injectable 40 milliGRAM(s) IV Push daily  nicotine - 21 mG/24Hr(s) Patch 1 patch Transdermal daily    MEDICATIONS  (PRN):  HYDROmorphone  Injectable 0.5 milliGRAM(s) IV Push every 6 hours PRN Moderate Pain (4 - 6)  HYDROmorphone  Injectable 0.5 milliGRAM(s) IV Push every 4 hours PRN Breakthrough pain      Vital Signs Last 24 Hrs  T(C): 36.8 (18 Aug 2017 05:00), Max: 36.8 (18 Aug 2017 05:00)  T(F): 98.3 (18 Aug 2017 05:00), Max: 98.3 (18 Aug 2017 05:00)  HR: 82 (18 Aug 2017 05:00) (82 - 90)  BP: 137/73 (18 Aug 2017 05:00) (129/82 - 151/78)  BP(mean): --  RR: 18 (18 Aug 2017 05:00) (18 - 18)  SpO2: 95% (18 Aug 2017 05:00) (94% - 95%)  CAPILLARY BLOOD GLUCOSE        I&O's Summary    16 Aug 2017 07:01  -  17 Aug 2017 07:00  --------------------------------------------------------  IN: 100 mL / OUT: 3500 mL / NET: -3400 mL    17 Aug 2017 07:01  -  18 Aug 2017 06:52  --------------------------------------------------------  IN: 0 mL / OUT: 2275 mL / NET: -2275 mL        PHYSICAL EXAM:  GENERAL: NAD, cachectic  HEAD:  Atraumatic, Normocephalic.   EYES: EOMI, PERRLA, conjunctiva and sclera clear  NECK: Supple, No JVD  CHEST/LUNG: Diminished breath sounds throughout, otherwise clear. Fentanyl patch in place.  HEART: RRR; No murmurs.   ABDOMEN: Soft, , Nontender, Mild distention, sluggish bs+  EXTREMITIES:  2+ Peripheral Pulses. 1+ pitting edema up to the knees but improved from yesterday  PSYCH: AAOx3  NEUROLOGY: non-focal  SKIN: Blanching, erythematous, minimally tender lesions on radial aspect of left hand and ulnar aspect of the right hand.     LABS:                12.2                           28.7  >-----------< 469                       37.3                           Comprehensive Metabolic Panel in AM (08.18.17 @ 07:14)    Sodium, Serum: 136 mmol/L    Potassium, Serum: 4.7 mmol/L    Chloride, Serum: 96 mmol/L    Carbon Dioxide, Serum: 25 mmol/L    Anion Gap, Serum: 15 mmol/L    Blood Urea Nitrogen, Serum: 24 mg/dL    Creatinine, Serum: 0.42 mg/dL    Glucose, Serum: 98 mg/dL    Calcium, Total Serum: 8.4 mg/dL    Protein Total, Serum: 5.5 g/dL    Albumin, Serum: 2.4 g/dL    Bilirubin Total, Serum: 0.4 mg/dL    Alkaline Phosphatase, Serum: 127 U/L    Aspartate Aminotransferase (AST/SGOT): 29 U/L    Alanine Aminotransferase (ALT/SGPT): 30 U/L RC    INR: 1.17                            12.2   26.6  )-----------( 483      ( 17 Aug 2017 07:00 )             37.6     08-17    137  |  97  |  25<H>  ----------------------------<  92  4.6   |  25  |  0.39<L>    Ca    8.9      17 Aug 2017 07:00  Phos  3.4     08-17  Mg     2.0     08-17    TPro  5.6<L>  /  Alb  2.6<L>  /  TBili  0.4  /  DBili  x   /  AST  31  /  ALT  35  /  AlkPhos  120  08-17    PT/INR - ( 17 Aug 2017 07:00 )   PT: 13.7 sec;   INR: 1.25 ratio         PTT - ( 17 Aug 2017 07:00 )  PTT:26.6 sec          RADIOLOGY & ADDITIONAL TESTS:    Imaging Personally Reviewed: ABX pending today, will review    Consultant(s) Notes Reviewed:   GI and Surgery    ERCP: 8/18/17  Impression:    - Dilated and tortuous pancreatic duct without evidence of leak                       - s/p pancreatic sphincterotomy and placement of 7Fr x 9cm single pigtail                        pancreatic stent  Recommendation:                            - Return patient to hospital cant[facility] for ongoing care.                       - Trial of clear liquid diet.                       - Continue with antibiotics.                       - ERCP with stent removal in next 6-8 weeks      Care Discussed with Consultants/Other Providers: GI, c/w meropenem until today to complete 7 days, CLD for today, no NG tube

## 2017-08-18 NOTE — DISCHARGE NOTE ADULT - CARE PROVIDERS DIRECT ADDRESSES
[email or web address]

## 2017-08-18 NOTE — PROGRESS NOTE ADULT - ASSESSMENT
56 yo male with chronic pancreatitis/ileus - tolerating clears after ercp/stent  -trend labs  - f/u return of gi fxn, advance diet per med/gi  -no sx intervention at this time  -will follow with you    Jana Guillermo Sx [phone]

## 2017-08-18 NOTE — PROGRESS NOTE ADULT - PROBLEM SELECTOR PLAN 6
MRI showed concern for acute/subacute portal vein thrombosis and patient started on therapeutic lovenox.   - Repeat abdominal U/S showed no thrombus.   - Will follow up with GI if Lovenox needs to be continued post pancreatic duct stent placement as thrombus not seen on most recent Abd duplex MRI showed concern for acute/subacute portal vein thrombosis and patient started on therapeutic lovenox.   - Repeat abdominal U/S showed no thrombus.   - Per GI will repeat hepatic/portal vein duplex. If negative for thrombus can d/x Lovenox. MRI showed concern for acute/subacute portal vein thrombosis and patient started on therapeutic lovenox   - Repeat abdominal U/S showed no thrombus.   - Per discussion with GI will repeat hepatic/portal vein duplex but continue with lovenox for now. If negative for thrombus can d/x Lovenox.

## 2017-08-18 NOTE — PROGRESS NOTE ADULT - PROBLEM SELECTOR PLAN 7
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 Lesions have been improving with steroids.   - Currently on methylprednisone 30mg daily.   - Will continue to taper by 10 mg weekly. 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 Lesions have been improving with steroids.   - Currently on methylprednisolone 30mg daily.   - Will continue to taper by 10 mg weekly.

## 2017-08-18 NOTE — PROGRESS NOTE ADULT - ASSESSMENT
58 YO M  hx of chronic pancreatitis (dx 2002), HTN, recent inguinal hernia repair (June 2017) initially presented to [facility] for panniculitis 2/2 pancreatitis, resolving with steroid taper. His hospital course was complicated by acute on chronic portal vein thrombosis (on Lovenox) and pancreatic ascites with concern for SBP, now on Meropenem day 3 with peritoneal and blood cultures NTD. Underwent ERCP stent placement yesterday. 58 YO M  hx of chronic pancreatitis (dx 2002), HTN, recent inguinal hernia repair (June 2017) initially presented to [facility] for panniculitis 2/2 pancreatitis, resolving with steroid taper. His hospital course was complicated by acute on chronic portal vein thrombosis (on Lovenox) and pancreatic ascites with concern for SBP, now on Meropenem day 7 with peritoneal and blood cultures NTD. Underwent ERCP stent placement yesterday.

## 2017-08-18 NOTE — DISCHARGE NOTE ADULT - CARE PLAN
Principal Discharge DX:	Panniculitis  Goal:	Resolution  Instructions for follow-up, activity and diet:	The painful nodule on the joints of your hands and feet appear to be a complication of pancreatitis. The biopsy showed panniculitis, which is inflammation of the deep skin layers.  This has been improving on the steroid medication you have been receiving.  Secondary Diagnosis:	Acute on chronic pancreatitis  Goal:	Maintenance  Instructions for follow-up, activity and diet:	The complications you experienced during your hospital course are linked to an acute episode of pre-existing pancreatitis. For your acute pancreatitis, we managed you primarily with bowel rest and hydartion. You also underwent stent placement in order to improved the ability of the pancreas to drain. This drain will require removal in 6-8 wks.  Secondary Diagnosis:	Pancreatic ascites  Goal:	Resolution  Instructions for follow-up, activity and diet:	During your hospital stay, you developed fluid in your abdomen. This is a result of inflammation of the pancreas. One liter of the fluid was removed by drainage. To prevent reaccumulation, you were started on lasix and aldactone. You were also given 7 days of antibiotics as there was initial concern the fluid may be infected.  Secondary Diagnosis:	Portal vein thrombosis  Goal:	Resolution  Instructions for follow-up, activity and diet:	On your abdominal MRI, it was noticed that there was a close in the portal vein, or series of veins in your liver. You were started on a blood thinner called Lovenox to resolve the clot.  Secondary Diagnosis:	Ileus  Goal:	Resolution  Instructions for follow-up, activity and diet:	Likely due to opioids and decreased ambulation, you developed ileus or a condition in which there is slowed transit of the intestines.With bowel rest and the nasogastric tube that was placed, you gradually regained bowel function and were able to advance back to regular diet. Principal Discharge DX:	Panniculitis  Goal:	Resolution  Instructions for follow-up, activity and diet:	The painful nodule on the joints of your hands and feet appear to be a complication of pancreatitis. The biopsy showed panniculitis, which is inflammation of the deep skin layers.  This has been improving on the steroid medication you have been receiving. Please continue to take prednisone as prescribed.  Secondary Diagnosis:	Acute on chronic pancreatitis  Goal:	Maintenance  Instructions for follow-up, activity and diet:	The complications you experienced during your hospital course are linked to an acute episode of pre-existing pancreatitis. For your acute pancreatitis, we managed you primarily with bowel rest and hydration. You also underwent stent placement in order to improved the ability of the pancreas to drain. This drain will require removal in 6-8 wks. Please follow up with your gastroenterologist.  Secondary Diagnosis:	Pancreatic ascites  Goal:	Resolution  Instructions for follow-up, activity and diet:	During your hospital stay, you developed fluid in your abdomen. This is a result of inflammation of the pancreas. One liter of the fluid was removed by drainage. To prevent reaccumulation, you were started on furosemide and aldactone. You were also given 7 days of antibiotics as there was initial concern the fluid may be infected.  Secondary Diagnosis:	Portal vein thrombosis  Goal:	Resolution  Instructions for follow-up, activity and diet:	On your abdominal MRI, it was noticed that there was a close in the portal vein, or series of veins in your liver. You were started on a blood thinner called enoxaparin to resolve the clot. The repeat Ultrasound of your abdomen showed resolution of the clot and so blood thinner is no longer required.  Secondary Diagnosis:	Ileus  Goal:	Resolution  Instructions for follow-up, activity and diet:	Likely due to opioids and decreased ambulation, you developed ileus or a condition in which there is slowed transit of the intestines. With bowel rest and the nasogastric tube that was placed, you gradually regained bowel function and were able to advance back to regular diet. Principal Discharge DX:	Panniculitis  Goal:	Resolution  Instructions for follow-up, activity and diet:	The painful nodule on the joints of your hands and feet appear to be a complication of pancreatitis. The biopsy showed panniculitis, which is inflammation of the deep skin layers.  This has been improving on the steroid medication you have been receiving. Please continue to take Prednisone as prescribed. You will take Prednisone 20 mg (4 tabs) once daily through Sunday (8/27), then take Prednisone 10 mg (2 tabs) once daily for the next 7 days, then take Prednisone 5 mg (1 tab) once daily for the next 7 days and stop.  Secondary Diagnosis:	Acute on chronic pancreatitis  Goal:	Maintenance  Instructions for follow-up, activity and diet:	The complications you experienced during your hospital course are linked to an acute episode of pre-existing pancreatitis. For your acute pancreatitis, we managed you primarily with bowel rest and hydration. You also underwent stent placement in order to improved the ability of the pancreas to drain. This drain will require removal in 6-8 wks. Please follow up with your gastroenterologist.  Secondary Diagnosis:	Pancreatic ascites  Goal:	Resolution  Instructions for follow-up, activity and diet:	During your hospital stay, you developed fluid in your abdomen. This is a result of inflammation of the pancreas. One liter of the fluid was removed by drainage. To prevent reaccumulation, you were started on furosemide and aldactone. You were also given 7 days of antibiotics as there was initial concern the fluid may be infected.  Secondary Diagnosis:	Portal vein thrombosis  Goal:	Resolution  Instructions for follow-up, activity and diet:	On your abdominal MRI, it was noticed that there was a close in the portal vein, or series of veins in your liver. You were started on a blood thinner called enoxaparin to resolve the clot. The repeat Ultrasound of your abdomen showed resolution of the clot and so blood thinner is no longer required.  Secondary Diagnosis:	Ileus  Goal:	Resolution  Instructions for follow-up, activity and diet:	Likely due to opioids and decreased ambulation, you developed ileus or a condition in which there is slowed transit of the intestines. With bowel rest and the nasogastric tube that was placed, you gradually regained bowel function and were able to advance back to regular diet.

## 2017-08-18 NOTE — CHART NOTE - NSCHARTNOTEFT_GEN_A_CORE
BRIEF MALNUTRITION INTERIM NOTE    Pt advanced to clear fluids today, off NGT to suction. Meds: Protonix, Solu-merdrol, Lasix. Labs: BUN 24^, Cr 0.42(low). No new Wt. Skin intact. No edema noted. Severe malnutrition continues. Recommend 2 Ensure Clear and 2 Promote shake daily. RD to remain available for further nutritional interventions as indicated/requested by medical team/pt.

## 2017-08-18 NOTE — PROGRESS NOTE ADULT - ASSESSMENT
IMP:    Chronic pancreatitis complicated by pancreatic ascites s/p ERCP showing dilated and tortuous pancreatic duct without evidence of leak s/p pancreatic sphincterotomy and PD stent placement. Clinically improving.    PLAN:  - advance to clear liquid diet - if able to tolerate would advance as tolerated tomorrow   - continue with antibiotics  - monitor LFTs  - supportive care   - repeat ERCP in 6-8 weeks for stent removal

## 2017-08-18 NOTE — PROGRESS NOTE ADULT - PROBLEM SELECTOR PLAN 2
.  KUB on 8/13/17 initially with concern for SBO. Repeat on 8/18 not concerning for SBO, more likely ileus likely related to opioids. Currently passing gas, but no BM in 5 days  - NGT removed overnight.   - Per GI trial of clears today  - Minimize opioids as tolerated  - Monitor for bowel movement .  KUB on 8/13/17 initially with concern for SBO/ileus. Repeat on 8/18 not concerning for SBO, more likely ileus likely related to opioids/pancreatitis/SBP  - Currently passing gas, but no BM in 5 days  - NGT removed overnight.   - Per GI trial of clears today  - Minimize opioids as tolerated  - Monitor for bowel movement  - surgery following  -daily ABX xrays

## 2017-08-18 NOTE — DISCHARGE NOTE ADULT - CARE PROVIDER_API CALL
Marques Yates (MD), Gastroenterology; Internal Medicine  Division of Gastroenterology  [address] [place] [address]  Phone: [phone]  Fax: [phone]    Khoi Ch (), Family Medicine  [address] [place] [address]  Phone: [phone]  Fax: [phone]

## 2017-08-18 NOTE — DISCHARGE NOTE ADULT - PLAN OF CARE
Resolution The painful nodule on the joints of your hands and feet appear to be a complication of pancreatitis. The biopsy showed panniculitis, which is inflammation of the deep skin layers.  This has been improving on the steroid medication you have been receiving. On your abdominal MRI, it was noticed that there was a close in the portal vein, or series of veins in your liver. You were started on a blood thinner called Lovenox to resolve the clot. Maintenance The complications you experienced during your hospital course are linked to an acute episode of pre-existing pancreatitis. For your acute pancreatitis, we managed you primarily with bowel rest and hydartion. You also underwent stent placement in order to improved the ability of the pancreas to drain. This drain will require removal in 6-8 wks. During your hospital stay, you developed fluid in your abdomen. This is a result of inflammation of the pancreas. One liter of the fluid was removed by drainage. To prevent reaccumulation, you were started on lasix and aldactone. You were also given 7 days of antibiotics as there was initial concern the fluid may be infected. Likely due to opioids and decreased ambulation, you developed ileus or a condition in which there is slowed transit of the intestines.With bowel rest and the nasogastric tube that was placed, you gradually regained bowel function and were able to advance back to regular diet. The painful nodule on the joints of your hands and feet appear to be a complication of pancreatitis. The biopsy showed panniculitis, which is inflammation of the deep skin layers.  This has been improving on the steroid medication you have been receiving. Please continue to take prednisone as prescribed. On your abdominal MRI, it was noticed that there was a close in the portal vein, or series of veins in your liver. You were started on a blood thinner called enoxaparin to resolve the clot. The repeat Ultrasound of your abdomen showed resolution of the clot and so blood thinner is no longer required. The complications you experienced during your hospital course are linked to an acute episode of pre-existing pancreatitis. For your acute pancreatitis, we managed you primarily with bowel rest and hydration. You also underwent stent placement in order to improved the ability of the pancreas to drain. This drain will require removal in 6-8 wks. Please follow up with your gastroenterologist. During your hospital stay, you developed fluid in your abdomen. This is a result of inflammation of the pancreas. One liter of the fluid was removed by drainage. To prevent reaccumulation, you were started on furosemide and aldactone. You were also given 7 days of antibiotics as there was initial concern the fluid may be infected. Likely due to opioids and decreased ambulation, you developed ileus or a condition in which there is slowed transit of the intestines. With bowel rest and the nasogastric tube that was placed, you gradually regained bowel function and were able to advance back to regular diet. The painful nodule on the joints of your hands and feet appear to be a complication of pancreatitis. The biopsy showed panniculitis, which is inflammation of the deep skin layers.  This has been improving on the steroid medication you have been receiving. Please continue to take Prednisone as prescribed. You will take Prednisone 20 mg (4 tabs) once daily through Sunday (8/27), then take Prednisone 10 mg (2 tabs) once daily for the next 7 days, then take Prednisone 5 mg (1 tab) once daily for the next 7 days and stop.

## 2017-08-18 NOTE — PROGRESS NOTE ADULT - PROBLEM SELECTOR PLAN 1
.  Imaging showing multiple stable pancreatic cysts and main pancreatic duct dilation.  - s/p ERCP w/ pancreatic duct placement, will need stent removal in 6-8 wks  - GI recs appreciated, f/u recs

## 2017-08-18 NOTE — DISCHARGE NOTE ADULT - HOME CARE AGENCY
[facility] [phone] Visiting Nurse, Physical/Occupational Therapy, Home Health Aide, . Nursing services to begin 8/24/17.

## 2017-08-18 NOTE — DISCHARGE NOTE ADULT - MEDICATION SUMMARY - MEDICATIONS TO TAKE
I will START or STAY ON the medications listed below when I get home from the hospital:    freetext  -- Rolling walker  -- Indication: For Ambulation    freetext  -- 3:1 Commode  -- Indication: For Medical Supplies    predniSONE 5 mg oral tablet  -- 20 tab(s) by mouth once a day MDD:Please take 4 tabs (20mg) through the rest of the week until (8/27), then 1 week of 2 tabs (10mg), then 1 week of 1 tab (5mg)  -- It is very important that you take or use this exactly as directed.  Do not skip doses or discontinue unless directed by your doctor.  Obtain medical advice before taking any non-prescription drugs as some may affect the action of this medication.  Take with food or milk.    -- Indication: For Panniculitis    oxyCODONE 5 mg oral tablet  -- 1 tab(s) by mouth every 6 hours, As Needed for Severe Pain MDD:4 tabs  -- Indication: For Pain    phenylephrine 0.25%-3% rectal ointment  -- 1 application rectally once a day, As needed, hemmorhoidal pain  -- Indication: For Hemorrhoids    furosemide 40 mg oral tablet  -- 1 tab(s) by mouth once a day  -- Indication: For Leg swelling    docusate sodium 100 mg oral capsule  -- 1 cap(s) by mouth 2 times a day As Needed for Constipation  -- Indication: For Constipation    MiraLax - oral powder for reconstitution  -- 17 gram(s) by mouth once a day As Needed for Constipation  -- Dilute this medication with liquid before administration.  It is very important that you take or use this exactly as directed.  Do not skip doses or discontinue unless directed by your doctor.    -- Indication: For Constipation    pantoprazole 40 mg oral delayed release tablet  -- 1 tab(s) by mouth once a day (before a meal)  -- Indication: For GI prophylaxis (on steroids)    nicotine 21 mg/24 hr transdermal film, extended release  -- 1 patch by transdermal patch once a day  -- Indication: For Smoking cessation    Multiple Vitamins oral tablet  -- 1 tab(s) by mouth once a day  -- Indication: For Supplements    folic acid 1 mg oral tablet  -- 1 tab(s) by mouth once a day  -- Indication: For Supplements I will START or STAY ON the medications listed below when I get home from the hospital:    freetext  -- Rolling walker  -- Indication: For Ambulation    freetext  -- 3:1 Commode  -- Indication: For Medical Supplies    predniSONE 5 mg oral tablet  -- Take 4 tabs (20mg) through Sunday (8/27), then take 2 tabs (10mg) for 7 days, then take 1 tab (5mg) for 7 days  -- It is very important that you take or use this exactly as directed.  Do not skip doses or discontinue unless directed by your doctor.  Obtain medical advice before taking any non-prescription drugs as some may affect the action of this medication.  Take with food or milk.    -- Indication: For Panniculitis    Duragesic-25 transdermal film, extended release  -- 1 patch by transdermal patch every 72 hours MDD:1 patch every 72 hours.  -- Indication: For Pain    oxyCODONE 5 mg oral tablet  -- 1 tab(s) by mouth every 6 hours, As Needed for Severe Pain MDD:4 tabs  -- Indication: For Pain    phenylephrine 0.25%-3% rectal ointment  -- 1 application rectally once a day, As needed, hemmorhoidal pain  -- Indication: For Hemorrhoids    furosemide 40 mg oral tablet  -- 1 tab(s) by mouth once a day  -- Indication: For Leg swelling    docusate sodium 100 mg oral capsule  -- 1 cap(s) by mouth 2 times a day As Needed for Constipation  -- Indication: For Constipation    MiraLax - oral powder for reconstitution  -- 17 gram(s) by mouth once a day As Needed for Constipation  -- Dilute this medication with liquid before administration.  It is very important that you take or use this exactly as directed.  Do not skip doses or discontinue unless directed by your doctor.    -- Indication: For Constipation    pantoprazole 40 mg oral delayed release tablet  -- 1 tab(s) by mouth once a day (before a meal)  -- Indication: For GI prophylaxis (on steroids)    nicotine 21 mg/24 hr transdermal film, extended release  -- 1 patch by transdermal patch once a day  -- Indication: For Smoking cessation    Multiple Vitamins oral tablet  -- 1 tab(s) by mouth once a day  -- Indication: For Supplements    folic acid 1 mg oral tablet  -- 1 tab(s) by mouth once a day  -- Indication: For Supplements I will START or STAY ON the medications listed below when I get home from the hospital:    freetext  -- Rolling walker  -- Indication: For Ambulation    freetext  -- 3:1 Commode  -- Indication: For Medical Supplies    predniSONE 5 mg oral tablet  -- Take 4 tabs (20mg) through Sunday (8/27), then take 2 tabs (10mg) for 7 days, then take 1 tab (5mg) for 7 days  -- It is very important that you take or use this exactly as directed.  Do not skip doses or discontinue unless directed by your doctor.  Obtain medical advice before taking any non-prescription drugs as some may affect the action of this medication.  Take with food or milk.    -- Indication: For Panniculitis    fentaNYL 25 mcg/hr transdermal film, extended release  -- 1 patch by transdermal patch every 72 hours  -- Indication: For Pain    oxyCODONE 5 mg oral tablet  -- 1 tab(s) by mouth every 6 hours, As Needed for Severe Pain MDD:4 tabs  -- Indication: For Pain    phenylephrine 0.25%-3% rectal ointment  -- 1 application rectally once a day, As needed, hemmorhoidal pain  -- Indication: For Hemorrhoids    furosemide 40 mg oral tablet  -- 1 tab(s) by mouth once a day  -- Indication: For Leg swelling    docusate sodium 100 mg oral capsule  -- 1 cap(s) by mouth 2 times a day As Needed for Constipation  -- Indication: For Constipation    MiraLax - oral powder for reconstitution  -- 17 gram(s) by mouth once a day As Needed for Constipation  -- Dilute this medication with liquid before administration.  It is very important that you take or use this exactly as directed.  Do not skip doses or discontinue unless directed by your doctor.    -- Indication: For Constipation    pantoprazole 40 mg oral delayed release tablet  -- 1 tab(s) by mouth once a day (before a meal)  -- Indication: For GI prophylaxis (on steroids)    nicotine 21 mg/24 hr transdermal film, extended release  -- 1 patch by transdermal patch once a day  -- Indication: For Smoking cessation    Multiple Vitamins oral tablet  -- 1 tab(s) by mouth once a day  -- Indication: For Supplements    folic acid 1 mg oral tablet  -- 1 tab(s) by mouth once a day  -- Indication: For Supplements

## 2017-08-18 NOTE — PROGRESS NOTE ADULT - SUBJECTIVE AND OBJECTIVE BOX
Chief Complaint:  Patient is a 57y old  Male who presents with a chief complaint of transfer for possible ERCP. (15 Aug 2017 21:38)      Interval Events: s/p ERCP yesterday - tortuous and dilated PD without leak, s/p PD stent placement. Afebrile. Pt feeling better. No abdominal pain/nausea/vomiting. (+) flatus. No BM     Allergies:  iodine (Swelling)      Home Medications:    Hospital Medications:  HYDROmorphone  Injectable 0.5 milliGRAM(s) IV Push every 6 hours PRN  HYDROmorphone  Injectable 0.5 milliGRAM(s) IV Push every 4 hours PRN  fentaNYL   Patch  25 MICROgram(s)/Hr 1 Patch Transdermal every 72 hours  furosemide   Injectable 40 milliGRAM(s) IV Push daily  meropenem IVPB 1000 milliGRAM(s) IV Intermittent every 8 hours  methylPREDNISolone sodium succinate Injectable 30 milliGRAM(s) IV Push daily  pantoprazole  Injectable 40 milliGRAM(s) IV Push daily  nicotine - 21 mG/24Hr(s) Patch 1 patch Transdermal daily      PMHX/PSHX:  Ascites  Hernia  Pancreatitis  Hypertension  TIA (transient ischemic attack)  No pertinent past medical history  History of inguinal hernia repair  S/P skin graft using Integra  Fracture of shaft of left femur  No significant past surgical history      Family history:  Family history of duodenal cancer (Father)  No pertinent family history in first degree relatives      ROS: as per HPI       PHYSICAL EXAM:   Vital Signs:  Vital Signs Last 24 Hrs  T(C): 36.8 (18 Aug 2017 05:00), Max: 36.8 (18 Aug 2017 05:00)  T(F): 98.3 (18 Aug 2017 05:00), Max: 98.3 (18 Aug 2017 05:00)  HR: 82 (18 Aug 2017 05:00) (82 - 90)  BP: 137/73 (18 Aug 2017 05:00) (129/82 - 151/78)  BP(mean): --  RR: 18 (18 Aug 2017 05:00) (18 - 18)  SpO2: 95% (18 Aug 2017 05:00) (94% - 95%)  Daily     Daily Weight in k (17 Aug 2017 12:28)    GENERAL:  NAD,   HEENT:  sclera -anicteric  CHEST:  breath sounds clear  HEART:  Regular rhythm  ABDOMEN:  Soft, non-tender, non-distended, normoactive bowel sounds  SKIN:  No jaundice   NEURO:  Alert, oriented    LABS:                        12.2   28.7  )-----------( 469      ( 18 Aug 2017 07:15 )             37.3         136  |  96  |  24<H>  ----------------------------<  98  4.7   |  25  |  0.42<L>    Ca    8.4      18 Aug 2017 07:14  Phos  3.4       Mg     2.0         TPro  5.5<L>  /  Alb  2.4<L>  /  TBili  0.4  /  DBili  x   /  AST  29  /  ALT  30  /  AlkPhos  127<H>      LIVER FUNCTIONS - ( 18 Aug 2017 07:14 )  Alb: 2.4 g/dL / Pro: 5.5 g/dL / ALK PHOS: 127 U/L / ALT: 30 U/L RC / AST: 29 U/L / GGT: x           PT/INR - ( 18 Aug 2017 07:14 )   PT: 12.7 sec;   INR: 1.17 ratio         PTT - ( 17 Aug 2017 07:00 )  PTT:26.6 sec        Imaging:      [facility]  ____________________________________________________________________________________________________  Patient Name: David Guzman                    MRN: [id number]  Account Number: [id number]                     YOB: 1960  Room: Endoscopy Room 2                           Gender: Male  Attending MD: Patrice Quevedo MD                Procedure Date No Time: 2017  ____________________________________________________________________________________________________     Procedure:           ERCP  Indications:         Acute on chronic pancreatitis complicated by pancreatic ascites, ERCP for                        evaluation of suspected PD leak  Providers:           Patrice Quevedo MD, Tim Forrest MD (Fellow)  Referring MD:        Alexy Mcdonald  Medicines:           General Anesthesia  Complications:       No immediate complications.  ____________________________________________________________________________________________________  Procedure:           Pre-Anesthesia Assessment:                       - Prior to the procedure, a History and Physical was performed, and patient                        medications, allergies and sensitivities were reviewed. The patient's                        tolerance of previous anesthesia was reviewed.                       - The risks and benefits of the procedure and the sedation options and risks                        were discussed with the patient. All questions were answered and informed                        consent was obtained.                       - Patient identification and proposed procedure were verified prior to the                        procedure by the physician. The procedure was verified in the endoscopy suite.                       - Prior to the procedure, a History and Physical was performed, and patient                        medications, allergies and sensitivities were reviewed. The patient's                        tolerance of previous anesthesia was reviewed.                       - The risks and benefits of the procedure and the sedation options and risks                        were discussed with the patient. All questions were answered and informed                        consent was obtained.                       - Patient identification and proposed procedure were verified prior to the                        procedure by the physician. The procedure was verified in the endoscopy suite.                       After obtaining informed consent, the scope was passed under direct vision.                        Throughout the procedure, the patient's blood pressure, pulse, and oxygen                        saturations were monitored continuously. The was introduced through the                        mouth, and advanced to the duodenum and used to inject contrast into the bile                        duct. The Endoscope was introduced through the mouth, and advanced to the                        duodenum and used to inject contrast into the bile duct. The ERCP was                        accomplished without difficulty. The patient tolerated the procedure well.                                                                                                        Findings:       The esophagus was successfully intubated under direct vision. The scope was advanced to the        major papilla in the descending duodenum without detailed examination of the pharynx, larynx        and associated structures, and upper GI tract. The upper GI tract was grossly normal. The        ampulla appeared normal. The pancreatic duct was deeply cannulated with a 44Rx hydratome        preloaded with a 0.035in x 260cm angled wire. Contrast was injected. The entire pancreatic        duct was dilated and tortuous (main PD measured approximately 15mm). There was no obvious        extravasation of contrast along the entire pancreatic duct. A pancreatic sphincterotomy was        performed in the usual fashion with ERBE cautery. There was poor drainage of contrast from        the pancreatic duct. A 7Fr x 9cm single pigtail pancreatic stent was placed under endoscopic        and fluoroscopic guidance. Fluroscopy confirmed excellent position.                                                                                                        Impression:          - Dilated and tortuous pancreatic duct without evidence of leak                       - s/p pancreatic sphincterotomy and placement of 7Fr x 9cm single pigtail                        pancreatic stent  Recommendation:      - Return patient to hospital [facility] for ongoing care.                       - Trial of clear liquid diet.                       - Continue with antibiotics.                       - ERCP with stent removal in next 6-8 weeks                                                                                                        Attending Participation:       I was present and participated during the entire procedure, including non-key portions.                                                                                                          __________________  Patrice Quevedo MD  2017 8:23:55 PM  Number of Addenda: 0    Note Initiated On: 2017 4:59 PM

## 2017-08-18 NOTE — PROGRESS NOTE ADULT - SUBJECTIVE AND OBJECTIVE BOX
Red surgery Progress Note     Subjective: patient feels well s/p ercp/stent - feels hungry asking for food    PE:    Vital Signs Last 24 Hrs  T(C): 36.4 (17 Aug 2017 14:13), Max: 36.9 (17 Aug 2017 04:41)  T(F): 97.5 (17 Aug 2017 14:13), Max: 98.5 (17 Aug 2017 04:41)  HR: 90 (17 Aug 2017 13:56) (85 - 91)  BP: 129/82 (17 Aug 2017 13:56) (115/68 - 129/82)  BP(mean): --  RR: 18 (17 Aug 2017 04:41) (18 - 20)  SpO2: 94% (17 Aug 2017 13:56) (91% - 94%)    I&O's Detail    16 Aug 2017 07:01  -  17 Aug 2017 07:00  --------------------------------------------------------  IN:    IV PiggyBack: 100 mL  Total IN: 100 mL    OUT:    Nasoenteral Tube: 700 mL    Voided: 2800 mL  Total OUT: 3500 mL    Total NET: -3400 mL      17 Aug 2017 07:01  -  17 Aug 2017 16:38  --------------------------------------------------------  IN:  Total IN: 0 mL    OUT:    Voided: 1875 mL  Total OUT: 1875 mL    Total NET: -1875 mL          Daily     Daily Weight in k (17 Aug 2017 12:28)    MEDICATIONS  (STANDING):  fentaNYL   Patch  25 MICROgram(s)/Hr 1 Patch Transdermal every 72 hours  furosemide   Injectable 40 milliGRAM(s) IV Push daily  meropenem IVPB 1000 milliGRAM(s) IV Intermittent every 8 hours  methylPREDNISolone sodium succinate Injectable 30 milliGRAM(s) IV Push daily  pantoprazole  Injectable 40 milliGRAM(s) IV Push daily  nicotine - 21 mG/24Hr(s) Patch 1 patch Transdermal daily    MEDICATIONS  (PRN):  HYDROmorphone  Injectable 0.5 milliGRAM(s) IV Push every 6 hours PRN Moderate Pain (4 - 6)  HYDROmorphone  Injectable 0.5 milliGRAM(s) IV Push every 4 hours PRN Breakthrough pain      LABS:                        12.2   26.6  )-----------( 483      ( 17 Aug 2017 07:00 )             37.6     08-    137  |  97  |  25<H>  ----------------------------<  92  4.6   |  25  |  0.39<L>    Ca    8.9      17 Aug 2017 07:00  Phos  3.4       Mg     2.0         TPro  5.6<L>  /  Alb  2.6<L>  /  TBili  0.4  /  DBili  x   /  AST  31  /  ALT  35  /  AlkPhos  120      PT/INR - ( 17 Aug 2017 07:00 )   PT: 13.7 sec;   INR: 1.25 ratio         PTT - ( 17 Aug 2017 07:00 )  PTT:26.6 sec      RADIOLOGY & ADDITIONAL STUDIES:

## 2017-08-18 NOTE — PROGRESS NOTE ADULT - ATTENDING COMMENTS
As above.  Acute on chronic pancreatitis, with ascites with high amylase, suspected pancreatic duct leak.  S/p ERCP/pancreatography without evidence of pancreatic duct leak, but dilated pancreatic duct.  Pancreatic sphincterotomy and pancreatic duct stent placement was successful.  Follow clinically  Outpatient GI followup in 2-4 weeks.  Repeat ERCP for removal of stent in 4-6 weeks.

## 2017-08-18 NOTE — PROGRESS NOTE ADULT - PROBLEM SELECTOR PLAN 4
.  Has been > 30 with neutrophilic predominance.  Likely multifactorial in etiology, including acute on chronic pancreatitis, SBP, and steroid therapy for panniculitis.   - Overall downtrending, today down to 26.6  - Currently afebrile and hemodynamically stable.   - Will continue to f/u

## 2017-08-18 NOTE — PROGRESS NOTE ADULT - PROBLEM SELECTOR PLAN 5
Patient has two therapeutic taps draining about 1.2 L each time. Amylase greatly elevated in both with SAAG < 1.2. With significant hypoalbuminemia. Now with minimal abdominal distention and significant urine output.   - Consider holding lasix today overall edema and ascites has improved.   - Hold aldactone as patient is NPO. Patient has two therapeutic taps draining about 1.2 L each time. Amylase greatly elevated in both with SAAG < 1.2. With significant hypoalbuminemia. Now with minimal abdominal distention and significant urine output.   -will decrease to lasix 20mg IV from 40mg overall edema and ascites has improved.   - Hold aldactone for now

## 2017-08-18 NOTE — DISCHARGE NOTE ADULT - OTHER SIGNIFICANT FINDINGS
ERCP 8/17/2017  Dilated tortuous pancreatic duct withuot evidence of leak s/p sphincterotomy and stent placement.    CT abdomen 8/4/2017  1. Edema and inflammatory changes around the pancreas consistent with acute  pancreatitis.  2. Calcifications in the pancreas consistent with chronic pancreatitis.  Dilatation of the pancreatic duct.  3. Multiple fluid collections adjacent to pancreas consistent with  pancreatic  pseudocysts. Largest measures 4 cm. Previously it measured 3.7 cm.  4. Small left pleural effusion. Smaller right pleural effusion.  5. Large amount of ascites throughout the abdomen and pelvis.  6. There are multiple loops of dilated small bowel with air-fluid levels.  This may represent ileus or early small bowel obstruction.  7. Anasarca    US Abdomen 8/21/2017  IMPRESSION:    1. Patent hepatic vasculature.  2. Chronic pancreatitis with peripancreatic fluid collections are again  noted.

## 2017-08-18 NOTE — DISCHARGE NOTE ADULT - MEDICATION SUMMARY - MEDICATIONS TO STOP TAKING
I will STOP taking the medications listed below when I get home from the hospital:    methylPREDNISolone  -- 30 milligram(s) intravenous once a day for 5 more days then 20mg IV QD x 7 days then 10mg IV qd x 7 days then 5 mg IV QD x 7 days then d/c can switch to PO if pt sbo resolves    HYDROmorphone  -- 0.5 milligram(s) intravenous every 4 hours, As Needed for severe pain    enoxaparin  -- 60 milligram(s) intravenous every 12 hours    gabapentin 300 mg oral capsule  -- 1 cap(s) by mouth every 8 hours    mirtazapine 7.5 mg oral tablet  -- 1 tab(s) by mouth once a day (at bedtime)    aluminum hydroxide-magnesium hydroxide 200 mg-200 mg/5 mL oral suspension  -- 30 milliliter(s) by mouth every 6 hours, As needed, Dyspepsia    ondansetron 2 mg/mL injectable solution  -- 4 milligram(s) injectable every 8 hours, As Needed n/v    amLODIPine 5 mg oral tablet  -- 1 tab(s) by mouth once a day    meropenem 1000 mg intravenous injection  -- 1000 milligram(s) intravenous every 8 hours    hydrocortisone 2.5% rectal cream with applicator  -- 1 application rectally 2 times a day    furosemide 100 mg/100 mL-0.9% intravenous solution  -- 40 milliliter(s) intravenous once a day    spironolactone 25 mg oral tablet  -- 4 tab(s) by mouth once a day    lactulose 10 g/15 mL oral syrup  -- 22.5 milliliter(s) by mouth 3 times a day    polyethylene glycol 3350 oral powder for reconstitution  -- 17 gram(s) by mouth once a day (at bedtime), As Needed    psyllium 3.4 g/7 g oral powder for reconstitution  -- 1 packet(s) by mouth 2 times a day, As Needed    saccharomyces boulardii lyo 250 mg oral capsule  -- 2 cap(s) by mouth 2 times a day

## 2017-08-18 NOTE — DISCHARGE NOTE ADULT - HOSPITAL COURSE
57M hx chronic pancreatitis (dx 2002), ETOH abuse (last drink 2 years ago), HTN, recent inguinal hernia repair (June 2017) transferred from [facility] for further management of acute on chronic pancreatitis. 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 This significantly improved on long steroid taper. Patient had several hospital course complications that were further managed at [facility]. Firstly, patient developed worsening abdominal ascites, which on diagnostic tap found to pancreatic ascites with concern for SBP ( SAAG < 1.2, elevated amylase and 1500 nucleated cells w/ 90% PMN) s/p 1.1 L drainage. Patient was started and continued on Lasix and aldactone. Patient was completed 7 day course of Meropenem with ascites culture from 8/7. On abdominal MRI (8/11) patient was noted to have main pancreatic duct dilation, stable pseudocyst, and acute on subacute portal vein thrombosis. Patient was started on full dose Lovenox for thrombus, which on repeat portal vein duplex (8/15) was found to be resolved. Currently, pending discontinuation of Lovenox if repeat portal vein duplex is negative. Patient underwent ERCP guided pancreatic duct stent placement on 8/17 with plans for stent removal in 6-8 wks. Towards end of hospitalization at [facility], patient developed constipation, abdominal distention with KUB Xray concerning for SBO vs ileus given chronic opioid use and started on bowel rest and NGT. During hospital course here, patient was able has return of bowel fxn (BM x 2) and advance to regular diet. Last abdominal flat plate (8/18) wnl. 57M hx chronic pancreatitis (dx 2002), ETOH abuse (last drink 2 years ago), HTN, recent inguinal hernia repair (June 2017) transferred from [facility] for further management of acute on chronic pancreatitis. 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 This significantly improved on long steroid taper. Patient had several hospital course complications that were further managed at [facility]. Firstly, patient developed worsening abdominal ascites, which on diagnostic tap found to pancreatic ascites with concern for SBP ( SAAG < 1.2, elevated amylase and 1500 nucleated cells w/ 90% PMN) s/p 1.1 L drainage. Patient was started and continued on furosemide and aldactone. Patient was completed 7 day course of Meropenem with ascites culture from 8/7. On abdominal MRI (8/11) patient was noted to have main pancreatic duct dilation, stable pseudocyst, and acute on subacute portal vein thrombosis. Patient was started on full dose enoxaparin for thrombus, which on repeat portal vein duplex (8/15) was found to be resolved. Currently, taken off of enoxaparin due to repeat portal vein duplex is negative. Patient underwent ERCP guided pancreatic duct stent placement on 8/17 with plans for stent removal in 6-8 wks. Towards end of hospitalization at [facility], patient developed constipation, abdominal distention with KUB Xray concerning for SBO vs ileus given chronic opioid use and started on bowel rest and NGT. During hospital course here, patient was able has return of bowel fxn (BM x 2) and advance to regular diet. Last abdominal flat plate (8/18) wnl and continues to have flatus and BM. His diet was switched to regular which he has been tolerating and has been working to regain his strength with Physical therapy and will be discharge with a rolling walker. He will follow up with his PCP Dr. Ch the week of 8/28 and his GI, Dr. Yates. 57M hx chronic pancreatitis (dx 2002), ETOH abuse (last drink 2 years ago), HTN, recent inguinal hernia repair (June 2017) transferred from [facility] for further management of acute on chronic pancreatitis. 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 This significantly improved on long steroid taper. Patient had several hospital course complications that were further managed at [facility]. Firstly, patient developed worsening abdominal ascites, which on diagnostic tap found to pancreatic ascites with concern for SBP ( SAAG < 1.2, elevated amylase and 1500 nucleated cells w/ 90% PMN) s/p 1.1 L drainage. Patient was started and continued on furosemide and aldactone. Patient was completed 7 day course of Meropenem with ascites culture from 8/7. On abdominal MRI (8/11) patient was noted to have main pancreatic duct dilation, stable pseudocyst, and acute on subacute portal vein thrombosis. Patient was started on full dose enoxaparin for thrombus, which on repeat portal vein duplex (8/15) was found to be resolved. Currently, taken off of enoxaparin due to repeat portal vein duplex is negative. Patient underwent ERCP guided pancreatic duct stent placement on 8/17 with plans for stent removal in 6-8 wks. Towards end of hospitalization at [facility], patient developed constipation, abdominal distention with KUB Xray concerning for SBO vs ileus given chronic opioid use and started on bowel rest and NGT. During hospital course here, patient was able has return of bowel fxn (BM x 2) and advance to regular diet. Last abdominal flat plate (8/18) wnl and continues to have flatus and BM. His diet was switched to regular which he has been tolerating and has been working to regain his strength with Physical therapy and will be discharge with a rolling walker. He will follow up with his PCP Dr. Ch the week of 8/28 and his GI, Dr. Yates.    Discharge time spent: 40 min

## 2017-08-19 DIAGNOSIS — D64.9 ANEMIA, UNSPECIFIED: ICD-10-CM

## 2017-08-19 LAB
ALBUMIN SERPL ELPH-MCNC: 2 G/DL — LOW (ref 3.3–5)
ALP SERPL-CCNC: 109 U/L — SIGNIFICANT CHANGE UP (ref 40–120)
ALT FLD-CCNC: 27 U/L RC — SIGNIFICANT CHANGE UP (ref 10–45)
ANION GAP SERPL CALC-SCNC: 11 MMOL/L — SIGNIFICANT CHANGE UP (ref 5–17)
AST SERPL-CCNC: 23 U/L — SIGNIFICANT CHANGE UP (ref 10–40)
BILIRUB SERPL-MCNC: 0.4 MG/DL — SIGNIFICANT CHANGE UP (ref 0.2–1.2)
BUN SERPL-MCNC: 18 MG/DL — SIGNIFICANT CHANGE UP (ref 7–23)
CALCIUM SERPL-MCNC: 8 MG/DL — LOW (ref 8.4–10.5)
CHLORIDE SERPL-SCNC: 97 MMOL/L — SIGNIFICANT CHANGE UP (ref 96–108)
CO2 SERPL-SCNC: 27 MMOL/L — SIGNIFICANT CHANGE UP (ref 22–31)
CREAT SERPL-MCNC: 0.36 MG/DL — LOW (ref 0.5–1.3)
GLUCOSE SERPL-MCNC: 120 MG/DL — HIGH (ref 70–99)
HCT VFR BLD CALC: 34.1 % — LOW (ref 39–50)
HGB BLD-MCNC: 11.1 G/DL — LOW (ref 13–17)
INR BLD: 1.35 RATIO — HIGH (ref 0.88–1.16)
MCHC RBC-ENTMCNC: 32.5 GM/DL — SIGNIFICANT CHANGE UP (ref 32–36)
MCHC RBC-ENTMCNC: 34.3 PG — HIGH (ref 27–34)
MCV RBC AUTO: 105 FL — HIGH (ref 80–100)
PLATELET # BLD AUTO: 380 K/UL — SIGNIFICANT CHANGE UP (ref 150–400)
POTASSIUM SERPL-MCNC: 4 MMOL/L — SIGNIFICANT CHANGE UP (ref 3.5–5.3)
POTASSIUM SERPL-SCNC: 4 MMOL/L — SIGNIFICANT CHANGE UP (ref 3.5–5.3)
PROT SERPL-MCNC: 4.8 G/DL — LOW (ref 6–8.3)
PROTHROM AB SERPL-ACNC: 14.8 SEC — HIGH (ref 9.8–12.7)
RBC # BLD: 3.24 M/UL — LOW (ref 4.2–5.8)
RBC # FLD: 13.8 % — SIGNIFICANT CHANGE UP (ref 10.3–14.5)
SODIUM SERPL-SCNC: 135 MMOL/L — SIGNIFICANT CHANGE UP (ref 135–145)
WBC # BLD: 22.1 K/UL — HIGH (ref 3.8–10.5)
WBC # FLD AUTO: 22.1 K/UL — HIGH (ref 3.8–10.5)

## 2017-08-19 PROCEDURE — 71010: CPT | Mod: 26

## 2017-08-19 PROCEDURE — 74000: CPT | Mod: 26

## 2017-08-19 PROCEDURE — 99233 SBSQ HOSP IP/OBS HIGH 50: CPT | Mod: GC

## 2017-08-19 RX ORDER — FUROSEMIDE 40 MG
20 TABLET ORAL ONCE
Qty: 0 | Refills: 0 | Status: COMPLETED | OUTPATIENT
Start: 2017-08-19 | End: 2017-08-19

## 2017-08-19 RX ORDER — FUROSEMIDE 40 MG
40 TABLET ORAL DAILY
Qty: 0 | Refills: 0 | Status: DISCONTINUED | OUTPATIENT
Start: 2017-08-20 | End: 2017-08-21

## 2017-08-19 RX ADMIN — PANTOPRAZOLE SODIUM 40 MILLIGRAM(S): 20 TABLET, DELAYED RELEASE ORAL at 11:42

## 2017-08-19 RX ADMIN — Medication 1 PATCH: at 11:41

## 2017-08-19 RX ADMIN — HYDROMORPHONE HYDROCHLORIDE 0.5 MILLIGRAM(S): 2 INJECTION INTRAMUSCULAR; INTRAVENOUS; SUBCUTANEOUS at 05:57

## 2017-08-19 RX ADMIN — HYDROMORPHONE HYDROCHLORIDE 0.5 MILLIGRAM(S): 2 INJECTION INTRAMUSCULAR; INTRAVENOUS; SUBCUTANEOUS at 05:27

## 2017-08-19 RX ADMIN — Medication 20 MILLIGRAM(S): at 13:51

## 2017-08-19 RX ADMIN — HYDROMORPHONE HYDROCHLORIDE 0.5 MILLIGRAM(S): 2 INJECTION INTRAMUSCULAR; INTRAVENOUS; SUBCUTANEOUS at 21:35

## 2017-08-19 RX ADMIN — ENOXAPARIN SODIUM 60 MILLIGRAM(S): 100 INJECTION SUBCUTANEOUS at 17:20

## 2017-08-19 RX ADMIN — Medication 30 MILLIGRAM(S): at 05:27

## 2017-08-19 RX ADMIN — Medication 1 PATCH: at 11:45

## 2017-08-19 RX ADMIN — HYDROMORPHONE HYDROCHLORIDE 0.5 MILLIGRAM(S): 2 INJECTION INTRAMUSCULAR; INTRAVENOUS; SUBCUTANEOUS at 22:35

## 2017-08-19 RX ADMIN — ENOXAPARIN SODIUM 60 MILLIGRAM(S): 100 INJECTION SUBCUTANEOUS at 05:27

## 2017-08-19 RX ADMIN — Medication 20 MILLIGRAM(S): at 05:26

## 2017-08-19 NOTE — PROGRESS NOTE ADULT - PROBLEM SELECTOR PLAN 6
MRI showed concern for acute/subacute portal vein thrombosis and patient started on therapeutic lovenox   - Repeat abdominal U/S showed no thrombus.   - Per discussion with GI will repeat hepatic/portal vein duplex but continue with lovenox for now. If negative for thrombus can d/x Lovenox. MRI showed concern for acute/subacute portal vein thrombosis and patient restarted on therapeutic lovenox per GI  - Repeat abdominal U/S showed no thrombus.   - Per discussion with GI will repeat hepatic/portal vein duplex but continue with lovenox for now. If negative for thrombus can d/x Lovenox.

## 2017-08-19 NOTE — PROGRESS NOTE ADULT - PROBLEM SELECTOR PLAN 4
.  Has been > 30 with neutrophilic predominance.  Likely multifactorial in etiology, including acute on chronic pancreatitis, SBP, and steroid therapy for panniculitis.   - Overall downtrending, today down to 22.1  - Currently afebrile and hemodynamically stable.   - Will continue to f/u .  Has been > 30 with neutrophilic predominance now downtrending.  Likely multifactorial in etiology, including acute on chronic pancreatitis, SBP, and steroid therapy for panniculitis.   - Overall downtrending, today down to 22.1  - Currently afebrile and hemodynamically stable.   - Will continue to f/u

## 2017-08-19 NOTE — PROGRESS NOTE ADULT - PROBLEM SELECTOR PLAN 3
Pt has had paracentesis x2, both showing elevated PMN's consistent with SBP. Cultures NTD > 48 hrs. Had been afebrile, HSS.   - Day 7 of Meropenem  - Per GI, can monitor patient off Meropenem as low suspicion for SBP at present and already completed 7 days of meropenem today  - If any s/s of infection fever, abdominal pain, septic features, will restart Meropenem paracentesis x2, both showing elevated PMN's consistent with SBP. Cultures NGTD, afebrile, HD stable   - Per GI, can monitor patient off Meropenem as low suspicion for SBP at present and already completed 7 days of meropenem  - If any s/s of infection fever, abdominal pain, septic features, will restart Meropenem

## 2017-08-19 NOTE — PROGRESS NOTE ADULT - ASSESSMENT
56 YO M  hx of chronic pancreatitis (dx 2002), HTN, recent inguinal hernia repair (June 2017) initially presented to [facility] for panniculitis 2/2 pancreatitis, now resolving with steroid taper. Prior hospitalization course completed by pancreatic ascites, now s/p ERCP pancreatic duct stent placement and completion of meropenem x 7days. Hospital course also complicated by acute on chronic portal vein thrombosis later shown to be resolved on follow-up duplex, pending reevaluation by repeat duplex for need to continue full dose Lovenox long term. Patient also developed ileus likely secondary to opiods and deconditioning, improving.

## 2017-08-19 NOTE — PROGRESS NOTE ADULT - PROBLEM SELECTOR PLAN 7
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 Lesions have been improving with steroids.   - Currently on methylprednisolone 30mg daily.   - Will continue to taper by 10 mg weekly.

## 2017-08-19 NOTE — PROGRESS NOTE ADULT - PROBLEM SELECTOR PLAN 2
.  KUB on 8/13/17 initially with concern for SBO/ileus. Repeat on 8/18 not concerning for SBO, more likely ileus likely related to opioids/pancreatitis/SBP  - NGT removed. Had BM.   - Tolerating clears. Per GI, can advance diet  - Minimize opioids as tolerated  - Monitor for bowel movement  - surgery following  - Ab X ray if unable to advance diet or no further BM in next 24 hrs .  s/p NG tube decompression  -improving, had BM yesterday and tolerated clear liquid diet   -per GI, advance to full liquid for lunch, possible solids for dinner  - Minimize opioids as tolerated  - Monitor for bowel movement  - surgery following  -ABX final read is pending, will review

## 2017-08-19 NOTE — PROGRESS NOTE ADULT - PROBLEM SELECTOR PLAN 8
DNR/DNI but wants all other aggressive medical therapies  - Patient has agreed to DNR/DNI.   - prognosis guarded DNR/DNI but wants all other aggressive medical therapies  - prognosis guarded

## 2017-08-19 NOTE — PROGRESS NOTE ADULT - PROBLEM SELECTOR PLAN 5
Patient has two therapeutic taps draining about 1.2 L each time. Amylase greatly elevated in both with SAAG < 1.2. With significant hypoalbuminemia. Now with minimal abdominal distention and significant urine output.   -will decrease to lasix 20mg IV from 40mg overall edema and ascites has improved.   - Hold aldactone for now Patient has two therapeutic taps draining about 1.2 L each time. Amylase greatly elevated in both with SAAG < 1.2. With significant hypoalbuminemia. Now with minimal abdominal distention and significant urine output.   --given increased LE edema, will increase back to lasix 40mg IVP daily   - Hold aldactone for now

## 2017-08-19 NOTE — PROGRESS NOTE ADULT - SUBJECTIVE AND OBJECTIVE BOX
Patient is a 57y old  Male who presents with a chief complaint of transfer for possible ERCP. (18 Aug 2017 16:24)      SUBJECTIVE / OVERNIGHT EVENTS:  No acute events overnight. Had BM overnight- formed, non-bloody. Tolerated clears.       MEDICATIONS  (STANDING):  fentaNYL   Patch  25 MICROgram(s)/Hr 1 Patch Transdermal every 72 hours  methylPREDNISolone sodium succinate Injectable 30 milliGRAM(s) IV Push daily  pantoprazole  Injectable 40 milliGRAM(s) IV Push daily  nicotine - 21 mG/24Hr(s) Patch 1 patch Transdermal daily  furosemide   Injectable 20 milliGRAM(s) IV Push daily  enoxaparin Injectable 60 milliGRAM(s) SubCutaneous every 12 hours    MEDICATIONS  (PRN):  HYDROmorphone  Injectable 0.5 milliGRAM(s) IV Push every 6 hours PRN Moderate Pain (4 - 6)  HYDROmorphone  Injectable 0.5 milliGRAM(s) IV Push every 4 hours PRN Breakthrough pain  hemorrhoidal Ointment 1 Application(s) Rectal daily PRN hemmorhoidal pain      Vital Signs Last 24 Hrs  T(C): 37.1 (19 Aug 2017 05:23), Max: 37.1 (19 Aug 2017 05:23)  T(F): 98.7 (19 Aug 2017 05:23), Max: 98.7 (19 Aug 2017 05:23)  HR: 101 (19 Aug 2017 05:23) (85 - 101)  BP: 125/69 (19 Aug 2017 05:23) (111/65 - 125/79)  BP(mean): --  RR: 18 (19 Aug 2017 05:23) (18 - 19)  SpO2: 92% (19 Aug 2017 05:23) (92% - 98%)  CAPILLARY BLOOD GLUCOSE        I&O's Summary    18 Aug 2017 07:01  -  19 Aug 2017 07:00  --------------------------------------------------------  IN: 0 mL / OUT: 1600 mL / NET: -1600 mL        PHYSICAL EXAM:  GENERAL: NAD, cachectic  HEAD:  Atraumatic, Normocephalic. NGT in place with bilious drainage  EYES: EOMI, PERRLA, conjunctiva and sclera clear  NECK: Supple, No JVD  CHEST/LUNG: Diminished breath sounds throughout, otherwise clear. Fentanyl patch in place.  HEART: RRR; No murmurs.   ABDOMEN: Soft, , Nontender, Mild distention, Positive BS  EXTREMITIES:  2+ Peripheral Pulses. 2+ pitting edema up to the knees but imrproved from yesterday  PSYCH: AAOx3  NEUROLOGY: non-focal  SKIN: Blanching, minimally erythematous, minimally tender lesions on radial aspect of left hand and ulnar aspect of the right hand. No erythema noted.     LABS:                        11.1   22.1  )-----------( 380      ( 19 Aug 2017 07:22 )             34.1     08-19    135  |  97  |  18  ----------------------------<  120<H>  4.0   |  27  |  0.36<L>    Ca    8.0<L>      19 Aug 2017 07:22    TPro  4.8<L>  /  Alb  2.0<L>  /  TBili  0.4  /  DBili  x   /  AST  23  /  ALT  27  /  AlkPhos  109  08-19    PT/INR - ( 19 Aug 2017 07:22 )   PT: 14.8 sec;   INR: 1.35 ratio                   RADIOLOGY & ADDITIONAL TESTS:    Imaging Personally Reviewed:    Consultant(s) Notes Reviewed:      Care Discussed with Consultants/Other Providers: Patient is a 57y old  Male who presents with a chief complaint of transfer for possible ERCP. (18 Aug 2017 16:24)      SUBJECTIVE / OVERNIGHT EVENTS:  No acute events overnight. Had BM overnight- formed, non-bloody. Tolerated clears. no n/v. no abd pain . Complains of increased LE swelling b/l .      MEDICATIONS  (STANDING):  fentaNYL   Patch  25 MICROgram(s)/Hr 1 Patch Transdermal every 72 hours  methylPREDNISolone sodium succinate Injectable 30 milliGRAM(s) IV Push daily  pantoprazole  Injectable 40 milliGRAM(s) IV Push daily  nicotine - 21 mG/24Hr(s) Patch 1 patch Transdermal daily  furosemide   Injectable 20 milliGRAM(s) IV Push daily  enoxaparin Injectable 60 milliGRAM(s) SubCutaneous every 12 hours    MEDICATIONS  (PRN):  HYDROmorphone  Injectable 0.5 milliGRAM(s) IV Push every 6 hours PRN Moderate Pain (4 - 6)  HYDROmorphone  Injectable 0.5 milliGRAM(s) IV Push every 4 hours PRN Breakthrough pain  hemorrhoidal Ointment 1 Application(s) Rectal daily PRN hemmorhoidal pain      Vital Signs Last 24 Hrs  T(C): 37.1 (19 Aug 2017 05:23), Max: 37.1 (19 Aug 2017 05:23)  T(F): 98.7 (19 Aug 2017 05:23), Max: 98.7 (19 Aug 2017 05:23)  HR: 101 (19 Aug 2017 05:23) (85 - 101)  BP: 125/69 (19 Aug 2017 05:23) (111/65 - 125/79)  BP(mean): --  RR: 18 (19 Aug 2017 05:23) (18 - 19)  SpO2: 92% (19 Aug 2017 05:23) (92% - 98%)  CAPILLARY BLOOD GLUCOSE        I&O's Summary    18 Aug 2017 07:01  -  19 Aug 2017 07:00  --------------------------------------------------------  IN: 0 mL / OUT: 1600 mL / NET: -1600 mL        PHYSICAL EXAM:  GENERAL: NAD, cachectic  HEAD:  Atraumatic, Normocephalic. NGT in place with bilious drainage  EYES: EOMI, PERRLA, conjunctiva and sclera clear  NECK: Supple, No JVD  CHEST/LUNG: Diminished breath sounds throughout, otherwise clear. Fentanyl patch in place.  HEART: RRR; No murmurs.   ABDOMEN: Soft, , Nontender, Mild distention, Positive BS  EXTREMITIES:  2+ Peripheral Pulses. 2+ pitting edema up to the knees worsened from yesterday  PSYCH: AAOx3  NEUROLOGY: non-focal  SKIN: Blanching, minimally erythematous, minimally tender lesions on radial aspect of left hand and ulnar aspect of the right hand. No erythema noted.     LABS:                        11.1   22.1  )-----------( 380      ( 19 Aug 2017 07:22 )             34.1     08-19    135  |  97  |  18  ----------------------------<  120<H>  4.0   |  27  |  0.36<L>    Ca    8.0<L>      19 Aug 2017 07:22    TPro  4.8<L>  /  Alb  2.0<L>  /  TBili  0.4  /  DBili  x   /  AST  23  /  ALT  27  /  AlkPhos  109  08-19    PT/INR - ( 19 Aug 2017 07:22 )   PT: 14.8 sec;   INR: 1.35 ratio                RADIOLOGY & ADDITIONAL TESTS:    Imaging Personally Reviewed:ABX: some dilated bowel, will f/u official results    Consultant(s) Notes Reviewed:  surgery, GI    Care Discussed with Consultants/Other Providers:

## 2017-08-19 NOTE — PROGRESS NOTE ADULT - PROBLEM SELECTOR PLAN 1
.  Imaging showing multiple stable pancreatic cysts and main pancreatic duct dilation.  - s/p ERCP w/ pancreatic duct placement, will need stent removal in 6-8 wks  - GI recs appreciated, f/u recs .  stable pancreatic cysts and main pancreatic duct dilation.  - s/p ERCP w/ pancreatic duct placement on 8/17, will need stent removal in 6-8 wks  - GI recs appreciated, f/u recs  -no signs of acute pancreatitis at this time as no epigastric tenderness and tolerating full liquid diet

## 2017-08-19 NOTE — PROGRESS NOTE ADULT - SUBJECTIVE AND OBJECTIVE BOX
SURGERY DAILY PROGRESS NOTE:     Hospital Day:  Postoperative Day:    Overnight Events: No acute events overnight, patient had bowel movement yesterday, no abdominal pain, tolerated clear liquid diet.        Physical Exam  Vital Signs Last 24 Hrs  T(C): 37.1 (19 Aug 2017 05:23), Max: 37.1 (19 Aug 2017 05:23)  T(F): 98.7 (19 Aug 2017 05:23), Max: 98.7 (19 Aug 2017 05:23)  HR: 101 (19 Aug 2017 05:23) (85 - 101)  BP: 125/69 (19 Aug 2017 05:23) (111/65 - 125/79)  BP(mean): --  RR: 18 (19 Aug 2017 05:23) (18 - 19)  SpO2: 92% (19 Aug 2017 05:23) (92% - 98%)    Gen: NAD, alert and interactive, oriented  HEENT: normocephalic, atraumatic, no scleral icterus  CV: S1, S2, RRR  Pulm: CTA B/L  Abd: Soft, ND, NTP, no rebound, no guarding, no palpable organomegaly/masses  Ext: warm, no edema, palp dp/pt    I&O's Detail    18 Aug 2017 07:01  -  19 Aug 2017 07:00  --------------------------------------------------------  IN:  Total IN: 0 mL    OUT:    Voided: 1600 mL  Total OUT: 1600 mL    Total NET: -1600 mL        Labs:                        11.1   22.1  )-----------( 380      ( 19 Aug 2017 07:22 )             34.1     08-19    135  |  97  |  18  ----------------------------<  120<H>  4.0   |  27  |  0.36<L>    Ca    8.0<L>      19 Aug 2017 07:22    TPro  4.8<L>  /  Alb  2.0<L>  /  TBili  0.4  /  DBili  x   /  AST  23  /  ALT  27  /  AlkPhos  109  08-19    PT/INR - ( 19 Aug 2017 07:22 )   PT: 14.8 sec;   INR: 1.35 ratio                   58 yo male with chronic pancreatitis/ileus - tolerating clears after ercp/stent  - trend labs  - f/u return of gi fxn, advance diet per med/gi  - no sx intervention at this time  - Medical management per primary team  - Surgery Red Team #[id number]

## 2017-08-20 DIAGNOSIS — Z29.9 ENCOUNTER FOR PROPHYLACTIC MEASURES, UNSPECIFIED: ICD-10-CM

## 2017-08-20 LAB
ALBUMIN SERPL ELPH-MCNC: 1.6 G/DL — LOW (ref 3.3–5)
ALP SERPL-CCNC: 116 U/L — SIGNIFICANT CHANGE UP (ref 40–120)
ALT FLD-CCNC: 27 U/L RC — SIGNIFICANT CHANGE UP (ref 10–45)
ANION GAP SERPL CALC-SCNC: 9 MMOL/L — SIGNIFICANT CHANGE UP (ref 5–17)
AST SERPL-CCNC: 27 U/L — SIGNIFICANT CHANGE UP (ref 10–40)
BILIRUB SERPL-MCNC: 0.4 MG/DL — SIGNIFICANT CHANGE UP (ref 0.2–1.2)
BUN SERPL-MCNC: 16 MG/DL — SIGNIFICANT CHANGE UP (ref 7–23)
CALCIUM SERPL-MCNC: 7.9 MG/DL — LOW (ref 8.4–10.5)
CHLORIDE SERPL-SCNC: 94 MMOL/L — LOW (ref 96–108)
CO2 SERPL-SCNC: 29 MMOL/L — SIGNIFICANT CHANGE UP (ref 22–31)
CREAT SERPL-MCNC: 0.27 MG/DL — LOW (ref 0.5–1.3)
GLUCOSE SERPL-MCNC: 94 MG/DL — SIGNIFICANT CHANGE UP (ref 70–99)
HCT VFR BLD CALC: 30.4 % — LOW (ref 39–50)
HGB BLD-MCNC: 10.1 G/DL — LOW (ref 13–17)
INR BLD: 1.29 RATIO — HIGH (ref 0.88–1.16)
MCHC RBC-ENTMCNC: 33.2 GM/DL — SIGNIFICANT CHANGE UP (ref 32–36)
MCHC RBC-ENTMCNC: 35.1 PG — HIGH (ref 27–34)
MCV RBC AUTO: 106 FL — HIGH (ref 80–100)
PLATELET # BLD AUTO: 335 K/UL — SIGNIFICANT CHANGE UP (ref 150–400)
POTASSIUM SERPL-MCNC: 4.1 MMOL/L — SIGNIFICANT CHANGE UP (ref 3.5–5.3)
POTASSIUM SERPL-SCNC: 4.1 MMOL/L — SIGNIFICANT CHANGE UP (ref 3.5–5.3)
PROT SERPL-MCNC: 4.6 G/DL — LOW (ref 6–8.3)
PROTHROM AB SERPL-ACNC: 14 SEC — HIGH (ref 9.8–12.7)
RBC # BLD: 2.87 M/UL — LOW (ref 4.2–5.8)
RBC # FLD: 13.8 % — SIGNIFICANT CHANGE UP (ref 10.3–14.5)
SODIUM SERPL-SCNC: 132 MMOL/L — LOW (ref 135–145)
WBC # BLD: 16.8 K/UL — HIGH (ref 3.8–10.5)
WBC # FLD AUTO: 16.8 K/UL — HIGH (ref 3.8–10.5)

## 2017-08-20 PROCEDURE — 99233 SBSQ HOSP IP/OBS HIGH 50: CPT | Mod: GC

## 2017-08-20 RX ADMIN — FENTANYL CITRATE 1 PATCH: 50 INJECTION INTRAVENOUS at 22:15

## 2017-08-20 RX ADMIN — PANTOPRAZOLE SODIUM 40 MILLIGRAM(S): 20 TABLET, DELAYED RELEASE ORAL at 13:09

## 2017-08-20 RX ADMIN — Medication 1 PATCH: at 13:08

## 2017-08-20 RX ADMIN — Medication 40 MILLIGRAM(S): at 05:48

## 2017-08-20 RX ADMIN — Medication 30 MILLIGRAM(S): at 05:53

## 2017-08-20 RX ADMIN — ENOXAPARIN SODIUM 60 MILLIGRAM(S): 100 INJECTION SUBCUTANEOUS at 17:00

## 2017-08-20 RX ADMIN — HYDROMORPHONE HYDROCHLORIDE 0.5 MILLIGRAM(S): 2 INJECTION INTRAMUSCULAR; INTRAVENOUS; SUBCUTANEOUS at 13:06

## 2017-08-20 RX ADMIN — HYDROMORPHONE HYDROCHLORIDE 0.5 MILLIGRAM(S): 2 INJECTION INTRAMUSCULAR; INTRAVENOUS; SUBCUTANEOUS at 14:00

## 2017-08-20 RX ADMIN — Medication 1 PATCH: at 13:00

## 2017-08-20 RX ADMIN — ENOXAPARIN SODIUM 60 MILLIGRAM(S): 100 INJECTION SUBCUTANEOUS at 05:53

## 2017-08-20 RX ADMIN — HYDROMORPHONE HYDROCHLORIDE 0.5 MILLIGRAM(S): 2 INJECTION INTRAMUSCULAR; INTRAVENOUS; SUBCUTANEOUS at 04:14

## 2017-08-20 RX ADMIN — HYDROMORPHONE HYDROCHLORIDE 0.5 MILLIGRAM(S): 2 INJECTION INTRAMUSCULAR; INTRAVENOUS; SUBCUTANEOUS at 05:14

## 2017-08-20 NOTE — PROGRESS NOTE ADULT - PROBLEM SELECTOR PLAN 2
.  s/p NG tube decompression  - improving, had BM 2 nights ago and tolerating regular diet  - minimize use of opioids for pain control  - continue to monitor for BMs and flatus, check daily abdominal flatplates  - Surgery following, recs appreciated, will f/u any new recs

## 2017-08-20 NOTE — PROGRESS NOTE ADULT - PROBLEM SELECTOR PLAN 1
.  stable pancreatic cysts and main pancreatic duct dilation.  - s/p ERCP with pancreatic sphincterotomy and pancreatic duct stent placement on 8/17, will need stent removal in 6 to 8 wks  - GI following, recs appreciated, will f/u any new recs  - no S/S of acute pancreatitis at this time .  resolved for now, stable pancreatic cysts and main pancreatic duct dilation.  - s/p ERCP with pancreatic sphincterotomy and pancreatic duct stent placement on 8/17, will need stent removal in 6 to 8 wks  - GI following, recs appreciated, will f/u any new recs  - no S/S of acute pancreatitis at this time

## 2017-08-20 NOTE — PROGRESS NOTE ADULT - PROBLEM SELECTOR PLAN 3
Paracentesis x2, both showing elevated PMN's consistent with SBP. Cultures NGTD.  - Per GI, can monitor patient off Meropenem as low suspicion for SBP at present and already completed 7 days of Meropenem  - If any fevers, abdominal pain, septic features, will restart Meropenem Paracentesis x2, both showing elevated PMN's consistent with SBP. Cultures NGTD.  - Per GI, can monitor patient off Meropenem as already completed 7 days of Meropenem  - If any fevers, abdominal pain, septic features, will restart Meropenem

## 2017-08-20 NOTE — PROGRESS NOTE ADULT - SUBJECTIVE AND OBJECTIVE BOX
STATUS POST:  N/A    POST OPERATIVE DAY #:  N/A    Vital Signs Last 24 Hrs  T(C): 37.1 (20 Aug 2017 13:35), Max: 37.1 (20 Aug 2017 04:38)  T(F): 98.8 (20 Aug 2017 13:35), Max: 98.8 (20 Aug 2017 04:38)  HR: 100 (20 Aug 2017 13:35) (74 - 100)  BP: 112/79 (20 Aug 2017 13:35) (107/67 - 112/79)  BP(mean): --  RR: 18 (20 Aug 2017 13:35) (18 - 18)  SpO2: 93% (20 Aug 2017 13:35) (93% - 96%)    I&O's Summary    19 Aug 2017 07:01  -  20 Aug 2017 07:00  --------------------------------------------------------  IN: 480 mL / OUT: 1250 mL / NET: -770 mL    20 Aug 2017 07:01  -  20 Aug 2017 14:15  --------------------------------------------------------  IN: 0 mL / OUT: 500 mL / NET: -500 mL        SUBJECTIVE: Pt seen and examined, with Dr. Mitchell. No complaints at this time.    Pain: [ ] YES [X ] NO  Pain (0-10):              Pain Control Adequate: [X ] YES [ ] NO  SOB: [ ]YES [X ] NO  Chest Discomfort: [ ] YES [X ] NO    Nausea: [ ] YES [X ] NO           Vomiting: [ ] YES [X ] NO  Flatus: [X ] YES [ ] NO             Bowel Movement: [X ] YES [ ] NO     Void: [X ]YES [ ]No    Physical Exam:    LABS:                        10.1   16.8  )-----------( 335      ( 20 Aug 2017 06:34 )             30.4     08-20    132<L>  |  94<L>  |  16  ----------------------------<  94  4.1   |  29  |  0.27<L>    Ca    7.9<L>      20 Aug 2017 06:34    TPro  4.6<L>  /  Alb  1.6<L>  /  TBili  0.4  /  DBili  x   /  AST  27  /  ALT  27  /  AlkPhos  116  08-20    PT/INR - ( 20 Aug 2017 06:34 )   PT: 14.0 sec;   INR: 1.29 ratio               RADIOLOGY & ADDITIONAL STUDIES: STATUS POST:  N/A    POST OPERATIVE DAY #:  N/A    Vital Signs Last 24 Hrs  T(C): 37.1 (20 Aug 2017 13:35), Max: 37.1 (20 Aug 2017 04:38)  T(F): 98.8 (20 Aug 2017 13:35), Max: 98.8 (20 Aug 2017 04:38)  HR: 100 (20 Aug 2017 13:35) (74 - 100)  BP: 112/79 (20 Aug 2017 13:35) (107/67 - 112/79)  BP(mean): --  RR: 18 (20 Aug 2017 13:35) (18 - 18)  SpO2: 93% (20 Aug 2017 13:35) (93% - 96%)    I&O's Summary    19 Aug 2017 07:01  -  20 Aug 2017 07:00  --------------------------------------------------------  IN: 480 mL / OUT: 1250 mL / NET: -770 mL    20 Aug 2017 07:01  -  20 Aug 2017 14:15  --------------------------------------------------------  IN: 0 mL / OUT: 500 mL / NET: -500 mL        SUBJECTIVE: Pt seen and examined, with Dr. Mitchell. No complaints at this time.    Pain: [ ] YES [X ] NO  Pain (0-10):              Pain Control Adequate: [X ] YES [ ] NO  SOB: [ ]YES [X ] NO  Chest Discomfort: [ ] YES [X ] NO    Nausea: [ ] YES [X ] NO           Vomiting: [ ] YES [X ] NO  Flatus: [X ] YES [ ] NO             Bowel Movement: [X ] YES [ ] NO     Void: [X ]YES [ ]No    Physical Exam:  Gen: NAD, alert and interactive, oriented  HEENT: normocephalic, atraumatic, no scleral icterus  CV: S1, S2, RRR  Pulm: CTA B/L  Abd: Soft, ND, NTP, no rebound, no guarding, no palpable organomegaly/masses  Ext: warm, no edema, palp dp/pt      LABS:                        10.1   16.8  )-----------( 335      ( 20 Aug 2017 06:34 )             30.4     08-20    132<L>  |  94<L>  |  16  ----------------------------<  94  4.1   |  29  |  0.27<L>    Ca    7.9<L>      20 Aug 2017 06:34    TPro  4.6<L>  /  Alb  1.6<L>  /  TBili  0.4  /  DBili  x   /  AST  27  /  ALT  27  /  AlkPhos  116  08-20    PT/INR - ( 20 Aug 2017 06:34 )   PT: 14.0 sec;   INR: 1.29 ratio               RADIOLOGY & ADDITIONAL STUDIES:

## 2017-08-20 NOTE — PROGRESS NOTE ADULT - PROBLEM SELECTOR PLAN 8
DNR/DNI but wants all other aggressive medical therapies  - prognosis guarded DNR/DNI but wants all other aggressive medical therapies  - overall prognosis guarded

## 2017-08-20 NOTE — PROGRESS NOTE ADULT - ASSESSMENT
58 yo M with h/o chronic pancreatitis (dx 2002), HTN, recent inguinal hernia repair (June 2017) initially presented to [facility] for panniculitis 2/2 chronic pancreatitis. Hospital course complicated by pancreatic ascites and transferred to [facility] for ERCP, now s/p ERCP with pancreatic sphincterotomy and pancreatic duct stent placement and completion of meropenem x 7 days. Hospital course also complicated by partial SBO vs ileus requiring decompression with NGT and acute on chronic portal vein thrombosis, later shown to be resolved on follow-up duplex, pending reevaluation by repeat duplex to determine need for therapeutic Lovenox long term.

## 2017-08-20 NOTE — PROGRESS NOTE ADULT - ASSESSMENT
58 yo male with chronic pancreatitis/ileus - tolerating regular diet after ercp/stent.    - return of GI function, tolerating regular diet  - no sx intervention at this time  - Medical management per primary team  - Surgery Red Team #[id number]  - Please call with any questions

## 2017-08-20 NOTE — PROGRESS NOTE ADULT - PROBLEM SELECTOR PLAN 4
.  Has been > 30 with neutrophilic predominance now downtrending.  Likely multifactorial in etiology, including acute on chronic pancreatitis, SBP, and steroid therapy for panniculitis.   - Overall downtrending, today down to 22.1  - Currently afebrile and hemodynamically stable.   - Will continue to f/u .  WBC has been > 30 with neutrophilic predominance, now downtrending.  Likely multifactorial in etiology, including acute on chronic pancreatitis, SBP, and steroid therapy for panniculitis.   - Overall downtrending, today down to 22.1  - Currently afebrile and hemodynamically stable.   - Will continue to f/u .  WBC has been > 30 with neutrophilic predominance, now downtrending.  Likely multifactorial in etiology, including acute on chronic pancreatitis, SBP, and steroid therapy for panniculitis.   - Overall downtrending, today down to 16.8  - Currently afebrile and hemodynamically stable   - Will continue to monitor WBC

## 2017-08-20 NOTE — PROGRESS NOTE ADULT - SUBJECTIVE AND OBJECTIVE BOX
Patient is a 57y old  Male who presents with a chief complaint of transfer for possible ERCP. (18 Aug 2017 16:24)      SUBJECTIVE / OVERNIGHT EVENTS:  No acute events overnight. No subjective complaints this AM except for chronic joint pain in b/l hands from the panniculitis. Tolerated regular diet. Last BM was 2 nights ago but continues to have flatus. Denies any F/C, CP, SOB, abdominal pain, N/V, diarrhea, constipation, or dysuria.       MEDICATIONS  (STANDING):  fentaNYL   Patch  25 MICROgram(s)/Hr 1 Patch Transdermal every 72 hours  pantoprazole  Injectable 40 milliGRAM(s) IV Push daily  nicotine - 21 mG/24Hr(s) Patch 1 patch Transdermal daily  enoxaparin Injectable 60 milliGRAM(s) SubCutaneous every 12 hours  furosemide   Injectable 40 milliGRAM(s) IV Push daily    MEDICATIONS  (PRN):  HYDROmorphone  Injectable 0.5 milliGRAM(s) IV Push every 6 hours PRN Moderate Pain (4 - 6)  HYDROmorphone  Injectable 0.5 milliGRAM(s) IV Push every 4 hours PRN Breakthrough pain  hemorrhoidal Ointment 1 Application(s) Rectal daily PRN hemmorhoidal pain        Vital Signs Last 24 Hrs  T(C): 37.1 (20 Aug 2017 13:35), Max: 37.1 (20 Aug 2017 04:38)  T(F): 98.8 (20 Aug 2017 13:35), Max: 98.8 (20 Aug 2017 04:38)  HR: 100 (20 Aug 2017 13:35) (74 - 100)  BP: 112/79 (20 Aug 2017 13:35) (107/67 - 112/79)  BP(mean): --  RR: 18 (20 Aug 2017 13:35) (18 - 18)  SpO2: 93% (20 Aug 2017 13:35) (93% - 96%)  CAPILLARY BLOOD GLUCOSE        I&O's Summary    19 Aug 2017 07:01  -  20 Aug 2017 07:00  --------------------------------------------------------  IN: 480 mL / OUT: 1250 mL / NET: -770 mL    20 Aug 2017 07:01  -  20 Aug 2017 14:31  --------------------------------------------------------  IN: 0 mL / OUT: 500 mL / NET: -500 mL        PHYSICAL EXAM:  GENERAL: NAD, cachectic  HEAD:  Atraumatic, Normocephalic.  EYES: EOMI, PERRL, conjunctiva and sclera clear  NECK: Supple, No JVD  CHEST/LUNG: Diminished breath sounds throughout, otherwise CTAB. Fentanyl patch in place.  HEART: RRR, Normal S1 and S2, No murmurs.   ABDOMEN: Normoactive BS, Soft, Nontender, Mildly distended  EXTREMITIES:  2+ Peripheral Pulses. 1+ pitting LE edema b/l (L > R)  PSYCH: AAOx3  NEUROLOGY: non-focal  SKIN: Blanching, minimally erythematous, minimally tender lesions on radial aspect of left hand and ulnar aspect of the right hand.    LABS:                                   10.1   16.8  )-----------( 335      ( 20 Aug 2017 06:34 )             30.4     08-20    132<L>  |  94<L>  |  16  ----------------------------<  94  4.1   |  29  |  0.27<L>    Ca    7.9<L>      20 Aug 2017 06:34    TPro  4.6<L>  /  Alb  1.6<L>  /  TBili  0.4  /  DBili  x   /  AST  27  /  ALT  27  /  AlkPhos  116  08-20      RADIOLOGY & ADDITIONAL TESTS:    Imaging Personally Reviewed:  Xray Abdomen 1 View PORTABLE -Urgent (08.18.17 @ 17:39)   IMPRESSION:   Nonobstructive bowel gas pattern.    Consultant(s) Notes Reviewed: Surgery, GI    Care Discussed with Consultants/Other Providers: Patient is a 57y old  Male who presents with a chief complaint of transfer for possible ERCP. (18 Aug 2017 16:24)      SUBJECTIVE / OVERNIGHT EVENTS:  No acute events overnight. No subjective complaints this AM except for chronic joint pain in b/l hands from the panniculitis. Tolerated regular diet. Last BM was 2 nights ago but continues to have flatus. Denies any F/C, CP, SOB, abdominal pain, N/V, diarrhea, constipation, or dysuria.       MEDICATIONS  (STANDING):  fentaNYL   Patch  25 MICROgram(s)/Hr 1 Patch Transdermal every 72 hours  pantoprazole  Injectable 40 milliGRAM(s) IV Push daily  nicotine - 21 mG/24Hr(s) Patch 1 patch Transdermal daily  enoxaparin Injectable 60 milliGRAM(s) SubCutaneous every 12 hours  furosemide   Injectable 40 milliGRAM(s) IV Push daily    MEDICATIONS  (PRN):  HYDROmorphone  Injectable 0.5 milliGRAM(s) IV Push every 6 hours PRN Moderate Pain (4 - 6)  HYDROmorphone  Injectable 0.5 milliGRAM(s) IV Push every 4 hours PRN Breakthrough pain  hemorrhoidal Ointment 1 Application(s) Rectal daily PRN hemmorhoidal pain        Vital Signs Last 24 Hrs  T(C): 37.1 (20 Aug 2017 13:35), Max: 37.1 (20 Aug 2017 04:38)  T(F): 98.8 (20 Aug 2017 13:35), Max: 98.8 (20 Aug 2017 04:38)  HR: 100 (20 Aug 2017 13:35) (74 - 100)  BP: 112/79 (20 Aug 2017 13:35) (107/67 - 112/79)  BP(mean): --  RR: 18 (20 Aug 2017 13:35) (18 - 18)  SpO2: 93% (20 Aug 2017 13:35) (93% - 96%)  CAPILLARY BLOOD GLUCOSE        I&O's Summary    19 Aug 2017 07:01  -  20 Aug 2017 07:00  --------------------------------------------------------  IN: 480 mL / OUT: 1250 mL / NET: -770 mL    20 Aug 2017 07:01  -  20 Aug 2017 14:31  --------------------------------------------------------  IN: 0 mL / OUT: 500 mL / NET: -500 mL        PHYSICAL EXAM:  GENERAL: NAD, cachectic  HEAD:  Atraumatic, Normocephalic.  EYES: EOMI, PERRL, conjunctiva and sclera clear  NECK: Supple, No JVD  CHEST/LUNG: Diminished breath sounds throughout, otherwise CTAB. Fentanyl patch in place.  HEART: RRR, Normal S1 and S2, No murmurs.   ABDOMEN: Normoactive BS, Soft, Nontender, Mildly distended  EXTREMITIES:  2+ Peripheral Pulses. 1+ pitting LE edema b/l (L > R)  PSYCH: AAOx3  NEUROLOGY: non-focal  SKIN: Blanching, minimally erythematous, minimally tender lesions on radial aspect of left hand and ulnar aspect of the right hand improving overall    LABS:                                   10.1   16.8  )-----------( 335      ( 20 Aug 2017 06:34 )             30.4     08-20    132<L>  |  94<L>  |  16  ----------------------------<  94  4.1   |  29  |  0.27<L>    Ca    7.9<L>      20 Aug 2017 06:34    TPro  4.6<L>  /  Alb  1.6<L>  /  TBili  0.4  /  DBili  x   /  AST  27  /  ALT  27  /  AlkPhos  116  08-20      RADIOLOGY & ADDITIONAL TESTS:    Personally reviewed CXR: clear lungs,    Consultant(s) Notes Reviewed: Surgery, GI    Care Discussed with Consultants/Other Providers:

## 2017-08-20 NOTE — PROGRESS NOTE ADULT - PROBLEM SELECTOR PLAN 6
MRI showed concern for acute/subacute portal vein thrombosis and patient restarted on therapeutic lovenox per GI  - Repeat abdominal U/S showed no thrombus.   - Per discussion with GI will repeat hepatic/portal vein duplex but continue with lovenox for now. If negative for thrombus can d/x Lovenox. MRI abd from 8/11/17 showed concern for acute/subacute portal vein thrombosis.  - Abdominal duplex on 8/15/17 showed no thrombosis  - Per discussion with GI, will repeat hepatic/portal vein duplex and continue with therapeutic Lovenox for now. If negative for thrombosis, can discontinue Lovenox.

## 2017-08-20 NOTE — PROGRESS NOTE ADULT - PROBLEM SELECTOR PLAN 5
Patient has two therapeutic taps draining about 1.2 L each time. Amylase greatly elevated in both with SAAG < 1.2. With significant hypoalbuminemia. Now with minimal abdominal distention and significant urine output.   --given increased LE edema, will increase back to lasix 40mg IVP daily   - Hold aldactone for now Patient has had two therapeutic paracentesis draining about 1.2 L each time. Amylase greatly elevated in both with SAAG < 1.1. With significant hypoalbuminemia. Now with minimal abdominal distention and significant urine output.   - Given increased LE edema yesterday, will continue with IV Lasix 40 mg daily and monitor Cr  - Hold aldactone for now Patient has had two therapeutic paracentesis draining about 1.2 L each time. Amylase greatly elevated in both with SAAG < 1.1. With significant hypoalbuminemia. Now with minimal abdominal distention and significant urine output.   - Given increased LE edema yesterday, will continue with IV Lasix 40 mg daily and monitor Cr  - Hold aldactone for now  -CXR with no pulm edema

## 2017-08-20 NOTE — PROGRESS NOTE ADULT - PROBLEM SELECTOR PLAN 7
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 Lesions have been improving with steroids.   - Currently on methylprednisolone 30mg daily.   - Will continue to taper by 10 mg weekly. 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 Lesions have been improving with steroids.   - Currently on IV methylprednisolone 30 mg daily through today  - Will continue to taper by 10 mg weekly; will start IV methylprednisolone 20 mg tomorrow

## 2017-08-21 LAB
ALBUMIN SERPL ELPH-MCNC: 1.9 G/DL — LOW (ref 3.3–5)
ALP SERPL-CCNC: 102 U/L — SIGNIFICANT CHANGE UP (ref 40–120)
ALT FLD-CCNC: 26 U/L RC — SIGNIFICANT CHANGE UP (ref 10–45)
ANION GAP SERPL CALC-SCNC: 12 MMOL/L — SIGNIFICANT CHANGE UP (ref 5–17)
APTT BLD: 30.1 SEC — SIGNIFICANT CHANGE UP (ref 27.5–37.4)
AST SERPL-CCNC: 21 U/L — SIGNIFICANT CHANGE UP (ref 10–40)
BILIRUB SERPL-MCNC: 0.4 MG/DL — SIGNIFICANT CHANGE UP (ref 0.2–1.2)
BUN SERPL-MCNC: 19 MG/DL — SIGNIFICANT CHANGE UP (ref 7–23)
CALCIUM SERPL-MCNC: 7.6 MG/DL — LOW (ref 8.4–10.5)
CHLORIDE SERPL-SCNC: 92 MMOL/L — LOW (ref 96–108)
CO2 SERPL-SCNC: 29 MMOL/L — SIGNIFICANT CHANGE UP (ref 22–31)
CREAT SERPL-MCNC: 0.48 MG/DL — LOW (ref 0.5–1.3)
GLUCOSE SERPL-MCNC: 120 MG/DL — HIGH (ref 70–99)
HCT VFR BLD CALC: 33.6 % — LOW (ref 39–50)
HGB BLD-MCNC: 11 G/DL — LOW (ref 13–17)
INR BLD: 1.51 RATIO — HIGH (ref 0.88–1.16)
MAGNESIUM SERPL-MCNC: 1.8 MG/DL — SIGNIFICANT CHANGE UP (ref 1.6–2.6)
MCHC RBC-ENTMCNC: 32.6 GM/DL — SIGNIFICANT CHANGE UP (ref 32–36)
MCHC RBC-ENTMCNC: 34.1 PG — HIGH (ref 27–34)
MCV RBC AUTO: 105 FL — HIGH (ref 80–100)
PHOSPHATE SERPL-MCNC: 3.6 MG/DL — SIGNIFICANT CHANGE UP (ref 2.5–4.5)
PLATELET # BLD AUTO: 303 K/UL — SIGNIFICANT CHANGE UP (ref 150–400)
POTASSIUM SERPL-MCNC: 4.1 MMOL/L — SIGNIFICANT CHANGE UP (ref 3.5–5.3)
POTASSIUM SERPL-SCNC: 4.1 MMOL/L — SIGNIFICANT CHANGE UP (ref 3.5–5.3)
PROT SERPL-MCNC: 4.9 G/DL — LOW (ref 6–8.3)
PROTHROM AB SERPL-ACNC: 16.6 SEC — HIGH (ref 9.8–12.7)
RBC # BLD: 3.21 M/UL — LOW (ref 4.2–5.8)
RBC # FLD: 13.8 % — SIGNIFICANT CHANGE UP (ref 10.3–14.5)
SODIUM SERPL-SCNC: 133 MMOL/L — LOW (ref 135–145)
WBC # BLD: 15.5 K/UL — HIGH (ref 3.8–10.5)
WBC # FLD AUTO: 15.5 K/UL — HIGH (ref 3.8–10.5)

## 2017-08-21 PROCEDURE — 99233 SBSQ HOSP IP/OBS HIGH 50: CPT | Mod: GC

## 2017-08-21 PROCEDURE — 99232 SBSQ HOSP IP/OBS MODERATE 35: CPT | Mod: GC

## 2017-08-21 PROCEDURE — 93975 VASCULAR STUDY: CPT | Mod: 26

## 2017-08-21 RX ORDER — OXYCODONE HYDROCHLORIDE 5 MG/1
5 TABLET ORAL EVERY 4 HOURS
Qty: 0 | Refills: 0 | Status: DISCONTINUED | OUTPATIENT
Start: 2017-08-21 | End: 2017-08-23

## 2017-08-21 RX ORDER — FOLIC ACID 0.8 MG
1 TABLET ORAL DAILY
Qty: 0 | Refills: 0 | Status: DISCONTINUED | OUTPATIENT
Start: 2017-08-21 | End: 2017-08-23

## 2017-08-21 RX ORDER — FUROSEMIDE 40 MG
40 TABLET ORAL DAILY
Qty: 0 | Refills: 0 | Status: DISCONTINUED | OUTPATIENT
Start: 2017-08-22 | End: 2017-08-23

## 2017-08-21 RX ORDER — PANTOPRAZOLE SODIUM 20 MG/1
40 TABLET, DELAYED RELEASE ORAL
Qty: 0 | Refills: 0 | Status: DISCONTINUED | OUTPATIENT
Start: 2017-08-22 | End: 2017-08-23

## 2017-08-21 RX ORDER — ENOXAPARIN SODIUM 100 MG/ML
40 INJECTION SUBCUTANEOUS DAILY
Qty: 0 | Refills: 0 | Status: DISCONTINUED | OUTPATIENT
Start: 2017-08-22 | End: 2017-08-23

## 2017-08-21 RX ORDER — HYDROMORPHONE HYDROCHLORIDE 2 MG/ML
0.5 INJECTION INTRAMUSCULAR; INTRAVENOUS; SUBCUTANEOUS EVERY 4 HOURS
Qty: 0 | Refills: 0 | Status: DISCONTINUED | OUTPATIENT
Start: 2017-08-21 | End: 2017-08-23

## 2017-08-21 RX ORDER — OXYCODONE HYDROCHLORIDE 5 MG/1
10 TABLET ORAL EVERY 4 HOURS
Qty: 0 | Refills: 0 | Status: DISCONTINUED | OUTPATIENT
Start: 2017-08-21 | End: 2017-08-23

## 2017-08-21 RX ADMIN — Medication 20 MILLIGRAM(S): at 05:22

## 2017-08-21 RX ADMIN — Medication 1 PATCH: at 11:49

## 2017-08-21 RX ADMIN — HYDROMORPHONE HYDROCHLORIDE 0.5 MILLIGRAM(S): 2 INJECTION INTRAMUSCULAR; INTRAVENOUS; SUBCUTANEOUS at 01:00

## 2017-08-21 RX ADMIN — Medication 40 MILLIGRAM(S): at 05:22

## 2017-08-21 RX ADMIN — HYDROMORPHONE HYDROCHLORIDE 0.5 MILLIGRAM(S): 2 INJECTION INTRAMUSCULAR; INTRAVENOUS; SUBCUTANEOUS at 00:37

## 2017-08-21 RX ADMIN — ENOXAPARIN SODIUM 60 MILLIGRAM(S): 100 INJECTION SUBCUTANEOUS at 05:22

## 2017-08-21 RX ADMIN — PANTOPRAZOLE SODIUM 40 MILLIGRAM(S): 20 TABLET, DELAYED RELEASE ORAL at 11:45

## 2017-08-21 RX ADMIN — OXYCODONE HYDROCHLORIDE 5 MILLIGRAM(S): 5 TABLET ORAL at 21:37

## 2017-08-21 RX ADMIN — Medication 1 MILLIGRAM(S): at 17:20

## 2017-08-21 RX ADMIN — Medication 1 PATCH: at 11:47

## 2017-08-21 RX ADMIN — HYDROMORPHONE HYDROCHLORIDE 0.5 MILLIGRAM(S): 2 INJECTION INTRAMUSCULAR; INTRAVENOUS; SUBCUTANEOUS at 12:10

## 2017-08-21 RX ADMIN — HYDROMORPHONE HYDROCHLORIDE 0.5 MILLIGRAM(S): 2 INJECTION INTRAMUSCULAR; INTRAVENOUS; SUBCUTANEOUS at 11:56

## 2017-08-21 RX ADMIN — OXYCODONE HYDROCHLORIDE 5 MILLIGRAM(S): 5 TABLET ORAL at 22:07

## 2017-08-21 NOTE — PROGRESS NOTE ADULT - PROBLEM SELECTOR PLAN 8
DNR/DNI but wants all other aggressive medical therapies  - overall prognosis guarded Megaloblastic anemia possible B12 deficiency or folate deficiency. Given 1g folate qday.

## 2017-08-21 NOTE — PROGRESS NOTE ADULT - PROBLEM SELECTOR PLAN 6
MRI abd from 8/11/17 showed concern for acute/subacute portal vein thrombosis.  - Abdominal duplex on 8/15/17 showed no thrombosis  - Per discussion with GI, will repeat hepatic/portal vein duplex and continue with therapeutic Lovenox for now. If negative for thrombosis, can discontinue Lovenox. MRI abd from 8/11/17 showed concern for acute/subacute portal vein thrombosis.  - Abdominal duplex on 8/15/17 showed no thrombosis again on 8/21 patent portal veins  -discontinued enoxaparin due to patent portal veins MRI abd from 8/11/17 showed concern for acute/subacute portal vein thrombosis.  - Abdominal duplex on 8/15/17 showed no thrombosis again on 8/21 patent portal veins  -discontinued therapeutic enoxaparin due to patent portal veins

## 2017-08-21 NOTE — PROGRESS NOTE ADULT - SUBJECTIVE AND OBJECTIVE BOX
Patient is a 57y old  Male who presents with a chief complaint of transfer for possible ERCP. (18 Aug 2017 16:24)      SUBJECTIVE / OVERNIGHT EVENTS:  No acute events overnight. No subjective complaints this AM except for chronic joint pain in b/l hands from the panniculitis. Tolerated regular diet. Last BM was 2 nights ago but continues to have flatus. Denies any F/C, CP, SOB, abdominal pain, N/V, diarrhea, constipation, or dysuria.       MEDICATIONS  (STANDING):  pantoprazole  Injectable 40 milliGRAM(s) IV Push daily  nicotine - 21 mG/24Hr(s) Patch 1 patch Transdermal daily  enoxaparin Injectable 60 milliGRAM(s) SubCutaneous every 12 hours  furosemide   Injectable 40 milliGRAM(s) IV Push daily  methylPREDNISolone sodium succinate Injectable 20 milliGRAM(s) IV Push daily    MEDICATIONS  (PRN):  HYDROmorphone  Injectable 0.5 milliGRAM(s) IV Push every 6 hours PRN Moderate Pain (4 - 6)  HYDROmorphone  Injectable 0.5 milliGRAM(s) IV Push every 4 hours PRN Breakthrough pain  hemorrhoidal Ointment 1 Application(s) Rectal daily PRN hemmorhoidal pain          Vital Signs Last 24 Hrs  T(C): 36.9 (21 Aug 2017 04:53), Max: 37.1 (20 Aug 2017 13:35)  T(F): 98.4 (21 Aug 2017 04:53), Max: 98.8 (20 Aug 2017 13:35)  HR: 86 (21 Aug 2017 04:53) (86 - 119)  BP: 106/67 (21 Aug 2017 04:53) (106/67 - 124/83)  BP(mean): --  RR: 18 (21 Aug 2017 04:53) (18 - 18)  SpO2: 96% (21 Aug 2017 04:53) (93% - 97%)      I&O's Summary    19 Aug 2017 07:01  -  20 Aug 2017 07:00  --------------------------------------------------------  IN: 480 mL / OUT: 1250 mL / NET: -770 mL    20 Aug 2017 07:01  -  20 Aug 2017 14:31  --------------------------------------------------------  IN: 0 mL / OUT: 500 mL / NET: -500 mL        PHYSICAL EXAM:  GENERAL: NAD, cachectic  HEAD:  Atraumatic, Normocephalic.  EYES: EOMI, PERRL, conjunctiva and sclera clear  NECK: Supple, No JVD  CHEST/LUNG: Diminished breath sounds throughout, otherwise CTAB. Fentanyl patch in place.  HEART: RRR, Normal S1 and S2, No murmurs.   ABDOMEN: Normoactive BS, Soft, Nontender, Mildly distended  EXTREMITIES:  2+ Peripheral Pulses. 1+ pitting LE edema b/l (L > R)  PSYCH: AAOx3  NEUROLOGY: non-focal  SKIN: Blanching, minimally erythematous, minimally tender lesions on radial aspect of left hand and ulnar aspect of the right hand improving overall    LABS:                                   10.1   16.8  )-----------( 335      ( 20 Aug 2017 06:34 )             30.4     08-20    132<L>  |  94<L>  |  16  ----------------------------<  94  4.1   |  29  |  0.27<L>    Ca    7.9<L>      20 Aug 2017 06:34    TPro  4.6<L>  /  Alb  1.6<L>  /  TBili  0.4  /  DBili  x   /  AST  27  /  ALT  27  /  AlkPhos  116  08-20      RADIOLOGY & ADDITIONAL TESTS:    Personally reviewed CXR: clear lungs,    Consultant(s) Notes Reviewed: Surgery, GI    Care Discussed with Consultants/Other Providers: Patient is a 57y old  Male who presents with a chief complaint of transfer for possible ERCP. (18 Aug 2017 16:24)      SUBJECTIVE / OVERNIGHT EVENTS:  No acute events overnight. No subjective complaints this AM except for chronic joint pain in b/l hands from the panniculitis. Tolerated regular diet. Last BM was last night and states that abdominal distention has decreased. Denies any F/C, CP, SOB, abdominal pain, N/V, diarrhea, constipation, or dysuria.       MEDICATIONS  (STANDING):  nicotine - 21 mG/24Hr(s) Patch 1 patch Transdermal daily  methylPREDNISolone sodium succinate Injectable 20 milliGRAM(s) IV Push daily    MEDICATIONS  (PRN):  hemorrhoidal Ointment 1 Application(s) Rectal daily PRN hemmorhoidal pain  oxyCODONE    IR 5 milliGRAM(s) Oral every 4 hours PRN Mild Pain (1 - 3)  oxyCODONE    IR 10 milliGRAM(s) Oral every 4 hours PRN Moderate Pain (4 - 6)  HYDROmorphone  Injectable 0.5 milliGRAM(s) IV Push every 4 hours PRN Severe Pain (7 - 10)      Vital Signs Last 24 Hrs  T(C): 36.6 (21 Aug 2017 13:55), Max: 36.9 (20 Aug 2017 21:27)  T(F): 97.9 (21 Aug 2017 13:55), Max: 98.5 (20 Aug 2017 21:27)  HR: 76 (21 Aug 2017 13:55) (76 - 119)  BP: 99/60 (21 Aug 2017 13:55) (99/60 - 124/83)  BP(mean): --  RR: 18 (21 Aug 2017 13:55) (18 - 18)  SpO2: 98% (21 Aug 2017 13:55) (96% - 98%)    I&O's Summary    19 Aug 2017 07:01  -  20 Aug 2017 07:00  --------------------------------------------------------  IN: 480 mL / OUT: 1250 mL / NET: -770 mL    20 Aug 2017 07:01  -  20 Aug 2017 14:31  --------------------------------------------------------  IN: 0 mL / OUT: 500 mL / NET: -500 mL        PHYSICAL EXAM:  GENERAL: NAD, cachectic  HEAD:  Atraumatic, Normocephalic.  EYES: EOMI, PERRL, conjunctiva and sclera clear  NECK: Supple, No JVD  CHEST/LUNG: Diminished breath sounds throughout, otherwise CTAB. Fentanyl patch in place.  HEART: RRR, Normal S1 and S2, No murmurs.   ABDOMEN: Normoactive BS, Soft, Nontender, Mildly distended  EXTREMITIES:  2+ Peripheral Pulses. 1+ pitting LE edema b/l (L > R)  PSYCH: AAOx3  NEUROLOGY: non-focal  SKIN: Blanching, minimally erythematous, minimally tender lesions on radial aspect of left hand and ulnar aspect of the right hand improving overall    LABS:                                   11.0   15.5  )-----------( 303      ( 21 Aug 2017 07:08 )             33.6     08-21    133<L>  |  92<L>  |  19  ----------------------------<  120<H>  4.1   |  29  |  0.48<L>      Ca    7.6<L>      21 Aug 2017 07:08  Phos  3.6     08-21  Mg     1.8     08-21    TPro  4.9<L>  /  Alb  1.9<L>  /  TBili  0.4  /  DBili  x   /  AST  21  /  ALT  26  /  AlkPhos  102  08-21          RADIOLOGY & ADDITIONAL TESTS:    < from: US Abdomen Doppler (08.21.17 @ 10:32) >    PROCEDURE DATE:  08/21/2017            INTERPRETATION:  CLINICAL INFORMATION: Chronic pancreatitis.    TECHNIQUE: Limited ultrasound of the abdomen with color and spectral   Doppler.    COMPARISON: August 16, 2017 abdominal ultrasound. MRI of the abdomen   August 11, 2017.    FINDINGS:  The main, right, and left portal veins are patent. The hepatic veins are   patent. The hepatic artery is patent with peak systolic velocity of 73   cm/s. The splenic vein is patent.    Chronic pancreatitis with peripancreatic fluid collections are again   noted.    IMPRESSION:    1. Patent hepatic vasculature.  2. Chronic pancreatitis with peripancreatic fluid collections are again   noted.    < end of copied text >  Consultant(s) Notes Reviewed: Surgery, GI    Care Discussed with Consultants/Other Providers:

## 2017-08-21 NOTE — PROGRESS NOTE ADULT - PROBLEM SELECTOR PLAN 7
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 Lesions have been improving with steroids.   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 Lesions have been improving with steroids.   -Will continue to taper by 10 mg weekly; on oral methylprednisolone 20 mg

## 2017-08-21 NOTE — PROGRESS NOTE ADULT - ASSESSMENT
IMP:    Chronic pancreatitis complicated by pancreatic ascites s/p ERCP showing dilated and tortuous pancreatic duct without evidence of leak s/p pancreatic sphincterotomy and PD stent placement. Clinically improved.    PLAN:  - diet as tolerated   - monitor LFTs  - supportive care   - repeat ERCP in 6-8 weeks for stent removal   - please call back with questions

## 2017-08-21 NOTE — PROGRESS NOTE ADULT - PROBLEM SELECTOR PLAN 3
Paracentesis x2, both showing elevated PMN's consistent with SBP. Cultures NGTD.  - Per GI, can monitor patient off Meropenem as already completed 7 days of Meropenem  - If any fevers, abdominal pain, septic features, will restart Meropenem

## 2017-08-21 NOTE — PROGRESS NOTE ADULT - PROBLEM SELECTOR PLAN 1
.  resolved for now, stable pancreatic cysts and main pancreatic duct dilation.  - s/p ERCP with pancreatic sphincterotomy and pancreatic duct stent placement on 8/17, will need stent removal in 6 to 8 wks  - GI following, recs appreciated, will f/u any new recs  - no S/S of acute pancreatitis at this time resolved for now, stable pancreatic cysts and main pancreatic duct dilation.  - s/p ERCP with pancreatic sphincterotomy and pancreatic duct stent placement on 8/17, will need stent removal in 6 to 8 wks  - GI with stated diet as tolerated, monitor LFTs, and supportive care. resolved for now, stable pancreatic cysts and main pancreatic duct dilation.  - s/p ERCP with pancreatic sphincterotomy and pancreatic duct stent placement on 8/17, will need stent removal in 6 to 8 wks  - GI with stated diet as tolerated, monitor LFTs, and supportive care. Now on regular diet. resolved for now, stable pancreatic cysts and main pancreatic duct dilation.  - s/p ERCP with pancreatic sphincterotomy and pancreatic duct stent placement on 8/17, will need stent removal in 6 to 8 wks  - GI with started diet as tolerated, monitor LFTs, and supportive care. Now on regular diet.

## 2017-08-21 NOTE — CHART NOTE - NSCHARTNOTEFT_GEN_A_CORE
NUTRITION FOLLOW UP NOTE   Pt seen for length of stay.     Source: Patient [x ]    Family [ ]     other [x ] comprehensive chart review     Diet : LOW FAT, REG, NO FISH, NO SHELLFISH      Patient reports no GI distress, tolerating diet well. Dislikes low fat diet restriction. + BM yesterday.     PO intake: [x] < 50% [ ] 50-75% [ ]   % [ ]  other :     Source for PO intake [x ] Patient [ ] family [ x] chart [ ] staff [ ] other      Current Weight: Weight (kg): 62 (08-15 @ 16:45). No new Wt.    Pertinent Medications: MEDICATIONS  (STANDING):  nicotine - 21 mG/24Hr(s) Patch 1 patch Transdermal daily  methylPREDNISolone sodium succinate Injectable 20 milliGRAM(s) IV Push daily  folic acid 1 milliGRAM(s) Oral daily    MEDICATIONS  (PRN):  hemorrhoidal Ointment 1 Application(s) Rectal daily PRN hemmorhoidal pain  oxyCODONE    IR 5 milliGRAM(s) Oral every 4 hours PRN Mild Pain (1 - 3)  oxyCODONE    IR 10 milliGRAM(s) Oral every 4 hours PRN Moderate Pain (4 - 6)  HYDROmorphone  Injectable 0.5 milliGRAM(s) IV Push every 4 hours PRN Severe Pain (7 - 10)    Pertinent Labs:  08-21 Na133 mmol/L<L> Glu 120 mg/dL<H> K+ 4.1 mmol/L Cr  0.48 mg/dL<L> BUN 19 mg/dL Phos 3.6 mg/dL Alb 1.9 g/dL<L> PAB n/a         Skin: stage 1 pressure ulcer on sacrum. 1+ edema ricky foot.    Per chart no acute pancreatitis.     Estimated Needs:   [x ] no change since previous assessment  [ ] recalculated:       Previous Nutrition Diagnosis:     [ ] Inadequate Energy Intake [ ]Inadequate Oral Intake [ ] Excessive Energy Intake     [ ] Underweight [ ] Increased Nutrient Needs [ ] Overweight/Obesity     [ ] Altered GI Function [ ] Unintended Weight Loss [ ] Food & Nutrition Related Knowledge Deficit [ x] Malnutrition          Nutrition Diagnosis is [x ] ongoing  [ ] resolved [ ] not applicable          New Nutrition Diagnosis: [ x] not applicable       Interventions:     Recommend    [x ] Change Diet To: Regular, No fish, No shellfish    [x ] Nutrition Supplement: Provide Ensure Enlive twice daily (provides additional 700kcal, 40g protein)     [x ] Nutrition Support: Provide food preferences within diet restrictions as feasible. Encourage po intake w/ snacks ordered at meals.    [ ] Other:        Monitoring and Evaluation:     [x ] PO intake [x ] Tolerance to diet prescription [x ] weights [ x] follow up per protocol    RD to remain available for further nutritional interventions as indicated/requested by medical team/pt.   Sterling Purcell, RD, CDN. Pager: [phone]

## 2017-08-21 NOTE — PROGRESS NOTE ADULT - PROBLEM SELECTOR PLAN 5
Patient has had two therapeutic paracentesis draining about 1.2 L each time. Amylase greatly elevated in both with SAAG < 1.1. With significant hypoalbuminemia. Now with minimal abdominal distention and significant urine output.   - Given increased LE edema yesterday, will continue with IV Lasix 40 mg daily and monitor Cr  - Hold aldactone for now  -CXR with no pulm edema Patient has had two therapeutic paracentesis draining about 1.2 L each time. Amylase greatly elevated in both with SAAG < 1.1. With significant hypoalbuminemia. Now with minimal abdominal distention and significant urine output.   - Given increased LE edema yesterday, change to oral furosemide 40 mg daily and monitor Cr  - Hold aldactone for now

## 2017-08-21 NOTE — PROGRESS NOTE ADULT - PROBLEM SELECTOR PLAN 4
.  WBC has been > 30 with neutrophilic predominance, now downtrending.  Likely multifactorial in etiology, including acute on chronic pancreatitis, SBP, and steroid therapy for panniculitis.   - Overall downtrending, today down to 16.8  - Currently afebrile and hemodynamically stable   - Will continue to monitor WBC WBC has been > 30 with neutrophilic predominance, now downtrending at 15.5.  Likely multifactorial in etiology, including acute on chronic pancreatitis, SBP, and steroid therapy for panniculitis.   - Currently afebrile and hemodynamically stable   - Will continue to monitor WBC

## 2017-08-21 NOTE — PROGRESS NOTE ADULT - ASSESSMENT
58 yo M with h/o chronic pancreatitis (dx 2002), HTN, recent inguinal hernia repair (June 2017) initially presented to [facility] for panniculitis 2/2 chronic pancreatitis. Hospital course complicated by pancreatic ascites and transferred to [facility] for ERCP, now s/p ERCP with pancreatic sphincterotomy and pancreatic duct stent placement and completion of meropenem x 7 days. Hospital course also complicated by partial SBO vs ileus requiring decompression with NGT and acute on chronic portal vein thrombosis, later shown to be resolved on follow-up duplex, pending reevaluation by repeat duplex to determine need for therapeutic Lovenox long term. 58 yo M with h/o chronic pancreatitis (dx 2002), HTN, recent inguinal hernia repair (June 2017) initially presented to [facility] for panniculitis 2/2 chronic pancreatitis. Hospital course complicated by pancreatic ascites and transferred to [facility] for ERCP, now s/p ERCP with pancreatic sphincterotomy and pancreatic duct stent placement and completion of meropenem x 7 days. Hospital course also complicated by partial SBO vs ileus requiring decompression with NGT and acute on chronic portal vein thrombosis, later shown to be resolved on follow-up duplex now with patent portal veins on duplex 8/21. Patient also stating abdominal distention has improved since recent bowel movement.

## 2017-08-21 NOTE — PROGRESS NOTE ADULT - SUBJECTIVE AND OBJECTIVE BOX
Chief Complaint:  Patient is a 57y old  Male who presents with a chief complaint of transfer for possible ERCP. (18 Aug 2017 16:24)      Interval Events: Afebrile. No abdominal pain/nausea/vomiting. Tolerating PO.     Allergies:  iodine (Swelling)      Home Medications:    Hospital Medications:  HYDROmorphone  Injectable 0.5 milliGRAM(s) IV Push every 6 hours PRN  HYDROmorphone  Injectable 0.5 milliGRAM(s) IV Push every 4 hours PRN  pantoprazole  Injectable 40 milliGRAM(s) IV Push daily  nicotine - 21 mG/24Hr(s) Patch 1 patch Transdermal daily  enoxaparin Injectable 60 milliGRAM(s) SubCutaneous every 12 hours  hemorrhoidal Ointment 1 Application(s) Rectal daily PRN  furosemide   Injectable 40 milliGRAM(s) IV Push daily  methylPREDNISolone sodium succinate Injectable 20 milliGRAM(s) IV Push daily      PMHX/PSHX:  Ascites  Hernia  Pancreatitis  Hypertension  TIA (transient ischemic attack)  No pertinent past medical history  History of inguinal hernia repair  S/P skin graft using Integra  Fracture of shaft of left femur  No significant past surgical history      Family history:  Family history of duodenal cancer (Father)  No pertinent family history in first degree relatives      ROS: as per HPI       PHYSICAL EXAM:   Vital Signs:  Vital Signs Last 24 Hrs  T(C): 36.9 (21 Aug 2017 04:53), Max: 37.1 (20 Aug 2017 13:35)  T(F): 98.4 (21 Aug 2017 04:53), Max: 98.8 (20 Aug 2017 13:35)  HR: 86 (21 Aug 2017 04:53) (86 - 119)  BP: 106/67 (21 Aug 2017 04:53) (106/67 - 124/83)  BP(mean): --  RR: 18 (21 Aug 2017 04:53) (18 - 18)  SpO2: 96% (21 Aug 2017 04:53) (93% - 97%)  Daily     Daily     GENERAL:  Appears stated age, well-groomed, well-nourished, no distress  HEENT:  NC/AT,  conjunctivae clear and pink, no thyromegaly, nodules, adenopathy, no JVD, sclera -anicteric  CHEST:  Full & symmetric excursion, no increased effort, breath sounds clear  HEART:  Regular rhythm, S1, S2, no murmur/rub/S3/S4, no abdominal bruit, no edema  ABDOMEN:  Soft, non-tender, non-distended, normoactive bowel sounds,  no masses ,no hepato-splenomegaly, no signs of chronic liver disease  EXTEREMITIES:  no cyanosis,clubbing or edema  SKIN:  No rash/erythema/ecchymoses/petechiae/wounds/abscess/warm/dry  NEURO:  Alert, oriented, no asterixis, no tremor, no encephalopathy    LABS:                        11.0   15.5  )-----------( 303      ( 21 Aug 2017 07:08 )             33.6     08-21    133<L>  |  92<L>  |  19  ----------------------------<  120<H>  4.1   |  29  |  0.48<L>    Ca    7.6<L>      21 Aug 2017 07:08  Phos  3.6     08-21  Mg     1.8     08-21    TPro  4.9<L>  /  Alb  1.9<L>  /  TBili  0.4  /  DBili  x   /  AST  21  /  ALT  26  /  AlkPhos  102  08-21    LIVER FUNCTIONS - ( 21 Aug 2017 07:08 )  Alb: 1.9 g/dL / Pro: 4.9 g/dL / ALK PHOS: 102 U/L / ALT: 26 U/L RC / AST: 21 U/L / GGT: x           PT/INR - ( 21 Aug 2017 07:08 )   PT: 16.6 sec;   INR: 1.51 ratio         PTT - ( 21 Aug 2017 07:08 )  PTT:30.1 sec        Imaging: Chief Complaint:  Patient is a 57y old  Male who presents with a chief complaint of transfer for possible ERCP. (18 Aug 2017 16:24)      Interval Events: Afebrile. No abdominal pain/nausea/vomiting. Tolerating PO.     Allergies:  iodine (Swelling)      Home Medications:    Hospital Medications:  HYDROmorphone  Injectable 0.5 milliGRAM(s) IV Push every 6 hours PRN  HYDROmorphone  Injectable 0.5 milliGRAM(s) IV Push every 4 hours PRN  pantoprazole  Injectable 40 milliGRAM(s) IV Push daily  nicotine - 21 mG/24Hr(s) Patch 1 patch Transdermal daily  enoxaparin Injectable 60 milliGRAM(s) SubCutaneous every 12 hours  hemorrhoidal Ointment 1 Application(s) Rectal daily PRN  furosemide   Injectable 40 milliGRAM(s) IV Push daily  methylPREDNISolone sodium succinate Injectable 20 milliGRAM(s) IV Push daily      PMHX/PSHX:  Ascites  Hernia  Pancreatitis  Hypertension  TIA (transient ischemic attack)  No pertinent past medical history  History of inguinal hernia repair  S/P skin graft using Integra  Fracture of shaft of left femur  No significant past surgical history      Family history:  Family history of duodenal cancer (Father)  No pertinent family history in first degree relatives      ROS: as per HPI       PHYSICAL EXAM:   Vital Signs:  Vital Signs Last 24 Hrs  T(C): 36.9 (21 Aug 2017 04:53), Max: 37.1 (20 Aug 2017 13:35)  T(F): 98.4 (21 Aug 2017 04:53), Max: 98.8 (20 Aug 2017 13:35)  HR: 86 (21 Aug 2017 04:53) (86 - 119)  BP: 106/67 (21 Aug 2017 04:53) (106/67 - 124/83)  BP(mean): --  RR: 18 (21 Aug 2017 04:53) (18 - 18)  SpO2: 96% (21 Aug 2017 04:53) (93% - 97%)  Daily     Daily     GENERAL:  NAD,   HEENT:  sclera -anicteric  CHEST:  breath sounds clear  HEART:  Regular rhythm  ABDOMEN:  Soft, non-tender, non-distended, normoactive bowel sounds  SKIN:  No jaundice   NEURO:  Alert, oriented      LABS:                        11.0   15.5  )-----------( 303      ( 21 Aug 2017 07:08 )             33.6     08-21    133<L>  |  92<L>  |  19  ----------------------------<  120<H>  4.1   |  29  |  0.48<L>    Ca    7.6<L>      21 Aug 2017 07:08  Phos  3.6     08-21  Mg     1.8     08-21    TPro  4.9<L>  /  Alb  1.9<L>  /  TBili  0.4  /  DBili  x   /  AST  21  /  ALT  26  /  AlkPhos  102  08-21    LIVER FUNCTIONS - ( 21 Aug 2017 07:08 )  Alb: 1.9 g/dL / Pro: 4.9 g/dL / ALK PHOS: 102 U/L / ALT: 26 U/L RC / AST: 21 U/L / GGT: x           PT/INR - ( 21 Aug 2017 07:08 )   PT: 16.6 sec;   INR: 1.51 ratio         PTT - ( 21 Aug 2017 07:08 )  PTT:30.1 sec        Imaging:

## 2017-08-21 NOTE — PROGRESS NOTE ADULT - ATTENDING COMMENTS
As above  Outpatient GI office followup in 3-4 weeks  Outpatient ERCP in 6-8 weeks.  Will sign off/call for questions/concerns

## 2017-08-21 NOTE — PROGRESS NOTE ADULT - ATTENDING COMMENTS
as above.  In addition:    Hyponatremia - in the setting of ascites the pt likely has a hypervolemic hypernatremia, will continue to monitor Sodium levels.

## 2017-08-22 ENCOUNTER — TRANSCRIPTION ENCOUNTER (OUTPATIENT)
Age: 57
End: 2017-08-22

## 2017-08-22 DIAGNOSIS — E87.8 OTHER DISORDERS OF ELECTROLYTE AND FLUID BALANCE, NOT ELSEWHERE CLASSIFIED: ICD-10-CM

## 2017-08-22 LAB
ALBUMIN SERPL ELPH-MCNC: 2 G/DL — LOW (ref 3.3–5)
ALP SERPL-CCNC: 100 U/L — SIGNIFICANT CHANGE UP (ref 40–120)
ALT FLD-CCNC: 26 U/L RC — SIGNIFICANT CHANGE UP (ref 10–45)
ANION GAP SERPL CALC-SCNC: 9 MMOL/L — SIGNIFICANT CHANGE UP (ref 5–17)
AST SERPL-CCNC: 24 U/L — SIGNIFICANT CHANGE UP (ref 10–40)
BILIRUB SERPL-MCNC: 0.3 MG/DL — SIGNIFICANT CHANGE UP (ref 0.2–1.2)
BUN SERPL-MCNC: 13 MG/DL — SIGNIFICANT CHANGE UP (ref 7–23)
CALCIUM SERPL-MCNC: 8.2 MG/DL — LOW (ref 8.4–10.5)
CHLORIDE SERPL-SCNC: 93 MMOL/L — LOW (ref 96–108)
CO2 SERPL-SCNC: 31 MMOL/L — SIGNIFICANT CHANGE UP (ref 22–31)
CREAT SERPL-MCNC: 0.37 MG/DL — LOW (ref 0.5–1.3)
GLUCOSE SERPL-MCNC: 104 MG/DL — HIGH (ref 70–99)
MAGNESIUM SERPL-MCNC: 1.6 MG/DL — SIGNIFICANT CHANGE UP (ref 1.6–2.6)
PHOSPHATE SERPL-MCNC: 2.3 MG/DL — LOW (ref 2.5–4.5)
POTASSIUM SERPL-MCNC: 4.5 MMOL/L — SIGNIFICANT CHANGE UP (ref 3.5–5.3)
POTASSIUM SERPL-SCNC: 4.5 MMOL/L — SIGNIFICANT CHANGE UP (ref 3.5–5.3)
PROT SERPL-MCNC: 4.9 G/DL — LOW (ref 6–8.3)
SODIUM SERPL-SCNC: 133 MMOL/L — LOW (ref 135–145)

## 2017-08-22 PROCEDURE — 99233 SBSQ HOSP IP/OBS HIGH 50: CPT | Mod: GC

## 2017-08-22 RX ORDER — MAGNESIUM OXIDE 400 MG ORAL TABLET 241.3 MG
400 TABLET ORAL
Qty: 0 | Refills: 0 | Status: COMPLETED | OUTPATIENT
Start: 2017-08-22 | End: 2017-08-23

## 2017-08-22 RX ORDER — FENTANYL CITRATE 50 UG/ML
1 INJECTION INTRAVENOUS
Qty: 0 | Refills: 0 | Status: DISCONTINUED | OUTPATIENT
Start: 2017-08-22 | End: 2017-08-23

## 2017-08-22 RX ORDER — SODIUM,POTASSIUM PHOSPHATES 278-250MG
1 POWDER IN PACKET (EA) ORAL
Qty: 0 | Refills: 0 | Status: COMPLETED | OUTPATIENT
Start: 2017-08-22 | End: 2017-08-23

## 2017-08-22 RX ADMIN — FENTANYL CITRATE 1 PATCH: 50 INJECTION INTRAVENOUS at 12:21

## 2017-08-22 RX ADMIN — ENOXAPARIN SODIUM 40 MILLIGRAM(S): 100 INJECTION SUBCUTANEOUS at 11:58

## 2017-08-22 RX ADMIN — Medication 40 MILLIGRAM(S): at 05:18

## 2017-08-22 RX ADMIN — MAGNESIUM OXIDE 400 MG ORAL TABLET 400 MILLIGRAM(S): 241.3 TABLET ORAL at 17:46

## 2017-08-22 RX ADMIN — OXYCODONE HYDROCHLORIDE 5 MILLIGRAM(S): 5 TABLET ORAL at 18:57

## 2017-08-22 RX ADMIN — OXYCODONE HYDROCHLORIDE 5 MILLIGRAM(S): 5 TABLET ORAL at 23:58

## 2017-08-22 RX ADMIN — OXYCODONE HYDROCHLORIDE 5 MILLIGRAM(S): 5 TABLET ORAL at 22:58

## 2017-08-22 RX ADMIN — MAGNESIUM OXIDE 400 MG ORAL TABLET 400 MILLIGRAM(S): 241.3 TABLET ORAL at 11:59

## 2017-08-22 RX ADMIN — OXYCODONE HYDROCHLORIDE 5 MILLIGRAM(S): 5 TABLET ORAL at 13:05

## 2017-08-22 RX ADMIN — MAGNESIUM OXIDE 400 MG ORAL TABLET 400 MILLIGRAM(S): 241.3 TABLET ORAL at 09:44

## 2017-08-22 RX ADMIN — PANTOPRAZOLE SODIUM 40 MILLIGRAM(S): 20 TABLET, DELAYED RELEASE ORAL at 05:18

## 2017-08-22 RX ADMIN — Medication 1 TABLET(S): at 11:59

## 2017-08-22 RX ADMIN — Medication 1 PATCH: at 11:59

## 2017-08-22 RX ADMIN — Medication 1 TABLET(S): at 09:44

## 2017-08-22 RX ADMIN — Medication 1 MILLIGRAM(S): at 11:58

## 2017-08-22 RX ADMIN — OXYCODONE HYDROCHLORIDE 5 MILLIGRAM(S): 5 TABLET ORAL at 05:20

## 2017-08-22 RX ADMIN — Medication 20 MILLIGRAM(S): at 05:18

## 2017-08-22 RX ADMIN — OXYCODONE HYDROCHLORIDE 5 MILLIGRAM(S): 5 TABLET ORAL at 14:00

## 2017-08-22 RX ADMIN — OXYCODONE HYDROCHLORIDE 5 MILLIGRAM(S): 5 TABLET ORAL at 04:50

## 2017-08-22 RX ADMIN — Medication 1 TABLET(S): at 21:52

## 2017-08-22 RX ADMIN — Medication 1 TABLET(S): at 17:46

## 2017-08-22 NOTE — PROGRESS NOTE ADULT - PROBLEM SELECTOR PLAN 7
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 Lesions have been improving with steroids.   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 Lesions have been improving with steroids.   -Will continue to taper methylprednisone by 10 mg weekly

## 2017-08-22 NOTE — PROGRESS NOTE ADULT - PROBLEM SELECTOR PLAN 6
MRI abd from 8/11/17 showed concern for acute/subacute portal vein thrombosis.  - Abdominal duplex on 8/15/17 showed no thrombosis again on 8/21 patent portal veins  -discontinued therapeutic enoxaparin due to patent portal veins

## 2017-08-22 NOTE — PROGRESS NOTE ADULT - PROBLEM SELECTOR PLAN 1
resolved, stable pancreatic cysts and main pancreatic duct dilation.  - s/p ERCP with pancreatic sphincterotomy and pancreatic duct stent placement on 8/17, will need stent removal in 6 to 8 wks  - GI with started diet as tolerated, monitor LFTs, and supportive care. Now on regular diet. resolved, stable pancreatic cysts and main pancreatic duct dilation.  - s/p ERCP with pancreatic sphincterotomy and pancreatic duct stent placement on 8/17, will need stent removal in 6 to 8 wks  - GI with started diet as tolerated, monitor LFTs, and supportive care. Now on regular diet.  -pain improved resolved, stable pancreatic cysts and main pancreatic duct dilation.  - s/p ERCP with pancreatic sphincterotomy and pancreatic duct stent placement on 8/17, will need stent removal in 6 to 8 wks  - tolerating regular diet.  -pain continues to improve.

## 2017-08-22 NOTE — PROGRESS NOTE ADULT - PROBLEM SELECTOR PLAN 2
- resolved, s/p NG tube decompression  -  8/21  - tolerating diet  - minimize use of opioids for pain control  - continue to monitor for BMs and flatus  - Surgery following, recs appreciated, will f/u any new recs - resolved  - tolerating diet

## 2017-08-22 NOTE — PROGRESS NOTE ADULT - PROBLEM SELECTOR PLAN 9
- on prophylactic enoxaparin 40mg qday  DNR/DNI but wants all other aggressive medical therapies  - overall prognosis guarded - on prophylactic enoxaparin 40mg qday  DNR/DNI but wants all other aggressive medical therapies - on prophylactic enoxaparin 40mg qday  DNR/DNI but wants all other aggressive medical therapies.

## 2017-08-22 NOTE — PROGRESS NOTE ADULT - PROBLEM SELECTOR PLAN 4
WBC has been > 30 with neutrophilic predominance, now downtrending.  Likely multifactorial in etiology, including acute on chronic pancreatitis, SBP, and steroid therapy for panniculitis.   - Currently afebrile and hemodynamically stable   - Will continue to monitor WBC WBC has been > 30 with neutrophilic predominance, now downtrending.  Likely multifactorial in etiology, including acute on chronic pancreatitis, SBP, and steroid therapy for panniculitis.   - Currently afebrile and hemodynamically stable WBC continues to downtrend.  Likely multifactorial in etiology, including acute on chronic pancreatitis, SBP, and steroid therapy for panniculitis.   - Currently afebrile and hemodynamically stable

## 2017-08-22 NOTE — PROGRESS NOTE ADULT - PROBLEM SELECTOR PLAN 5
Patient has had two therapeutic paracentesis draining about 1.2 L each time. Amylase greatly elevated in both with SAAG < 1.1. With significant hypoalbuminemia. Now with minimal abdominal distention and significant urine output.   - Changed to oral furosemide 40 mg daily and monitor Cr for LE edema  - Hold aldactone for now

## 2017-08-22 NOTE — PROGRESS NOTE ADULT - SUBJECTIVE AND OBJECTIVE BOX
Patient is a 57y old  Male who presents with a chief complaint of transfer for possible ERCP. (18 Aug 2017 16:24)      SUBJECTIVE / OVERNIGHT EVENTS:  No acute events overnight. No subjective complaints this AM except for chronic joint pain in b/l hands from the panniculitis. Tolerated regular diet. Last BM was two nights and denies abdominal pain. Has a mild non productive cough. Denies any F/C, CP, SOB, abdominal pain, N/V, diarrhea, constipation, or dysuria.       MEDICATIONS  (STANDING):  nicotine - 21 mG/24Hr(s) Patch 1 patch Transdermal daily  methylPREDNISolone sodium succinate Injectable 20 milliGRAM(s) IV Push daily  furosemide    Tablet 40 milliGRAM(s) Oral daily  pantoprazole    Tablet 40 milliGRAM(s) Oral before breakfast  enoxaparin Injectable 40 milliGRAM(s) SubCutaneous daily  folic acid 1 milliGRAM(s) Oral daily    MEDICATIONS  (PRN):  hemorrhoidal Ointment 1 Application(s) Rectal daily PRN hemmorhoidal pain  oxyCODONE    IR 5 milliGRAM(s) Oral every 4 hours PRN Mild Pain (1 - 3)  oxyCODONE    IR 10 milliGRAM(s) Oral every 4 hours PRN Moderate Pain (4 - 6)  HYDROmorphone  Injectable 0.5 milliGRAM(s) IV Push every 4 hours PRN Severe Pain (7 - 10)      Vital Signs Last 24 Hrs  T(C): 36.6 (22 Aug 2017 04:44), Max: 36.9 (21 Aug 2017 20:11)  T(F): 97.9 (22 Aug 2017 04:44), Max: 98.4 (21 Aug 2017 20:11)  HR: 93 (22 Aug 2017 04:44) (76 - 93)  BP: 107/68 (22 Aug 2017 04:44) (99/60 - 112/72)  BP(mean): --  RR: 18 (22 Aug 2017 04:44) (17 - 18)  SpO2: 92% (22 Aug 2017 04:44) (92% - 98%)    I&O's Summary    I&O's Summary    21 Aug 2017 07:01  -  22 Aug 2017 07:00  --------------------------------------------------------  IN: 240 mL / OUT: 1750 mL / NET: -1510 mL        PHYSICAL EXAM:  GENERAL: NAD, cachectic  HEAD:  Atraumatic, Normocephalic.  EYES: EOMI, PERRL, conjunctiva and sclera clear  NECK: Supple, No JVD  CHEST/LUNG: Diminished breath sounds throughout, otherwise CTAB. No egophony and no dullness to percussion.  HEART: RRR, Normal S1 and S2, No murmurs.   ABDOMEN: Normoactive BS, Soft, Nontender, Mildly distended. No TTP.  EXTREMITIES:  2+ Peripheral Pulses. 1+ pitting LE edema b/l (L > R)  PSYCH: AAOx3  NEUROLOGY: non-focal  SKIN: Blanching, minimally erythematous, minimally tender lesions on radial aspect of left hand and ulnar aspect of the right hand    LABS:                                   11.0   15.5  )-----------( 303      ( 21 Aug 2017 07:08 )             33.6   08-22    133<L>  |  93<L>  |  13  ----------------------------<  104<H>  4.5   |  31  |  0.37<L>    Ca    8.2<L>      22 Aug 2017 07:12  Phos  2.3     08-22  Mg     1.6     08-22    TPro  4.9<L>  /  Alb  2.0<L>  /  TBili  0.3  /  DBili  x   /  AST  24  /  ALT  26  /  AlkPhos  100  08-22          RADIOLOGY & ADDITIONAL TESTS:    < from: US Abdomen Doppler (08.21.17 @ 10:32) >    PROCEDURE DATE:  08/21/2017            INTERPRETATION:  CLINICAL INFORMATION: Chronic pancreatitis.    TECHNIQUE: Limited ultrasound of the abdomen with color and spectral   Doppler.    COMPARISON: August 16, 2017 abdominal ultrasound. MRI of the abdomen   August 11, 2017.    FINDINGS:  The main, right, and left portal veins are patent. The hepatic veins are   patent. The hepatic artery is patent with peak systolic velocity of 73   cm/s. The splenic vein is patent.    Chronic pancreatitis with peripancreatic fluid collections are again   noted.    IMPRESSION:    1. Patent hepatic vasculature.  2. Chronic pancreatitis with peripancreatic fluid collections are again   noted.    < end of copied text >  Consultant(s) Notes Reviewed: Surgery, GI    Care Discussed with Consultants/Other Providers: Patient is a 57y old  Male who presents with a chief complaint of transfer for possible ERCP. (18 Aug 2017 16:24)      SUBJECTIVE / OVERNIGHT EVENTS:  No acute events overnight. No subjective complaints this AM except for chronic joint pain in b/l hands from the panniculitis. Tolerated regular diet. Last BM was two nights and denies abdominal pain. Has a mild non productive cough. Denies any F/C, CP, SOB, abdominal pain, N/V, diarrhea, constipation, or dysuria.       MEDICATIONS  (STANDING):  nicotine - 21 mG/24Hr(s) Patch 1 patch Transdermal daily  methylPREDNISolone sodium succinate Injectable 20 milliGRAM(s) IV Push daily  furosemide    Tablet 40 milliGRAM(s) Oral daily  pantoprazole    Tablet 40 milliGRAM(s) Oral before breakfast  enoxaparin Injectable 40 milliGRAM(s) SubCutaneous daily  folic acid 1 milliGRAM(s) Oral daily    MEDICATIONS  (PRN):  hemorrhoidal Ointment 1 Application(s) Rectal daily PRN hemmorhoidal pain  oxyCODONE    IR 5 milliGRAM(s) Oral every 4 hours PRN Mild Pain (1 - 3)  oxyCODONE    IR 10 milliGRAM(s) Oral every 4 hours PRN Moderate Pain (4 - 6)  HYDROmorphone  Injectable 0.5 milliGRAM(s) IV Push every 4 hours PRN Severe Pain (7 - 10)      Vital Signs Last 24 Hrs  T(C): 36.6 (22 Aug 2017 04:44), Max: 36.9 (21 Aug 2017 20:11)  T(F): 97.9 (22 Aug 2017 04:44), Max: 98.4 (21 Aug 2017 20:11)  HR: 93 (22 Aug 2017 04:44) (76 - 93)  BP: 107/68 (22 Aug 2017 04:44) (99/60 - 112/72)  BP(mean): --  RR: 18 (22 Aug 2017 04:44) (17 - 18)  SpO2: 92% (22 Aug 2017 04:44) (92% - 98%)      I&O's Summary    21 Aug 2017 07:01  -  22 Aug 2017 07:00  --------------------------------------------------------  IN: 240 mL / OUT: 1750 mL / NET: -1510 mL        PHYSICAL EXAM:  GENERAL: NAD, cachectic  HEAD:  Atraumatic, Normocephalic.  EYES: EOMI, PERRL, conjunctiva and sclera clear  NECK: Supple, No JVD  CHEST/LUNG: Diminished breath sounds throughout, otherwise CTAB. No egophony and no dullness to percussion.  HEART: RRR, Normal S1 and S2, No murmurs.   ABDOMEN: Normoactive BS, Soft, Nontender, Mildly distended. No TTP.  EXTREMITIES:  2+ Peripheral Pulses. 1+ pitting LE edema b/l (L > R)  PSYCH: AAOx3  NEUROLOGY: non-focal  SKIN: Blanching, minimally erythematous, minimally tender lesions on radial aspect of left hand and ulnar aspect of the right hand    LABS:                                   11.0   15.5  )-----------( 303      ( 21 Aug 2017 07:08 )             33.6   08-22    133<L>  |  93<L>  |  13  ----------------------------<  104<H>  4.5   |  31  |  0.37<L>    Ca    8.2<L>      22 Aug 2017 07:12  Phos  2.3     08-22  Mg     1.6     08-22    TPro  4.9<L>  /  Alb  2.0<L>  /  TBili  0.3  /  DBili  x   /  AST  24  /  ALT  26  /  AlkPhos  100  08-22          RADIOLOGY & ADDITIONAL TESTS:    < from: US Abdomen Doppler (08.21.17 @ 10:32) >    PROCEDURE DATE:  08/21/2017            INTERPRETATION:  CLINICAL INFORMATION: Chronic pancreatitis.    TECHNIQUE: Limited ultrasound of the abdomen with color and spectral   Doppler.    COMPARISON: August 16, 2017 abdominal ultrasound. MRI of the abdomen   August 11, 2017.    FINDINGS:  The main, right, and left portal veins are patent. The hepatic veins are   patent. The hepatic artery is patent with peak systolic velocity of 73   cm/s. The splenic vein is patent.    Chronic pancreatitis with peripancreatic fluid collections are again   noted.    IMPRESSION:    1. Patent hepatic vasculature.  2. Chronic pancreatitis with peripancreatic fluid collections are again   noted.    < end of copied text >  Consultant(s) Notes Reviewed: Surgery, GI    Care Discussed with Consultants/Other Providers:

## 2017-08-22 NOTE — PROGRESS NOTE ADULT - ASSESSMENT
56 yo M with h/o chronic pancreatitis (dx 2002), HTN, recent inguinal hernia repair (June 2017) initially presented to [facility] for panniculitis 2/2 chronic pancreatitis. Hospital course complicated by pancreatic ascites and transferred to [facility] for ERCP, now s/p ERCP with pancreatic sphincterotomy and pancreatic duct stent placement and completion of meropenem x 7 days. Hospital course also complicated by partial SBO vs ileus requiring decompression with NGT and acute on chronic portal vein thrombosis, later shown to be resolved on follow-up duplex now with patent portal veins on duplex 8/21. Patient also stating abdominal distention has improved since recent bowel movement. 56 yo M with h/o chronic pancreatitis (dx 2002), HTN, recent inguinal hernia repair (June 2017) initially presented to [facility] for panniculitis 2/2 chronic pancreatitis. Hospital course complicated by pancreatic ascites and transferred to [facility] for ERCP, now s/p ERCP with pancreatic sphincterotomy and pancreatic duct stent placement and completion of meropenem x 7 days. Hospital course also complicated by partial SBO vs ileus requiring decompression with NGT and acute on chronic portal vein thrombosis, later shown to be resolved on follow-up duplex now with patent portal veins on duplex 8/21. Patient also stating abdominal distention has improved since recent bowel movement. Now is medically optimized and arranging for discharge home.

## 2017-08-22 NOTE — PROGRESS NOTE ADULT - PROBLEM SELECTOR PLAN 3
Paracentesis x2, both showing elevated PMN's consistent with SBP. Cultures NGTD.  -monitor off meropenem, If any fevers, abdominal pain, septic features, will restart meropenem resolved. abx completed  -monitor off meropenem, If any fevers, abdominal pain, septic features, will restart meropenem

## 2017-08-23 VITALS
TEMPERATURE: 98 F | OXYGEN SATURATION: 98 % | HEART RATE: 99 BPM | RESPIRATION RATE: 17 BRPM | DIASTOLIC BLOOD PRESSURE: 74 MMHG | SYSTOLIC BLOOD PRESSURE: 115 MMHG

## 2017-08-23 PROCEDURE — 85027 COMPLETE CBC AUTOMATED: CPT

## 2017-08-23 PROCEDURE — 71045 X-RAY EXAM CHEST 1 VIEW: CPT

## 2017-08-23 PROCEDURE — 85610 PROTHROMBIN TIME: CPT

## 2017-08-23 PROCEDURE — 84100 ASSAY OF PHOSPHORUS: CPT

## 2017-08-23 PROCEDURE — 86900 BLOOD TYPING SEROLOGIC ABO: CPT

## 2017-08-23 PROCEDURE — 83735 ASSAY OF MAGNESIUM: CPT

## 2017-08-23 PROCEDURE — 93975 VASCULAR STUDY: CPT

## 2017-08-23 PROCEDURE — C1769: CPT

## 2017-08-23 PROCEDURE — 85730 THROMBOPLASTIN TIME PARTIAL: CPT

## 2017-08-23 PROCEDURE — 86901 BLOOD TYPING SEROLOGIC RH(D): CPT

## 2017-08-23 PROCEDURE — C2617: CPT

## 2017-08-23 PROCEDURE — 74018 RADEX ABDOMEN 1 VIEW: CPT

## 2017-08-23 PROCEDURE — 80061 LIPID PANEL: CPT

## 2017-08-23 PROCEDURE — 99239 HOSP IP/OBS DSCHRG MGMT >30: CPT

## 2017-08-23 PROCEDURE — 86850 RBC ANTIBODY SCREEN: CPT

## 2017-08-23 PROCEDURE — 80053 COMPREHEN METABOLIC PANEL: CPT

## 2017-08-23 PROCEDURE — 97116 GAIT TRAINING THERAPY: CPT

## 2017-08-23 PROCEDURE — 97162 PT EVAL MOD COMPLEX 30 MIN: CPT

## 2017-08-23 PROCEDURE — 82150 ASSAY OF AMYLASE: CPT

## 2017-08-23 PROCEDURE — 83690 ASSAY OF LIPASE: CPT

## 2017-08-23 PROCEDURE — 97110 THERAPEUTIC EXERCISES: CPT

## 2017-08-23 RX ORDER — FENTANYL CITRATE 50 UG/ML
1 INJECTION INTRAVENOUS
Qty: 5 | Refills: 0 | OUTPATIENT
Start: 2017-08-23

## 2017-08-23 RX ORDER — POLYETHYLENE GLYCOL 3350 17 G/17G
17 POWDER, FOR SOLUTION ORAL
Qty: 119 | Refills: 0 | OUTPATIENT
Start: 2017-08-23 | End: 2017-08-30

## 2017-08-23 RX ORDER — FENTANYL CITRATE 50 UG/ML
1 INJECTION INTRAVENOUS
Qty: 5 | Refills: 0 | OUTPATIENT
Start: 2017-08-23 | End: 2017-09-02

## 2017-08-23 RX ORDER — FUROSEMIDE 40 MG
1 TABLET ORAL
Qty: 30 | Refills: 0 | OUTPATIENT
Start: 2017-08-23 | End: 2017-09-22

## 2017-08-23 RX ORDER — FENTANYL CITRATE 50 UG/ML
25 INJECTION INTRAVENOUS
Qty: 5 | Refills: 0 | OUTPATIENT
Start: 2017-08-23

## 2017-08-23 RX ORDER — FOLIC ACID 0.8 MG
1 TABLET ORAL
Qty: 30 | Refills: 0 | OUTPATIENT
Start: 2017-08-23 | End: 2017-09-22

## 2017-08-23 RX ORDER — PANTOPRAZOLE SODIUM 20 MG/1
1 TABLET, DELAYED RELEASE ORAL
Qty: 30 | Refills: 0 | OUTPATIENT
Start: 2017-08-23 | End: 2017-09-22

## 2017-08-23 RX ORDER — OXYCODONE HYDROCHLORIDE 5 MG/1
1 TABLET ORAL
Qty: 28 | Refills: 0 | OUTPATIENT
Start: 2017-08-23 | End: 2017-08-30

## 2017-08-23 RX ORDER — DOCUSATE SODIUM 100 MG
1 CAPSULE ORAL
Qty: 60 | Refills: 0 | OUTPATIENT
Start: 2017-08-23 | End: 2017-09-22

## 2017-08-23 RX ORDER — FENTANYL CITRATE 50 UG/ML
1 INJECTION INTRAVENOUS
Qty: 0 | Refills: 0 | COMMUNITY
Start: 2017-08-23

## 2017-08-23 RX ORDER — OXYCODONE HYDROCHLORIDE 5 MG/1
1 TABLET ORAL
Qty: 20 | Refills: 0 | OUTPATIENT
Start: 2017-08-23 | End: 2017-08-28

## 2017-08-23 RX ORDER — NICOTINE POLACRILEX 2 MG
1 GUM BUCCAL
Qty: 1 | Refills: 0 | OUTPATIENT
Start: 2017-08-23

## 2017-08-23 RX ORDER — PHENYLEPHRINE-SHARK LIVER OIL-MINERAL OIL-PETROLATUM RECTAL OINTMENT
1 OINTMENT (GRAM) RECTAL
Qty: 1 | Refills: 0 | OUTPATIENT
Start: 2017-08-23

## 2017-08-23 RX ADMIN — Medication 1 PATCH: at 11:55

## 2017-08-23 RX ADMIN — Medication 20 MILLIGRAM(S): at 06:16

## 2017-08-23 RX ADMIN — MAGNESIUM OXIDE 400 MG ORAL TABLET 400 MILLIGRAM(S): 241.3 TABLET ORAL at 08:16

## 2017-08-23 RX ADMIN — PANTOPRAZOLE SODIUM 40 MILLIGRAM(S): 20 TABLET, DELAYED RELEASE ORAL at 06:16

## 2017-08-23 RX ADMIN — OXYCODONE HYDROCHLORIDE 5 MILLIGRAM(S): 5 TABLET ORAL at 13:10

## 2017-08-23 RX ADMIN — Medication 1 TABLET(S): at 08:23

## 2017-08-23 RX ADMIN — Medication 40 MILLIGRAM(S): at 06:16

## 2017-08-23 RX ADMIN — OXYCODONE HYDROCHLORIDE 5 MILLIGRAM(S): 5 TABLET ORAL at 09:06

## 2017-08-23 RX ADMIN — OXYCODONE HYDROCHLORIDE 5 MILLIGRAM(S): 5 TABLET ORAL at 04:45

## 2017-08-23 RX ADMIN — OXYCODONE HYDROCHLORIDE 5 MILLIGRAM(S): 5 TABLET ORAL at 08:36

## 2017-08-23 RX ADMIN — Medication 1 PATCH: at 11:54

## 2017-08-23 RX ADMIN — Medication 1 MILLIGRAM(S): at 11:54

## 2017-08-23 RX ADMIN — OXYCODONE HYDROCHLORIDE 5 MILLIGRAM(S): 5 TABLET ORAL at 03:45

## 2017-08-23 NOTE — PROGRESS NOTE ADULT - PROBLEM SELECTOR PLAN 10
-Mg at 1.6 and Phos and 2.3 with no symptoms. Will replete Mg and Phosph. - on prophylactic enoxaparin 40mg qday  DNR/DNI but wants all other aggressive medical therapies.

## 2017-08-23 NOTE — PROGRESS NOTE ADULT - PROVIDER SPECIALTY LIST ADULT
Gastroenterology
Internal Medicine
Surgery
Internal Medicine
Surgery

## 2017-08-23 NOTE — PROGRESS NOTE ADULT - PROBLEM SELECTOR PLAN 1
resolved, stable pancreatic cysts and main pancreatic duct dilation.  - s/p ERCP with pancreatic sphincterotomy and pancreatic duct stent placement on 8/17, will need stent removal in 6 to 8 wks  - tolerating regular diet.  -pain continues to improve.

## 2017-08-23 NOTE — PROGRESS NOTE ADULT - PROBLEM SELECTOR PLAN 9
- on prophylactic enoxaparin 40mg qday  DNR/DNI but wants all other aggressive medical therapies. -Mg at 1.6 and Phos and 2.3 with no symptoms. Will replete Mg and Phosph.

## 2017-08-23 NOTE — PROGRESS NOTE ADULT - PROBLEM SELECTOR PLAN 7
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 Lesions have been improving with steroids.   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 Lesions have been improving with steroids.   -switched to oral prednisone 20mg for the rest of the week then 10mg taper 1 wk and 5 subsequent week

## 2017-08-23 NOTE — PROGRESS NOTE ADULT - SUBJECTIVE AND OBJECTIVE BOX
Patient is a 57y old  Male who presents with a chief complaint of transfer for possible ERCP. (18 Aug 2017 16:24)      SUBJECTIVE / OVERNIGHT EVENTS:  No acute events overnight. No subjective complaints this AM except for chronic joint pain in b/l hands from the panniculitis. Tolerated regular diet. Last BM was two nights and denies abdominal pain. Has a mild non productive cough. Denies any F/C, CP, SOB, abdominal pain, N/V, diarrhea, constipation, or dysuria.       MEDICATIONS  (STANDING):  nicotine - 21 mG/24Hr(s) Patch 1 patch Transdermal daily  methylPREDNISolone sodium succinate Injectable 20 milliGRAM(s) IV Push daily  furosemide    Tablet 40 milliGRAM(s) Oral daily  pantoprazole    Tablet 40 milliGRAM(s) Oral before breakfast  enoxaparin Injectable 40 milliGRAM(s) SubCutaneous daily  folic acid 1 milliGRAM(s) Oral daily    MEDICATIONS  (PRN):  hemorrhoidal Ointment 1 Application(s) Rectal daily PRN hemmorhoidal pain  oxyCODONE    IR 5 milliGRAM(s) Oral every 4 hours PRN Mild Pain (1 - 3)  oxyCODONE    IR 10 milliGRAM(s) Oral every 4 hours PRN Moderate Pain (4 - 6)  HYDROmorphone  Injectable 0.5 milliGRAM(s) IV Push every 4 hours PRN Severe Pain (7 - 10)      Vital Signs Last 24 Hrs  T(C): 36.6 (22 Aug 2017 04:44), Max: 36.9 (21 Aug 2017 20:11)  T(F): 97.9 (22 Aug 2017 04:44), Max: 98.4 (21 Aug 2017 20:11)  HR: 93 (22 Aug 2017 04:44) (76 - 93)  BP: 107/68 (22 Aug 2017 04:44) (99/60 - 112/72)  BP(mean): --  RR: 18 (22 Aug 2017 04:44) (17 - 18)  SpO2: 92% (22 Aug 2017 04:44) (92% - 98%)      I&O's Summary    21 Aug 2017 07:01  -  22 Aug 2017 07:00  --------------------------------------------------------  IN: 240 mL / OUT: 1750 mL / NET: -1510 mL        PHYSICAL EXAM:  GENERAL: NAD, cachectic  HEAD:  Atraumatic, Normocephalic.  EYES: EOMI, PERRL, conjunctiva and sclera clear  NECK: Supple, No JVD  CHEST/LUNG: Diminished breath sounds throughout, otherwise CTAB. No egophony and no dullness to percussion.  HEART: RRR, Normal S1 and S2, No murmurs.   ABDOMEN: Normoactive BS, Soft, Nontender, Mildly distended. No TTP.  EXTREMITIES:  2+ Peripheral Pulses. 1+ pitting LE edema b/l (L > R)  PSYCH: AAOx3  NEUROLOGY: non-focal  SKIN: Blanching, minimally erythematous, minimally tender lesions on radial aspect of left hand and ulnar aspect of the right hand    LABS:                                   11.0   15.5  )-----------( 303      ( 21 Aug 2017 07:08 )             33.6   08-22    133<L>  |  93<L>  |  13  ----------------------------<  104<H>  4.5   |  31  |  0.37<L>    Ca    8.2<L>      22 Aug 2017 07:12  Phos  2.3     08-22  Mg     1.6     08-22    TPro  4.9<L>  /  Alb  2.0<L>  /  TBili  0.3  /  DBili  x   /  AST  24  /  ALT  26  /  AlkPhos  100  08-22          RADIOLOGY & ADDITIONAL TESTS:    < from: US Abdomen Doppler (08.21.17 @ 10:32) >    PROCEDURE DATE:  08/21/2017            INTERPRETATION:  CLINICAL INFORMATION: Chronic pancreatitis.    TECHNIQUE: Limited ultrasound of the abdomen with color and spectral   Doppler.    COMPARISON: August 16, 2017 abdominal ultrasound. MRI of the abdomen   August 11, 2017.    FINDINGS:  The main, right, and left portal veins are patent. The hepatic veins are   patent. The hepatic artery is patent with peak systolic velocity of 73   cm/s. The splenic vein is patent.    Chronic pancreatitis with peripancreatic fluid collections are again   noted.    IMPRESSION:    1. Patent hepatic vasculature.  2. Chronic pancreatitis with peripancreatic fluid collections are again   noted.    < end of copied text >  Consultant(s) Notes Reviewed: Surgery, GI    Care Discussed with Consultants/Other Providers: Patient is a 57y old  Male who presents with a chief complaint of transfer for possible ERCP. (18 Aug 2017 16:24)      SUBJECTIVE / OVERNIGHT EVENTS:  No acute events overnight. Tolerated regular diet. Last BM was yesterday and denies abdominal pain.Cough has resolved. Denies any F/C, CP, SOB, abdominal pain, N/V, diarrhea, constipation, or dysuria.     MEDICATIONS  (STANDING):  nicotine - 21 mG/24Hr(s) Patch 1 patch Transdermal daily  methylPREDNISolone sodium succinate Injectable 20 milliGRAM(s) IV Push daily  furosemide    Tablet 40 milliGRAM(s) Oral daily  pantoprazole    Tablet 40 milliGRAM(s) Oral before breakfast  enoxaparin Injectable 40 milliGRAM(s) SubCutaneous daily  folic acid 1 milliGRAM(s) Oral daily  fentaNYL   Patch  25 MICROgram(s)/Hr 1 Patch Transdermal every 72 hours    MEDICATIONS  (PRN):  hemorrhoidal Ointment 1 Application(s) Rectal daily PRN hemmorhoidal pain  oxyCODONE    IR 5 milliGRAM(s) Oral every 4 hours PRN Mild Pain (1 - 3)  oxyCODONE    IR 10 milliGRAM(s) Oral every 4 hours PRN Moderate Pain (4 - 6)  HYDROmorphone  Injectable 0.5 milliGRAM(s) IV Push every 4 hours PRN Severe Pain (7 - 10)      Vital Signs Last 24 Hrs  T(C): 37 (23 Aug 2017 12:13), Max: 37 (22 Aug 2017 20:50)  T(F): 98.6 (23 Aug 2017 12:13), Max: 98.6 (22 Aug 2017 20:50)  HR: 110 (23 Aug 2017 12:13) (98 - 110)  BP: 111/72 (23 Aug 2017 12:13) (107/70 - 123/69)  BP(mean): --  RR: 16 (23 Aug 2017 12:13) (16 - 18)  SpO2: 97% (23 Aug 2017 12:13) (96% - 98%)      PHYSICAL EXAM:  GENERAL: NAD, cachectic  HEAD:  Atraumatic, Normocephalic.  EYES: EOMI, PERRL, conjunctiva and sclera clear  NECK: Supple, No JVD  CHEST/LUNG: Diminished breath sounds throughout, otherwise CTAB. No egophony and no dullness to percussion.  HEART: RRR, Normal S1 and S2, No murmurs.   ABDOMEN: Normoactive BS, Soft, Nontender, Mildly distended. No TTP.  EXTREMITIES:  2+ Peripheral Pulses. No LE edema.  PSYCH: AAOx3  NEUROLOGY: non-focal  SKIN: Blanching, minimally erythematous, minimally tender lesions on radial aspect of left hand and ulnar aspect of the right hand    LABS:             08-22    133<L>  |  93<L>  |  13  ----------------------------<  104<H>  4.5   |  31  |  0.37<L>    Ca    8.2<L>      22 Aug 2017 07:12  Phos  2.3     08-22  Mg     1.6     08-22    TPro  4.9<L>  /  Alb  2.0<L>  /  TBili  0.3  /  DBili  x   /  AST  24  /  ALT  26  /  AlkPhos  100  08-22        RADIOLOGY & ADDITIONAL TESTS:    < from: US Abdomen Doppler (08.21.17 @ 10:32) >    PROCEDURE DATE:  08/21/2017            INTERPRETATION:  CLINICAL INFORMATION: Chronic pancreatitis.    TECHNIQUE: Limited ultrasound of the abdomen with color and spectral   Doppler.    COMPARISON: August 16, 2017 abdominal ultrasound. MRI of the abdomen   August 11, 2017.    FINDINGS:  The main, right, and left portal veins are patent. The hepatic veins are   patent. The hepatic artery is patent with peak systolic velocity of 73   cm/s. The splenic vein is patent.    Chronic pancreatitis with peripancreatic fluid collections are again   noted.    IMPRESSION:    1. Patent hepatic vasculature.  2. Chronic pancreatitis with peripancreatic fluid collections are again   noted.    < end of copied text >  Consultant(s) Notes Reviewed: Surgery, GI    Care Discussed with Consultants/Other Providers:

## 2017-08-23 NOTE — PROGRESS NOTE ADULT - PROBLEM SELECTOR PLAN 5
Patient has had two therapeutic paracentesis draining about 1.2 L each time. Amylase greatly elevated in both with SAAG < 1.1. With significant hypoalbuminemia. Now with minimal abdominal distention and significant urine output.   - Changed to oral furosemide 40 mg daily and monitor Cr for LE edema  - Hold aldactone for now Patient has had two therapeutic paracentesis draining about 1.2 L each time. Amylase greatly elevated in both with SAAG < 1.1. With significant hypoalbuminemia. Now with minimal abdominal distention and significant urine output.   - Changed to oral furosemide 40 mg daily and monitor Cr for LE edema

## 2017-08-23 NOTE — PROGRESS NOTE ADULT - ASSESSMENT
58 yo M with h/o chronic pancreatitis (dx 2002), HTN, recent inguinal hernia repair (June 2017) initially presented to [facility] for panniculitis 2/2 chronic pancreatitis. Hospital course complicated by pancreatic ascites and transferred to [facility] for ERCP, now s/p ERCP with pancreatic sphincterotomy and pancreatic duct stent placement and completion of meropenem x 7 days. Hospital course also complicated by partial SBO vs ileus requiring decompression with NGT and acute on chronic portal vein thrombosis, later shown to be resolved on follow-up duplex now with patent portal veins on duplex 8/21. Patient also stating abdominal distention has improved since recent bowel movement. Now is medically optimized and arranging for discharge home.

## 2017-08-23 NOTE — PROGRESS NOTE ADULT - PROBLEM SELECTOR PLAN 4
WBC continues to downtrend.  Likely multifactorial in etiology, including acute on chronic pancreatitis, SBP, and steroid therapy for panniculitis.   - Currently afebrile and hemodynamically stable

## 2017-08-28 LAB
CULTURE RESULTS: SIGNIFICANT CHANGE UP
SPECIMEN SOURCE: SIGNIFICANT CHANGE UP

## 2017-08-31 ENCOUNTER — LABORATORY RESULT (OUTPATIENT)
Age: 57
End: 2017-08-31

## 2017-08-31 ENCOUNTER — APPOINTMENT (OUTPATIENT)
Dept: FAMILY MEDICINE | Facility: CLINIC | Age: 57
End: 2017-08-31
Payer: COMMERCIAL

## 2017-08-31 VITALS
WEIGHT: 129 LBS | BODY MASS INDEX: 18.06 KG/M2 | RESPIRATION RATE: 18 BRPM | DIASTOLIC BLOOD PRESSURE: 70 MMHG | SYSTOLIC BLOOD PRESSURE: 100 MMHG | HEART RATE: 125 BPM | OXYGEN SATURATION: 97 % | HEIGHT: 71 IN

## 2017-08-31 DIAGNOSIS — I81 PORTAL VEIN THROMBOSIS: ICD-10-CM

## 2017-08-31 DIAGNOSIS — M79.89 OTHER SPECIFIED SOFT TISSUE DISORDERS: ICD-10-CM

## 2017-08-31 DIAGNOSIS — K74.69 OTHER CIRRHOSIS OF LIVER: ICD-10-CM

## 2017-08-31 DIAGNOSIS — Z60.2 PROBLEMS RELATED TO LIVING ALONE: ICD-10-CM

## 2017-08-31 DIAGNOSIS — M79.3 PANNICULITIS, UNSPECIFIED: ICD-10-CM

## 2017-08-31 DIAGNOSIS — F17.200 NICOTINE DEPENDENCE, UNSPECIFIED, UNCOMPLICATED: ICD-10-CM

## 2017-08-31 DIAGNOSIS — Z80.0 FAMILY HISTORY OF MALIGNANT NEOPLASM OF DIGESTIVE ORGANS: ICD-10-CM

## 2017-08-31 DIAGNOSIS — K85.90 ACUTE PANCREATITIS WITHOUT NECROSIS OR INFECTION, UNSPECIFIED: ICD-10-CM

## 2017-08-31 PROCEDURE — 99215 OFFICE O/P EST HI 40 MIN: CPT | Mod: 25

## 2017-08-31 PROCEDURE — 36415 COLL VENOUS BLD VENIPUNCTURE: CPT

## 2017-08-31 RX ORDER — FENTANYL 12 UG/H
12 PATCH, EXTENDED RELEASE TRANSDERMAL
Qty: 10 | Refills: 0 | Status: ACTIVE | COMMUNITY
Start: 2017-08-31

## 2017-08-31 RX ORDER — FENTANYL 25 UG/H
25 PATCH, EXTENDED RELEASE TRANSDERMAL
Refills: 0 | Status: DISCONTINUED | COMMUNITY
Start: 2017-08-31 | End: 2017-08-31

## 2017-08-31 RX ORDER — FOLIC ACID 1 MG/1
1 TABLET ORAL DAILY
Qty: 90 | Refills: 1 | Status: ACTIVE | COMMUNITY
Start: 2017-08-31 | End: 1900-01-01

## 2017-08-31 RX ORDER — NICOTINE 21 MG/24HR
21 PATCH, TRANSDERMAL 24 HOURS TRANSDERMAL
Refills: 0 | Status: ACTIVE | COMMUNITY
Start: 2017-08-31

## 2017-08-31 RX ORDER — PREDNISONE 5 MG/1
5 TABLET ORAL
Refills: 0 | Status: ACTIVE | COMMUNITY
Start: 2017-08-31

## 2017-08-31 RX ORDER — FUROSEMIDE 20 MG/1
20 TABLET ORAL
Qty: 90 | Refills: 0 | Status: ACTIVE | COMMUNITY
Start: 2017-08-31 | End: 1900-01-01

## 2017-08-31 SDOH — SOCIAL STABILITY - SOCIAL INSECURITY: PROBLEMS RELATED TO LIVING ALONE: Z60.2

## 2017-09-01 PROBLEM — Z60.2 LIVING ALONE: Status: ACTIVE | Noted: 2017-09-01

## 2017-09-01 RX ORDER — OXYCODONE HYDROCHLORIDE 5 MG/1
5 CAPSULE ORAL
Qty: 60 | Refills: 0 | Status: ACTIVE | COMMUNITY
Start: 2017-08-31 | End: 1900-01-01

## 2017-09-04 ENCOUNTER — INPATIENT (INPATIENT)
Facility: HOSPITAL | Age: 57
LOS: 5 days | Discharge: HOME HEALTH | DRG: 871 | End: 2017-09-10
Attending: HOSPITALIST | Admitting: INTERNAL MEDICINE
Payer: COMMERCIAL

## 2017-09-04 VITALS
HEIGHT: 71 IN | OXYGEN SATURATION: 82 % | SYSTOLIC BLOOD PRESSURE: 117 MMHG | HEART RATE: 116 BPM | WEIGHT: 139.99 LBS | RESPIRATION RATE: 18 BRPM | DIASTOLIC BLOOD PRESSURE: 60 MMHG

## 2017-09-04 DIAGNOSIS — A41.9 SEPSIS, UNSPECIFIED ORGANISM: ICD-10-CM

## 2017-09-04 DIAGNOSIS — S72.302A UNSPECIFIED FRACTURE OF SHAFT OF LEFT FEMUR, INITIAL ENCOUNTER FOR CLOSED FRACTURE: Chronic | ICD-10-CM

## 2017-09-04 DIAGNOSIS — I10 ESSENTIAL (PRIMARY) HYPERTENSION: ICD-10-CM

## 2017-09-04 DIAGNOSIS — Z98.890 OTHER SPECIFIED POSTPROCEDURAL STATES: Chronic | ICD-10-CM

## 2017-09-04 DIAGNOSIS — R06.02 SHORTNESS OF BREATH: ICD-10-CM

## 2017-09-04 DIAGNOSIS — K86.1 OTHER CHRONIC PANCREATITIS: ICD-10-CM

## 2017-09-04 DIAGNOSIS — I82.90 ACUTE EMBOLISM AND THROMBOSIS OF UNSPECIFIED VEIN: ICD-10-CM

## 2017-09-04 DIAGNOSIS — Z94.5 SKIN TRANSPLANT STATUS: Chronic | ICD-10-CM

## 2017-09-04 LAB
ALBUMIN SERPL ELPH-MCNC: 2.1 G/DL — LOW (ref 3.3–5.2)
ALP SERPL-CCNC: 151 U/L — HIGH (ref 40–120)
ALT FLD-CCNC: 15 U/L — SIGNIFICANT CHANGE UP
ANION GAP SERPL CALC-SCNC: 16 MMOL/L — SIGNIFICANT CHANGE UP (ref 5–17)
ANISOCYTOSIS BLD QL: SLIGHT — SIGNIFICANT CHANGE UP
APTT BLD: 29.4 SEC — SIGNIFICANT CHANGE UP (ref 27.5–37.4)
AST SERPL-CCNC: 19 U/L — SIGNIFICANT CHANGE UP
BILIRUB SERPL-MCNC: 0.5 MG/DL — SIGNIFICANT CHANGE UP (ref 0.4–2)
BUN SERPL-MCNC: 24 MG/DL — HIGH (ref 8–20)
CALCIUM SERPL-MCNC: 8.2 MG/DL — LOW (ref 8.6–10.2)
CHLORIDE SERPL-SCNC: 86 MMOL/L — LOW (ref 98–107)
CK SERPL-CCNC: 25 U/L — LOW (ref 30–200)
CO2 SERPL-SCNC: 33 MMOL/L — HIGH (ref 22–29)
CREAT SERPL-MCNC: 0.58 MG/DL — SIGNIFICANT CHANGE UP (ref 0.5–1.3)
GAS PNL BLDA: SIGNIFICANT CHANGE UP
GLUCOSE SERPL-MCNC: 124 MG/DL — HIGH (ref 70–115)
HCT VFR BLD CALC: 33.7 % — LOW (ref 42–52)
HGB BLD-MCNC: 11.2 G/DL — LOW (ref 14–18)
INR BLD: 1.33 RATIO — HIGH (ref 0.88–1.16)
LIDOCAIN IGE QN: 14 U/L — LOW (ref 22–51)
LYMPHOCYTES # BLD AUTO: 2 % — LOW (ref 20–55)
MACROCYTES BLD QL: SLIGHT — SIGNIFICANT CHANGE UP
MCHC RBC-ENTMCNC: 32.5 PG — HIGH (ref 27–31)
MCHC RBC-ENTMCNC: 33.2 G/DL — SIGNIFICANT CHANGE UP (ref 32–36)
MCV RBC AUTO: 97.7 FL — HIGH (ref 80–94)
MICROCYTES BLD QL: SLIGHT — SIGNIFICANT CHANGE UP
MONOCYTES NFR BLD AUTO: 4 % — SIGNIFICANT CHANGE UP (ref 3–10)
NEUTROPHILS NFR BLD AUTO: 84 % — HIGH (ref 37–73)
NEUTS BAND # BLD: 10 % — HIGH (ref 0–8)
NT-PROBNP SERPL-SCNC: 1363 PG/ML — HIGH (ref 0–300)
PLAT MORPH BLD: NORMAL — SIGNIFICANT CHANGE UP
PLATELET # BLD AUTO: 172 K/UL — SIGNIFICANT CHANGE UP (ref 150–400)
POTASSIUM SERPL-MCNC: 3.1 MMOL/L — LOW (ref 3.5–5.3)
POTASSIUM SERPL-SCNC: 3.1 MMOL/L — LOW (ref 3.5–5.3)
PROT SERPL-MCNC: 5.9 G/DL — LOW (ref 6.6–8.7)
PROTHROM AB SERPL-ACNC: 14.7 SEC — HIGH (ref 9.8–12.7)
RBC # BLD: 3.45 M/UL — LOW (ref 4.6–6.2)
RBC # FLD: 15.6 % — SIGNIFICANT CHANGE UP (ref 11–15.6)
RBC BLD AUTO: ABNORMAL
SODIUM SERPL-SCNC: 135 MMOL/L — SIGNIFICANT CHANGE UP (ref 135–145)
TROPONIN T SERPL-MCNC: <0.01 NG/ML — SIGNIFICANT CHANGE UP (ref 0–0.06)
WBC # BLD: 23.7 K/UL — HIGH (ref 4.8–10.8)
WBC # FLD AUTO: 23.7 K/UL — HIGH (ref 4.8–10.8)

## 2017-09-04 PROCEDURE — 93010 ELECTROCARDIOGRAM REPORT: CPT

## 2017-09-04 PROCEDURE — 99292 CRITICAL CARE ADDL 30 MIN: CPT

## 2017-09-04 PROCEDURE — 99291 CRITICAL CARE FIRST HOUR: CPT

## 2017-09-04 PROCEDURE — 71010: CPT | Mod: 26

## 2017-09-04 RX ORDER — ENOXAPARIN SODIUM 100 MG/ML
65 INJECTION SUBCUTANEOUS ONCE
Qty: 0 | Refills: 0 | Status: COMPLETED | OUTPATIENT
Start: 2017-09-04 | End: 2017-09-04

## 2017-09-04 RX ORDER — PANTOPRAZOLE SODIUM 20 MG/1
40 TABLET, DELAYED RELEASE ORAL DAILY
Qty: 0 | Refills: 0 | Status: COMPLETED | OUTPATIENT
Start: 2017-09-04 | End: 2017-09-05

## 2017-09-04 RX ORDER — ENOXAPARIN SODIUM 100 MG/ML
65 INJECTION SUBCUTANEOUS EVERY 12 HOURS
Qty: 0 | Refills: 0 | Status: DISCONTINUED | OUTPATIENT
Start: 2017-09-04 | End: 2017-09-05

## 2017-09-04 RX ORDER — VANCOMYCIN HCL 1 G
1000 VIAL (EA) INTRAVENOUS EVERY 12 HOURS
Qty: 0 | Refills: 0 | Status: DISCONTINUED | OUTPATIENT
Start: 2017-09-04 | End: 2017-09-04

## 2017-09-04 RX ORDER — POTASSIUM CHLORIDE 20 MEQ
40 PACKET (EA) ORAL ONCE
Qty: 0 | Refills: 0 | Status: COMPLETED | OUTPATIENT
Start: 2017-09-04 | End: 2017-09-04

## 2017-09-04 RX ORDER — DIPHENHYDRAMINE HCL 50 MG
25 CAPSULE ORAL ONCE
Qty: 0 | Refills: 0 | Status: COMPLETED | OUTPATIENT
Start: 2017-09-04 | End: 2017-09-05

## 2017-09-04 RX ORDER — PIPERACILLIN AND TAZOBACTAM 4; .5 G/20ML; G/20ML
3.38 INJECTION, POWDER, LYOPHILIZED, FOR SOLUTION INTRAVENOUS ONCE
Qty: 0 | Refills: 0 | Status: COMPLETED | OUTPATIENT
Start: 2017-09-04 | End: 2017-09-04

## 2017-09-04 RX ORDER — DIPHENHYDRAMINE HCL 50 MG
50 CAPSULE ORAL ONCE
Qty: 0 | Refills: 0 | Status: COMPLETED | OUTPATIENT
Start: 2017-09-04 | End: 2017-09-04

## 2017-09-04 RX ORDER — SODIUM CHLORIDE 9 MG/ML
3 INJECTION INTRAMUSCULAR; INTRAVENOUS; SUBCUTANEOUS ONCE
Qty: 0 | Refills: 0 | Status: COMPLETED | OUTPATIENT
Start: 2017-09-04 | End: 2017-09-04

## 2017-09-04 RX ORDER — FENTANYL CITRATE 50 UG/ML
1 INJECTION INTRAVENOUS
Qty: 0 | Refills: 0 | Status: DISCONTINUED | OUTPATIENT
Start: 2017-09-04 | End: 2017-09-05

## 2017-09-04 RX ORDER — VANCOMYCIN HCL 1 G
1000 VIAL (EA) INTRAVENOUS ONCE
Qty: 0 | Refills: 0 | Status: COMPLETED | OUTPATIENT
Start: 2017-09-04 | End: 2017-09-04

## 2017-09-04 RX ORDER — POTASSIUM CHLORIDE 20 MEQ
10 PACKET (EA) ORAL
Qty: 0 | Refills: 0 | Status: COMPLETED | OUTPATIENT
Start: 2017-09-04 | End: 2017-09-04

## 2017-09-04 RX ORDER — VANCOMYCIN HCL 1 G
1000 VIAL (EA) INTRAVENOUS EVERY 8 HOURS
Qty: 0 | Refills: 0 | Status: DISCONTINUED | OUTPATIENT
Start: 2017-09-04 | End: 2017-09-06

## 2017-09-04 RX ORDER — SODIUM CHLORIDE 9 MG/ML
2000 INJECTION, SOLUTION INTRAVENOUS ONCE
Qty: 0 | Refills: 0 | Status: COMPLETED | OUTPATIENT
Start: 2017-09-04 | End: 2017-09-04

## 2017-09-04 RX ORDER — PIPERACILLIN AND TAZOBACTAM 4; .5 G/20ML; G/20ML
3.38 INJECTION, POWDER, LYOPHILIZED, FOR SOLUTION INTRAVENOUS EVERY 8 HOURS
Qty: 0 | Refills: 0 | Status: DISCONTINUED | OUTPATIENT
Start: 2017-09-04 | End: 2017-09-05

## 2017-09-04 RX ADMIN — SODIUM CHLORIDE 3 MILLILITER(S): 9 INJECTION INTRAMUSCULAR; INTRAVENOUS; SUBCUTANEOUS at 20:57

## 2017-09-04 RX ADMIN — SODIUM CHLORIDE 1000 MILLILITER(S): 9 INJECTION, SOLUTION INTRAVENOUS at 22:35

## 2017-09-04 RX ADMIN — Medication 50 MILLIGRAM(S): at 20:57

## 2017-09-04 RX ADMIN — Medication 100 MILLIEQUIVALENT(S): at 22:35

## 2017-09-04 RX ADMIN — Medication 125 MILLIGRAM(S): at 20:57

## 2017-09-04 RX ADMIN — Medication 250 MILLIGRAM(S): at 23:44

## 2017-09-04 RX ADMIN — PIPERACILLIN AND TAZOBACTAM 200 GRAM(S): 4; .5 INJECTION, POWDER, LYOPHILIZED, FOR SOLUTION INTRAVENOUS at 22:19

## 2017-09-04 RX ADMIN — ENOXAPARIN SODIUM 65 MILLIGRAM(S): 100 INJECTION SUBCUTANEOUS at 22:21

## 2017-09-04 RX ADMIN — Medication 100 MILLIEQUIVALENT(S): at 23:34

## 2017-09-04 NOTE — ED PROVIDER NOTE - ENMT, MLM
Airway patent, Nasal mucosa clear. Mouth with normal mucosa. Throat has no vesicles, no oropharyngeal exudates and uvula is midline. dry mmm

## 2017-09-04 NOTE — H&P ADULT - ASSESSMENT
57M  PMhx chronic pancreatitis (dx 2002), Alcohol abuse (last drink 2 years), HTN, inguinal hernia repair.  Recent protracted hospital course complicated by acute on chronic pancreatitis,  SBP,  pancreatic pseudocysts, dilated main pancreatic duct as well as acute/subacute portal vein thrombosis. s/p  ERCP and stent placement.   Now home from rehab, poor Po intake, limited mobility with high suspicion for VTE.  However given recent protracted hospitalization and leukocytosis, sepsis with hospital acquired organisms is also likely.

## 2017-09-04 NOTE — H&P ADULT - PROBLEM SELECTOR PLAN 4
Will monitor - Panc enzymes ok at this point   GI PPx   Case D/W Dr. Louie who was at bedside during evaluation

## 2017-09-04 NOTE — ED PROVIDER NOTE - MEDICAL DECISION MAKING DETAILS
tachypnic 24, tachycardic 116 , hypoxic 82% on RA with hx of portal vein thrombosis so will r/o PE. ekg shows diffuse t wave flattening, no prior available, will r/o acs. cxr grossly wnl w/o e/o pna. bnp elevated & pt on lasix so possible chf vs pulmonary edema.   -labs -ctangio -ctap -premedicate for contrast with solumedrol & benadryl -will give ppx lovenox for PE

## 2017-09-04 NOTE — ED ADULT TRIAGE NOTE - CHIEF COMPLAINT QUOTE
pt biba for acure onset sob and chest pain, recently discharged from [facility] for pancreatitis, hypoxic and tachycaridc

## 2017-09-04 NOTE — ED ADULT NURSE NOTE - CHIEF COMPLAINT QUOTE
pt biba for acure onset sob and chest pain, recently discharged from [facility] for pancreatitis, hypoxic and tachycaridc

## 2017-09-04 NOTE — ED PROVIDER NOTE - OBJECTIVE STATEMENT
56yo M PMH portal vein thrombosis, chronic pancreatitis, TIA, p/w sob & diffuse anterior chest pain for 1 week, worsening. also c/o cough & congestion. smoked for 40 years, states he quit 1 week ago. denies any f/c, abdominal pain, n/v/d, recent travel, hx LE clot or PE.

## 2017-09-04 NOTE — H&P ADULT - HISTORY OF PRESENT ILLNESS
57M Presents with C/O SOB worsening over past few days.  PMhx chronic pancreatitis (dx 2002), Alcohol abuse (last drink 2 years), HTN, inguinal hernia repair.  Recent protracted hospital course initially presented to [facility] 7/29/2017 for worsening foot pain refractory to antibiotics. Patient was found to have acute on chronic pancreatitis.  He did  improved with course of steroid taper. Patient has had multiple complications throughout his course. He was found to have worsening abdominal ascites, which was tapped found to have  SBP and started on abx, and escalated to meropenum for worsening leukocytosis. . MRI Abdomen showed stable pancreatic pseudocysts, dilated main pancreatic duct as well as acute/subacute portal vein thrombosis. Patient started on therapeutic Lovenox with  repeat abdominal US with doppler which shows no thrombus.  Patient was then transferred to [facility] 8/15/2017 for  an  ERCP and stent placement.   Eventually discharged to Rehab on 8/23.  Pt has been home from rehab X 1 week.  Lives alone and has a neighbor who helps out.

## 2017-09-04 NOTE — H&P ADULT - PROBLEM SELECTOR PLAN 2
ABX broad coverage   F/U pan cultures  + lactemia will bolus 30cc/kg of plasmalyte and recheck lactate in 6hrs

## 2017-09-04 NOTE — H&P ADULT - PROBLEM SELECTOR PLAN 3
monitor for now - will hold BP meds as hemodynamics may become a problem with either of the working Dx.

## 2017-09-04 NOTE — H&P ADULT - PROBLEM SELECTOR PLAN 1
Will obtain CTA - Pt with questionable iodine allergy requiring prep.  Will bring to ICU and scan per allergy protocol steroids / benadryl  Full AC with lovenox 65mg SQ daily

## 2017-09-04 NOTE — H&P ADULT - NSHPPHYSICALEXAM_GEN_ALL_CORE
Physical Examination:    General: Sick appearing, cachectic, Tachypneic, Tolerating bipap,  able to speak in stilted sentences from SOB,  Alert, oriented, interactive, nonfocal    HEENT: Pupils equal, reactive to light.  Symmetric. No JVD    PULM: Clear to auscultation bilaterally, no significant sputum production    CVS: S1, S2 tachy , no murmurs, rubs, or gallops    ABD: Soft, nondistended, nontender, normoactive bowel sounds, no masses    EXT: No edema, + B/L Calf tenderness     SKIN: Warm and well perfused, no rashes noted.

## 2017-09-04 NOTE — H&P ADULT - NSHPREVIEWOFSYSTEMS_GEN_ALL_CORE
Review of Systems:  CONSTITUTIONAL: No fever, chills, (+)  fatigue / weakness   EYES: No eye pain, visual disturbances, or discharge  ENMT:  No difficulty hearing, tinnitus, vertigo; No sinus or throat pain  NECK: No pain or stiffness  RESPIRATORY: No cough, wheezing, chills or hemoptysis; (+)  shortness of breath  CARDIOVASCULAR: (+) chest pain, No palpitations, dizziness, or leg swelling  GASTROINTESTINAL: (+)  abdominal or epigastric pain. No nausea, vomiting, or hematemesis; No diarrhea or constipation. No melena or hematochezia.  GENITOURINARY: No dysuria, frequency, hematuria, or incontinence  NEUROLOGICAL: No headaches, memory loss, loss of strength, numbness, or tremors  SKIN: No itching, burning, rashes, or lesions   MUSCULOSKELETAL: No joint pain or swelling; No muscle, back, or extremity pain  PSYCHIATRIC: No depression, anxiety, mood swings, or difficulty sleeping

## 2017-09-04 NOTE — ED PROVIDER NOTE - ATTENDING CONTRIBUTION TO CARE
I, Conor Yates, performed the initial face to face bedside interview with this patient regarding history of present illness, review of symptoms and relevant past medical, social and family history.  I completed an independent physical examination.  I was the initial provider who evaluated this patient. I have signed out the follow up of any pending tests (i.e. labs, radiological studies) to the resident.  I have communicated the patient’s plan of care and disposition with the resident.

## 2017-09-05 DIAGNOSIS — J85.0 GANGRENE AND NECROSIS OF LUNG: ICD-10-CM

## 2017-09-05 DIAGNOSIS — J98.51 MEDIASTINITIS: ICD-10-CM

## 2017-09-05 DIAGNOSIS — J96.01 ACUTE RESPIRATORY FAILURE WITH HYPOXIA: ICD-10-CM

## 2017-09-05 DIAGNOSIS — J18.9 PNEUMONIA, UNSPECIFIED ORGANISM: ICD-10-CM

## 2017-09-05 DIAGNOSIS — Z71.89 OTHER SPECIFIED COUNSELING: ICD-10-CM

## 2017-09-05 DIAGNOSIS — R06.02 SHORTNESS OF BREATH: ICD-10-CM

## 2017-09-05 LAB
ANION GAP SERPL CALC-SCNC: 17 MMOL/L — SIGNIFICANT CHANGE UP (ref 5–17)
BASE EXCESS BLDA CALC-SCNC: 8.2 MMOL/L — HIGH (ref -3–3)
BLOOD GAS COMMENTS ARTERIAL: SIGNIFICANT CHANGE UP
BUN SERPL-MCNC: 25 MG/DL — HIGH (ref 8–20)
CALCIUM SERPL-MCNC: 7.8 MG/DL — LOW (ref 8.6–10.2)
CHLORIDE SERPL-SCNC: 87 MMOL/L — LOW (ref 98–107)
CO2 SERPL-SCNC: 29 MMOL/L — SIGNIFICANT CHANGE UP (ref 22–29)
CREAT SERPL-MCNC: 0.68 MG/DL — SIGNIFICANT CHANGE UP (ref 0.5–1.3)
GAS PNL BLDA: SIGNIFICANT CHANGE UP
GLUCOSE SERPL-MCNC: 112 MG/DL — SIGNIFICANT CHANGE UP (ref 70–115)
GRAM STN FLD: SIGNIFICANT CHANGE UP
HCO3 BLDA-SCNC: 32 MMOL/L — HIGH (ref 20–26)
HCT VFR BLD CALC: 33.9 % — LOW (ref 42–52)
HGB BLD-MCNC: 11.4 G/DL — LOW (ref 14–18)
HOROWITZ INDEX BLDA+IHG-RTO: 45 — SIGNIFICANT CHANGE UP
LACTATE SERPL-SCNC: 3.6 MMOL/L — HIGH (ref 0.5–2)
MCHC RBC-ENTMCNC: 32.6 PG — HIGH (ref 27–31)
MCHC RBC-ENTMCNC: 33.6 G/DL — SIGNIFICANT CHANGE UP (ref 32–36)
MCV RBC AUTO: 96.9 FL — HIGH (ref 80–94)
MRSA PCR RESULT.: SIGNIFICANT CHANGE UP
PCO2 BLDA: 35 MMHG — SIGNIFICANT CHANGE UP (ref 35–45)
PH BLDA: 7.55 — HIGH (ref 7.35–7.45)
PLATELET # BLD AUTO: 134 K/UL — LOW (ref 150–400)
PO2 BLDA: 62 MMHG — LOW (ref 83–108)
POTASSIUM SERPL-MCNC: 4 MMOL/L — SIGNIFICANT CHANGE UP (ref 3.5–5.3)
POTASSIUM SERPL-SCNC: 4 MMOL/L — SIGNIFICANT CHANGE UP (ref 3.5–5.3)
RBC # BLD: 3.5 M/UL — LOW (ref 4.6–6.2)
RBC # FLD: 15.7 % — HIGH (ref 11–15.6)
S AUREUS DNA NOSE QL NAA+PROBE: SIGNIFICANT CHANGE UP
SAO2 % BLDA: 93 % — LOW (ref 95–99)
SODIUM SERPL-SCNC: 133 MMOL/L — LOW (ref 135–145)
SPECIMEN SOURCE: SIGNIFICANT CHANGE UP
VANCOMYCIN TROUGH SERPL-MCNC: 26 UG/ML — CRITICAL HIGH (ref 10–20)
WBC # BLD: 12.7 K/UL — HIGH (ref 4.8–10.8)
WBC # FLD AUTO: 12.7 K/UL — HIGH (ref 4.8–10.8)

## 2017-09-05 PROCEDURE — 99233 SBSQ HOSP IP/OBS HIGH 50: CPT

## 2017-09-05 PROCEDURE — 99291 CRITICAL CARE FIRST HOUR: CPT

## 2017-09-05 PROCEDURE — 99252 IP/OBS CONSLTJ NEW/EST SF 35: CPT

## 2017-09-05 PROCEDURE — 71275 CT ANGIOGRAPHY CHEST: CPT | Mod: 26

## 2017-09-05 PROCEDURE — 74174 CTA ABD&PLVS W/CONTRAST: CPT | Mod: 26

## 2017-09-05 RX ORDER — MEROPENEM 1 G/30ML
INJECTION INTRAVENOUS
Qty: 0 | Refills: 0 | Status: DISCONTINUED | OUTPATIENT
Start: 2017-09-05 | End: 2017-09-08

## 2017-09-05 RX ORDER — MEROPENEM 1 G/30ML
1000 INJECTION INTRAVENOUS EVERY 8 HOURS
Qty: 0 | Refills: 0 | Status: DISCONTINUED | OUTPATIENT
Start: 2017-09-05 | End: 2017-09-08

## 2017-09-05 RX ORDER — AZITHROMYCIN 500 MG/1
500 TABLET, FILM COATED ORAL ONCE
Qty: 0 | Refills: 0 | Status: COMPLETED | OUTPATIENT
Start: 2017-09-05 | End: 2017-09-05

## 2017-09-05 RX ORDER — ENOXAPARIN SODIUM 100 MG/ML
40 INJECTION SUBCUTANEOUS DAILY
Qty: 0 | Refills: 0 | Status: DISCONTINUED | OUTPATIENT
Start: 2017-09-05 | End: 2017-09-07

## 2017-09-05 RX ORDER — PANTOPRAZOLE SODIUM 20 MG/1
40 TABLET, DELAYED RELEASE ORAL DAILY
Qty: 0 | Refills: 0 | Status: DISCONTINUED | OUTPATIENT
Start: 2017-09-05 | End: 2017-09-10

## 2017-09-05 RX ORDER — NICOTINE POLACRILEX 2 MG
1 GUM BUCCAL DAILY
Qty: 0 | Refills: 0 | Status: DISCONTINUED | OUTPATIENT
Start: 2017-09-05 | End: 2017-09-09

## 2017-09-05 RX ORDER — HYDROCORTISONE 20 MG
50 TABLET ORAL EVERY 6 HOURS
Qty: 0 | Refills: 0 | Status: COMPLETED | OUTPATIENT
Start: 2017-09-05 | End: 2017-09-09

## 2017-09-05 RX ORDER — MEROPENEM 1 G/30ML
1000 INJECTION INTRAVENOUS ONCE
Qty: 0 | Refills: 0 | Status: COMPLETED | OUTPATIENT
Start: 2017-09-05 | End: 2017-09-05

## 2017-09-05 RX ORDER — AZITHROMYCIN 500 MG/1
TABLET, FILM COATED ORAL
Qty: 0 | Refills: 0 | Status: DISCONTINUED | OUTPATIENT
Start: 2017-09-05 | End: 2017-09-05

## 2017-09-05 RX ORDER — OXYCODONE HYDROCHLORIDE 5 MG/1
5 TABLET ORAL EVERY 6 HOURS
Qty: 0 | Refills: 0 | Status: DISCONTINUED | OUTPATIENT
Start: 2017-09-05 | End: 2017-09-08

## 2017-09-05 RX ORDER — THIAMINE MONONITRATE (VIT B1) 100 MG
200 TABLET ORAL EVERY 12 HOURS
Qty: 0 | Refills: 0 | Status: DISCONTINUED | OUTPATIENT
Start: 2017-09-05 | End: 2017-09-09

## 2017-09-05 RX ADMIN — Medication 100 MILLIEQUIVALENT(S): at 00:23

## 2017-09-05 RX ADMIN — AZITHROMYCIN 255 MILLIGRAM(S): 500 TABLET, FILM COATED ORAL at 05:51

## 2017-09-05 RX ADMIN — MEROPENEM 200 MILLIGRAM(S): 1 INJECTION INTRAVENOUS at 05:50

## 2017-09-05 RX ADMIN — Medication 50 MILLIGRAM(S): at 21:30

## 2017-09-05 RX ADMIN — Medication 250 MILLIGRAM(S): at 13:57

## 2017-09-05 RX ADMIN — MEROPENEM 200 MILLIGRAM(S): 1 INJECTION INTRAVENOUS at 13:57

## 2017-09-05 RX ADMIN — Medication 50 MILLIGRAM(S): at 15:14

## 2017-09-05 RX ADMIN — MEROPENEM 200 MILLIGRAM(S): 1 INJECTION INTRAVENOUS at 22:20

## 2017-09-05 RX ADMIN — Medication 250 MILLIGRAM(S): at 05:51

## 2017-09-05 RX ADMIN — OXYCODONE HYDROCHLORIDE 5 MILLIGRAM(S): 5 TABLET ORAL at 20:20

## 2017-09-05 RX ADMIN — PANTOPRAZOLE SODIUM 40 MILLIGRAM(S): 20 TABLET, DELAYED RELEASE ORAL at 12:09

## 2017-09-05 RX ADMIN — Medication 25 MILLIGRAM(S): at 00:23

## 2017-09-05 RX ADMIN — Medication 102 MILLIGRAM(S): at 18:34

## 2017-09-05 RX ADMIN — Medication 1 PATCH: at 15:54

## 2017-09-05 RX ADMIN — ENOXAPARIN SODIUM 40 MILLIGRAM(S): 100 INJECTION SUBCUTANEOUS at 12:09

## 2017-09-05 RX ADMIN — OXYCODONE HYDROCHLORIDE 5 MILLIGRAM(S): 5 TABLET ORAL at 19:40

## 2017-09-05 NOTE — CONSULT NOTE ADULT - SUBJECTIVE AND OBJECTIVE BOX
[facility]  INFECTIOUS DISEASES AND INTERNAL MEDICINE OF [place]  =======================================================  Lyle Caceres MD  Diplomates American Board of Internal Medicine and Infectious Diseases  =======================================================      [id number]  DON GOOD     CC: Patient is a 57y old  Male who presents with a chief complaint of SOB (04 Sep 2017 22:24)    58y/o  Male with a h/o ETOH abuse, chronic pancreatitis, ?Liver cirrhosis, recent admission with acute on chronic pancreatitis complicated with worsening ascites, and had SBP treated with Meropenem, transferred to [facility] for ERCP and STENT placement. Patient now returns to [facility] for SOB. In the ER he was afebrile, tachycardiac, hypoxic, Leukocytosis to 23.7k. CT Chest reporting necrotizing PNA. ID Input requested      Past Medical & Surgical Hx:  Ascites: may 2017  Hernia  Pancreatitis  Hypertension  TIA (transient ischemic attack)  History of inguinal hernia repair  S/P skin graft using Integra: right calf skin graft  Fracture of shaft of left femur: 1971 compound fracrture left femur  1980 left femur bone spur removed      Social Hx:      FAMILY HISTORY:  Family history of duodenal cancer      Allergies  iodine (Swelling)      Antibiotics:  vancomycin  IVPB 1000 milliGRAM(s) IV Intermittent every 8 hours  azithromycin  IVPB   IV Intermittent   meropenem IVPB 1000 milliGRAM(s) IV Intermittent every 8 hours       REVIEW OF SYSTEMS:  CONSTITUTIONAL:  No Fever or chills  HEENT:  No diplopia or blurred vision.  No earache, sore throat or runny nose.  CARDIOVASCULAR:  No pressure, squeezing, strangling, tightness, heaviness or aching about the chest, neck, axilla or epigastrium.  RESPIRATORY:  + cough, + shortness of breath  GASTROINTESTINAL:  No nausea, vomiting or diarrhea.  GENITOURINARY:  No dysuria, frequency or urgency.  MUSCULOSKELETAL:  no joint aches, no muscle pain  SKIN:  No change in skin, hair or nails.  NEUROLOGIC:  No paresthesias, fasciculations  PSYCHIATRIC:  No disorder of thought or mood.  ENDOCRINE:  No heat or cold intolerance  HEMATOLOGICAL:  No easy bruising or bleeding.       Physical Exam:  Vital Signs Last 24 Hrs  T(C): 36.5 (05 Sep 2017 08:00), Max: 36.8 (04 Sep 2017 20:24)  T(F): 97.7 (05 Sep 2017 08:00), Max: 98.3 (04 Sep 2017 20:24)  HR: 103 (05 Sep 2017 10:00) (102 - 122)  BP: 110/75 (05 Sep 2017 10:00) (101/81 - 146/90)  BP(mean): 88 (05 Sep 2017 10:00) (86 - 113)  RR: 32 (05 Sep 2017 10:00) (18 - 35)  SpO2: 96% (05 Sep 2017 10:00) (82% - 100%)  Height (cm): 180.34 (09-04 @ 23:00)  Weight (kg): 51.9 (09-04 @ 23:00)  BMI (kg/m2): 16 (09-04 @ 23:00)  BSA (m2): 1.66 (09-04 @ 23:00)      GEN: NAD, cachectic   HEENT: normocephalic and atraumatic. EOMI. PERRL.    NECK: Supple.   LUNGS: + Rales b/l Lower lobes  HEART: Regular rate and rhythm   ABDOMEN: Soft, nontender, and nondistended.  Positive bowel sounds.    : No CVA tenderness  EXTREMITIES: Without any edema.  MSK: No joint swelling  NEUROLOGIC: No focal deficits  PSYCHIATRIC: Appropriate affect .  SKIN: No rash        Labs:  09-05    133<L>  |  87<L>  |  25.0<H>  ----------------------------<  112  4.0   |  29.0  |  0.68    Ca    7.8<L>      05 Sep 2017 06:53    TPro  5.9<L>  /  Alb  2.1<L>  /  TBili  0.5  /  DBili  x   /  AST  19  /  ALT  15  /  AlkPhos  151<H>  09-04                          11.4   12.7  )-----------( 134      ( 05 Sep 2017 06:53 )             33.9       PT/INR - ( 04 Sep 2017 20:37 )   PT: 14.7 sec;   INR: 1.33 ratio         PTT - ( 04 Sep 2017 20:37 )  PTT:29.4 sec    LIVER FUNCTIONS - ( 04 Sep 2017 20:37 )  Alb: 2.1 g/dL / Pro: 5.9 g/dL / ALK PHOS: 151 U/L / ALT: 15 U/L / AST: 19 U/L / GGT: x           CARDIAC MARKERS ( 04 Sep 2017 20:37 )  x     / <0.01 ng/mL / 25 U/L / x     / x          ABG - ( 05 Sep 2017 06:28 )  pH: 7.55  /  pCO2: 35    /  pO2: 62    / HCO3: 32    / Base Excess: 8.2   /  SaO2: 93              EXAM:  CT ANGIO ABD PELV (W)AW IC                        EXAM:  CT ANGIO CHEST (W)AW IC                        PROCEDURE DATE:  09/05/2017    INTERPRETATION:  EXAMINATION: CT ANGIOGRAPHY OF THE CHEST (AORTIC   DISSECTION PROTOCOL)  INDICATION: Hypoxemic, tachycardic, shortness of breath, evaluate for   pulmonary embolism  Technique: Axial images were obtained from the thoracic inlet through the   pelvis during arterial phase of intravenous contrast administration. 94   cc of Omnipaque 350 was administered intravenously without complication.    Reformatted coronal and sagittal images are submitted.  COMPARISON: Abdominal CT of August 4, 2017  FINDINGS:  There is no aortic dissection, aneurysm, intramural hematoma or   para-aortic hemorrhage.  There is no central pulmonary embolism.  The   great vessels are not dilated.    The visualized neck, axilla and subcutaneous tissues are unremarkable.  The tracheobronchial tree is patent proximally. There are scattered foci   of air in the posterior mediastinum. There is a moderately large volume   of fluid in the posterior mediastinal soft tissues. There is no   significant mediastinal or hilar adenopathy.  The heart is not enlarged.  There is no pericardial effusion.  Lungs: There are patchy airspace and groundglass opacities throughout the   lungs, worst at the lung bases compatible with multifocal pneumonia. Foci   of air in the left lower lobe are compatible with necrotizing pneumonia.   There is a left-sided hydropneumothorax likely related to extension of   the left lung parenchymal infection or bronchopleural fistula should be   excluded clinically. Patchy opacities are also seen in the right lower   and middle lobes to a lesser extent. There are moderate centrilobular and   paraseptal emphysematous changes. There is minor involvement of the   lingula and left upper lobe.  There is no pleural abnormality.  There is no free air. There is a moderate volume of abdominopelvic   ascites, not significantly changed compared to the previous CT of August 4, 2017.  The liver, spleen, adrenal glands and kidneys are unremarkable. Again   seen are coarse calcifications involving the head of the pancreas   compatible with sequela of chronic pancreatitis. There is a stable 3.6 cm   cystic focus in the head of the pancreas. There is decreased dilatation   of the pancreatic duct (4-5 mm)with a stent in the neck.   Gallbladder: Unremarkable.  There is no small bowel obstruction.  Pelvic organs: No pelvic masses.  The urinary bladder is unremarkable.  The aorta is not dilated.  There are atherosclerotic vascular calcifications.  There is no significant adenopathy.  There is no retroperitoneal mass.  The visualized osseous structures are unremarkable.  IMPRESSION:  No evidence of aortic dissection or pulmonary embolism.  Multifocal pneumonia complicated by air in the left lower lobe, left   pleural space, and posterior mediastinum concerning for necrotizing   infection with trans-spatial spread of gas-forming organism.  Correlate   clinically for wretching to exclude esophageal injury as cause of   posterior mediastinal air. Bronchopleural fistula is less likely.  No significant change in moderately large volume of abdominopelvic ascites.  Decreased pancreatic ductal dilatation status post stent placement.   Chronic pancreatitis without convincing acute peripancreatic inflammation [facility]  INFECTIOUS DISEASES AND INTERNAL MEDICINE OF [place]  =======================================================  Lyle Caceres MD  Diplomates American Board of Internal Medicine and Infectious Diseases  =======================================================      MRN-[id number]  DON GOOD     CC: Patient is a 57y old  Male who presents with a chief complaint of SOB (04 Sep 2017 22:24)    56y/o  Male with a h/o ETOH abuse, chronic pancreatitis, ?Liver cirrhosis, recent admission with acute on chronic pancreatitis complicated with worsening ascites, and had SBP treated with Meropenem, transferred to [facility] for ERCP and STENT placement. Patient now returns to [facility] for SOB. In the ER he was afebrile, tachycardiac, hypoxic, Leukocytosis to 23.7k. CT Chest reporting necrotizing PNA. ID Input requested      Past Medical & Surgical Hx:  Ascites: may 2017  Hernia  Pancreatitis  Hypertension  TIA (transient ischemic attack)  History of inguinal hernia repair  S/P skin graft using Integra: right calf skin graft  Fracture of shaft of left femur: 1971 compound fracrture left femur  1980 left femur bone spur removed      Social Hx:  Continues to Drink ETOH on occation      FAMILY HISTORY:  Family history of duodenal cancer      Allergies  iodine (Swelling)      Antibiotics:  vancomycin  IVPB 1000 milliGRAM(s) IV Intermittent every 8 hours  azithromycin  IVPB   IV Intermittent   meropenem IVPB 1000 milliGRAM(s) IV Intermittent every 8 hours       REVIEW OF SYSTEMS:  CONSTITUTIONAL:  No Fever or chills  HEENT:  No diplopia or blurred vision.  No earache, sore throat or runny nose.  CARDIOVASCULAR:  No pressure, squeezing, strangling, tightness, heaviness or aching about the chest, neck, axilla or epigastrium.  RESPIRATORY:  + cough, + shortness of breath  GASTROINTESTINAL:  No nausea, vomiting or diarrhea.  GENITOURINARY:  No dysuria, frequency or urgency.  MUSCULOSKELETAL:  no joint aches, no muscle pain  SKIN:  No change in skin, hair or nails.  NEUROLOGIC:  No paresthesias, fasciculations  PSYCHIATRIC:  No disorder of thought or mood.  ENDOCRINE:  No heat or cold intolerance  HEMATOLOGICAL:  No easy bruising or bleeding.       Physical Exam:  Vital Signs Last 24 Hrs  T(C): 36.5 (05 Sep 2017 08:00), Max: 36.8 (04 Sep 2017 20:24)  T(F): 97.7 (05 Sep 2017 08:00), Max: 98.3 (04 Sep 2017 20:24)  HR: 103 (05 Sep 2017 10:00) (102 - 122)  BP: 110/75 (05 Sep 2017 10:00) (101/81 - 146/90)  BP(mean): 88 (05 Sep 2017 10:00) (86 - 113)  RR: 32 (05 Sep 2017 10:00) (18 - 35)  SpO2: 96% (05 Sep 2017 10:00) (82% - 100%)  Height (cm): 180.34 (09-04 @ 23:00)  Weight (kg): 51.9 (09-04 @ 23:00)  BMI (kg/m2): 16 (09-04 @ 23:00)  BSA (m2): 1.66 (09-04 @ 23:00)      GEN: NAD, cachectic   HEENT: normocephalic and atraumatic. EOMI. PERRL.    NECK: Supple.   LUNGS: + Rales b/l Lower lobes  HEART: Regular rate and rhythm   ABDOMEN: Soft, nontender, and nondistended.  Positive bowel sounds.    : No CVA tenderness  EXTREMITIES: Without any edema.  MSK: No joint swelling  NEUROLOGIC: No focal deficits  PSYCHIATRIC: Appropriate affect .  SKIN: No rash        Labs:  09-05    133<L>  |  87<L>  |  25.0<H>  ----------------------------<  112  4.0   |  29.0  |  0.68    Ca    7.8<L>      05 Sep 2017 06:53    TPro  5.9<L>  /  Alb  2.1<L>  /  TBili  0.5  /  DBili  x   /  AST  19  /  ALT  15  /  AlkPhos  151<H>  09-04                          11.4   12.7  )-----------( 134      ( 05 Sep 2017 06:53 )             33.9       PT/INR - ( 04 Sep 2017 20:37 )   PT: 14.7 sec;   INR: 1.33 ratio         PTT - ( 04 Sep 2017 20:37 )  PTT:29.4 sec    LIVER FUNCTIONS - ( 04 Sep 2017 20:37 )  Alb: 2.1 g/dL / Pro: 5.9 g/dL / ALK PHOS: 151 U/L / ALT: 15 U/L / AST: 19 U/L / GGT: x           CARDIAC MARKERS ( 04 Sep 2017 20:37 )  x     / <0.01 ng/mL / 25 U/L / x     / x          ABG - ( 05 Sep 2017 06:28 )  pH: 7.55  /  pCO2: 35    /  pO2: 62    / HCO3: 32    / Base Excess: 8.2   /  SaO2: 93              EXAM:  CT ANGIO ABD PELV (W)AW IC                        EXAM:  CT ANGIO CHEST (W)AW IC                        PROCEDURE DATE:  09/05/2017    INTERPRETATION:  EXAMINATION: CT ANGIOGRAPHY OF THE CHEST (AORTIC   DISSECTION PROTOCOL)  INDICATION: Hypoxemic, tachycardic, shortness of breath, evaluate for   pulmonary embolism  Technique: Axial images were obtained from the thoracic inlet through the   pelvis during arterial phase of intravenous contrast administration. 94   cc of Omnipaque 350 was administered intravenously without complication.    Reformatted coronal and sagittal images are submitted.  COMPARISON: Abdominal CT of August 4, 2017  FINDINGS:  There is no aortic dissection, aneurysm, intramural hematoma or   para-aortic hemorrhage.  There is no central pulmonary embolism.  The   great vessels are not dilated.    The visualized neck, axilla and subcutaneous tissues are unremarkable.  The tracheobronchial tree is patent proximally. There are scattered foci   of air in the posterior mediastinum. There is a moderately large volume   of fluid in the posterior mediastinal soft tissues. There is no   significant mediastinal or hilar adenopathy.  The heart is not enlarged.  There is no pericardial effusion.  Lungs: There are patchy airspace and groundglass opacities throughout the   lungs, worst at the lung bases compatible with multifocal pneumonia. Foci   of air in the left lower lobe are compatible with necrotizing pneumonia.   There is a left-sided hydropneumothorax likely related to extension of   the left lung parenchymal infection or bronchopleural fistula should be   excluded clinically. Patchy opacities are also seen in the right lower   and middle lobes to a lesser extent. There are moderate centrilobular and   paraseptal emphysematous changes. There is minor involvement of the   lingula and left upper lobe.  There is no pleural abnormality.  There is no free air. There is a moderate volume of abdominopelvic   ascites, not significantly changed compared to the previous CT of August 4, 2017.  The liver, spleen, adrenal glands and kidneys are unremarkable. Again   seen are coarse calcifications involving the head of the pancreas   compatible with sequela of chronic pancreatitis. There is a stable 3.6 cm   cystic focus in the head of the pancreas. There is decreased dilatation   of the pancreatic duct (4-5 mm)with a stent in the neck.   Gallbladder: Unremarkable.  There is no small bowel obstruction.  Pelvic organs: No pelvic masses.  The urinary bladder is unremarkable.  The aorta is not dilated.  There are atherosclerotic vascular calcifications.  There is no significant adenopathy.  There is no retroperitoneal mass.  The visualized osseous structures are unremarkable.  IMPRESSION:  No evidence of aortic dissection or pulmonary embolism.  Multifocal pneumonia complicated by air in the left lower lobe, left   pleural space, and posterior mediastinum concerning for necrotizing   infection with trans-spatial spread of gas-forming organism.  Correlate   clinically for wretching to exclude esophageal injury as cause of   posterior mediastinal air. Bronchopleural fistula is less likely.  No significant change in moderately large volume of abdominopelvic ascites.  Decreased pancreatic ductal dilatation status post stent placement.   Chronic pancreatitis without convincing acute peripancreatic inflammation

## 2017-09-05 NOTE — PROGRESS NOTE ADULT - PROBLEM SELECTOR PLAN 2
ABX broad coverage   F/U pan cultures  + lactemia will bolus 30cc/kg of plasmalyte and recheck lactate in 6hrs broad spectrum antibiotics for possible anaerobic organism with gas production  ID consult appreciated

## 2017-09-05 NOTE — PROGRESS NOTE ADULT - PROBLEM SELECTOR PLAN 4
Will monitor - Panc enzymes ok at this point   GI PPx   Case D/W Dr. Louie who was at bedside during evaluation ABX broad coverage   F/U pan cultures

## 2017-09-05 NOTE — PROGRESS NOTE ADULT - PROBLEM SELECTOR PLAN 3
monitor for now - will hold BP meds as hemodynamics may become a problem with either of the working Dx. posterior mediastinal collection with air bubbles  ?esophageal injury pt had recent ERCP  CTS consult

## 2017-09-05 NOTE — CONSULT NOTE ADULT - SUBJECTIVE AND OBJECTIVE BOX
Subjective: + SOB described by pt as "can't take deep breaths". Worsening SOB over past few days, cough with clear/white sputum over past weekend. Pt with no appetite and minimal PO intake over past few weeks. Largely bedbound past few weeks. Was debilitated when d/c'd from hospital and has only gotten worse    HPI:  57M Presents with C/O SOB worsening over past few days.  PMhx chronic pancreatitis (dx 2002), Alcohol abuse (last drink 2 years), HTN, inguinal hernia repair.  Recent protracted hospital course initially presented to [facility] 7/29/2017 for worsening foot pain refractory to antibiotics. Patient was found to have acute on chronic pancreatitis.  He did  improved with course of steroid taper. Patient has had multiple complications throughout his course. He was found to have worsening abdominal ascites, which was tapped found to have  SBP and started on abx, and escalated to meropenum for worsening leukocytosis. . MRI Abdomen showed stable pancreatic pseudocysts, dilated main pancreatic duct as well as acute/subacute portal vein thrombosis. Patient started on therapeutic Lovenox with  repeat abdominal US with doppler which shows no thrombus.  Patient was then transferred to [facility] 8/15/2017 for  an  ERCP and stent placement.   Eventually discharged to Rehab on 8/23.  Pt has been home from rehab X 1 week.  Lives alone and has a neighbor who helps out. (04 Sep 2017 22:24)      PMH/PSH:  Ascites: may 2017  Hernia  Pancreatitis  Hypertension  TIA (transient ischemic attack)  No pertinent past medical history    History of inguinal hernia repair  S/P skin graft using Integra: right calf skin graft  Fracture of shaft of left femur: 1971 compound fracrture left femur  1980 left femur bone spur removed  No significant past surgical history      Relevant Family History  FAMILY HISTORY:  Family history of duodenal cancer      SOCIAL HISTORY:  ETOH use: quit two years ago  Live with: alone      MEDICATIONS  (STANDING):  fentaNYL   Patch  25 MICROgram(s)/Hr 1 Patch Transdermal every 72 hours  vancomycin  IVPB 1000 milliGRAM(s) IV Intermittent every 8 hours  meropenem IVPB   IV Intermittent   meropenem IVPB 1000 milliGRAM(s) IV Intermittent every 8 hours  enoxaparin Injectable 40 milliGRAM(s) SubCutaneous daily  pantoprazole  Injectable 40 milliGRAM(s) IV Push daily    Allergies: iodine (Swelling)                                                            LABS:                        11.4   12.7  )-----------( 134      ( 05 Sep 2017 06:53 )             33.9     09-05    133<L>  |  87<L>  |  25.0<H>  ----------------------------<  112  4.0   |  29.0  |  0.68    Ca    7.8<L>      05 Sep 2017 06:53    TPro  5.9<L>  /  Alb  2.1<L>  /  TBili  0.5  /  DBili  x   /  AST  19  /  ALT  15  /  AlkPhos  151<H>  09-04    PT/INR - ( 04 Sep 2017 20:37 )   PT: 14.7 sec;   INR: 1.33 ratio    PTT - ( 04 Sep 2017 20:37 )  PTT:29.4 sec    CARDIAC MARKERS ( 04 Sep 2017 20:37 )  CK25 U/L / CKMBx     / Troponin T<0.01 ng/mL /    Lactate, Blood (09.05.17 @ 06:50)    Lactate, Blood: 3.6 mmol/L    Serum Pro-Brain Natriuretic Peptide (09.04.17 @ 20:37)    Serum Pro-Brain Natriuretic Peptide: 1363 pg/mL          < from: CT Angio Chest w/ IV Cont (09.05.17 @ 01:44) >  COMPARISON: Abdominal CT of August 4, 2017  FINDINGS:  There is no aortic dissection, aneurysm, intramural hematoma or para-aortic hemorrhage.  There is no central pulmonary embolism.  The   great vessels are not dilated.    The visualized neck, axilla and subcutaneous tissues are unremarkable.  The tracheobronchial tree is patent proximally. There are scattered foci of air in the posterior mediastinum. There is a moderately large volume of fluid in the posterior mediastinal soft tissues. There is no significant mediastinal or hilar adenopathy.  The heart is not enlarged.  There is no pericardial effusion.  Lungs: There are patchy airspace and groundglass opacities throughout the lungs, worst at the lung bases compatible with multifocal pneumonia. Foci of air in the left lower lobe are compatible with necrotizing pneumonia. There is a left-sided hydropneumothorax likely related to extension of the left lung parenchymal infection or bronchopleural fistula should be excluded clinically. Patchy opacities are also seen in the right lower and middle lobes to a lesser extent. There are moderate centrilobular and paraseptal emphysematous changes. There is minor involvement of the lingula and left upper lobe.  There is no pleural abnormality.  There is no free air. There is a moderate volume of abdominopelvic ascites, not significantly changed compared to the previous CT of August 4, 2017.  The liver, spleen, adrenal glands and kidneys are unremarkable. Again seen are coarse calcifications involving the head of the pancreas   compatible with sequela of chronic pancreatitis. There is a stable 3.6 cm cystic focus in the head of the pancreas. There is decreased dilatation of the pancreatic duct (4-5 mm)with a stent in the neck.   Gallbladder: Unremarkable.  There is no small bowel obstruction.  Pelvic organs: No pelvic masses.  The urinary bladder is unremarkable.  The aorta is not dilated.  There are atherosclerotic vascular calcifications.  There is no significant adenopathy.  There is no retroperitoneal mass.  The visualized osseous structures are unremarkable.  IMPRESSION:  No evidence of aortic dissection or pulmonary embolism.  Multifocal pneumonia complicated by air in the left lower lobe, left pleural space, and posterior mediastinum concerning for necrotizing   infection with trans-spatial spread of gas-forming organism.  Correlate clinically for wretching to exclude esophageal injury as cause of   posterior mediastinal air. Bronchopleural fistula is less likely.  No significant change in moderately large volume of abdominopelvic ascites.  Decreased pancreatic ductal dilatation status post stent placement.   Chronic pancreatitis without convincing acute peripancreatic inflammation.   < end of copied text >      Review of Systems         Unable to obtain due to: intubated & sedated altered mental status _______________    Constitutional: denies fever, chills, general malaise, weight loss, weight gain, diaphoresis   HEENT: denies dry mouth, sore throat, runny nose, photophobia, blurry vision, double vision, glasses, discharge, eye pain, difficulty hearing, vertigo, dysphagia, epistaxis, recent dental work    Respiratory: denies SOB, PARADA, cough, phlegm, wheezing, hemoptysis  Cardiovascular: denies CP, palpitations, edema, diaphoresis   Gastrointestinal: denies nausea, vomiting, diarrhea, constipation, abdominal pain, melena   Genitourinary: denies dysuria, frequency, urgency, incontinence, hematuria   Skin/Breast: denies rash, hives, itching, masses, hair loss   Musculoskeletal: denies myalgias, arthritis, joint swelling, muscle weakness  Neurologic: denies syncope, LOC, headache, weakness, dizziness, parasthesias, numbness, seizures, confusion, dementia   Psychiatric: denies feeling anxious, depressed, suicidal, homicidal  Endocrine: denies increased fingerstick glucoses, cold or heat intolerance, polydipsia, polyuria, polyphagia   Hematology/Oncology: denies bruising, tender or enlarged lymph nodes   ROS negative x 10 systems except as noted above    T(C): 36.4 (09-05-17 @ 12:00), Max: 36.8 (09-04-17 @ 20:24)  HR: 98 (09-05-17 @ 12:00) (98 - 122), NSR  BP: 101/69 (09-05-17 @ 12:00) (101/69 - 146/90)  RR: 23 (09-05-17 @ 12:00) (18 - 35)  SpO2: 95% (09-05-17 @ 12:00) (82% - 100%)  Wt(kg): --  CVP(mm Hg): --  CO: --  CI: --  PA: --    Physical Exam  General: WD, WN, NAD                                                         Neuro: A+O x 3, non-focal, speech clear and intact                     Eyes: PERRL, EOMI   ENT: Normal exam of nasal/oral mucosa with absence of cyanosis.   Neck: supple, no JVD, trachea midline   Chest: CTA, equal bilaterally, no wheezes/rales/rhonchi, normal excursion, no accessory muscle use note  CV: RRR, +S1S2  GI: soft, NT, ND, +BS, no organomegaly noted  Extremities: MARKS x 4, no C/C/E, distal motor/neuro/circ intact  SKIN: warm, dry, intact Subjective: + SOB described by pt as "can't take deep breaths". Worsening SOB over past few days, cough with clear/white sputum over past weekend. Pt with no appetite and minimal PO intake over past few weeks. Largely bedbound past few weeks. Was debilitated when d/c'd from hospital and has only gotten worse. Denies CP, syncope, N/V, fever/chills, recent travel or sick contacts.    HPI:  57M Presents with C/O SOB worsening over past few days.  PMhx chronic pancreatitis (dx 2002), Alcohol abuse (last drink 2 years), HTN, inguinal hernia repair.  Recent protracted hospital course initially presented to [facility] 7/29/2017 for worsening foot pain refractory to antibiotics. Patient was found to have acute on chronic pancreatitis.  He did  improved with course of steroid taper. Patient has had multiple complications throughout his course. He was found to have worsening abdominal ascites, which was tapped found to have  SBP and started on abx, and escalated to meropenum for worsening leukocytosis. . MRI Abdomen showed stable pancreatic pseudocysts, dilated main pancreatic duct as well as acute/subacute portal vein thrombosis. Patient started on therapeutic Lovenox with  repeat abdominal US with doppler which shows no thrombus.  Patient was then transferred to [facility] 8/15/2017 for  an  ERCP and stent placement.   Eventually discharged to Rehab on 8/23.  Pt has been home from rehab X 1 week.  Lives alone and has a neighbor who helps out. (04 Sep 2017 22:24)      PMH/PSH:  Ascites: may 2017  Hernia  Pancreatitis  Hypertension  TIA (transient ischemic attack)  No pertinent past medical history    History of inguinal hernia repair  S/P skin graft using Integra: right calf skin graft  Fracture of shaft of left femur: 1971 compound fracrture left femur  1980 left femur bone spur removed  No significant past surgical history      Relevant Family History  FAMILY HISTORY:  Family history of duodenal cancer      SOCIAL HISTORY:  ETOH use: quit two years ago  Live with: alone      MEDICATIONS  (STANDING):  fentaNYL   Patch  25 MICROgram(s)/Hr 1 Patch Transdermal every 72 hours  vancomycin  IVPB 1000 milliGRAM(s) IV Intermittent every 8 hours  meropenem IVPB   IV Intermittent   meropenem IVPB 1000 milliGRAM(s) IV Intermittent every 8 hours  enoxaparin Injectable 40 milliGRAM(s) SubCutaneous daily  pantoprazole  Injectable 40 milliGRAM(s) IV Push daily    Allergies: iodine (Swelling)                                                            LABS:                        11.4   12.7  )-----------( 134      ( 05 Sep 2017 06:53 )             33.9     09-05    133<L>  |  87<L>  |  25.0<H>  ----------------------------<  112  4.0   |  29.0  |  0.68    Ca    7.8<L>      05 Sep 2017 06:53    TPro  5.9<L>  /  Alb  2.1<L>  /  TBili  0.5  /  DBili  x   /  AST  19  /  ALT  15  /  AlkPhos  151<H>  09-04    PT/INR - ( 04 Sep 2017 20:37 )   PT: 14.7 sec;   INR: 1.33 ratio    PTT - ( 04 Sep 2017 20:37 )  PTT:29.4 sec    CARDIAC MARKERS ( 04 Sep 2017 20:37 )  CK25 U/L / CKMBx     / Troponin T<0.01 ng/mL /    Lactate, Blood (09.05.17 @ 06:50)    Lactate, Blood: 3.6 mmol/L    Serum Pro-Brain Natriuretic Peptide (09.04.17 @ 20:37)    Serum Pro-Brain Natriuretic Peptide: 1363 pg/mL      < from: CT Angio Chest w/ IV Cont (09.05.17 @ 01:44) >  COMPARISON: Abdominal CT of August 4, 2017  FINDINGS:  There is no aortic dissection, aneurysm, intramural hematoma or para-aortic hemorrhage.  There is no central pulmonary embolism.  The   great vessels are not dilated.    The visualized neck, axilla and subcutaneous tissues are unremarkable.  The tracheobronchial tree is patent proximally. There are scattered foci of air in the posterior mediastinum. There is a moderately large volume of fluid in the posterior mediastinal soft tissues. There is no significant mediastinal or hilar adenopathy.  The heart is not enlarged.  There is no pericardial effusion.  Lungs: There are patchy airspace and groundglass opacities throughout the lungs, worst at the lung bases compatible with multifocal pneumonia. Foci of air in the left lower lobe are compatible with necrotizing pneumonia. There is a left-sided hydropneumothorax likely related to extension of the left lung parenchymal infection or bronchopleural fistula should be excluded clinically. Patchy opacities are also seen in the right lower and middle lobes to a lesser extent. There are moderate centrilobular and paraseptal emphysematous changes. There is minor involvement of the lingula and left upper lobe.  There is no pleural abnormality.  There is no free air. There is a moderate volume of abdominopelvic ascites, not significantly changed compared to the previous CT of August 4, 2017.  The liver, spleen, adrenal glands and kidneys are unremarkable. Again seen are coarse calcifications involving the head of the pancreas   compatible with sequela of chronic pancreatitis. There is a stable 3.6 cm cystic focus in the head of the pancreas. There is decreased dilatation of the pancreatic duct (4-5 mm)with a stent in the neck.   Gallbladder: Unremarkable.  There is no small bowel obstruction.  Pelvic organs: No pelvic masses.  The urinary bladder is unremarkable.  The aorta is not dilated.  There are atherosclerotic vascular calcifications.  There is no significant adenopathy.  There is no retroperitoneal mass.  The visualized osseous structures are unremarkable.  IMPRESSION:  No evidence of aortic dissection or pulmonary embolism.  Multifocal pneumonia complicated by air in the left lower lobe, left pleural space, and posterior mediastinum concerning for necrotizing   infection with trans-spatial spread of gas-forming organism.  Correlate clinically for wretching to exclude esophageal injury as cause of   posterior mediastinal air. Bronchopleural fistula is less likely.  No significant change in moderately large volume of abdominopelvic ascites.  Decreased pancreatic ductal dilatation status post stent placement.   Chronic pancreatitis without convincing acute peripancreatic inflammation.   < end of copied text >      Review of Systems       SOB, cough, no appetite, generalized weakness/debilitation as above.  Constitutional: denies fever, chills, general malaise, weight loss, weight gain, diaphoresis   HEENT: denies dry mouth, sore throat, runny nose, photophobia, blurry vision, double vision, glasses, discharge, eye pain, difficulty hearing, vertigo, dysphagia, epistaxis, recent dental work    Respiratory: denies SOB, PARADA, cough, phlegm, wheezing, hemoptysis  Cardiovascular: denies CP, palpitations, edema, diaphoresis   Gastrointestinal: denies nausea, vomiting, diarrhea, constipation, abdominal pain, melena   Genitourinary: denies dysuria, frequency, urgency, incontinence, hematuria   Skin/Breast: denies rash, hives, itching, masses, hair loss   Musculoskeletal: denies myalgias, arthritis, joint swelling, muscle weakness  Neurologic: denies syncope, LOC, headache, weakness, dizziness, parasthesias, numbness, seizures, confusion, dementia   Psychiatric: denies feeling anxious, depressed, suicidal, homicidal  Endocrine: denies increased fingerstick glucoses, cold or heat intolerance, polydipsia, polyuria, polyphagia   Hematology/Oncology: denies bruising, tender or enlarged lymph nodes   ROS negative x 10 systems except as noted above    T(C): 36.4 (09-05-17 @ 12:00), Max: 36.8 (09-04-17 @ 20:24)  HR: 98 (09-05-17 @ 12:00) (98 - 122), NSR  BP: 101/69 (09-05-17 @ 12:00) (101/69 - 146/90)  RR: 23 (09-05-17 @ 12:00) (18 - 35)  SpO2: 95% (09-05-17 @ 12:00) (82% - 100%)  Wt(kg): --  CVP(mm Hg): --  CO: --  CI: --  PA: --    Physical Exam  General: WD, WN, NAD                                                         Neuro: A+O x 3, non-focal, speech clear and intact                     Eyes: PERRL, EOMI   ENT: Normal exam of nasal/oral mucosa with absence of cyanosis.   Neck: supple, no JVD, trachea midline   Chest: CTA, equal bilaterally, no wheezes/rales/rhonchi, normal excursion, no accessory muscle use note  CV: RRR, +S1S2  GI: soft, NT, ND, +BS, no organomegaly noted  Extremities: MARKS x 4, no C/C/E, distal motor/neuro/circ intact  SKIN: warm, dry, intact

## 2017-09-05 NOTE — PROGRESS NOTE ADULT - ASSESSMENT
57 male with chronic pancreatitis and alcohol abuse admitted with acute hypoxic respiratory failure secondary to bilateral  gas producing [facility]ocal lower lobe pneumonia [facility]. 57 male with chronic pancreatitis and alcohol abuse admitted with acute hypoxic respiratory failure secondary to bilateral  gas producing multifocal lower lobe pneumonia and sepsis [facility] due to recent prolonged hospitalization and nursing home stay.     Critical Care time: 35 mins, Initiating plan of care, reviewing data, imaging, discussing with multidisciplinary team, non inclusive of procedures, discussing goals of care with patient/family

## 2017-09-05 NOTE — CONSULT NOTE ADULT - PROBLEM SELECTOR PROBLEM 2
Patient came into clinic for dressing removal from prior . The dressing was removed with adhesive tape remover, pt tolerated well and stated that she is still having a little pain but is taking prescription that was prescribed. Patient incision looked fine, pt stated she is not running any fever, was told to clean area with anti-bacterial soap and to pat dry, pt voiced understanding.  
Pneumonia of both lower lobes due to infectious organism
Sepsis, due to unspecified organism

## 2017-09-05 NOTE — CONSULT NOTE ADULT - SUBJECTIVE AND OBJECTIVE BOX
HPI: 57M with PMH as listed admitted 9/4 with weakness, fever, shortness of breath found to have PNA.     PERTINENT PMH REVIEWED: Yes     PAST MEDICAL & SURGICAL HISTORY:  Ascites: may 2017  Hernia  Pancreatitis  Hypertension  TIA (transient ischemic attack)  History of inguinal hernia repair  S/P skin graft using Integra: right calf skin graft  Fracture of shaft of left femur: 1971 compound fracrture left femur  1980 left femur bone spur removed    SOCIAL HISTORY:                                     Admitted from:  home  SNF  [facility]     Surrogate -     FAMILY HISTORY:  Family history of duodenal cancer    Baseline ADLs (prior to admission):  Independent     Allergies    iodine (Swelling)    Present Symptoms:     Dyspnea: 0 1 2 3   Nausea/Vomiting: Yes No  Anxiety:  Yes No  Depression: Yes No  Fatigue: Yes No  Loss of appetite: Yes No    Pain:             Character-            Duration-            Effect-            Factors-            Frequency-            Location-            Severity-    Review of Systems: Reviewed                     Negative:                     Positive:  Unable to obtain due to poor mentation   All others negative    MEDICATIONS  (STANDING):  vancomycin  IVPB 1000 milliGRAM(s) IV Intermittent every 8 hours  meropenem IVPB   IV Intermittent   meropenem IVPB 1000 milliGRAM(s) IV Intermittent every 8 hours  enoxaparin Injectable 40 milliGRAM(s) SubCutaneous daily  pantoprazole  Injectable 40 milliGRAM(s) IV Push daily  Ascorbic Acid in 160mL NS 1500 milliGRAM(s) 160 milliLiter(s) IV Intermittent every 6 hours  hydrocortisone sodium succinate Injectable 50 milliGRAM(s) IV Push every 6 hours  thiamine IVPB 200 milliGRAM(s) IV Intermittent every 12 hours  nicotine - 21 mG/24Hr(s) Patch 1 patch Transdermal daily    MEDICATIONS  (PRN):  oxyCODONE    IR 5 milliGRAM(s) Oral every 6 hours PRN Moderate Pain (4 - 6)      PHYSICAL EXAM:    Vital Signs Last 24 Hrs  T(C): 36.4 (05 Sep 2017 12:00), Max: 36.8 (04 Sep 2017 20:24)  T(F): 97.6 (05 Sep 2017 12:00), Max: 98.3 (04 Sep 2017 20:24)  HR: 102 (05 Sep 2017 14:00) (98 - 122)  BP: 101/69 (05 Sep 2017 14:00) (101/69 - 146/90)  BP(mean): 81 (05 Sep 2017 14:00) (81 - 113)  RR: 23 (05 Sep 2017 14:00) (18 - 35)  SpO2: 92% (05 Sep 2017 14:00) (82% - 100%)    General: alert  oriented x ____ lethargic agitated                  cachexia  nonverbal  coma    Karnofsky:  %    HEENT: normal  dry mouth  ET tube/trach    Lungs: comfortable tachypnea/labored breathing  excessive secretions    CV: normal  tachycardia    GI: normal  distended  tender  no BS               PEG/NG/OG tube  constipation  last BM:     : normal  incontinent  oliguria/anuria  kirk    MSK: normal  weakness  edema             ambulatory  bedbound/wheelchair bound    Skin: normal  pressure ulcers- Stage_____  no rash    LABS:                        11.4   12.7  )-----------( 134      ( 05 Sep 2017 06:53 )             33.9     09-05    133<L>  |  87<L>  |  25.0<H>  ----------------------------<  112  4.0   |  29.0  |  0.68    Ca    7.8<L>      05 Sep 2017 06:53    TPro  5.9<L>  /  Alb  2.1<L>  /  TBili  0.5  /  DBili  x   /  AST  19  /  ALT  15  /  AlkPhos  151<H>  09-04    PT/INR - ( 04 Sep 2017 20:37 )   PT: 14.7 sec;   INR: 1.33 ratio      PTT - ( 04 Sep 2017 20:37 )  PTT:29.4 sec    I&O's Summary    04 Sep 2017 07:01  -  05 Sep 2017 07:00  --------------------------------------------------------  IN: 950 mL / OUT: 150 mL / NET: 800 mL    05 Sep 2017 07:01  -  05 Sep 2017 15:16  --------------------------------------------------------  IN: 0 mL / OUT: 50 mL / NET: -50 mL    RADIOLOGY & ADDITIONAL STUDIES:    < from: CT Angio Abdomen and Pelvis w/ IV Cont (09.05.17 @ 01:44) >  No evidence of aortic dissection or pulmonary embolism. Multifocal pneumonia complicated by air in the left lower lobe, left   pleural space, and posterior mediastinum concerning for necrotizing infection with trans-spatial spread of gas-forming organism.  Correlate clinically for wretching to exclude esophageal injury as cause of posterior mediastinal air. Bronchopleural fistula is less likely. No significant change in moderately large volume of abdominopelvic ascites. Decreased pancreatic ductal dilatation status post stent placement.  Chronic pancreatitis without convincing acute peripancreatic inflammation. The findings were discussed with PA left at 2:47AM on September 5, 2017 with RBV.    ADVANCE DIRECTIVES: DNR/I - MOLST from August scanned in [place] printed and placed on chart HPI: 57M with PMH as listed admitted 9/4 with weakness, fever, shortness of breath found to have possible necrotizing PNA.     Recent admission at [place] 7/29 - 8/15 and at [place] 8/15-8/23    PERTINENT PMH REVIEWED: Yes     PAST MEDICAL & SURGICAL HISTORY:  Ascites: may 2017  Hernia  Pancreatitis  Hypertension  TIA (transient ischemic attack)  History of inguinal hernia repair  S/P skin graft using Integra: right calf skin graft  Fracture of shaft of left femur: 1971 compound fracrture left femur  1980 left femur bone spur removed    SOCIAL HISTORY:                                     Admitted from:  home  SNF  [facility]     Surrogate -     FAMILY HISTORY:  Family history of duodenal cancer    Baseline ADLs (prior to admission):  Independent     Allergies    iodine (Swelling)    Present Symptoms:     Dyspnea: 0 1 2 3   Nausea/Vomiting: Yes No  Anxiety:  Yes No  Depression: Yes No  Fatigue: Yes No  Loss of appetite: Yes No    Pain:             Character-            Duration-            Effect-            Factors-            Frequency-            Location-            Severity-    Review of Systems: Reviewed                     Negative:                     Positive:  Unable to obtain due to poor mentation   All others negative    MEDICATIONS  (STANDING):  vancomycin  IVPB 1000 milliGRAM(s) IV Intermittent every 8 hours  meropenem IVPB   IV Intermittent   meropenem IVPB 1000 milliGRAM(s) IV Intermittent every 8 hours  enoxaparin Injectable 40 milliGRAM(s) SubCutaneous daily  pantoprazole  Injectable 40 milliGRAM(s) IV Push daily  Ascorbic Acid in 160mL NS 1500 milliGRAM(s) 160 milliLiter(s) IV Intermittent every 6 hours  hydrocortisone sodium succinate Injectable 50 milliGRAM(s) IV Push every 6 hours  thiamine IVPB 200 milliGRAM(s) IV Intermittent every 12 hours  nicotine - 21 mG/24Hr(s) Patch 1 patch Transdermal daily    MEDICATIONS  (PRN):  oxyCODONE    IR 5 milliGRAM(s) Oral every 6 hours PRN Moderate Pain (4 - 6)      PHYSICAL EXAM:    Vital Signs Last 24 Hrs  T(C): 36.4 (05 Sep 2017 12:00), Max: 36.8 (04 Sep 2017 20:24)  T(F): 97.6 (05 Sep 2017 12:00), Max: 98.3 (04 Sep 2017 20:24)  HR: 102 (05 Sep 2017 14:00) (98 - 122)  BP: 101/69 (05 Sep 2017 14:00) (101/69 - 146/90)  BP(mean): 81 (05 Sep 2017 14:00) (81 - 113)  RR: 23 (05 Sep 2017 14:00) (18 - 35)  SpO2: 92% (05 Sep 2017 14:00) (82% - 100%)    General: alert  oriented x ____ lethargic agitated                  cachexia  nonverbal  coma    Karnofsky:  %    HEENT: normal  dry mouth  ET tube/trach    Lungs: comfortable tachypnea/labored breathing  excessive secretions    CV: normal  tachycardia    GI: normal  distended  tender  no BS               PEG/NG/OG tube  constipation  last BM:     : normal  incontinent  oliguria/anuria  kirk    MSK: normal  weakness  edema             ambulatory  bedbound/wheelchair bound    Skin: normal  pressure ulcers- Stage_____  no rash    LABS:                        11.4   12.7  )-----------( 134      ( 05 Sep 2017 06:53 )             33.9     09-05    133<L>  |  87<L>  |  25.0<H>  ----------------------------<  112  4.0   |  29.0  |  0.68    Ca    7.8<L>      05 Sep 2017 06:53    TPro  5.9<L>  /  Alb  2.1<L>  /  TBili  0.5  /  DBili  x   /  AST  19  /  ALT  15  /  AlkPhos  151<H>  09-04    PT/INR - ( 04 Sep 2017 20:37 )   PT: 14.7 sec;   INR: 1.33 ratio      PTT - ( 04 Sep 2017 20:37 )  PTT:29.4 sec    I&O's Summary    04 Sep 2017 07:01  -  05 Sep 2017 07:00  --------------------------------------------------------  IN: 950 mL / OUT: 150 mL / NET: 800 mL    05 Sep 2017 07:01  -  05 Sep 2017 15:16  --------------------------------------------------------  IN: 0 mL / OUT: 50 mL / NET: -50 mL    RADIOLOGY & ADDITIONAL STUDIES:    < from: CT Angio Abdomen and Pelvis w/ IV Cont (09.05.17 @ 01:44) >  No evidence of aortic dissection or pulmonary embolism. Multifocal pneumonia complicated by air in the left lower lobe, left   pleural space, and posterior mediastinum concerning for necrotizing infection with trans-spatial spread of gas-forming organism.  Correlate clinically for wretching to exclude esophageal injury as cause of posterior mediastinal air. Bronchopleural fistula is less likely. No significant change in moderately large volume of abdominopelvic ascites. Decreased pancreatic ductal dilatation status post stent placement.  Chronic pancreatitis without convincing acute peripancreatic inflammation. The findings were discussed with PA left at 2:47AM on September 5, 2017 with RBV.    ADVANCE DIRECTIVES: DNR/I - MOLST from August scanned in [place] printed and placed on chart

## 2017-09-05 NOTE — PROGRESS NOTE ADULT - PROBLEM SELECTOR PLAN 2
continue meropenum and vancomycin  vanco dose held tonight due to high trough  will repeat trough in am before next dose

## 2017-09-06 DIAGNOSIS — K86.0 ALCOHOL-INDUCED CHRONIC PANCREATITIS: ICD-10-CM

## 2017-09-06 LAB
ALBUMIN SERPL ELPH-MCNC: 2.2 G/DL
ALP BLD-CCNC: 140 U/L
ALT SERPL-CCNC: 38 U/L
ANION GAP SERPL CALC-SCNC: 12 MMOL/L — SIGNIFICANT CHANGE UP (ref 5–17)
ANION GAP SERPL CALC-SCNC: 20 MMOL/L
AST SERPL-CCNC: 45 U/L
BASOPHILS # BLD AUTO: 0 K/UL
BASOPHILS NFR BLD AUTO: 0 %
BILIRUB SERPL-MCNC: 0.5 MG/DL
BUN SERPL-MCNC: 18 MG/DL
BUN SERPL-MCNC: 33 MG/DL — HIGH (ref 8–20)
CALCIUM SERPL-MCNC: 7.4 MG/DL — LOW (ref 8.6–10.2)
CALCIUM SERPL-MCNC: 8.7 MG/DL
CHLORIDE SERPL-SCNC: 83 MMOL/L
CHLORIDE SERPL-SCNC: 89 MMOL/L — LOW (ref 98–107)
CO2 SERPL-SCNC: 30 MMOL/L
CO2 SERPL-SCNC: 31 MMOL/L — HIGH (ref 22–29)
CREAT SERPL-MCNC: 0.66 MG/DL
CREAT SERPL-MCNC: 0.72 MG/DL — SIGNIFICANT CHANGE UP (ref 0.5–1.3)
EOSINOPHIL # BLD AUTO: 0 K/UL
EOSINOPHIL NFR BLD AUTO: 0 %
GLUCOSE SERPL-MCNC: 125 MG/DL — HIGH (ref 70–115)
GLUCOSE SERPL-MCNC: 142 MG/DL
HCT VFR BLD CALC: 25.7 % — LOW (ref 42–52)
HCT VFR BLD CALC: 25.8 % — LOW (ref 42–52)
HCT VFR BLD CALC: 33.2 %
HGB BLD-MCNC: 10.6 G/DL
HGB BLD-MCNC: 8.5 G/DL — LOW (ref 14–18)
HGB BLD-MCNC: 8.8 G/DL — LOW (ref 14–18)
LEGIONELLA AG UR QL: NEGATIVE — SIGNIFICANT CHANGE UP
LYMPHOCYTES # BLD AUTO: 0.14 K/UL
LYMPHOCYTES NFR BLD AUTO: 0.9 %
MAGNESIUM SERPL-MCNC: 2.1 MG/DL — SIGNIFICANT CHANGE UP (ref 1.6–2.6)
MAN DIFF?: NORMAL
MCHC RBC-ENTMCNC: 31.8 PG — HIGH (ref 27–31)
MCHC RBC-ENTMCNC: 31.9 GM/DL
MCHC RBC-ENTMCNC: 32.1 PG
MCHC RBC-ENTMCNC: 32.6 PG — HIGH (ref 27–31)
MCHC RBC-ENTMCNC: 32.9 G/DL — SIGNIFICANT CHANGE UP (ref 32–36)
MCHC RBC-ENTMCNC: 34.2 G/DL — SIGNIFICANT CHANGE UP (ref 32–36)
MCV RBC AUTO: 100.6 FL
MCV RBC AUTO: 95.2 FL — HIGH (ref 80–94)
MCV RBC AUTO: 96.6 FL — HIGH (ref 80–94)
MONOCYTES # BLD AUTO: 0.55 K/UL
MONOCYTES NFR BLD AUTO: 3.5 %
NEUTROPHILS # BLD AUTO: 15.04 K/UL
NEUTROPHILS NFR BLD AUTO: 93.8 %
PHOSPHATE SERPL-MCNC: 4.6 MG/DL — SIGNIFICANT CHANGE UP (ref 2.4–4.7)
PLATELET # BLD AUTO: 291 K/UL
PLATELET # BLD AUTO: 70 K/UL — LOW (ref 150–400)
PLATELET # BLD AUTO: 83 K/UL — LOW (ref 150–400)
POTASSIUM SERPL-MCNC: 3.3 MMOL/L — LOW (ref 3.5–5.3)
POTASSIUM SERPL-SCNC: 3.3 MMOL/L — LOW (ref 3.5–5.3)
POTASSIUM SERPL-SCNC: 4.1 MMOL/L
PROT SERPL-MCNC: 6.3 G/DL
RBC # BLD: 2.67 M/UL — LOW (ref 4.6–6.2)
RBC # BLD: 2.7 M/UL — LOW (ref 4.6–6.2)
RBC # BLD: 3.3 M/UL
RBC # FLD: 15.4 % — SIGNIFICANT CHANGE UP (ref 11–15.6)
RBC # FLD: 15.5 % — SIGNIFICANT CHANGE UP (ref 11–15.6)
RBC # FLD: 15.7 %
SODIUM SERPL-SCNC: 132 MMOL/L — LOW (ref 135–145)
SODIUM SERPL-SCNC: 133 MMOL/L
VANCOMYCIN TROUGH SERPL-MCNC: 18.6 UG/ML — SIGNIFICANT CHANGE UP (ref 10–20)
WBC # BLD: 15.5 K/UL — HIGH (ref 4.8–10.8)
WBC # BLD: 17.7 K/UL — HIGH (ref 4.8–10.8)
WBC # FLD AUTO: 15.5 K/UL — HIGH (ref 4.8–10.8)
WBC # FLD AUTO: 15.73 K/UL
WBC # FLD AUTO: 17.7 K/UL — HIGH (ref 4.8–10.8)

## 2017-09-06 PROCEDURE — 99233 SBSQ HOSP IP/OBS HIGH 50: CPT

## 2017-09-06 PROCEDURE — 99291 CRITICAL CARE FIRST HOUR: CPT

## 2017-09-06 PROCEDURE — 99254 IP/OBS CNSLTJ NEW/EST MOD 60: CPT

## 2017-09-06 RX ORDER — MORPHINE SULFATE 50 MG/1
2 CAPSULE, EXTENDED RELEASE ORAL EVERY 4 HOURS
Qty: 0 | Refills: 0 | Status: DISCONTINUED | OUTPATIENT
Start: 2017-09-06 | End: 2017-09-07

## 2017-09-06 RX ORDER — MORPHINE SULFATE 50 MG/1
1 CAPSULE, EXTENDED RELEASE ORAL
Qty: 0 | Refills: 0 | Status: DISCONTINUED | OUTPATIENT
Start: 2017-09-06 | End: 2017-09-07

## 2017-09-06 RX ORDER — ALPRAZOLAM 0.25 MG
0.25 TABLET ORAL EVERY 8 HOURS
Qty: 0 | Refills: 0 | Status: DISCONTINUED | OUTPATIENT
Start: 2017-09-06 | End: 2017-09-07

## 2017-09-06 RX ORDER — MORPHINE SULFATE 50 MG/1
2 CAPSULE, EXTENDED RELEASE ORAL
Qty: 0 | Refills: 0 | Status: DISCONTINUED | OUTPATIENT
Start: 2017-09-06 | End: 2017-09-07

## 2017-09-06 RX ORDER — ALPRAZOLAM 0.25 MG
0.25 TABLET ORAL EVERY 6 HOURS
Qty: 0 | Refills: 0 | Status: DISCONTINUED | OUTPATIENT
Start: 2017-09-06 | End: 2017-09-07

## 2017-09-06 RX ORDER — MORPHINE SULFATE 50 MG/1
2 CAPSULE, EXTENDED RELEASE ORAL EVERY 4 HOURS
Qty: 0 | Refills: 0 | Status: DISCONTINUED | OUTPATIENT
Start: 2017-09-06 | End: 2017-09-06

## 2017-09-06 RX ORDER — POTASSIUM CHLORIDE 20 MEQ
10 PACKET (EA) ORAL
Qty: 0 | Refills: 0 | Status: COMPLETED | OUTPATIENT
Start: 2017-09-06 | End: 2017-09-06

## 2017-09-06 RX ORDER — INFLUENZA VIRUS VACCINE 15; 15; 15; 15 UG/.5ML; UG/.5ML; UG/.5ML; UG/.5ML
0.5 SUSPENSION INTRAMUSCULAR ONCE
Qty: 0 | Refills: 0 | Status: COMPLETED | OUTPATIENT
Start: 2017-09-06 | End: 2017-09-06

## 2017-09-06 RX ORDER — IPRATROPIUM/ALBUTEROL SULFATE 18-103MCG
3 AEROSOL WITH ADAPTER (GRAM) INHALATION EVERY 6 HOURS
Qty: 0 | Refills: 0 | Status: DISCONTINUED | OUTPATIENT
Start: 2017-09-06 | End: 2017-09-08

## 2017-09-06 RX ADMIN — MORPHINE SULFATE 2 MILLIGRAM(S): 50 CAPSULE, EXTENDED RELEASE ORAL at 23:00

## 2017-09-06 RX ADMIN — Medication 100 MILLIEQUIVALENT(S): at 09:19

## 2017-09-06 RX ADMIN — Medication 50 MILLIGRAM(S): at 21:17

## 2017-09-06 RX ADMIN — MEROPENEM 200 MILLIGRAM(S): 1 INJECTION INTRAVENOUS at 14:19

## 2017-09-06 RX ADMIN — MORPHINE SULFATE 2 MILLIGRAM(S): 50 CAPSULE, EXTENDED RELEASE ORAL at 14:19

## 2017-09-06 RX ADMIN — MORPHINE SULFATE 2 MILLIGRAM(S): 50 CAPSULE, EXTENDED RELEASE ORAL at 18:48

## 2017-09-06 RX ADMIN — MORPHINE SULFATE 2 MILLIGRAM(S): 50 CAPSULE, EXTENDED RELEASE ORAL at 17:00

## 2017-09-06 RX ADMIN — MORPHINE SULFATE 2 MILLIGRAM(S): 50 CAPSULE, EXTENDED RELEASE ORAL at 19:12

## 2017-09-06 RX ADMIN — MEROPENEM 200 MILLIGRAM(S): 1 INJECTION INTRAVENOUS at 06:17

## 2017-09-06 RX ADMIN — Medication 50 MILLIGRAM(S): at 03:13

## 2017-09-06 RX ADMIN — MORPHINE SULFATE 1 MILLIGRAM(S): 50 CAPSULE, EXTENDED RELEASE ORAL at 21:15

## 2017-09-06 RX ADMIN — OXYCODONE HYDROCHLORIDE 5 MILLIGRAM(S): 5 TABLET ORAL at 01:45

## 2017-09-06 RX ADMIN — PANTOPRAZOLE SODIUM 40 MILLIGRAM(S): 20 TABLET, DELAYED RELEASE ORAL at 12:43

## 2017-09-06 RX ADMIN — Medication 100 MILLIEQUIVALENT(S): at 06:38

## 2017-09-06 RX ADMIN — OXYCODONE HYDROCHLORIDE 5 MILLIGRAM(S): 5 TABLET ORAL at 02:30

## 2017-09-06 RX ADMIN — Medication 250 MILLIGRAM(S): at 06:34

## 2017-09-06 RX ADMIN — Medication 50 MILLIGRAM(S): at 09:19

## 2017-09-06 RX ADMIN — Medication 3 MILLILITER(S): at 20:42

## 2017-09-06 RX ADMIN — MORPHINE SULFATE 2 MILLIGRAM(S): 50 CAPSULE, EXTENDED RELEASE ORAL at 22:25

## 2017-09-06 RX ADMIN — MORPHINE SULFATE 2 MILLIGRAM(S): 50 CAPSULE, EXTENDED RELEASE ORAL at 14:30

## 2017-09-06 RX ADMIN — ENOXAPARIN SODIUM 40 MILLIGRAM(S): 100 INJECTION SUBCUTANEOUS at 12:43

## 2017-09-06 RX ADMIN — MORPHINE SULFATE 1 MILLIGRAM(S): 50 CAPSULE, EXTENDED RELEASE ORAL at 21:45

## 2017-09-06 RX ADMIN — Medication 50 MILLIGRAM(S): at 15:58

## 2017-09-06 RX ADMIN — Medication 100 MILLIEQUIVALENT(S): at 08:08

## 2017-09-06 RX ADMIN — Medication 1 PATCH: at 12:42

## 2017-09-06 RX ADMIN — Medication 102 MILLIGRAM(S): at 17:57

## 2017-09-06 RX ADMIN — Medication 1 PATCH: at 12:00

## 2017-09-06 RX ADMIN — Medication 3 MILLILITER(S): at 14:25

## 2017-09-06 RX ADMIN — MEROPENEM 200 MILLIGRAM(S): 1 INJECTION INTRAVENOUS at 22:30

## 2017-09-06 RX ADMIN — Medication 102 MILLIGRAM(S): at 06:17

## 2017-09-06 NOTE — DIETITIAN INITIAL EVALUATION ADULT. - OTHER INFO
Pt reports # (~4months ago), Pt with extremely poor appetite PTA. Physical signs of muscle mass in temple, clavicle, shoulder; subcutaneous fat loss in orbit, thorac and tibia/fibia region

## 2017-09-06 NOTE — CONSULT NOTE ADULT - SUBJECTIVE AND OBJECTIVE BOX
HPI: 57M with PMH as listed admitted 9/4 with weakness, fever, shortness of breath found to have possible necrotizing PNA.     Recent admission at [place] 7/29 - 8/15 and at [place] 8/15-8/23    PERTINENT PMH REVIEWED: Yes     PAST MEDICAL & SURGICAL HISTORY:  Ascites: may 2017  Hernia  Pancreatitis  Hypertension  TIA (transient ischemic attack)  History of inguinal hernia repair  S/P skin graft using Integra: right calf skin graft  Fracture of shaft of left femur: 1971 compound fracrture left femur  1980 left femur bone spur removed    SOCIAL HISTORY:                                     Admitted from:  home  SNF  [facility]     Surrogate -     FAMILY HISTORY:  Family history of duodenal cancer    Baseline ADLs (prior to admission):  Independent     Allergies    iodine (Swelling)    Present Symptoms:     Dyspnea: 0 1 2 3   Nausea/Vomiting: Yes No  Anxiety:  Yes No  Depression: Yes No  Fatigue: Yes No  Loss of appetite: Yes No    Pain:             Character-            Duration-            Effect-            Factors-            Frequency-            Location-            Severity-    Review of Systems: Reviewed                     Negative:                     Positive:  Unable to obtain due to poor mentation   All others negative    MEDICATIONS  (STANDING):  vancomycin  IVPB 1000 milliGRAM(s) IV Intermittent every 8 hours  meropenem IVPB   IV Intermittent   meropenem IVPB 1000 milliGRAM(s) IV Intermittent every 8 hours  enoxaparin Injectable 40 milliGRAM(s) SubCutaneous daily  pantoprazole  Injectable 40 milliGRAM(s) IV Push daily  Ascorbic Acid in 160mL NS 1500 milliGRAM(s) 160 milliLiter(s) IV Intermittent every 6 hours  hydrocortisone sodium succinate Injectable 50 milliGRAM(s) IV Push every 6 hours  thiamine IVPB 200 milliGRAM(s) IV Intermittent every 12 hours  nicotine - 21 mG/24Hr(s) Patch 1 patch Transdermal daily    MEDICATIONS  (PRN):  oxyCODONE    IR 5 milliGRAM(s) Oral every 6 hours PRN Moderate Pain (4 - 6)      PHYSICAL EXAM:    Vital Signs Last 24 Hrs  T(C): 36.4 (05 Sep 2017 12:00), Max: 36.8 (04 Sep 2017 20:24)  T(F): 97.6 (05 Sep 2017 12:00), Max: 98.3 (04 Sep 2017 20:24)  HR: 102 (05 Sep 2017 14:00) (98 - 122)  BP: 101/69 (05 Sep 2017 14:00) (101/69 - 146/90)  BP(mean): 81 (05 Sep 2017 14:00) (81 - 113)  RR: 23 (05 Sep 2017 14:00) (18 - 35)  SpO2: 92% (05 Sep 2017 14:00) (82% - 100%)    General: alert  oriented x ____ lethargic agitated                  cachexia  nonverbal  coma    Karnofsky:  %    HEENT: normal  dry mouth  ET tube/trach    Lungs: comfortable tachypnea/labored breathing  excessive secretions    CV: normal  tachycardia    GI: normal  distended  tender  no BS               PEG/NG/OG tube  constipation  last BM:     : normal  incontinent  oliguria/anuria  kirk    MSK: normal  weakness  edema             ambulatory  bedbound/wheelchair bound    Skin: normal  pressure ulcers- Stage_____  no rash    LABS:                        11.4   12.7  )-----------( 134      ( 05 Sep 2017 06:53 )             33.9     09-05    133<L>  |  87<L>  |  25.0<H>  ----------------------------<  112  4.0   |  29.0  |  0.68    Ca    7.8<L>      05 Sep 2017 06:53    TPro  5.9<L>  /  Alb  2.1<L>  /  TBili  0.5  /  DBili  x   /  AST  19  /  ALT  15  /  AlkPhos  151<H>  09-04    PT/INR - ( 04 Sep 2017 20:37 )   PT: 14.7 sec;   INR: 1.33 ratio      PTT - ( 04 Sep 2017 20:37 )  PTT:29.4 sec    I&O's Summary    04 Sep 2017 07:01  -  05 Sep 2017 07:00  --------------------------------------------------------  IN: 950 mL / OUT: 150 mL / NET: 800 mL    05 Sep 2017 07:01  -  05 Sep 2017 15:16  --------------------------------------------------------  IN: 0 mL / OUT: 50 mL / NET: -50 mL    RADIOLOGY & ADDITIONAL STUDIES:    < from: CT Angio Abdomen and Pelvis w/ IV Cont (09.05.17 @ 01:44) >  No evidence of aortic dissection or pulmonary embolism. Multifocal pneumonia complicated by air in the left lower lobe, left   pleural space, and posterior mediastinum concerning for necrotizing infection with trans-spatial spread of gas-forming organism.  Correlate clinically for wretching to exclude esophageal injury as cause of posterior mediastinal air. Bronchopleural fistula is less likely. No significant change in moderately large volume of abdominopelvic ascites. Decreased pancreatic ductal dilatation status post stent placement.  Chronic pancreatitis without convincing acute peripancreatic inflammation. The findings were discussed with PA left at 2:47AM on September 5, 2017 with RBV.    ADVANCE DIRECTIVES: DNR/I - MOLST from August scanned in [place] printed and placed on chart    ·   Assessment	  57M with

## 2017-09-06 NOTE — CONSULT NOTE ADULT - SUBJECTIVE AND OBJECTIVE BOX
This is a 57yoM  cachectic, anxious, on High concentration venti mask, RR32-38/min Pulse Oximetry 89%. SOB with Oxygenation-unable to eat or drink because he can't tolerate being off mask.  Having difficulty speaking-becomes SOB with speech. Frequently coughing and expectorating thick phlem-earlier "brown tinged" getting clearer but very tenacious.   HPI:  57M Presents with C/O SOB worsening over past few days.  PMhx chronic pancreatitis (dx 2002), Alcohol abuse (last drink 2 years), HTN, inguinal hernia repair.  Recent protracted hospital course initially presented to [facility] 7/29/2017 for worsening foot pain refractory to antibiotics. Patient was found to have acute on chronic pancreatitis.  He did  improved with course of steroid taper. Patient has had multiple complications throughout his course. He was found to have worsening abdominal ascites, which was tapped found to have  SBP and started on abx, and escalated to meropenum for worsening leukocytosis. . MRI Abdomen showed stable pancreatic pseudocysts, dilated main pancreatic duct as well as acute/subacute portal vein thrombosis. Patient started on therapeutic Lovenox with  repeat abdominal US with doppler which shows no thrombus.  Patient was then transferred to [facility] 8/15/2017 for  an  ERCP and stent placement.   Eventually discharged to Rehab on 8/23.  Pt has been home from rehab X 1 week.  Lives alone and has a neighbor who helps out. (04 Sep 2017 22:24)      PERTINENT PMH REVIEWED: Yes     PAST MEDICAL & SURGICAL HISTORY:  Ascites: may 2017  Hernia  Pancreatitis  Hypertension  TIA (transient ischemic attack)  History of inguinal hernia repair  S/P skin graft using Integra: right calf skin graft  Fracture of shaft of left femur: 1971 compound fracrture left femur  1980 left femur bone spur removed      SOCIAL HISTORY:  EtOH Yes                                       Drugs    No                                    smoker                                     Admitted from: home  HCP [facility]  [phone]  FAMILY HISTORY:  Family history of duodenal cancer      Allergies    iodine (Swelling)    Intolerances    Baseline ADLs (prior to admission):  Independent/      Present Symptoms:     Dyspnea:  3   Nausea/Vomiting: No  Anxiety:  Yes   Depression: Yes   Fatigue: Yes   Loss of appetite: Yes     Pain:  Denies at present            Character-            Duration-            Effect-            Factors-            Frequency-            Location-            Severity-    Review of Systems: Reviewed                       All others negative    MEDICATIONS  (STANDING):  meropenem IVPB   IV Intermittent   meropenem IVPB 1000 milliGRAM(s) IV Intermittent every 8 hours  enoxaparin Injectable 40 milliGRAM(s) SubCutaneous daily  pantoprazole  Injectable 40 milliGRAM(s) IV Push daily  Ascorbic Acid in 160mL NS 1500 milliGRAM(s) 160 milliLiter(s) IV Intermittent every 6 hours  hydrocortisone sodium succinate Injectable 50 milliGRAM(s) IV Push every 6 hours  thiamine IVPB 200 milliGRAM(s) IV Intermittent every 12 hours  nicotine - 21 mG/24Hr(s) Patch 1 patch Transdermal daily  influenza   Vaccine 0.5 milliLiter(s) IntraMuscular once  ALBUTerol/ipratropium for Nebulization 3 milliLiter(s) Nebulizer every 6 hours  morphine  - Injectable 2 milliGRAM(s) IV Push every 4 hours    MEDICATIONS  (PRN):  oxyCODONE    IR 5 milliGRAM(s) Oral every 6 hours PRN Moderate Pain (4 - 6)  morphine  - Injectable 2 milliGRAM(s) IV Push every 2 hours PRN perssitent dyspnea  ALPRAZolam 0.25 milliGRAM(s) Oral every 6 hours PRN anxiety      PHYSICAL EXAM:    Vital Signs Last 24 Hrs  T(C): 36.7 (06 Sep 2017 12:00), Max: 37.3 (06 Sep 2017 00:00)  T(F): 98 (06 Sep 2017 12:00), Max: 99.1 (06 Sep 2017 00:00)  HR: 99 (06 Sep 2017 13:00) (84 - 106)  BP: 108/68 (06 Sep 2017 13:00) (95/67 - 112/69)  BP(mean): 83 (06 Sep 2017 13:00) (72 - 86)  RR: 31 (06 Sep 2017 13:00) (21 - 37)  SpO2: 93% (06 Sep 2017 13:00) (89% - 97%)    General: alert  oriented x __3__ anxious                  cachexia      HEENT:   dry mouth      Lungs: tachypnea/labored breathing  excessive secretions    CV:   tachycardia    GI: softly  distended  tender                 constipation  last BM:     : normal  incontinent  oliguria/anuria  kirk    MSK: normal  weakness  edema BLLE-L>R            bedbound/wheelchair bound    Skin: normal   no rash    LABS:                        8.8    15.5  )-----------( 83       ( 06 Sep 2017 09:41 )             25.7     09-06    132<L>  |  89<L>  |  33.0<H>  ----------------------------<  125<H>  3.3<L>   |  31.0<H>  |  0.72    Ca    7.4<L>      06 Sep 2017 05:49  Phos  4.6     09-06  Mg     2.1     09-06    TPro  5.9<L>  /  Alb  2.1<L>  /  TBili  0.5  /  DBili  x   /  AST  19  /  ALT  15  /  AlkPhos  151<H>  09-04    PT/INR - ( 04 Sep 2017 20:37 )   PT: 14.7 sec;   INR: 1.33 ratio         PTT - ( 04 Sep 2017 20:37 )  PTT:29.4 sec    I&O's Summary    05 Sep 2017 07:01  -  06 Sep 2017 07:00  --------------------------------------------------------  IN: 1580 mL / OUT: 800 mL / NET: 780 mL    06 Sep 2017 07:01  -  06 Sep 2017 13:53  --------------------------------------------------------  IN: 560 mL / OUT: 250 mL / NET: 310 mL        RADIOLOGY & ADDITIONAL STUDIES:< from: CT Angio Abdomen and Pelvis w/ IV Cont (09.05.17 @ 01:44) >  IMPRESSION:    No evidence of aortic dissection or pulmonary embolism.  Multifocal pneumonia complicated by air in the left lower lobe, left   pleural space, and posterior mediastinum concerning for necrotizing   infection with trans-spatial spread of gas-forming organism.  Correlate   clinically for wretching to exclude esophageal injury as cause of   posterior mediastinal air. Bronchopleural fistula is less likely.  No significant change in moderately large volume of abdominopelvic   ascites.  Decreased pancreatic ductal dilatation status post stent placement.   Chronic pancreatitis without convincing acute peripancreatic   inflammation. The findings were discussed with PA left at 2:47AM on   September 5, 2017 with RBV.    < end of copied text >  < from: CT Angio Abdomen and Pelvis w/ IV Cont (09.05.17 @ 01:44) >  IMPRESSION:    No evidence of aortic dissection or pulmonary embolism.  Multifocal pneumonia complicated by air in the left lower lobe, left   pleural space, and posterior mediastinum concerning for necrotizing   infection with trans-spatial spread of gas-forming organism.  Correlate   clinically for wretching to exclude esophageal injury as cause of   posterior mediastinal air. Bronchopleural fistula is less likely.  No significant change in moderately large volume of abdominopelvic   ascites.  Decreased pancreatic ductal dilatation status post stent placement.   Chronic pancreatitis without convincing acute peripancreatic   inflammation. The findings were discussed with PA left at 2:47AM on   September 5, 2017 with RBV.    < end of copied text >      ADVANCE DIRECTIVES:   DNR YES   Completed on:                     MOLST  YES    Completed on:  Living Will   NO   Completed on:

## 2017-09-06 NOTE — PROGRESS NOTE ADULT - PROBLEM SELECTOR PLAN 3
posterior mediastinal collection with air bubbles  ?esophageal injury pt had recent ERCP  CTS consult appreciated, medical management

## 2017-09-06 NOTE — PROGRESS NOTE ADULT - ASSESSMENT
57M  PMhx chronic pancreatitis (dx 2002), Alcohol abuse (last drink 2 years), HTN, inguinal hernia repair.  Recent protracted hospital course complicated by acute on chronic pancreatitis,  SBP,  pancreatic pseudocysts, dilated main pancreatic duct as well as acute/subacute portal vein thrombosis. s/p  ERCP and stent placement.   Now admitted with resp compromise found to have bilateral PNA and probable mediastinitis

## 2017-09-06 NOTE — CONSULT NOTE ADULT - ASSESSMENT
Acute Respiratory Failure    Severe Dyspnea  Morphine 2mg IV Q4 ATC    Anxiety and Distress  Xanax 0.25mg PO Q6 prn    Excessive thick secretions--Recommend Mucinex Q12  ChronicPain- Opioid Tolerance-Fentanyl patch DC'd  Oxycodone Q6prn   Small doses of morphine should provide some relief
57M  PMhx chronic pancreatitis (dx 2002), Alcohol abuse (last drink 2 years), HTN, inguinal hernia repair.  Recent protracted hospital course complicated by acute on chronic pancreatitis,  SBP,  pancreatic pseudocysts, dilated main pancreatic duct as well as acute/subacute portal vein thrombosis. s/p  ERCP and stent placement.   Now home from rehab, poor Po intake, limited mobility with high suspicion for VTE.  However given recent protracted hospitalization and leukocytosis, sepsis with hospital acquired organisms is also likely.
57M with
56 y/o M with h/o ETOH abuse, Chronic pancreatitis, ?liver cirrhosis here with necrotizing PNA

## 2017-09-06 NOTE — PROGRESS NOTE ADULT - PROBLEM SELECTOR PLAN 2
broad spectrum antibiotics for possible anaerobic organism with gas production  ID consult appreciated  sputum culture prelim G+ cocci pairs & chains, await final culture data, pt remains on vanco w acceptable trough

## 2017-09-06 NOTE — CONSULT NOTE ADULT - ATTENDING COMMENTS
COUNSELING:    Face to face meeting to discuss Advanced Care Planning - Time Spent ______ Minutes.  See goals of care note.    More than 50% time spent in counseling and coordinating care. _40_____ Minutes.     Thank you for the opportunity to assist with the care of this patient.   [facility] Consult [facility] [phone].
Will follow

## 2017-09-06 NOTE — CHART NOTE - NSCHARTNOTEFT_GEN_A_CORE
Upon Nutritional Assessment by the Registered Dietitian your patient was determined to meet criteria / has evidence of the following diagnosis/diagnoses:          [ ]  Mild Protein Calorie Malnutrition        [ ]  Moderate Protein Calorie Malnutrition        [x ] Severe Protein Calorie Malnutrition        [ ] Unspecified Protein Calorie Malnutrition        [ ] Underweight / BMI <19        [ ] Morbid Obesity / BMI > 40      Findings as based on:  •  Comprehensive nutrition assessment and consultation  •  Calorie counts (nutrient intake analysis)  •  Food acceptance and intake status from observations by staff  •  Follow up  •  Patient education  •  Intervention secondary to interdisciplinary rounds  •   concerns      Treatment:    The following diet has been recommended:  Ensure TID     PROVIDER Section:     By signing this assessment you are acknowledging and agree with the diagnosis/diagnoses assigned by the Registered Dietitian    Comments:

## 2017-09-06 NOTE — PROGRESS NOTE ADULT - ASSESSMENT
57M  PMhx chronic pancreatitis (dx 2002), Alcohol abuse (last drink 2 years), HTN, inguinal hernia repair.  Recent protracted hospital course complicated by acute on chronic pancreatitis,  SBP,  pancreatic pseudocysts, dilated main pancreatic duct as well as acute/subacute portal vein thrombosis. s/p  ERCP and stent placement.   Now home from rehab, poor Po intake, limited mobility with high suspicion for VTE.  Found to be Negative for PE but CT revealed B/L necrotizing PNA and mediastinitis, Acute Hypoxic Respiratory failure Requiring NIV.

## 2017-09-07 DIAGNOSIS — D69.6 THROMBOCYTOPENIA, UNSPECIFIED: ICD-10-CM

## 2017-09-07 LAB
-  AMIKACIN: SIGNIFICANT CHANGE UP
-  AMPICILLIN/SULBACTAM: SIGNIFICANT CHANGE UP
-  AMPICILLIN: SIGNIFICANT CHANGE UP
-  AZTREONAM: SIGNIFICANT CHANGE UP
-  CEFAZOLIN: SIGNIFICANT CHANGE UP
-  CEFEPIME: SIGNIFICANT CHANGE UP
-  CEFOXITIN: SIGNIFICANT CHANGE UP
-  CEFTAZIDIME: SIGNIFICANT CHANGE UP
-  CEFTRIAXONE: SIGNIFICANT CHANGE UP
-  CIPROFLOXACIN: SIGNIFICANT CHANGE UP
-  ERTAPENEM: SIGNIFICANT CHANGE UP
-  GENTAMICIN: SIGNIFICANT CHANGE UP
-  IMIPENEM: SIGNIFICANT CHANGE UP
-  LEVOFLOXACIN: SIGNIFICANT CHANGE UP
-  MEROPENEM: SIGNIFICANT CHANGE UP
-  PIPERACILLIN/TAZOBACTAM: SIGNIFICANT CHANGE UP
-  TOBRAMYCIN: SIGNIFICANT CHANGE UP
-  TRIMETHOPRIM/SULFAMETHOXAZOLE: SIGNIFICANT CHANGE UP
ANION GAP SERPL CALC-SCNC: 17 MMOL/L — SIGNIFICANT CHANGE UP (ref 5–17)
BUN SERPL-MCNC: 36 MG/DL — HIGH (ref 8–20)
CALCIUM SERPL-MCNC: 7.3 MG/DL — LOW (ref 8.6–10.2)
CHLORIDE SERPL-SCNC: 89 MMOL/L — LOW (ref 98–107)
CO2 SERPL-SCNC: 27 MMOL/L — SIGNIFICANT CHANGE UP (ref 22–29)
CREAT SERPL-MCNC: 1.01 MG/DL — SIGNIFICANT CHANGE UP (ref 0.5–1.3)
CULTURE RESULTS: SIGNIFICANT CHANGE UP
GLUCOSE SERPL-MCNC: 172 MG/DL — HIGH (ref 70–115)
HCT VFR BLD CALC: 27 % — LOW (ref 42–52)
HGB BLD-MCNC: 9.1 G/DL — LOW (ref 14–18)
MAGNESIUM SERPL-MCNC: 2 MG/DL — SIGNIFICANT CHANGE UP (ref 1.6–2.6)
MCHC RBC-ENTMCNC: 31.9 PG — HIGH (ref 27–31)
MCHC RBC-ENTMCNC: 33.7 G/DL — SIGNIFICANT CHANGE UP (ref 32–36)
MCV RBC AUTO: 94.7 FL — HIGH (ref 80–94)
METHOD TYPE: SIGNIFICANT CHANGE UP
ORGANISM # SPEC MICROSCOPIC CNT: SIGNIFICANT CHANGE UP
PHOSPHATE SERPL-MCNC: 4.5 MG/DL — SIGNIFICANT CHANGE UP (ref 2.4–4.7)
PLATELET # BLD AUTO: 44 K/UL — LOW (ref 150–400)
POTASSIUM SERPL-MCNC: 3.8 MMOL/L — SIGNIFICANT CHANGE UP (ref 3.5–5.3)
POTASSIUM SERPL-SCNC: 3.8 MMOL/L — SIGNIFICANT CHANGE UP (ref 3.5–5.3)
RBC # BLD: 2.85 M/UL — LOW (ref 4.6–6.2)
RBC # FLD: 15.5 % — SIGNIFICANT CHANGE UP (ref 11–15.6)
SODIUM SERPL-SCNC: 133 MMOL/L — LOW (ref 135–145)
SPECIMEN SOURCE: SIGNIFICANT CHANGE UP
WBC # BLD: 17.4 K/UL — HIGH (ref 4.8–10.8)
WBC # FLD AUTO: 17.4 K/UL — HIGH (ref 4.8–10.8)

## 2017-09-07 PROCEDURE — 99233 SBSQ HOSP IP/OBS HIGH 50: CPT

## 2017-09-07 PROCEDURE — 99291 CRITICAL CARE FIRST HOUR: CPT

## 2017-09-07 RX ORDER — MORPHINE SULFATE 50 MG/1
4 CAPSULE, EXTENDED RELEASE ORAL
Qty: 100 | Refills: 0 | Status: DISCONTINUED | OUTPATIENT
Start: 2017-09-07 | End: 2017-09-08

## 2017-09-07 RX ORDER — MICAFUNGIN SODIUM 100 MG/1
100 INJECTION, POWDER, LYOPHILIZED, FOR SOLUTION INTRAVENOUS EVERY 24 HOURS
Qty: 0 | Refills: 0 | Status: DISCONTINUED | OUTPATIENT
Start: 2017-09-08 | End: 2017-09-09

## 2017-09-07 RX ORDER — MORPHINE SULFATE 50 MG/1
5 CAPSULE, EXTENDED RELEASE ORAL
Qty: 0 | Refills: 0 | Status: DISCONTINUED | OUTPATIENT
Start: 2017-09-07 | End: 2017-09-10

## 2017-09-07 RX ORDER — ROBINUL 0.2 MG/ML
0.2 INJECTION INTRAMUSCULAR; INTRAVENOUS EVERY 6 HOURS
Qty: 0 | Refills: 0 | Status: DISCONTINUED | OUTPATIENT
Start: 2017-09-07 | End: 2017-09-10

## 2017-09-07 RX ORDER — MORPHINE SULFATE 50 MG/1
2 CAPSULE, EXTENDED RELEASE ORAL
Qty: 100 | Refills: 0 | Status: DISCONTINUED | OUTPATIENT
Start: 2017-09-07 | End: 2017-09-07

## 2017-09-07 RX ORDER — MORPHINE SULFATE 50 MG/1
4 CAPSULE, EXTENDED RELEASE ORAL ONCE
Qty: 0 | Refills: 0 | Status: DISCONTINUED | OUTPATIENT
Start: 2017-09-07 | End: 2017-09-07

## 2017-09-07 RX ORDER — SALIVA SUBSTITUTE COMB NO.11 351 MG
2 POWDER IN PACKET (EA) MUCOUS MEMBRANE
Qty: 0 | Refills: 0 | Status: DISCONTINUED | OUTPATIENT
Start: 2017-09-07 | End: 2017-09-10

## 2017-09-07 RX ORDER — MICAFUNGIN SODIUM 100 MG/1
100 INJECTION, POWDER, LYOPHILIZED, FOR SOLUTION INTRAVENOUS ONCE
Qty: 0 | Refills: 0 | Status: COMPLETED | OUTPATIENT
Start: 2017-09-07 | End: 2017-09-07

## 2017-09-07 RX ORDER — MICAFUNGIN SODIUM 100 MG/1
INJECTION, POWDER, LYOPHILIZED, FOR SOLUTION INTRAVENOUS
Qty: 0 | Refills: 0 | Status: DISCONTINUED | OUTPATIENT
Start: 2017-09-07 | End: 2017-09-09

## 2017-09-07 RX ORDER — FLUCONAZOLE 150 MG/1
200 TABLET ORAL ONCE
Qty: 0 | Refills: 0 | Status: DISCONTINUED | OUTPATIENT
Start: 2017-09-07 | End: 2017-09-07

## 2017-09-07 RX ADMIN — Medication 2 SPRAY(S): at 17:49

## 2017-09-07 RX ADMIN — Medication 1 PATCH: at 12:00

## 2017-09-07 RX ADMIN — Medication 3 MILLILITER(S): at 20:25

## 2017-09-07 RX ADMIN — MORPHINE SULFATE 2 MG/HR: 50 CAPSULE, EXTENDED RELEASE ORAL at 12:54

## 2017-09-07 RX ADMIN — PANTOPRAZOLE SODIUM 40 MILLIGRAM(S): 20 TABLET, DELAYED RELEASE ORAL at 12:55

## 2017-09-07 RX ADMIN — Medication 1 PATCH: at 12:57

## 2017-09-07 RX ADMIN — Medication 2 SPRAY(S): at 12:56

## 2017-09-07 RX ADMIN — MEROPENEM 200 MILLIGRAM(S): 1 INJECTION INTRAVENOUS at 06:15

## 2017-09-07 RX ADMIN — MORPHINE SULFATE 4 MILLIGRAM(S): 50 CAPSULE, EXTENDED RELEASE ORAL at 12:55

## 2017-09-07 RX ADMIN — Medication 50 MILLIGRAM(S): at 09:37

## 2017-09-07 RX ADMIN — Medication 3 MILLILITER(S): at 08:20

## 2017-09-07 RX ADMIN — Medication 102 MILLIGRAM(S): at 06:16

## 2017-09-07 RX ADMIN — MEROPENEM 200 MILLIGRAM(S): 1 INJECTION INTRAVENOUS at 16:49

## 2017-09-07 RX ADMIN — Medication 102 MILLIGRAM(S): at 17:55

## 2017-09-07 RX ADMIN — Medication 0.5 MILLIGRAM(S): at 17:53

## 2017-09-07 RX ADMIN — Medication 3 MILLILITER(S): at 02:45

## 2017-09-07 RX ADMIN — Medication 50 MILLIGRAM(S): at 03:15

## 2017-09-07 RX ADMIN — MEROPENEM 200 MILLIGRAM(S): 1 INJECTION INTRAVENOUS at 22:05

## 2017-09-07 RX ADMIN — MICAFUNGIN SODIUM 105 MILLIGRAM(S): 100 INJECTION, POWDER, LYOPHILIZED, FOR SOLUTION INTRAVENOUS at 12:54

## 2017-09-07 RX ADMIN — Medication 2 SPRAY(S): at 15:08

## 2017-09-07 RX ADMIN — MORPHINE SULFATE 5 MILLIGRAM(S): 50 CAPSULE, EXTENDED RELEASE ORAL at 17:59

## 2017-09-07 RX ADMIN — Medication 2 SPRAY(S): at 17:50

## 2017-09-07 RX ADMIN — MORPHINE SULFATE 5 MILLIGRAM(S): 50 CAPSULE, EXTENDED RELEASE ORAL at 22:21

## 2017-09-07 RX ADMIN — MORPHINE SULFATE 2 MILLIGRAM(S): 50 CAPSULE, EXTENDED RELEASE ORAL at 09:37

## 2017-09-07 RX ADMIN — Medication 50 MILLIGRAM(S): at 15:41

## 2017-09-07 RX ADMIN — MORPHINE SULFATE 2 MG/HR: 50 CAPSULE, EXTENDED RELEASE ORAL at 16:53

## 2017-09-07 RX ADMIN — Medication 2 SPRAY(S): at 23:33

## 2017-09-07 RX ADMIN — MORPHINE SULFATE 2 MG/HR: 50 CAPSULE, EXTENDED RELEASE ORAL at 13:09

## 2017-09-07 RX ADMIN — Medication 50 MILLIGRAM(S): at 22:04

## 2017-09-07 RX ADMIN — Medication 2 SPRAY(S): at 21:43

## 2017-09-07 RX ADMIN — MORPHINE SULFATE 2 MILLIGRAM(S): 50 CAPSULE, EXTENDED RELEASE ORAL at 09:52

## 2017-09-07 RX ADMIN — MORPHINE SULFATE 5 MILLIGRAM(S): 50 CAPSULE, EXTENDED RELEASE ORAL at 22:06

## 2017-09-07 RX ADMIN — MORPHINE SULFATE 2 MG/HR: 50 CAPSULE, EXTENDED RELEASE ORAL at 16:49

## 2017-09-07 RX ADMIN — MORPHINE SULFATE 5 MILLIGRAM(S): 50 CAPSULE, EXTENDED RELEASE ORAL at 17:53

## 2017-09-07 RX ADMIN — Medication 3 MILLILITER(S): at 15:03

## 2017-09-07 RX ADMIN — MORPHINE SULFATE 4 MILLIGRAM(S): 50 CAPSULE, EXTENDED RELEASE ORAL at 13:10

## 2017-09-07 RX ADMIN — Medication 0.5 MILLIGRAM(S): at 22:05

## 2017-09-07 NOTE — PROGRESS NOTE ADULT - ATTENDING COMMENTS
COUNSELING:    Face to face meeting to discuss Advanced Care Planning - Time Spent ______ Minutes.  See goals of care note.    More than 50% time spent in counseling and coordinating care. ___45___ Minutes.     Thank you for the opportunity to assist with the care of this patient.   [facility] Consult [facility] [phone].

## 2017-09-07 NOTE — PROGRESS NOTE ADULT - PROBLEM SELECTOR PLAN 2
stephanie martin/britney'veronica yesterday per ID,  await official sputum culture data, mod candida species and numerous gram + cocci in rachid and chains, will give one dose of diflucan

## 2017-09-07 NOTE — PROGRESS NOTE ADULT - PROBLEM SELECTOR PLAN 2
? esophageal injury  CT surgery following  Given possibility of mediastinitis and Candida on sputum will add Micafungin

## 2017-09-07 NOTE — PROGRESS NOTE ADULT - PROBLEM SELECTOR PLAN 5
monitor for now - will hold BP meds   resume as tolerated Will monitor   pt with poor appetite, nutrition eval  PRN pain meds

## 2017-09-07 NOTE — PROGRESS NOTE ADULT - ASSESSMENT
57 yr old male with hypertension, alcohol abuse admitted on 9/3 with complaints of shortness of breath. Prior to this admission, patient was admitted to [place], where he initially presented with 4th plantar digit abscess, seen by podiatry. Also had an episode of acute on chronic pancreatitis. He developed SBP, which was tapped, treated with antibiotics. MRI abdomen showed pancreatic duct dilatation, portal vein thrombosis, given Lovenox, on repeat portal vein US, thrombus had resolved. He was transferred to [place] for ERCP which was done on 8/17, and a pancreatic duct stent was placed. He was discharged home on 8/24 with home care. Per sister, post discharge, patient continued to smoke, was barely eating and progressively got weaker. He presented to [place] with shortness of breath. Noted to have hypoxic respiratory failure, needing non invasive ventilation. Initial concern for pulmonary embolism, hence CTA chest was done, which showed necrotizing pneumonia and mediastinal fluid collection, suggestive of mediastinitis. He was started on Vancomycin and Meropenem and ID consulted. Cultures were sent. He continued to have hypoxia and continued to require BiPAP, had episodes of hypoxia on Hi Aayush. Palliative care was consulted, goals of care were discussed. Patient is DNR/DNI, started on morphine drip for respiratory distress. His sputum cultures grew Candida and hence was started on Micafungin. He was transferred to medical floor for further management. 57 yr old male with hypertension, alcohol abuse, pancreatitis admitted on 9/3 with complaints of shortness of breath. Prior to this admission, patient was admitted to [place], where he initially presented with 4th plantar digit abscess, seen by podiatry, had an I&D. He developed generalized rash and arthralgias, was consulted by rheumatology and dermatology. Diagnosed with panniculitis, likely secondary to pancreatitis and improved with a course of steroids. His course was complicated by ascites, suspected SBP. He was started on Lasix and Aldactone.He underwent paracentesis by IR, treated with Meropenem. MRI abdomen showed pancreatic duct dilatation, portal vein thrombosis, given Lovenox, on repeat portal vein US, thrombus had resolved. He was transferred to [place] for ERCP which was done on 8/17, and a pancreatic duct stent was placed. He was discharged home on 8/23 with home care. Per sister, post discharge, patient continued to smoke, was barely eating and progressively got weaker. He presented to [place] with shortness of breath. Noted to have hypoxic respiratory failure, requiring NIPPV. Initial concern for pulmonary embolism, hence CTA chest was done, which showed necrotizing pneumonia and mediastinal fluid collection, suggestive of mediastinitis. He was started on Vancomycin and Meropenem and ID consulted. Cultures were sent. He continued to have hypoxia and continued to require BiPAP, had episodes of hypoxia on Hi Aayush. Palliative care was consulted, goals of care were discussed. Patient is DNR/DNI, started on morphine drip for respiratory distress. His sputum cultures grew Candida and hence was started on Micafungin. He was transferred to medical floor for further management.

## 2017-09-07 NOTE — PROGRESS NOTE ADULT - ATTENDING COMMENTS
Overall prognosis poor. Spoke with patient's sister Mahnaz at length. She is in agreement of above and wants to focus on patient's comfort.

## 2017-09-07 NOTE — PROGRESS NOTE ADULT - ASSESSMENT
Acute Respiratory Failure  Dependent on BIPAP.  Pneumonia  Anxiety related to dyspnea        PLAN  Morphine 4mg IVP stat  Start Morphine infusion at 2mg/hr   Ativan 0.5 mg IV for anxiety stat  Transition to BIPAP mask that is better tolerated

## 2017-09-08 DIAGNOSIS — R06.09 OTHER FORMS OF DYSPNEA: ICD-10-CM

## 2017-09-08 DIAGNOSIS — F41.9 ANXIETY DISORDER, UNSPECIFIED: ICD-10-CM

## 2017-09-08 DIAGNOSIS — Z51.5 ENCOUNTER FOR PALLIATIVE CARE: ICD-10-CM

## 2017-09-08 PROCEDURE — 99233 SBSQ HOSP IP/OBS HIGH 50: CPT

## 2017-09-08 RX ORDER — HYDROMORPHONE HYDROCHLORIDE 2 MG/ML
1.5 INJECTION INTRAMUSCULAR; INTRAVENOUS; SUBCUTANEOUS ONCE
Qty: 0 | Refills: 0 | Status: DISCONTINUED | OUTPATIENT
Start: 2017-09-08 | End: 2017-09-08

## 2017-09-08 RX ORDER — PIPERACILLIN AND TAZOBACTAM 4; .5 G/20ML; G/20ML
3.38 INJECTION, POWDER, LYOPHILIZED, FOR SOLUTION INTRAVENOUS EVERY 8 HOURS
Qty: 0 | Refills: 0 | Status: DISCONTINUED | OUTPATIENT
Start: 2017-09-08 | End: 2017-09-09

## 2017-09-08 RX ORDER — ROBINUL 0.2 MG/ML
0.4 INJECTION INTRAMUSCULAR; INTRAVENOUS ONCE
Qty: 0 | Refills: 0 | Status: COMPLETED | OUTPATIENT
Start: 2017-09-08 | End: 2017-09-08

## 2017-09-08 RX ORDER — ALBUTEROL 90 UG/1
2.5 AEROSOL, METERED ORAL ONCE
Qty: 0 | Refills: 0 | Status: DISCONTINUED | OUTPATIENT
Start: 2017-09-08 | End: 2017-09-10

## 2017-09-08 RX ORDER — PIPERACILLIN AND TAZOBACTAM 4; .5 G/20ML; G/20ML
3.38 INJECTION, POWDER, LYOPHILIZED, FOR SOLUTION INTRAVENOUS ONCE
Qty: 0 | Refills: 0 | Status: COMPLETED | OUTPATIENT
Start: 2017-09-08 | End: 2017-09-08

## 2017-09-08 RX ORDER — MORPHINE SULFATE 50 MG/1
6 CAPSULE, EXTENDED RELEASE ORAL
Qty: 100 | Refills: 0 | Status: DISCONTINUED | OUTPATIENT
Start: 2017-09-08 | End: 2017-09-10

## 2017-09-08 RX ADMIN — Medication 2 SPRAY(S): at 14:49

## 2017-09-08 RX ADMIN — Medication 2 SPRAY(S): at 20:00

## 2017-09-08 RX ADMIN — MORPHINE SULFATE 6 MG/HR: 50 CAPSULE, EXTENDED RELEASE ORAL at 11:25

## 2017-09-08 RX ADMIN — MORPHINE SULFATE 5 MILLIGRAM(S): 50 CAPSULE, EXTENDED RELEASE ORAL at 05:20

## 2017-09-08 RX ADMIN — Medication 50 MILLIGRAM(S): at 04:33

## 2017-09-08 RX ADMIN — Medication 50 MILLIGRAM(S): at 13:19

## 2017-09-08 RX ADMIN — Medication 102 MILLIGRAM(S): at 21:30

## 2017-09-08 RX ADMIN — Medication 1 PATCH: at 13:06

## 2017-09-08 RX ADMIN — Medication 2 SPRAY(S): at 12:07

## 2017-09-08 RX ADMIN — HYDROMORPHONE HYDROCHLORIDE 1.5 MILLIGRAM(S): 2 INJECTION INTRAMUSCULAR; INTRAVENOUS; SUBCUTANEOUS at 15:05

## 2017-09-08 RX ADMIN — PIPERACILLIN AND TAZOBACTAM 25 GRAM(S): 4; .5 INJECTION, POWDER, LYOPHILIZED, FOR SOLUTION INTRAVENOUS at 13:10

## 2017-09-08 RX ADMIN — Medication 3 MILLILITER(S): at 03:05

## 2017-09-08 RX ADMIN — Medication 50 MILLIGRAM(S): at 21:30

## 2017-09-08 RX ADMIN — Medication 1.5 MILLIGRAM(S): at 17:54

## 2017-09-08 RX ADMIN — Medication 2 SPRAY(S): at 21:30

## 2017-09-08 RX ADMIN — Medication 3 MILLILITER(S): at 08:25

## 2017-09-08 RX ADMIN — MEROPENEM 200 MILLIGRAM(S): 1 INJECTION INTRAVENOUS at 05:04

## 2017-09-08 RX ADMIN — MORPHINE SULFATE 6 MG/HR: 50 CAPSULE, EXTENDED RELEASE ORAL at 11:32

## 2017-09-08 RX ADMIN — Medication 2 SPRAY(S): at 21:31

## 2017-09-08 RX ADMIN — MORPHINE SULFATE 5 MILLIGRAM(S): 50 CAPSULE, EXTENDED RELEASE ORAL at 05:05

## 2017-09-08 RX ADMIN — Medication 0.5 MILLIGRAM(S): at 05:05

## 2017-09-08 RX ADMIN — Medication 2 SPRAY(S): at 17:57

## 2017-09-08 RX ADMIN — PANTOPRAZOLE SODIUM 40 MILLIGRAM(S): 20 TABLET, DELAYED RELEASE ORAL at 13:05

## 2017-09-08 RX ADMIN — PIPERACILLIN AND TAZOBACTAM 25 GRAM(S): 4; .5 INJECTION, POWDER, LYOPHILIZED, FOR SOLUTION INTRAVENOUS at 21:30

## 2017-09-08 RX ADMIN — Medication 50 MILLIGRAM(S): at 17:55

## 2017-09-08 RX ADMIN — Medication 0.5 MILLIGRAM(S): at 13:18

## 2017-09-08 RX ADMIN — Medication 1.5 MILLIGRAM(S): at 14:42

## 2017-09-08 RX ADMIN — MICAFUNGIN SODIUM 105 MILLIGRAM(S): 100 INJECTION, POWDER, LYOPHILIZED, FOR SOLUTION INTRAVENOUS at 13:05

## 2017-09-08 RX ADMIN — ROBINUL 0.4 MILLIGRAM(S): 0.2 INJECTION INTRAMUSCULAR; INTRAVENOUS at 13:17

## 2017-09-08 RX ADMIN — PIPERACILLIN AND TAZOBACTAM 200 GRAM(S): 4; .5 INJECTION, POWDER, LYOPHILIZED, FOR SOLUTION INTRAVENOUS at 10:09

## 2017-09-08 RX ADMIN — Medication 102 MILLIGRAM(S): at 05:58

## 2017-09-08 RX ADMIN — Medication 1.5 MILLIGRAM(S): at 23:50

## 2017-09-08 RX ADMIN — Medication 2 SPRAY(S): at 10:26

## 2017-09-08 RX ADMIN — HYDROMORPHONE HYDROCHLORIDE 1.5 MILLIGRAM(S): 2 INJECTION INTRAMUSCULAR; INTRAVENOUS; SUBCUTANEOUS at 14:50

## 2017-09-08 NOTE — PROGRESS NOTE ADULT - ASSESSMENT
59 yo M  Actively dying  In Acute Respiratory Failure- Dependent on BIPAP  Overwhelming Pneumonia and Mediastinitis  Dyspnea-Anxiety   Cachexia

## 2017-09-08 NOTE — PROGRESS NOTE ADULT - PROBLEM SELECTOR PLAN 7
Palliative care following, Goals are clear- full medical management with no intubation or resuscitation. Adjusted opioid regimen, added Xanax for anxiety.  Continued support for pt and family   Spoke with sister Mahnaz this morning, she is going to come in today and visit as he seems to be declining further
pt confirms he is DNR/I but wants to try to fight this infection per discussion yesterday, today he is nervous that he is not improving and asked that I call his sister with an update which I did. She expressed that he finds comfort in knowing that we are all communicating.
I spent hours at patient's bedside, supporting family members, explaining dying process, what to expect...  Medications titrated to comfort,during and after BIPAP withdrawn  Patient very comfortable by later this afternoon
per pt's sister, she is proxy and there is a MOLST on file from prior hospital stay.  She joined us on rounds and reports they were speaking to palliative on prior admission and looking into hospice care, will consult Palliative and put orders for DNR

## 2017-09-08 NOTE — PROGRESS NOTE ADULT - PROBLEM SELECTOR PLAN 2
? esophageal injury  CT surgery following  Given possibility of mediastinitis and Candida on sputum will continue Micafungin

## 2017-09-08 NOTE — PROGRESS NOTE ADULT - ATTENDING COMMENTS
COUNSELING:    Face to face meeting to discuss Advanced Care Planning - Time Spent ______ Minutes.  See goals of care note.    More than 50% time spent in counseling and coordinating care. ___120___ Minutes.     Thank you for the opportunity to assist with the care of this patient.   [facility] Consult Service [phone].

## 2017-09-08 NOTE — CHART NOTE - NSCHARTNOTEFT_GEN_A_CORE
Source: Patient [ ]  Family [ ]   other [ X]    Current Diet: Regular    Patient reports [ ] nausea  [ ] vomiting [ ] diarrhea [ ] constipation  [ ]chewing problems [ ] swallowing issues  [ ] other:     PO intake:  < 50% [ ]   50-75%  [ ]   %  [ ]  other : Pt currently on BIPAP unable to take po diet. Poor appetite noted.    Source for PO intake [ ] Patient [ ] family [ ] chart [X ] staff [ ] other    Enteral /Parenteral Nutrition:     Current Weight: 54.8 kg    % Weight Change     Pertinent Medications: MEDICATIONS  (STANDING):  meropenem IVPB   IV Intermittent   meropenem IVPB 1000 milliGRAM(s) IV Intermittent every 8 hours  pantoprazole  Injectable 40 milliGRAM(s) IV Push daily  Ascorbic Acid in 160mL NS 1500 milliGRAM(s) 160 milliLiter(s) IV Intermittent every 6 hours  hydrocortisone sodium succinate Injectable 50 milliGRAM(s) IV Push every 6 hours  thiamine IVPB 200 milliGRAM(s) IV Intermittent every 12 hours  nicotine - 21 mG/24Hr(s) Patch 1 patch Transdermal daily  influenza   Vaccine 0.5 milliLiter(s) IntraMuscular once  ALBUTerol/ipratropium for Nebulization 3 milliLiter(s) Nebulizer every 6 hours  micafungin IVPB   IV Intermittent   micafungin IVPB 100 milliGRAM(s) IV Intermittent every 24 hours  saliva substitute (BIOTENE MOISTURIZING MOUTH SPRAY) 2 [place](s) Topical every 2 hours  morphine  Infusion 4 mG/Hr (4 mL/Hr) IV Continuous <Continuous>  LORazepam   Injectable 0.5 milliGRAM(s) IV Push every 6 hours    MEDICATIONS  (PRN):  oxyCODONE    IR 5 milliGRAM(s) Oral every 6 hours PRN Moderate Pain (4 - 6)  LORazepam   Injectable 0.5 milliGRAM(s) IV Push every 4 hours PRN dyspnea-anxiety  morphine  - Injectable 5 milliGRAM(s) IV Push every 2 hours PRN dyspnea-dependence on BIPAP  glycopyrrolate Injectable 0.2 milliGRAM(s) IV Push every 6 hours PRN excessive secretions    Pertinent Labs: CBC Full  -  ( 07 Sep 2017 05:53 )  WBC Count : 17.4 K/uL  Hemoglobin : 9.1 g/dL  Hematocrit : 27.0 %  Platelet Count - Automated : 44 K/uL  Mean Cell Volume : 94.7 fl  Mean Cell Hemoglobin : 31.9 pg  Mean Cell Hemoglobin Concentration : 33.7 g/dL    Skin:     Nutrition focused physical exam conducted - found signs of malnutrition [ ]absent [X ]present    Subcutaneous fat loss: [X ] Orbital fat pads region, [ ]Buccal fat region, [ ]Triceps region,  [ ]Ribs region    Muscle wasting: [ X]Temples region, [X ]Clavicle region, [X ]Shoulder region, [ ]Scapula region, [ ]Interosseous region,  [ ]thigh region, [ ]Calf region    Estimated Needs:   [X ] no change since previous assessment  [ ] recalculated:     Current Nutrition Diagnosis: Pt remains at nutritional risk secondary to severe-chronic protein calorie malnutrition related to inadequate energy intake in setting of chronic pancreatitis and new Dx of necrotizingPNA, sepsis as evidenced by wt loss of 49# (29%) over past 4 months and physical signs of severe muscle mass loss in temple, clavicle and shoulder region, subacutaneous fat loss in orbit, thoracic and tricep/bicep region. Pt remains with poor po intake, currently on BIPAP - unable to take po diet at this time. Palliative care is following - aware pt is DNR/DNI with focus on pts comfort.    Recommendations: 8oz Ensure Enlive TID po  Encouragement and assistance at meals when pt able to take po diet.    Monitoring and Evaluation:   [ ] PO intake [ ] Tolerance to diet prescription [X] Weights  [X] Follow up per protocol [X] Labs:

## 2017-09-08 NOTE — PROGRESS NOTE ADULT - PROBLEM SELECTOR PROBLEM 7
Advanced care planning/counseling discussion
Advanced care planning/counseling discussion
Dying care
Advanced care planning/counseling discussion

## 2017-09-08 NOTE — PROGRESS NOTE ADULT - ASSESSMENT
57 yr old male with hypertension, alcohol abuse, pancreatitis admitted on 9/3 with complaints of shortness of breath. Prior to this admission, patient was admitted to [place], where he initially presented with 4th plantar digit abscess, seen by podiatry, had an I&D. He developed generalized rash and arthralgias, was consulted by rheumatology and dermatology. Diagnosed with panniculitis, likely secondary to pancreatitis and improved with a course of steroids. His course was complicated by ascites, suspected SBP. He was started on Lasix and Aldactone.He underwent paracentesis by IR, treated with Meropenem. MRI abdomen showed pancreatic duct dilatation, portal vein thrombosis, given Lovenox, on repeat portal vein US, thrombus had resolved. He was transferred to [place] for ERCP which was done on 8/17, and a pancreatic duct stent was placed. He was discharged home on 8/23 with home care. Per sister, post discharge, patient continued to smoke, was barely eating and progressively got weaker. He presented to [place] with shortness of breath. Noted to have hypoxic respiratory failure, requiring NIPPV. Initial concern for pulmonary embolism, hence CTA chest was done, which showed necrotizing pneumonia and mediastinal fluid collection, suggestive of mediastinitis. He was started on Vancomycin and Meropenem and ID consulted. Cultures were sent. He continued to have hypoxia and continued to require BiPAP, had episodes of hypoxia on Hi Aayush. Palliative care was consulted, goals of care were discussed. Patient is DNR/DNI, started on morphine drip for respiratory distress. His sputum cultures grew Candida and hence was started on Micafungin. He was transferred to medical floor for further management. His morphine dose was titrated by Palliative care, given respiratory distress.

## 2017-09-09 DIAGNOSIS — R33.9 RETENTION OF URINE, UNSPECIFIED: ICD-10-CM

## 2017-09-09 PROCEDURE — 99233 SBSQ HOSP IP/OBS HIGH 50: CPT

## 2017-09-09 RX ADMIN — Medication 2 SPRAY(S): at 08:00

## 2017-09-09 RX ADMIN — Medication 1.5 MILLIGRAM(S): at 05:00

## 2017-09-09 RX ADMIN — Medication 1.5 MILLIGRAM(S): at 23:16

## 2017-09-09 RX ADMIN — Medication 2 SPRAY(S): at 21:17

## 2017-09-09 RX ADMIN — MORPHINE SULFATE 6 MG/HR: 50 CAPSULE, EXTENDED RELEASE ORAL at 11:16

## 2017-09-09 RX ADMIN — Medication 50 MILLIGRAM(S): at 03:00

## 2017-09-09 RX ADMIN — Medication 2 SPRAY(S): at 15:45

## 2017-09-09 RX ADMIN — ROBINUL 0.2 MILLIGRAM(S): 0.2 INJECTION INTRAMUSCULAR; INTRAVENOUS at 19:15

## 2017-09-09 RX ADMIN — Medication 102 MILLIGRAM(S): at 05:00

## 2017-09-09 RX ADMIN — Medication 2 SPRAY(S): at 13:40

## 2017-09-09 RX ADMIN — PANTOPRAZOLE SODIUM 40 MILLIGRAM(S): 20 TABLET, DELAYED RELEASE ORAL at 13:40

## 2017-09-09 RX ADMIN — Medication 2 SPRAY(S): at 02:00

## 2017-09-09 RX ADMIN — Medication 2 SPRAY(S): at 04:00

## 2017-09-09 RX ADMIN — PIPERACILLIN AND TAZOBACTAM 25 GRAM(S): 4; .5 INJECTION, POWDER, LYOPHILIZED, FOR SOLUTION INTRAVENOUS at 15:44

## 2017-09-09 RX ADMIN — Medication 2 SPRAY(S): at 23:16

## 2017-09-09 RX ADMIN — Medication 2 SPRAY(S): at 13:11

## 2017-09-09 RX ADMIN — Medication 1.5 MILLIGRAM(S): at 13:38

## 2017-09-09 RX ADMIN — MORPHINE SULFATE 6 MG/HR: 50 CAPSULE, EXTENDED RELEASE ORAL at 11:30

## 2017-09-09 RX ADMIN — Medication 50 MILLIGRAM(S): at 08:30

## 2017-09-09 RX ADMIN — Medication 2 SPRAY(S): at 06:00

## 2017-09-09 RX ADMIN — Medication 2 SPRAY(S): at 00:00

## 2017-09-09 RX ADMIN — MICAFUNGIN SODIUM 105 MILLIGRAM(S): 100 INJECTION, POWDER, LYOPHILIZED, FOR SOLUTION INTRAVENOUS at 13:37

## 2017-09-09 RX ADMIN — Medication 1.5 MILLIGRAM(S): at 19:14

## 2017-09-09 RX ADMIN — PIPERACILLIN AND TAZOBACTAM 25 GRAM(S): 4; .5 INJECTION, POWDER, LYOPHILIZED, FOR SOLUTION INTRAVENOUS at 06:50

## 2017-09-09 RX ADMIN — Medication 2 SPRAY(S): at 21:16

## 2017-09-09 NOTE — PROGRESS NOTE ADULT - ATTENDING COMMENTS
Spoke with sister Mahnaz, aware of poor prognosis. She does not want blood work but does want to c/w IV abx

## 2017-09-09 NOTE — PROCEDURE NOTE - NSINFORMCONSENT_GEN_A_CORE
From family/Benefits, risks, and possible complications of procedure explained to patient/caregiver who verbalized understanding and gave verbal consent.

## 2017-09-09 NOTE — PROGRESS NOTE ADULT - ASSESSMENT
57 yr old male with hypertension, alcohol abuse, pancreatitis admitted on 9/3 with complaints of shortness of breath. Prior to this admission, patient was admitted to [place], where he initially presented with 4th plantar digit abscess, seen by podiatry, had an I&D. He developed generalized rash and arthralgias, was consulted by rheumatology and dermatology. Diagnosed with panniculitis, likely secondary to pancreatitis and improved with a course of steroids. His course was complicated by ascites, suspected SBP. He was started on Lasix and Aldactone.He underwent paracentesis by IR, treated with Meropenem. MRI abdomen showed pancreatic duct dilatation, portal vein thrombosis, given Lovenox, on repeat portal vein US, thrombus had resolved. He was transferred to [place] for ERCP which was done on 8/17, and a pancreatic duct stent was placed. He was discharged home on 8/23 with home care. Per sister, post discharge, patient continued to smoke, was barely eating and progressively got weaker. He presented to [place] with shortness of breath. Noted to have hypoxic respiratory failure, requiring NIPPV. Initial concern for pulmonary embolism, hence CTA chest was done, which showed necrotizing pneumonia and mediastinal fluid collection, suggestive of mediastinitis. He was started on Vancomycin and Meropenem and ID consulted. Cultures were sent. He continued to have hypoxia and continued to require BiPAP, had episodes of hypoxia on Hi Aayush. Palliative care was consulted, goals of care were discussed. Patient is DNR/DNI, started on morphine drip for respiratory distress. His sputum cultures grew Candida and hence was started on Micafungin. He was transferred to medical floor for further management. His morphine dose was titrated by Palliative care, given respiratory distress.

## 2017-09-09 NOTE — PROCEDURE NOTE - NSURITECHNIQUE_GU_A_CORE
Proper hand hygiene was performed/Sterile gloves were worn for the duration of the procedure/The catheter was secured with a securement device (e.g. StatLock)/All applicable medical record documentation is completed/A sterile drape was used to cover all adjacent areas/The site was cleaned with soap/water and sterile solution (betadine)/The urinary drainage system is closed at the end of the procedure/The catheter was appropriately lubricated/The collection bag is below the level of the patient and urinary bladder

## 2017-09-09 NOTE — PROGRESS NOTE ADULT - PROBLEM SELECTOR PLAN 3
As above,   Sputum cx with Enterobacter aerogenes, Escherichia coli, Klebsiella pneumoniae and Candida  MRSA PCR negative  Zosyn and Micafungin as per ID  Follow up repeat blood cultures.

## 2017-09-09 NOTE — CONSULT NOTE ADULT - PROBLEM SELECTOR RECOMMENDATION 9
The patient is currently under palliative care.  Had extensive discussion with the family specifically the patient's sister (health care proxy) and daughter regarding kirk management.  As he is under palliative care, they do not wish to attempt TOV in future.  Will maintain kirk for now.  If the situation arises, then the catheter should be changed monthly.  Will see prn.
Will do Blood cultures  Check sputum cx  Check MRSA PCA  Continue Meropenem and Vancomycin  Can D/C Azithromycin  Follow up cultures  Afebrile  Leukocytosis trending down
per medicine/ICU team

## 2017-09-09 NOTE — PROCEDURE NOTE - NSPROCDETAILS_GEN_ALL_CORE
prior to placement, an active physician order for the placement of a urinary catheter was verified/sterile technique, indwelling urinary device inserted/a urinary catheter insertion kit was used for all insertion materials

## 2017-09-09 NOTE — CONSULT NOTE ADULT - SUBJECTIVE AND OBJECTIVE BOX
HPI:  57M Presents with C/O SOB worsening over past few days.  PMhx chronic pancreatitis (dx 2002), Alcohol abuse (last drink 2 years), HTN, inguinal hernia repair.  Recent protracted hospital course initially presented to [facility] 7/29/2017 for worsening foot pain refractory to antibiotics. Patient was found to have acute on chronic pancreatitis.  He did  improved with course of steroid taper. Patient has had multiple complications throughout his course. He was found to have worsening abdominal ascites, which was tapped found to have  SBP and started on abx, and escalated to meropenum for worsening leukocytosis. . MRI Abdomen showed stable pancreatic pseudocysts, dilated main pancreatic duct as well as acute/subacute portal vein thrombosis. Patient started on therapeutic Lovenox with  repeat abdominal US with doppler which shows no thrombus.  Patient was then transferred to [facility] 8/15/2017 for  an  ERCP and stent placement.   Eventually discharged to Rehab on 8/23.  Pt has been home from rehab X 1 week.  Lives alone and has a neighbor who helps out. (04 Sep 2017 22:24)    Urology consultation called because patient had not voided and bladder scan demonstrated over 1000 ml.  Staff unable to place kirk.  Surgical PA placed coude catheter - 300 ml drained.      PAST MEDICAL & SURGICAL HISTORY:  Ascites: may 2017  Hernia  Pancreatitis  Hypertension  TIA (transient ischemic attack)  History of inguinal hernia repair  S/P skin graft using Integra: right calf skin graft  Fracture of shaft of left femur: 1971 compound fracrture left femur  1980 left femur bone spur removed      REVIEW OF SYSTEMS:    As per HPI    MEDICATIONS  (STANDING):  pantoprazole  Injectable 40 milliGRAM(s) IV Push daily  Ascorbic Acid in 160mL NS 1500 milliGRAM(s) 160 milliLiter(s) IV Intermittent every 6 hours  thiamine IVPB 200 milliGRAM(s) IV Intermittent every 12 hours  nicotine - 21 mG/24Hr(s) Patch 1 patch Transdermal daily  influenza   Vaccine 0.5 milliLiter(s) IntraMuscular once  micafungin IVPB   IV Intermittent   micafungin IVPB 100 milliGRAM(s) IV Intermittent every 24 hours  saliva substitute (BIOTENE MOISTURIZING MOUTH SPRAY) 2 [place](s) Topical every 2 hours  piperacillin/tazobactam IVPB. 3.375 Gram(s) IV Intermittent every 8 hours  morphine  Infusion 6 mG/Hr (6 mL/Hr) IV Continuous <Continuous>  LORazepam   Injectable 1.5 milliGRAM(s) IV Push every 6 hours    MEDICATIONS  (PRN):  morphine  - Injectable 5 milliGRAM(s) IV Push every 2 hours PRN dyspnea-dependence on BIPAP  glycopyrrolate Injectable 0.2 milliGRAM(s) IV Push every 6 hours PRN excessive secretions  ALBUTerol    0.083%. 2.5 milliGRAM(s) Nebulizer once PRN Assist with LABORED BREATHING      Allergies    iodine (Swelling)    Intolerances        SOCIAL HISTORY:    FAMILY HISTORY:  Family history of duodenal cancer      Vital Signs Last 24 Hrs  T(C): 36.7 (09 Sep 2017 11:39), Max: 36.7 (09 Sep 2017 11:39)  T(F): 98 (09 Sep 2017 11:39), Max: 98 (09 Sep 2017 11:39)  HR: 98 (09 Sep 2017 11:39) (98 - 98)  BP: 86/60 (09 Sep 2017 11:39) (86/60 - 86/60)  BP(mean): --  RR: 12 (09 Sep 2017 11:39) (12 - 12)  SpO2: 95% (09 Sep 2017 11:39) (95% - 95%)    PHYSICAL EXAM:    General: no acute distress.  Not responsive to stimuli  Head: Normocephalic; atraumatic  Respiratory: No tachypnea  Gastrointestinal: Soft non-tender non-distended;   Genitourinary: No costovertebral angle tenderness.  Urinary bladder is clinically not distended.  Kirk catheter draining clear  Neurological: Not responsive to stimuli  Skin: Warm and dry. No acute rash        LABS:                RADIOLOGY & ADDITIONAL STUDIES:

## 2017-09-10 VITALS
DIASTOLIC BLOOD PRESSURE: 78 MMHG | SYSTOLIC BLOOD PRESSURE: 101 MMHG | RESPIRATION RATE: 18 BRPM | HEART RATE: 81 BPM | OXYGEN SATURATION: 92 % | TEMPERATURE: 98 F

## 2017-09-10 LAB
CULTURE RESULTS: SIGNIFICANT CHANGE UP
CULTURE RESULTS: SIGNIFICANT CHANGE UP
SPECIMEN SOURCE: SIGNIFICANT CHANGE UP
SPECIMEN SOURCE: SIGNIFICANT CHANGE UP

## 2017-09-10 PROCEDURE — 93005 ELECTROCARDIOGRAM TRACING: CPT

## 2017-09-10 PROCEDURE — 80048 BASIC METABOLIC PNL TOTAL CA: CPT

## 2017-09-10 PROCEDURE — 84132 ASSAY OF SERUM POTASSIUM: CPT

## 2017-09-10 PROCEDURE — 87070 CULTURE OTHR SPECIMN AEROBIC: CPT

## 2017-09-10 PROCEDURE — 82435 ASSAY OF BLOOD CHLORIDE: CPT

## 2017-09-10 PROCEDURE — 83605 ASSAY OF LACTIC ACID: CPT

## 2017-09-10 PROCEDURE — 80202 ASSAY OF VANCOMYCIN: CPT

## 2017-09-10 PROCEDURE — 94660 CPAP INITIATION&MGMT: CPT

## 2017-09-10 PROCEDURE — 85730 THROMBOPLASTIN TIME PARTIAL: CPT

## 2017-09-10 PROCEDURE — 99292 CRITICAL CARE ADDL 30 MIN: CPT | Mod: 25

## 2017-09-10 PROCEDURE — 85610 PROTHROMBIN TIME: CPT

## 2017-09-10 PROCEDURE — 84484 ASSAY OF TROPONIN QUANT: CPT

## 2017-09-10 PROCEDURE — 83690 ASSAY OF LIPASE: CPT

## 2017-09-10 PROCEDURE — 87641 MR-STAPH DNA AMP PROBE: CPT

## 2017-09-10 PROCEDURE — 71275 CT ANGIOGRAPHY CHEST: CPT

## 2017-09-10 PROCEDURE — 83880 ASSAY OF NATRIURETIC PEPTIDE: CPT

## 2017-09-10 PROCEDURE — 87040 BLOOD CULTURE FOR BACTERIA: CPT

## 2017-09-10 PROCEDURE — 99291 CRITICAL CARE FIRST HOUR: CPT | Mod: 25

## 2017-09-10 PROCEDURE — 96374 THER/PROPH/DIAG INJ IV PUSH: CPT | Mod: XU

## 2017-09-10 PROCEDURE — 87449 NOS EACH ORGANISM AG IA: CPT

## 2017-09-10 PROCEDURE — 99233 SBSQ HOSP IP/OBS HIGH 50: CPT

## 2017-09-10 PROCEDURE — 84100 ASSAY OF PHOSPHORUS: CPT

## 2017-09-10 PROCEDURE — 74174 CTA ABD&PLVS W/CONTRAST: CPT

## 2017-09-10 PROCEDURE — 82803 BLOOD GASES ANY COMBINATION: CPT

## 2017-09-10 PROCEDURE — 82330 ASSAY OF CALCIUM: CPT

## 2017-09-10 PROCEDURE — 71045 X-RAY EXAM CHEST 1 VIEW: CPT

## 2017-09-10 PROCEDURE — 96375 TX/PRO/DX INJ NEW DRUG ADDON: CPT

## 2017-09-10 PROCEDURE — 87186 SC STD MICRODIL/AGAR DIL: CPT

## 2017-09-10 PROCEDURE — 87640 STAPH A DNA AMP PROBE: CPT

## 2017-09-10 PROCEDURE — 99231 SBSQ HOSP IP/OBS SF/LOW 25: CPT

## 2017-09-10 PROCEDURE — 36415 COLL VENOUS BLD VENIPUNCTURE: CPT

## 2017-09-10 PROCEDURE — 83735 ASSAY OF MAGNESIUM: CPT

## 2017-09-10 PROCEDURE — 80053 COMPREHEN METABOLIC PANEL: CPT

## 2017-09-10 PROCEDURE — 94760 N-INVAS EAR/PLS OXIMETRY 1: CPT

## 2017-09-10 PROCEDURE — 84295 ASSAY OF SERUM SODIUM: CPT

## 2017-09-10 PROCEDURE — 85027 COMPLETE CBC AUTOMATED: CPT

## 2017-09-10 PROCEDURE — 82550 ASSAY OF CK (CPK): CPT

## 2017-09-10 PROCEDURE — 94640 AIRWAY INHALATION TREATMENT: CPT

## 2017-09-10 PROCEDURE — 82947 ASSAY GLUCOSE BLOOD QUANT: CPT

## 2017-09-10 PROCEDURE — 36600 WITHDRAWAL OF ARTERIAL BLOOD: CPT

## 2017-09-10 PROCEDURE — 85014 HEMATOCRIT: CPT

## 2017-09-10 RX ADMIN — Medication 2 SPRAY(S): at 11:20

## 2017-09-10 RX ADMIN — Medication 2 SPRAY(S): at 06:32

## 2017-09-10 RX ADMIN — Medication 1.5 MILLIGRAM(S): at 06:33

## 2017-09-10 RX ADMIN — Medication 2 SPRAY(S): at 14:37

## 2017-09-10 RX ADMIN — Medication 2 SPRAY(S): at 11:26

## 2017-09-10 RX ADMIN — MORPHINE SULFATE 6 MG/HR: 50 CAPSULE, EXTENDED RELEASE ORAL at 03:25

## 2017-09-10 RX ADMIN — Medication 1.5 MILLIGRAM(S): at 11:25

## 2017-09-10 RX ADMIN — Medication 2 SPRAY(S): at 02:19

## 2017-09-10 RX ADMIN — PANTOPRAZOLE SODIUM 40 MILLIGRAM(S): 20 TABLET, DELAYED RELEASE ORAL at 11:25

## 2017-09-10 RX ADMIN — Medication 2 SPRAY(S): at 09:34

## 2017-09-10 RX ADMIN — Medication 2 SPRAY(S): at 05:40

## 2017-09-10 NOTE — PROGRESS NOTE ADULT - SUBJECTIVE AND OBJECTIVE BOX
CHIEF COMPLAINT/INTERVAL HISTORY:    Patient is a 57y old  Male who presents with a chief complaint of Shortness of breath (06 Sep 2017 00:44)      HPI:  57M Presents with C/O SOB worsening over past few days.  PMhx chronic pancreatitis (dx 2002), Alcohol abuse (last drink 2 years), HTN, inguinal hernia repair.  Recent protracted hospital course initially presented to [facility] 7/29/2017 for worsening foot pain refractory to antibiotics. Patient was found to have acute on chronic pancreatitis.  He did  improved with course of steroid taper. Patient has had multiple complications throughout his course. He was found to have worsening abdominal ascites, which was tapped found to have  SBP and started on abx, and escalated to meropenum for worsening leukocytosis. . MRI Abdomen showed stable pancreatic pseudocysts, dilated main pancreatic duct as well as acute/subacute portal vein thrombosis. Patient started on therapeutic Lovenox with  repeat abdominal US with doppler which shows no thrombus.  Patient was then transferred to [facility] 8/15/2017 for  an  ERCP and stent placement.   Eventually discharged to Rehab on 8/23.  Pt has been home from rehab X 1 week.  Lives alone and has a neighbor who helps out. (04 Sep 2017 22:24)      SUBJECTIVE & OBJECTIVE: Pt seen and examined at bedside. Comfort care made by family    ICU Vital Signs Last 24 Hrs  T(C): 36.9 (10 Sep 2017 07:00), Max: 36.9 (10 Sep 2017 07:00)  T(F): 98.4 (10 Sep 2017 07:00), Max: 98.4 (10 Sep 2017 07:00)  HR: 81 (10 Sep 2017 07:00) (81 - 81)  BP: 101/78 (10 Sep 2017 07:00) (101/78 - 129/80)  BP(mean): --  ABP: --  ABP(mean): --  RR: 18 (10 Sep 2017 07:00) (18 - 20)  SpO2: 92% (10 Sep 2017 07:00) (92% - 92%)        MEDICATIONS  (STANDING):  pantoprazole  Injectable 40 milliGRAM(s) IV Push daily  saliva substitute (BIOTENE MOISTURIZING MOUTH SPRAY) 2 [place](s) Topical every 2 hours  morphine  Infusion 6 mG/Hr (6 mL/Hr) IV Continuous <Continuous>  LORazepam   Injectable 1.5 milliGRAM(s) IV Push every 6 hours    MEDICATIONS  (PRN):  morphine  - Injectable 5 milliGRAM(s) IV Push every 2 hours PRN dyspnea-dependence on BIPAP  glycopyrrolate Injectable 0.2 milliGRAM(s) IV Push every 6 hours PRN excessive secretions  ALBUTerol    0.083%. 2.5 milliGRAM(s) Nebulizer once PRN Assist with LABORED BREATHING      LABS:                CAPILLARY BLOOD GLUCOSE          RECENT CULTURES:      RADIOLOGY & ADDITIONAL TESTS:    PHYSICAL EXAM:  GENERAL: cachetic, agonal breating, difficult to arouse  CHEST/LUNG: b/l air entry, coarse  HEART: Regular   ABDOMEN: Soft, BS+  EXTREMITIES: b/l edema
57y  Male  iodine (Swelling)    CC: Patient is a 57y old  Male who presented with a chief complaint of Shortness of breath     HPI: 57M Presented with C/O SOB that was worsening over past few days.  PMhx  Alcohol abuse , chronic pancreatitis, hypertension and  inguinal hernia repair.  Recent  hospital course initially presented to [place] in July for which he had multiple complications including being found to have acute on chronic pancreatitis with worsening abdominal ascites, a abdominal paracentesis was performed  which found  SBP and started on abx, that was escalated to meropenum for worsening leukocytosis.  Patient was transferred to [facility]  for an ERCP and stent placement and eventually discharged to Rehab on 8/23 but returns with worsening SOB and found to have multifocal pneumonia with gas production begin treated for sepsis with broad spectrum antibiotics. Currently he desaturates off oxygen and remains on BIPAP, evening Vanco dose held due to high trough of 26 ( will check again before morning dose)       PAST MEDICAL & SURGICAL HISTORY:  Ascites: may 2017  Hernia  Pancreatitis  Hypertension  TIA (transient ischemic attack)  History of inguinal hernia repair  S/P skin graft using Integra: right calf skin graft  Fracture of shaft of left femur: 1971 compound fracrture left femur  1980 left femur bone spur removed    FAMILY HISTORY:  Family history of duodenal cancer      Vitals   Vital Signs Last 24 Hrs  T(C): 37.3 (06 Sep 2017 00:00), Max: 37.3 (06 Sep 2017 00:00)  T(F): 99.1 (06 Sep 2017 00:00), Max: 99.1 (06 Sep 2017 00:00)  HR: 90 (06 Sep 2017 02:00) (90 - 122)  BP: 100/71 (06 Sep 2017 02:00) (95/67 - 118/83)  BP(mean): 82 (06 Sep 2017 02:00) (76 - 96)  RR: 29 (06 Sep 2017 02:00) (23 - 36)  SpO2: 96% (06 Sep 2017 02:00) (90% - 98%)  ABG - ( 05 Sep 2017 06:28 )  pH: 7.55  /  pCO2: 35    /  pO2: 62    / HCO3: 32    / Base Excess: 8.2   /  SaO2: 93                I&O's Summary    04 Sep 2017 07:01  -  05 Sep 2017 07:00  --------------------------------------------------------  IN: 950 mL / OUT: 150 mL / NET: 800 mL    05 Sep 2017 07:01  -  06 Sep 2017 02:35  --------------------------------------------------------  IN: 870 mL / OUT: 450 mL / NET: 420 mL        LABS  09-05    133<L>  |  87<L>  |  25.0<H>  ----------------------------<  112  4.0   |  29.0  |  0.68    Ca    7.8<L>      05 Sep 2017 06:53    TPro  5.9<L>  /  Alb  2.1<L>  /  TBili  0.5  /  DBili  x   /  AST  19  /  ALT  15  /  AlkPhos  151<H>  09-04                          11.4   12.7  )-----------( 134      ( 05 Sep 2017 06:53 )             33.9     PT/INR - ( 04 Sep 2017 20:37 )   PT: 14.7 sec;   INR: 1.33 ratio         PTT - ( 04 Sep 2017 20:37 )  PTT:29.4 sec  CARDIAC MARKERS ( 04 Sep 2017 20:37 )  x     / <0.01 ng/mL / 25 U/L / x     / x          LIVER FUNCTIONS - ( 04 Sep 2017 20:37 )  Alb: 2.1 g/dL / Pro: 5.9 g/dL / ALK PHOS: 151 U/L / ALT: 15 U/L / AST: 19 U/L / GGT: x           CAPILLARY BLOOD GLUCOSE            VENT SETTINGS   non invasive ventilation ( BIPAP) IPAP 12/EPAP 6 Fio2 60%     Meds  MEDICATIONS  (STANDING):  vancomycin  IVPB 1000 milliGRAM(s) IV Intermittent every 8 hours  meropenem IVPB   IV Intermittent   meropenem IVPB 1000 milliGRAM(s) IV Intermittent every 8 hours  enoxaparin Injectable 40 milliGRAM(s) SubCutaneous daily  pantoprazole  Injectable 40 milliGRAM(s) IV Push daily  Ascorbic Acid in 160mL NS 1500 milliGRAM(s) 160 milliLiter(s) IV Intermittent every 6 hours  hydrocortisone sodium succinate Injectable 50 milliGRAM(s) IV Push every 6 hours  thiamine IVPB 200 milliGRAM(s) IV Intermittent every 12 hours  nicotine - 21 mG/24Hr(s) Patch 1 patch Transdermal daily  influenza   Vaccine 0.5 milliLiter(s) IntraMuscular once      REVIEW OF SYSTEMS:    CONSTITUTIONAL: No fever, weight loss, or fatigue  EYES: No eye pain, visual disturbances, or discharge  ENMT:  No difficulty hearing, tinnitus, vertigo; No sinus or throat pain  NECK: No pain or stiffness  BREASTS: No pain, masses, or nipple discharge  RESPIRATORY: No cough, wheezing, chills or hemoptysis; No shortness of breath  CARDIOVASCULAR: No chest pain, palpitations, dizziness, or leg swelling  GASTROINTESTINAL: No abdominal or epigastric pain. No nausea, vomiting, or hematemesis; No diarrhea or constipation. No melena or hematochezia.  GENITOURINARY: No dysuria, frequency, hematuria, or incontinence  NEUROLOGICAL: No headaches, memory loss, loss of strength, numbness, or tremors  SKIN: No itching, burning, rashes, or lesions   LYMPH NODES: No enlarged glands  ENDOCRINE: No heat or cold intolerance; No hair loss  MUSCULOSKELETAL: No joint pain or swelling; No muscle, back, or extremity pain  PSYCHIATRIC: No depression, anxiety, mood swings, or difficulty sleeping  HEME/LYMPH: No easy bruising, or bleeding gums  ALLERY AND IMMUNOLOGIC: No hives or eczema      Physicial Exam:     Constitutional: malnurished, on BIPAP  HEENT: PERRLA, EOMI,   Neck:    No JVD  Respiratory: Breath Sounds equal to auscultation , no accessory muscle use on BIPAP   Cardiovascular: Regular rate & rhythm, normal S1, S2; no murmurs, gallops or rubs; no S3, S4  Gastrointestinal: Soft, non-tender, non distended no hepatosplenomegaly, normal bowel sounds  Extremities: No peripheral edema, No cyanosis, clubbing   Vascular: Equal and normal pulses: 2+ peripheral pulses throughout  Neurological:   A&O x 3; no sensory, motor  deficits, normal reflexes  Psychiatric: Normal mood, normal affect  Musculoskeletal: No joint pain, swelling or deformity; no limitation of movement  Skin: No rashes
[facility]  INFECTIOUS DISEASES AND INTERNAL MEDICINE OF [place]  =======================================================  Lyle Caceres MD  Diplomates American Board of Internal Medicine and Infectious Diseases  =======================================================    IRMAALFONSODON MAGDALENO [id number]    Follow up: Necrotizing pneumonia    Still with SOB  On BIPAP  no fevers      Allergies:  iodine (Swelling)      Antibiotics:  vancomycin  IVPB 1000 milliGRAM(s) IV Intermittent every 8 hours  meropenem IVPB 1000 milliGRAM(s) IV Intermittent every 8 hours        REVIEW OF SYSTEMS:  CONSTITUTIONAL:  No Fever or chills  HEENT:  No diplopia or blurred vision.  No earache, sore throat or runny nose.  CARDIOVASCULAR:  No pressure, squeezing, strangling, tightness, heaviness or aching about the chest, neck, axilla or epigastrium.  RESPIRATORY:  + cough, + shortness of breath  GASTROINTESTINAL:  No nausea, vomiting or diarrhea.  GENITOURINARY:  No dysuria, frequency or urgency.  MUSCULOSKELETAL:  no joint aches, no muscle pain  SKIN:  No change in skin, hair or nails.  NEUROLOGIC:  No paresthesias, fasciculations  PSYCHIATRIC:  No disorder of thought or mood.  ENDOCRINE:  No heat or cold intolerance  HEMATOLOGICAL:  No easy bruising or bleeding.       Physical Exam:  ICU Vital Signs Last 24 Hrs  T(C): 36.9 (07 Sep 2017 08:10), Max: 37 (07 Sep 2017 00:00)  T(F): 98.4 (07 Sep 2017 08:10), Max: 98.6 (07 Sep 2017 00:00)  HR: 103 (07 Sep 2017 08:37) (91 - 128)  BP: 103/61 (07 Sep 2017 08:00) (97/64 - 124/78)  BP(mean): 76 (07 Sep 2017 08:00) (74 - 94)  RR: 33 (07 Sep 2017 08:30) (22 - 39)  SpO2: 87% (07 Sep 2017 08:37) (82% - 93%)      GEN: NAD, cachectic   HEENT: normocephalic and atraumatic. EOMI. PERRL.    NECK: Supple.   LUNGS: + Rales, diffuse  HEART: Regular rate and rhythm   ABDOMEN: Soft, nontender, and nondistended.  Positive bowel sounds.    : No CVA tenderness  EXTREMITIES: Trace edema.  MSK: No joint swelling  NEUROLOGIC: No focal deficits  PSYCHIATRIC: Appropriate affect .  SKIN: No rash      Labs:  09-07    133<L>  |  89<L>  |  36.0<H>  ----------------------------<  172<H>  3.8   |  27.0  |  1.01    Ca    7.3<L>      07 Sep 2017 05:53  Phos  4.5     09-07  Mg     2.0     09-07                      9.1    17.4  )-----------( 44       ( 07 Sep 2017 05:53 )             27.0         RECENT CULTURES:  Culture - Sputum . (09.05.17 @ 15:15)    Gram Stain:   Few WBC's  Numerous Gram Positive Cocci in Pairs and Chains    Specimen Source: .Sputum Sputum    Culture Results:   Moderate Candida species  Rare Gram Negative Rods Identification and susceptibility to follow.  Moderate Routine respiratory karen present  Culture in progress          EXAM:  CT ANGIO ABD PELV (W)AW IC                        EXAM:  CT ANGIO CHEST (W)AW IC                        PROCEDURE DATE:  09/05/2017    INTERPRETATION:  EXAMINATION: CT ANGIOGRAPHY OF THE CHEST (AORTIC   DISSECTION PROTOCOL)  INDICATION: Hypoxemic, tachycardic, shortness of breath, evaluate for   pulmonary embolism  Technique: Axial images were obtained from the thoracic inlet through the   pelvis during arterial phase of intravenous contrast administration. 94   cc of Omnipaque 350 was administered intravenously without complication.    Reformatted coronal and sagittal images are submitted.  COMPARISON: Abdominal CT of August 4, 2017  FINDINGS:  There is no aortic dissection, aneurysm, intramural hematoma or   para-aortic hemorrhage.  There is no central pulmonary embolism.  The   great vessels are not dilated.    The visualized neck, axilla and subcutaneous tissues are unremarkable.  The tracheobronchial tree is patent proximally. There are scattered foci   of air in the posterior mediastinum. There is a moderately large volume   of fluid in the posterior mediastinal soft tissues. There is no   significant mediastinal or hilar adenopathy.  The heart is not enlarged.  There is no pericardial effusion.  Lungs: There are patchy airspace and groundglass opacities throughout the   lungs, worst at the lung bases compatible with multifocal pneumonia. Foci   of air in the left lower lobe are compatible with necrotizing pneumonia.   There is a left-sided hydropneumothorax likely related to extension of   the left lung parenchymal infection or bronchopleural fistula should be   excluded clinically. Patchy opacities are also seen in the right lower   and middle lobes to a lesser extent. There are moderate centrilobular and   paraseptal emphysematous changes. There is minor involvement of the   lingula and left upper lobe.  There is no pleural abnormality.  There is no free air. There is a moderate volume of abdominopelvic   ascites, not significantly changed compared to the previous CT of August 4, 2017.  The liver, spleen, adrenal glands and kidneys are unremarkable. Again   seen are coarse calcifications involving the head of the pancreas   compatible with sequela of chronic pancreatitis. There is a stable 3.6 cm   cystic focus in the head of the pancreas. There is decreased dilatation   of the pancreatic duct (4-5 mm)with a stent in the neck.   Gallbladder: Unremarkable.  There is no small bowel obstruction.  Pelvic organs: No pelvic masses.  The urinary bladder is unremarkable.  The aorta is not dilated.  There are atherosclerotic vascular calcifications.  There is no significant adenopathy.  There is no retroperitoneal mass.  The visualized osseous structures are unremarkable.  IMPRESSION:  No evidence of aortic dissection or pulmonary embolism.  Multifocal pneumonia complicated by air in the left lower lobe, left   pleural space, and posterior mediastinum concerning for necrotizing   infection with trans-spatial spread of gas-forming organism.  Correlate   clinically for wretching to exclude esophageal injury as cause of   posterior mediastinal air. Bronchopleural fistula is less likely.  No significant change in moderately large volume of abdominopelvic ascites.  Decreased pancreatic ductal dilatation status post stent placement.   Chronic pancreatitis without convincing acute peripancreatic inflammation
[facility]  INFECTIOUS DISEASES AND INTERNAL MEDICINE OF [place]  =======================================================  Lyle Caceres MD  Diplomates American Board of Internal Medicine and Infectious Diseases  =======================================================    DON GOOD [id number]    Follow up: Necrotizing pneumonia    Still with some SOB  no fevers      Allergies:  iodine (Swelling)      Antibiotics:  vancomycin  IVPB 1000 milliGRAM(s) IV Intermittent every 8 hours  meropenem IVPB 1000 milliGRAM(s) IV Intermittent every 8 hours        REVIEW OF SYSTEMS:  CONSTITUTIONAL:  No Fever or chills  HEENT:  No diplopia or blurred vision.  No earache, sore throat or runny nose.  CARDIOVASCULAR:  No pressure, squeezing, strangling, tightness, heaviness or aching about the chest, neck, axilla or epigastrium.  RESPIRATORY:  + cough, + shortness of breath  GASTROINTESTINAL:  No nausea, vomiting or diarrhea.  GENITOURINARY:  No dysuria, frequency or urgency.  MUSCULOSKELETAL:  no joint aches, no muscle pain  SKIN:  No change in skin, hair or nails.  NEUROLOGIC:  No paresthesias, fasciculations  PSYCHIATRIC:  No disorder of thought or mood.  ENDOCRINE:  No heat or cold intolerance  HEMATOLOGICAL:  No easy bruising or bleeding.       Physical Exam:  Vital Signs Last 24 Hrs  T(C): 36.4 (06 Sep 2017 08:00), Max: 37.3 (06 Sep 2017 00:00)  T(F): 97.6 (06 Sep 2017 08:00), Max: 99.1 (06 Sep 2017 00:00)  HR: 95 (06 Sep 2017 09:00) (84 - 106)  BP: 101/63 (06 Sep 2017 09:00) (95/67 - 106/72)  BP(mean): 79 (06 Sep 2017 09:00) (72 - 85)  RR: 34 (06 Sep 2017 09:00) (21 - 36)  SpO2: 91% (06 Sep 2017 09:00) (90% - 97%)      GEN: NAD, cachectic   HEENT: normocephalic and atraumatic. EOMI. PERRL.    NECK: Supple.   LUNGS: + Rales b/l Lower lobes  HEART: Regular rate and rhythm   ABDOMEN: Soft, nontender, and nondistended.  Positive bowel sounds.    : No CVA tenderness  EXTREMITIES: Without any edema.  MSK: No joint swelling  NEUROLOGIC: No focal deficits  PSYCHIATRIC: Appropriate affect .  SKIN: No rash      Labs:  09-06    132<L>  |  89<L>  |  33.0<H>  ----------------------------<  125<H>  3.3<L>   |  31.0<H>  |  0.72    Ca    7.4<L>      06 Sep 2017 05:49  Phos  4.6     09-06  Mg     2.1     09-06    TPro  5.9<L>  /  Alb  2.1<L>  /  TBili  0.5  /  DBili  x   /  AST  19  /  ALT  15  /  AlkPhos  151<H>  09-04                          8.8    15.5  )-----------( 83       ( 06 Sep 2017 09:41 )             25.7       PT/INR - ( 04 Sep 2017 20:37 )   PT: 14.7 sec;   INR: 1.33 ratio         PTT - ( 04 Sep 2017 20:37 )  PTT:29.4 sec    LIVER FUNCTIONS - ( 04 Sep 2017 20:37 )  Alb: 2.1 g/dL / Pro: 5.9 g/dL / ALK PHOS: 151 U/L / ALT: 15 U/L / AST: 19 U/L / GGT: x           CARDIAC MARKERS ( 04 Sep 2017 20:37 )  x     / <0.01 ng/mL / 25 U/L / x     / x          ABG - ( 05 Sep 2017 06:28 )  pH: 7.55  /  pCO2: 35    /  pO2: 62    / HCO3: 32    / Base Excess: 8.2   /  SaO2: 93          RECENT CULTURES:  Culture - Sputum . (09.05.17 @ 15:15)    Gram Stain:   Few WBC's  Numerous Gram Positive Cocci in Pairs and Chains    Specimen Source: .Sputum Sputum        EXAM:  CT ANGIO ABD PELV (W)AW IC                        EXAM:  CT ANGIO CHEST (W)AW IC                        PROCEDURE DATE:  09/05/2017    INTERPRETATION:  EXAMINATION: CT ANGIOGRAPHY OF THE CHEST (AORTIC   DISSECTION PROTOCOL)  INDICATION: Hypoxemic, tachycardic, shortness of breath, evaluate for   pulmonary embolism  Technique: Axial images were obtained from the thoracic inlet through the   pelvis during arterial phase of intravenous contrast administration. 94   cc of Omnipaque 350 was administered intravenously without complication.    Reformatted coronal and sagittal images are submitted.  COMPARISON: Abdominal CT of August 4, 2017  FINDINGS:  There is no aortic dissection, aneurysm, intramural hematoma or   para-aortic hemorrhage.  There is no central pulmonary embolism.  The   great vessels are not dilated.    The visualized neck, axilla and subcutaneous tissues are unremarkable.  The tracheobronchial tree is patent proximally. There are scattered foci   of air in the posterior mediastinum. There is a moderately large volume   of fluid in the posterior mediastinal soft tissues. There is no   significant mediastinal or hilar adenopathy.  The heart is not enlarged.  There is no pericardial effusion.  Lungs: There are patchy airspace and groundglass opacities throughout the   lungs, worst at the lung bases compatible with multifocal pneumonia. Foci   of air in the left lower lobe are compatible with necrotizing pneumonia.   There is a left-sided hydropneumothorax likely related to extension of   the left lung parenchymal infection or bronchopleural fistula should be   excluded clinically. Patchy opacities are also seen in the right lower   and middle lobes to a lesser extent. There are moderate centrilobular and   paraseptal emphysematous changes. There is minor involvement of the   lingula and left upper lobe.  There is no pleural abnormality.  There is no free air. There is a moderate volume of abdominopelvic   ascites, not significantly changed compared to the previous CT of August 4, 2017.  The liver, spleen, adrenal glands and kidneys are unremarkable. Again   seen are coarse calcifications involving the head of the pancreas   compatible with sequela of chronic pancreatitis. There is a stable 3.6 cm   cystic focus in the head of the pancreas. There is decreased dilatation   of the pancreatic duct (4-5 mm)with a stent in the neck.   Gallbladder: Unremarkable.  There is no small bowel obstruction.  Pelvic organs: No pelvic masses.  The urinary bladder is unremarkable.  The aorta is not dilated.  There are atherosclerotic vascular calcifications.  There is no significant adenopathy.  There is no retroperitoneal mass.  The visualized osseous structures are unremarkable.  IMPRESSION:  No evidence of aortic dissection or pulmonary embolism.  Multifocal pneumonia complicated by air in the left lower lobe, left   pleural space, and posterior mediastinum concerning for necrotizing   infection with trans-spatial spread of gas-forming organism.  Correlate   clinically for wretching to exclude esophageal injury as cause of   posterior mediastinal air. Bronchopleural fistula is less likely.  No significant change in moderately large volume of abdominopelvic ascites.  Decreased pancreatic ductal dilatation status post stent placement.   Chronic pancreatitis without convincing acute peripancreatic inflammation
INTERVAL HPI/OVERNIGHT EVENTS:    CC: necrotizing pneumonia, mediastinitis, acute hypoxic respiratory failure    Remains on BiPAP, on Morphine, difficult to arouse.     Vital Signs Last 24 Hrs  T(C): 36.4 (08 Sep 2017 07:27), Max: 36.8 (07 Sep 2017 17:03)  T(F): 97.5 (08 Sep 2017 07:27), Max: 98.3 (07 Sep 2017 17:03)  HR: 96 (08 Sep 2017 08:27) (96 - 115)  BP: 102/60 (08 Sep 2017 07:27) (90/62 - 114/77)  BP(mean): 93 (07 Sep 2017 16:00) (79 - 93)  RR: 25 (08 Sep 2017 07:27) (25 - 36)  SpO2: 93% (08 Sep 2017 08:27) (81% - 95%)    PHYSICAL EXAM:    GENERAL: cachetic, on Bipap, difficult to arouse  CHEST/LUNG: b/l air entry, coarse  HEART: Regular   ABDOMEN: Soft, BS+  EXTREMITIES: No edema, tenderness    MEDICATIONS  (STANDING):  pantoprazole  Injectable 40 milliGRAM(s) IV Push daily  Ascorbic Acid in 160mL NS 1500 milliGRAM(s) 160 milliLiter(s) IV Intermittent every 6 hours  hydrocortisone sodium succinate Injectable 50 milliGRAM(s) IV Push every 6 hours  thiamine IVPB 200 milliGRAM(s) IV Intermittent every 12 hours  nicotine - 21 mG/24Hr(s) Patch 1 patch Transdermal daily  influenza   Vaccine 0.5 milliLiter(s) IntraMuscular once  ALBUTerol/ipratropium for Nebulization 3 milliLiter(s) Nebulizer every 6 hours  micafungin IVPB   IV Intermittent   micafungin IVPB 100 milliGRAM(s) IV Intermittent every 24 hours  saliva substitute (BIOTENE MOISTURIZING MOUTH SPRAY) 2 [place](s) Topical every 2 hours  LORazepam   Injectable 0.5 milliGRAM(s) IV Push every 6 hours  piperacillin/tazobactam IVPB. 3.375 Gram(s) IV Intermittent once  piperacillin/tazobactam IVPB. 3.375 Gram(s) IV Intermittent every 8 hours  morphine  Infusion 6 mG/Hr (6 mL/Hr) IV Continuous <Continuous>  glycopyrrolate Injectable 0.4 milliGRAM(s) IV Push once    MEDICATIONS  (PRN):  oxyCODONE    IR 5 milliGRAM(s) Oral every 6 hours PRN Moderate Pain (4 - 6)  LORazepam   Injectable 0.5 milliGRAM(s) IV Push every 4 hours PRN dyspnea-anxiety  morphine  - Injectable 5 milliGRAM(s) IV Push every 2 hours PRN dyspnea-dependence on BIPAP  glycopyrrolate Injectable 0.2 milliGRAM(s) IV Push every 6 hours PRN excessive secretions      Allergies    iodine (Swelling)    Intolerances          LABS:                          9.1    17.4  )-----------( 44       ( 07 Sep 2017 05:53 )             27.0     09-07    133<L>  |  89<L>  |  36.0<H>  ----------------------------<  172<H>  3.8   |  27.0  |  1.01    Ca    7.3<L>      07 Sep 2017 05:53  Phos  4.5     09-07  Mg     2.0     09-07            RADIOLOGY & ADDITIONAL TESTS:
INTERVAL HPI/OVERNIGHT EVENTS:    ICU ACCEPTANCE NOTE:    CC: Necrotizing pneumonia, mediastinitis, acute hypoxic respiratory failure, hypertension, alcohol abuse    Chart and course reviewed. Seen at bedside, on BiPAP, mild distress. Denies new complaints. Sister Mahnaz at bedside, reports she wants patient to be comfortable.     Vital Signs Last 24 Hrs  T(C): 37.2 (07 Sep 2017 11:46), Max: 37.2 (07 Sep 2017 11:46)  T(F): 99 (07 Sep 2017 11:46), Max: 99 (07 Sep 2017 11:46)  HR: 115 (07 Sep 2017 12:09) (100 - 128)  BP: 113/78 (07 Sep 2017 11:00) (97/64 - 124/78)  BP(mean): 90 (07 Sep 2017 11:00) (74 - 94)  RR: 35 (07 Sep 2017 11:00) (22 - 44)  SpO2: 90% (07 Sep 2017 12:09) (82% - 93%)    PHYSICAL EXAM:    GENERAL: cachetic male, on BiPAP, mild respiratory distress  CHEST/LUNG: b/l air entry, coarse  HEART: tachycardia  ABDOMEN: Soft, BS+  EXTREMITIES:  No edema, tenderness    MEDICATIONS  (STANDING):  meropenem IVPB   IV Intermittent   meropenem IVPB 1000 milliGRAM(s) IV Intermittent every 8 hours  pantoprazole  Injectable 40 milliGRAM(s) IV Push daily  Ascorbic Acid in 160mL NS 1500 milliGRAM(s) 160 milliLiter(s) IV Intermittent every 6 hours  hydrocortisone sodium succinate Injectable 50 milliGRAM(s) IV Push every 6 hours  thiamine IVPB 200 milliGRAM(s) IV Intermittent every 12 hours  nicotine - 21 mG/24Hr(s) Patch 1 patch Transdermal daily  influenza   Vaccine 0.5 milliLiter(s) IntraMuscular once  ALBUTerol/ipratropium for Nebulization 3 milliLiter(s) Nebulizer every 6 hours  micafungin IVPB   IV Intermittent   morphine  Infusion 2 mG/Hr (2 mL/Hr) IV Continuous <Continuous>  saliva substitute (BIOTENE MOISTURIZING MOUTH SPRAY) 2 [place](s) Topical every 2 hours    MEDICATIONS  (PRN):  oxyCODONE    IR 5 milliGRAM(s) Oral every 6 hours PRN Moderate Pain (4 - 6)  LORazepam   Injectable 0.5 milliGRAM(s) IV Push every 4 hours PRN dyspnea-anxiety  morphine  - Injectable 5 milliGRAM(s) IV Push every 2 hours PRN dyspnea-dependence on BIPAP      Allergies    iodine (Swelling)    Intolerances          LABS:                          9.1    17.4  )-----------( 44       ( 07 Sep 2017 05:53 )             27.0     09-07    133<L>  |  89<L>  |  36.0<H>  ----------------------------<  172<H>  3.8   |  27.0  |  1.01    Ca    7.3<L>      07 Sep 2017 05:53  Phos  4.5     09-07  Mg     2.0     09-07            RADIOLOGY & ADDITIONAL TESTS:
INTERVAL HPI/OVERNIGHT EVENTS: 58yo M awake alert and oriented anxious, is now on BIPAP in acute respiratory failure-Tachypneic RR 34/min Hypoxic O2 Saturation decreasing to high 80%.  Mouth and throat very dry, unable to eat or drink while dependent on BIPAP.  Sister at bedside. He is using pad to communicate-wants alternate BIPAP mask-he was more comfortable with     57y old  Male who presents with a chief complaint of Shortness of breath (06 Sep 2017 00:44)      Present Symptoms:     Dyspnea:  3   Nausea/Vomiting:  No  Anxiety:  Yes   Depression: Yes   Fatigue: Yes   Loss of appetite: Yes     Pain: denies            Character-            Duration-            Effect-            Factors-            Frequency-            Location-            Severity-    Review of Systems: Reviewed            All others negative    MEDICATIONS  (STANDING):  meropenem IVPB   IV Intermittent   meropenem IVPB 1000 milliGRAM(s) IV Intermittent every 8 hours  pantoprazole  Injectable 40 milliGRAM(s) IV Push daily  Ascorbic Acid in 160mL NS 1500 milliGRAM(s) 160 milliLiter(s) IV Intermittent every 6 hours  hydrocortisone sodium succinate Injectable 50 milliGRAM(s) IV Push every 6 hours  thiamine IVPB 200 milliGRAM(s) IV Intermittent every 12 hours  nicotine - 21 mG/24Hr(s) Patch 1 patch Transdermal daily  influenza   Vaccine 0.5 milliLiter(s) IntraMuscular once  ALBUTerol/ipratropium for Nebulization 3 milliLiter(s) Nebulizer every 6 hours  micafungin IVPB   IV Intermittent   micafungin IVPB 100 milliGRAM(s) IV Intermittent once  morphine  - Injectable 4 milliGRAM(s) IV Push once  morphine  Infusion 2 mG/Hr (2 mL/Hr) IV Continuous <Continuous>  saliva substitute (BIOTENE MOISTURIZING MOUTH SPRAY) 2 [place](s) Topical every 2 hours    MEDICATIONS  (PRN):  oxyCODONE    IR 5 milliGRAM(s) Oral every 6 hours PRN Moderate Pain (4 - 6)  LORazepam   Injectable 0.5 milliGRAM(s) IV Push every 4 hours PRN dyspnea-anxiety  morphine  - Injectable 5 milliGRAM(s) IV Push every 2 hours PRN dyspnea-dependence on BIPAP      PHYSICAL EXAM:    Vital Signs Last 24 Hrs  T(C): 36.9 (07 Sep 2017 08:10), Max: 37 (07 Sep 2017 00:00)  T(F): 98.4 (07 Sep 2017 08:10), Max: 98.6 (07 Sep 2017 00:00)  HR: 103 (07 Sep 2017 08:37) (99 - 128)  BP: 103/61 (07 Sep 2017 08:00) (97/64 - 124/78)  BP(mean): 76 (07 Sep 2017 08:00) (74 - 94)  RR: 33 (07 Sep 2017 08:30) (22 - 39)  SpO2: 87% (07 Sep 2017 08:37) (82% - 93%)    General: alert  oriented x _3___ lethargic agitated                  cachexia  nonverbal-using pad to communicate        HEENT:  dry mouth  BIPAP    Lungs:  tachypnea/labored breathing  excessive secretions    CV: tachycardia    GI:  distended  tender             constipation  last BM:     :   reagan    MSK:  weakness  edema             bedbound/wheelchair bound    Skin:   no rash    LABS:                        9.1    17.4  )-----------( 44       ( 07 Sep 2017 05:53 )             27.0     09-07    133<L>  |  89<L>  |  36.0<H>  ----------------------------<  172<H>  3.8   |  27.0  |  1.01    Ca    7.3<L>      07 Sep 2017 05:53  Phos  4.5     09-07  Mg     2.0     09-07          I&O's Summary    06 Sep 2017 07:01  -  07 Sep 2017 07:00  --------------------------------------------------------  IN: 1440 mL / OUT: 700 mL / NET: 740 mL        RADIOLOGY & ADDITIONAL STUDIES:    ADVANCE DIRECTIVES:   DNR YES   Completed on:                     DERRICK  YES    Completed on:  Living Will   NO   Completed on:
INTERVAL HPI/OVERNIGHT EVENTS: 58yo M in Respiratory Failure maintained on BIPAP over past 24hours, RR>24 setting at 12. Non verbal, not moving extremities. Is actively dying     57y old  Male who presents with a chief complaint of Shortness of breath (06 Sep 2017 00:44)      Present Symptoms:     Dyspnea:  3 Respiratory Failure- BIPAP continuous  Nausea/Vomiting: Yes No  Anxiety:  Yes   Depression: Yes   Fatigue: Yes   Loss of appetite: Yes     Pain:  Assume Pain is present- Chronic Back Painl B/LLE pain            Character-            Duration-            Effect-            Factors-            Frequency-            Location-            Severity-    Review of Systems: Reviewed                     Unable to obtain due to poor mentation       MEDICATIONS  (STANDING):  pantoprazole  Injectable 40 milliGRAM(s) IV Push daily  Ascorbic Acid in 160mL NS 1500 milliGRAM(s) 160 milliLiter(s) IV Intermittent every 6 hours  hydrocortisone sodium succinate Injectable 50 milliGRAM(s) IV Push every 6 hours  thiamine IVPB 200 milliGRAM(s) IV Intermittent every 12 hours  nicotine - 21 mG/24Hr(s) Patch 1 patch Transdermal daily  influenza   Vaccine 0.5 milliLiter(s) IntraMuscular once  micafungin IVPB   IV Intermittent   micafungin IVPB 100 milliGRAM(s) IV Intermittent every 24 hours  saliva substitute (BIOTENE MOISTURIZING MOUTH SPRAY) 2 [place](s) Topical every 2 hours  piperacillin/tazobactam IVPB. 3.375 Gram(s) IV Intermittent every 8 hours  morphine  Infusion 6 mG/Hr (6 mL/Hr) IV Continuous <Continuous>  LORazepam   Injectable 2 milliGRAM(s) IV Push every 6 hours    MEDICATIONS  (PRN):  morphine  - Injectable 5 milliGRAM(s) IV Push every 2 hours PRN dyspnea-dependence on BIPAP  glycopyrrolate Injectable 0.2 milliGRAM(s) IV Push every 6 hours PRN excessive secretions  ALBUTerol    0.083%. 2.5 milliGRAM(s) Nebulizer once PRN Assist with LABORED BREATHING      PHYSICAL EXAM:    Vital Signs Last 24 Hrs  T(C): 36.4 (08 Sep 2017 07:27), Max: 36.8 (07 Sep 2017 17:03)  T(F): 97.5 (08 Sep 2017 07:27), Max: 98.3 (07 Sep 2017 17:03)  HR: 96 (08 Sep 2017 08:27) (96 - 108)  BP: 102/60 (08 Sep 2017 07:27) (90/62 - 114/77)  BP(mean): 93 (07 Sep 2017 16:00) (93 - 93)  RR: 25 (08 Sep 2017 07:27) (25 - 31)  SpO2: 93% (08 Sep 2017 08:27) (88% - 95%)    General:lethargic                   cachexia  nonverbal  Unresponsive        HEENT:  dry mouth      Lungs:  tachypnea/labored breathing  excessive secretions    CV: normal      GI:  distended  tender                 constipation  last BM:     :   incontinent    kirk    MSK:  weakness  edema              bedbound    Skin: sacral pressure ulcers- Forehead-bumpy rash     LABS:                        9.1    17.4  )-----------( 44       ( 07 Sep 2017 05:53 )             27.0     09-07    133<L>  |  89<L>  |  36.0<H>  ----------------------------<  172<H>  3.8   |  27.0  |  1.01    Ca    7.3<L>      07 Sep 2017 05:53  Phos  4.5     09-07  Mg     2.0     09-07          I&O's Summary    07 Sep 2017 07:01  -  08 Sep 2017 07:00  --------------------------------------------------------  IN: 270 mL / OUT: 0 mL / NET: 270 mL        RADIOLOGY & ADDITIONAL STUDIES:    ADVANCE DIRECTIVES:   DNR YES  Completed on:                     DERRICK  YES    Completed on:  Living Will   NO   Completed on:
[facility]  INFECTIOUS DISEASES AND INTERNAL MEDICINE OF [place]  =======================================================  Lyle Caceres MD  Diplomates American Board of Internal Medicine and Infectious Diseases  =======================================================    IRMAALFONSODON MAGDALENO [id number]    Follow up: Necrotizing pneumonia    Still with SOB  On BIPAP  no fevers      Allergies:  iodine (Swelling)      Antibiotics:  vancomycin  IVPB 1000 milliGRAM(s) IV Intermittent every 8 hours  meropenem IVPB 1000 milliGRAM(s) IV Intermittent every 8 hours        REVIEW OF SYSTEMS:  CONSTITUTIONAL:  No Fever or chills  HEENT:  No diplopia or blurred vision.  No earache, sore throat or runny nose.  CARDIOVASCULAR:  No pressure, squeezing, strangling, tightness, heaviness or aching about the chest, neck, axilla or epigastrium.  RESPIRATORY:  + cough, + shortness of breath  GASTROINTESTINAL:  No nausea, vomiting or diarrhea.  GENITOURINARY:  No dysuria, frequency or urgency.  MUSCULOSKELETAL:  no joint aches, no muscle pain  SKIN:  No change in skin, hair or nails.  NEUROLOGIC:  No paresthesias, fasciculations  PSYCHIATRIC:  No disorder of thought or mood.  ENDOCRINE:  No heat or cold intolerance  HEMATOLOGICAL:  No easy bruising or bleeding.       Physical Exam:  Vital Signs Last 24 Hrs  T(C): 36.4 (08 Sep 2017 07:27), Max: 37.2 (07 Sep 2017 11:46)  T(F): 97.5 (08 Sep 2017 07:27), Max: 99 (07 Sep 2017 11:46)  HR: 96 (08 Sep 2017 08:27) (96 - 115)  BP: 102/60 (08 Sep 2017 07:27) (90/62 - 114/77)  BP(mean): 93 (07 Sep 2017 16:00) (79 - 93)  RR: 25 (08 Sep 2017 07:27) (25 - 36)  SpO2: 93% (08 Sep 2017 08:27) (81% - 95%)      GEN: NAD, cachectic   HEENT: normocephalic and atraumatic. EOMI. PERRL.    NECK: Supple.   LUNGS: + Rales, diffuse  HEART: Regular rate and rhythm   ABDOMEN: Soft, nontender, and nondistended.  Positive bowel sounds.    : No CVA tenderness  EXTREMITIES: Trace edema.  MSK: No joint swelling  NEUROLOGIC: No focal deficits  PSYCHIATRIC: Appropriate affect .  SKIN: No rash      Labs:  09-07    133<L>  |  89<L>  |  36.0<H>  ----------------------------<  172<H>  3.8   |  27.0  |  1.01    Ca    7.3<L>      07 Sep 2017 05:53  Phos  4.5     09-07  Mg     2.0     09-07                  9.1    17.4  )-----------( 44       ( 07 Sep 2017 05:53 )             27.0         RECENT CULTURES:  Culture - Blood (09.05.17 @ 18:07)    Specimen Source: .Blood Blood    Culture Results:   No growth at 48 hours      Culture - Blood (09.05.17 @ 18:07)    Specimen Source: .Blood Blood    Culture Results:   No growth at 48 hours      Culture - Sputum . (09.05.17 @ 15:15)    -  Cefoxitin: R <=8    -  Ceftriaxone: S <=1    -  Ceftriaxone: S <=1    -  Ceftriaxone: S <=1    -  Levofloxacin: S <=2    -  Levofloxacin: S <=2    -  Levofloxacin: S <=2    -  Piperacillin/Tazobactam: S <=16    -  Piperacillin/Tazobactam: S <=16    -  Piperacillin/Tazobactam: S <=16    -  Trimethoprim/Sulfamethoxazole: S <=2/38    -  Trimethoprim/Sulfamethoxazole: S <=2/38    -  Trimethoprim/Sulfamethoxazole: S <=2/38    Gram Stain:   Few WBC's  Numerous Gram Positive Cocci in Pairs and Chains    -  Ampicillin/Sulbactam: R <=8/4    -  Cefazolin: R <=8    -  Cefazolin: R >16    -  Cefepime: S <=4    -  Cefepime: S <=4    -  Cefepime: S <=4    -  Ceftazidime: S <=1    -  Ceftazidime: S <=1    -  Ceftazidime: S <=1    -  Ciprofloxacin: S <=1    -  Ciprofloxacin: S <=1    -  Ciprofloxacin: S <=1    -  Ertapenem: S <=1    -  Ertapenem: S <=1    -  Ertapenem: S <=1    -  Gentamicin: S <=4    -  Gentamicin: S <=4    -  Gentamicin: S <=4    -  Imipenem: S <=1    -  Imipenem: S <=1    -  Imipenem: S <=1    -  Meropenem: S <=1    -  Meropenem: S <=1    -  Meropenem: S <=1    -  Tobramycin: S <=4    -  Tobramycin: S <=4    -  Tobramycin: S <=4    -  Ampicillin/Sulbactam: S <=8/4    -  Ampicillin/Sulbactam: S <=8/4    -  Cefazolin: S <=8    -  Cefoxitin: S <=8    -  Cefoxitin: S <=8    -  Amikacin: S <=16    -  Amikacin: S <=16    -  Amikacin: S <=16    -  Ampicillin: R >16    -  Ampicillin: R >16    -  Ampicillin: R >16    -  Aztreonam: S <=4    -  Aztreonam: S <=4    -  Aztreonam: S <=4    Specimen Source: .Sputum Sputum    Culture Results:   Rare Escherichia coli  Rare Klebsiella pneumoniae  Rare Enterobacter aerogenes  Moderate Candida species  Moderate Routine respiratory karen present    Organism Identification: Escherichia coli  Klebsiella pneumoniae  Enterobacter aerogenes    Organism: Enterobacter aerogenes    Organism: Escherichia coli    Organism: Klebsiella pneumoniae    Method Type: NEHEMIAS    Method Type: NEHEMIAS    Method Type: NEHEMIAS              EXAM:  CT ANGIO ABD PELV (W)AW IC                        EXAM:  CT ANGIO CHEST (W)AW IC                        PROCEDURE DATE:  09/05/2017    INTERPRETATION:  EXAMINATION: CT ANGIOGRAPHY OF THE CHEST (AORTIC   DISSECTION PROTOCOL)  INDICATION: Hypoxemic, tachycardic, shortness of breath, evaluate for   pulmonary embolism  Technique: Axial images were obtained from the thoracic inlet through the   pelvis during arterial phase of intravenous contrast administration. 94   cc of Omnipaque 350 was administered intravenously without complication.    Reformatted coronal and sagittal images are submitted.  COMPARISON: Abdominal CT of August 4, 2017  FINDINGS:  There is no aortic dissection, aneurysm, intramural hematoma or   para-aortic hemorrhage.  There is no central pulmonary embolism.  The   great vessels are not dilated.    The visualized neck, axilla and subcutaneous tissues are unremarkable.  The tracheobronchial tree is patent proximally. There are scattered foci   of air in the posterior mediastinum. There is a moderately large volume   of fluid in the posterior mediastinal soft tissues. There is no   significant mediastinal or hilar adenopathy.  The heart is not enlarged.  There is no pericardial effusion.  Lungs: There are patchy airspace and groundglass opacities throughout the   lungs, worst at the lung bases compatible with multifocal pneumonia. Foci   of air in the left lower lobe are compatible with necrotizing pneumonia.   There is a left-sided hydropneumothorax likely related to extension of   the left lung parenchymal infection or bronchopleural fistula should be   excluded clinically. Patchy opacities are also seen in the right lower   and middle lobes to a lesser extent. There are moderate centrilobular and   paraseptal emphysematous changes. There is minor involvement of the   lingula and left upper lobe.  There is no pleural abnormality.  There is no free air. There is a moderate volume of abdominopelvic   ascites, not significantly changed compared to the previous CT of August 4, 2017.  The liver, spleen, adrenal glands and kidneys are unremarkable. Again   seen are coarse calcifications involving the head of the pancreas   compatible with sequela of chronic pancreatitis. There is a stable 3.6 cm   cystic focus in the head of the pancreas. There is decreased dilatation   of the pancreatic duct (4-5 mm)with a stent in the neck.   Gallbladder: Unremarkable.  There is no small bowel obstruction.  Pelvic organs: No pelvic masses.  The urinary bladder is unremarkable.  The aorta is not dilated.  There are atherosclerotic vascular calcifications.  There is no significant adenopathy.  There is no retroperitoneal mass.  The visualized osseous structures are unremarkable.  IMPRESSION:  No evidence of aortic dissection or pulmonary embolism.  Multifocal pneumonia complicated by air in the left lower lobe, left   pleural space, and posterior mediastinum concerning for necrotizing   infection with trans-spatial spread of gas-forming organism.  Correlate   clinically for wretching to exclude esophageal injury as cause of   posterior mediastinal air. Bronchopleural fistula is less likely.  No significant change in moderately large volume of abdominopelvic ascites.  Decreased pancreatic ductal dilatation status post stent placement.   Chronic pancreatitis without convincing acute peripancreatic inflammation
Patient is a 57y old  Male who presents with a chief complaint of SOB (04 Sep 2017 22:24)      BRIEF HOSPITAL COURSE: 57M Presents with C/O SOB worsening over past few days.  PMhx chronic pancreatitis (dx 2002), Alcohol abuse (last drink 2 years), HTN, inguinal hernia repair.  Recent protracted hospital course initially presented to [facility] 7/29/2017 for worsening foot pain refractory to antibiotics. Patient was found to have acute on chronic pancreatitis.  He did  improved with course of steroid taper. Patient has had multiple complications throughout his course. He was found to have worsening abdominal ascites, which was tapped found to have  SBP and started on abx, and escalated to meropenum for worsening leukocytosis. . MRI Abdomen showed stable pancreatic pseudocysts, dilated main pancreatic duct as well as acute/subacute portal vein thrombosis. Patient started on therapeutic Lovenox with  repeat abdominal US with doppler which shows no thrombus.  Patient was then transferred to [facility] 8/15/2017 for  an  ERCP and stent placement.   Eventually discharged to Rehab on 8/23.  Pt has been home from rehab X 1 week.  Lives alone and has a neighbor who helps out.      Events last 24 hours: was on BIPAP overnight, able to wean off this morning    PAST MEDICAL & SURGICAL HISTORY:  Ascites: may 2017  Hernia  Pancreatitis  Hypertension  TIA (transient ischemic attack)  History of inguinal hernia repair  S/P skin graft using Integra: right calf skin graft  Fracture of shaft of left femur: 1971 compound fracrture left femur  1980 left femur bone spur removed      Review of Systems:  CONSTITUTIONAL: No fever, chills. c/o generalized weakness and not feeling well  EYES: No eye pain, visual disturbances, or discharge  ENMT:  No difficulty hearing, tinnitus, vertigo; No sinus or throat pain  NECK: No pain or stiffness  RESPIRATORY: No cough, wheezing, chills or hemoptysis; c/o shortness of breath  CARDIOVASCULAR: No chest pain, palpitations, dizziness, or leg swelling  GASTROINTESTINAL: No abdominal or epigastric pain. No nausea, vomiting, or hematemesis; No diarrhea or constipation. No melena or hematochezia.  GENITOURINARY: No dysuria, frequency, hematuria, or incontinence  NEUROLOGICAL: No headaches, memory loss, loss of strength, numbness, or tremors  SKIN: No itching, burning, rashes, or lesions   MUSCULOSKELETAL: No joint pain or swelling; No muscle, back, or extremity pain  PSYCHIATRIC: No depression, anxiety, mood swings, or difficulty sleeping      Medications:  vancomycin  IVPB 1000 milliGRAM(s) IV Intermittent every 8 hours  meropenem IVPB   IV Intermittent   azithromycin  IVPB   IV Intermittent   meropenem IVPB 1000 milliGRAM(s) IV Intermittent every 8 hours        [id number]   Patch  25 MICROgram(s)/Hr 1 Patch Transdermal every 72 hours      enoxaparin Injectable 40 milliGRAM(s) SubCutaneous daily    pantoprazole  Injectable 40 milliGRAM(s) IV Push daily  pantoprazole  Injectable 40 milliGRAM(s) IV Push daily                      ICU Vital Signs Last 24 Hrs  T(C): 36.5 (05 Sep 2017 08:00), Max: 36.8 (04 Sep 2017 20:24)  T(F): 97.7 (05 Sep 2017 08:00), Max: 98.3 (04 Sep 2017 20:24)  HR: 103 (05 Sep 2017 10:00) (102 - 122)  BP: 110/75 (05 Sep 2017 10:00) (101/81 - 146/90)  BP(mean): 88 (05 Sep 2017 10:00) (86 - 113)  ABP: --  ABP(mean): --  RR: 32 (05 Sep 2017 10:00) (18 - 35)  SpO2: 96% (05 Sep 2017 10:00) (82% - 100%)      ABG - ( 05 Sep 2017 06:28 )  pH: 7.55  /  pCO2: 35    /  pO2: 62    / HCO3: 32    / Base Excess: 8.2   /  SaO2: 93                  I&O's Detail    04 Sep 2017 07:01  -  05 Sep 2017 07:00  --------------------------------------------------------  IN:    Solution: 100 mL    Solution: 250 mL    Solution: 500 mL    Solution: 100 mL  Total IN: 950 mL    OUT:    Voided: 150 mL  Total OUT: 150 mL    Total NET: 800 mL            LABS:                        11.4   12.7  )-----------( 134      ( 05 Sep 2017 06:53 )             33.9     09-05    133<L>  |  87<L>  |  25.0<H>  ----------------------------<  112  4.0   |  29.0  |  0.68    Ca    7.8<L>      05 Sep 2017 06:53    TPro  5.9<L>  /  Alb  2.1<L>  /  TBili  0.5  /  DBili  x   /  AST  19  /  ALT  15  /  AlkPhos  151<H>  09-04      CARDIAC MARKERS ( 04 Sep 2017 20:37 )  x     / <0.01 ng/mL / 25 U/L / x     / x          CAPILLARY BLOOD GLUCOSE        PT/INR - ( 04 Sep 2017 20:37 )   PT: 14.7 sec;   INR: 1.33 ratio         PTT - ( 04 Sep 2017 20:37 )  PTT:29.4 sec    CULTURES:      Physical Examination:    General: No acute distress.  cachectic     HEENT: Pupils equal, reactive to light.  Symmetric.    PULM: decreased air entry to auscultation bilaterally with fine crackles at the bases,  no significant sputum production    CVS: Regular rate and rhythm, no murmurs, rubs, or gallops    ABD: Scaphoid, soft, nondistended, nontender, normoactive bowel sounds    EXT: No edema, nontender    SKIN: Warm and well perfused, no rashes noted.    NEURO: Alert, oriented, interactive, nonfocal    RADIOLOGY:   < from: CT Angio Abdomen and Pelvis w/ IV Cont (09.05.17 @ 01:44) >    FINDINGS:    There is no aortic dissection, aneurysm, intramural hematoma or   para-aortic hemorrhage.  There is no central pulmonary embolism.  The   great vessels are not dilated.      The visualized neck, axilla and subcutaneous tissues are unremarkable.    The tracheobronchial tree is patent proximally. There are scattered foci  of air in the posterior mediastinum. There is a moderately large volume   of fluid in the posterior mediastinal soft tissues. There is no   significant mediastinal or hilar adenopathy.    The heart is not enlarged.  There is no pericardial effusion.    Lungs: There are patchy airspace and groundglass opacities throughout the   lungs, worst at the lung bases compatible with multifocal pneumonia. Foci   of air in the left lower lobe are compatible with necrotizing pneumonia.   There is a left-sided hydropneumothorax likely related to extension of   the left lung parenchymal infection or bronchopleural fistula should be   excluded clinically. Patchy opacities are also seen in the right lower   and middle lobes to a lesser extent. There are moderate centrilobular and   paraseptal emphysematous changes. There is minor involvement of the   lingula and left upper lobe.    There is no pleural abnormality.    There is no free air. There is a moderate volume of abdominopelvic   ascites, not significantly changed compared to the previous CT of August 4, 2017.  The liver, spleen, adrenal glands and kidneys are unremarkable. Again   seen are coarse calcifications involving the head of the pancreas   compatible with sequela of chronic pancreatitis. There is a stable 3.6 cm   cystic focus in the head of the pancreas. There is decreased dilatation   of the pancreatic duct (4-5 mm)with a stent in the neck.   Gallbladder: Unremarkable.    There is no small bowel obstruction.    Pelvic organs: No pelvic masses.  The urinary bladder is unremarkable.    The aorta is not dilated.  There are atherosclerotic vascular   calcifications.  There is no significant adenopathy.  There is no retroperitoneal mass.  The visualized osseous structures are unremarkable.    IMPRESSION:    No evidence of aortic dissection or pulmonary embolism.  Multifocal pneumonia complicated by air in the left lower lobe, left   pleural space, and posterior mediastinum concerning for necrotizing   infection with trans-spatial spread of gas-forming organism.  Correlate   clinically for wretching to exclude esophageal injury as cause of   posterior mediastinal air. Bronchopleural fistula is less likely.  No significant change in moderately large volume of abdominopelvic   ascites.  Decreased pancreatic ductal dilatation status post stent placement.   Chronic pancreatitis without convincing acute peripancreatic   inflammation.  < end of copied text >    CRITICAL CARE TIME SPENT: 35min
Patient is a 57y old  Male who presents with a chief complaint of Shortness of breath (06 Sep 2017 00:44)      BRIEF HOSPITAL COURSE:  57M Presents with C/O SOB worsening over past few days.  PMhx chronic pancreatitis (dx 2002), Alcohol abuse (last drink 2 years), HTN, inguinal hernia repair.  Recent protracted hospital course initially presented to [facility] 7/29/2017 for worsening foot pain refractory to antibiotics. Patient was found to have acute on chronic pancreatitis.  He did  improved with course of steroid taper. Patient has had multiple complications throughout his course. He was found to have worsening abdominal ascites, which was tapped found to have  SBP and started on abx, and escalated to meropenum for worsening leukocytosis. . MRI Abdomen showed stable pancreatic pseudocysts, dilated main pancreatic duct as well as acute/subacute portal vein thrombosis. Patient started on therapeutic Lovenox with  repeat abdominal US with doppler which shows no thrombus.  Patient was then transferred to [facility] 8/15/2017 for  an  ERCP and stent placement.   Eventually discharged to Rehab on 8/23.  Pt has been home from rehab X 1 week.  Lives alone and has a neighbor who helps out.        Events last 24 hours: remains afebrile with borderline BP    PAST MEDICAL & SURGICAL HISTORY:  Ascites: may 2017  Hernia  Pancreatitis  Hypertension  TIA (transient ischemic attack)  History of inguinal hernia repair  S/P skin graft using Integra: right calf skin graft  Fracture of shaft of left femur: 1971 compound fracrture left femur  1980 left femur bone spur removed      Review of Systems:  CONSTITUTIONAL: No fever, chills, or fatigue  EYES: No eye pain, visual disturbances, or discharge  ENMT:  No difficulty hearing, tinnitus, vertigo; No sinus or throat pain  NECK: No pain or stiffness  RESPIRATORY: No cough, wheezing, chills or hemoptysis; C/o shortness of breath with any movement  CARDIOVASCULAR: No chest pain, palpitations, dizziness, or leg swelling  GASTROINTESTINAL: No abdominal or epigastric pain. No nausea, vomiting, or hematemesis; No diarrhea or constipation. No melena or hematochezia.  GENITOURINARY: No dysuria, frequency, hematuria, or incontinence  NEUROLOGICAL: No headaches, memory loss, loss of strength, numbness, or tremors  SKIN: No itching, burning, rashes, or lesions   MUSCULOSKELETAL: No joint pain or swelling; No muscle, back, or extremity pain  PSYCHIATRIC: No depression, mood swings, or difficulty sleeping. C/o nervousness/anxiety about his condition      Medications:  vancomycin  IVPB 1000 milliGRAM(s) IV Intermittent every 8 hours  meropenem IVPB   IV Intermittent   meropenem IVPB 1000 milliGRAM(s) IV Intermittent every 8 hours        oxyCODONE    IR 5 milliGRAM(s) Oral every 6 hours PRN      enoxaparin Injectable 40 milliGRAM(s) SubCutaneous daily    pantoprazole  Injectable 40 milliGRAM(s) IV Push daily      hydrocortisone sodium succinate Injectable 50 milliGRAM(s) IV Push every 6 hours    thiamine IVPB 200 milliGRAM(s) IV Intermittent every 12 hours  potassium chloride  10 mEq/100 mL IVPB 10 milliEquivalent(s) IV Intermittent every 1 hour    influenza   Vaccine 0.5 milliLiter(s) IntraMuscular once      Ascorbic Acid in 160mL NS 1500 milliGRAM(s) 160 milliLiter(s) IV Intermittent every 6 hours  nicotine - 21 mG/24Hr(s) Patch 1 patch Transdermal daily          ICU Vital Signs Last 24 Hrs  T(C): 36.4 (06 Sep 2017 08:00), Max: 37.3 (06 Sep 2017 00:00)  T(F): 97.6 (06 Sep 2017 08:00), Max: 99.1 (06 Sep 2017 00:00)  HR: 91 (06 Sep 2017 08:00) (84 - 106)  BP: 99/67 (06 Sep 2017 08:00) (95/67 - 110/75)  BP(mean): 78 (06 Sep 2017 08:00) (72 - 88)  ABP: --  ABP(mean): --  RR: 33 (06 Sep 2017 08:00) (21 - 36)  SpO2: 91% (06 Sep 2017 08:00) (90% - 97%)      ABG - ( 05 Sep 2017 06:28 )  pH: 7.55  /  pCO2: 35    /  pO2: 62    / HCO3: 32    / Base Excess: 8.2   /  SaO2: 93                  I&O's Detail    05 Sep 2017 07:01  -  06 Sep 2017 07:00  --------------------------------------------------------  IN:    Solution: 480 mL    Solution: 300 mL    Solution: 500 mL    Solution: 200 mL    Solution: 100 mL  Total IN: 1580 mL    OUT:    Voided: 800 mL  Total OUT: 800 mL    Total NET: 780 mL            LABS:                        8.5    17.7  )-----------( 70       ( 06 Sep 2017 05:49 )             25.8     09-06    132<L>  |  89<L>  |  33.0<H>  ----------------------------<  125<H>  3.3<L>   |  31.0<H>  |  0.72    Ca    7.4<L>      06 Sep 2017 05:49  Phos  4.6     09-06  Mg     2.1     09-06    TPro  5.9<L>  /  Alb  2.1<L>  /  TBili  0.5  /  DBili  x   /  AST  19  /  ALT  15  /  AlkPhos  151<H>  09-04      CARDIAC MARKERS ( 04 Sep 2017 20:37 )  x     / <0.01 ng/mL / 25 U/L / x     / x          CAPILLARY BLOOD GLUCOSE        PT/INR - ( 04 Sep 2017 20:37 )   PT: 14.7 sec;   INR: 1.33 ratio         PTT - ( 04 Sep 2017 20:37 )  PTT:29.4 sec    CULTURES:  Culture - Sputum . (09.05.17 @ 15:15)    Gram Stain:   Few WBC's  Numerous Gram Positive Cocci in Pairs and Chains    Specimen Source: .Sputum Sputum    Vancomycin Level, Trough - Prior to Next Dose (09.06.17 @ 05:52)    Vancomycin Level, Trough: 18.6: Vancomycin trough levels should be rapidly reached and maintained at  15-20 ug/ml for life threatening MRSA  infections such as sepsis, endocarditis, osteomyelitis and pneumonia. A  first trough level should be drawn  before the 3rd or 4th dose.  Risk18.6:  of renal toxicity is increased for levels >15 ug/ml, in patients on  other nephrotoxic drugs, who are  hemodynamically unstable, have unstable renal function, or are on  Vancomycin therapy for >14 days. Renal function with  creatinine levels should b18.6: e monitored for those patients. ug/mL          Physical Examination:    General: No acute distress.  cachexic    HEENT: Pupils equal, reactive to light.  Symmetric.    PULM: Clear to auscultation bilaterally, no significant sputum production    CVS: Regular rate and rhythm, no murmurs, rubs, or gallops    ABD: Soft, nondistended, nontender, normoactive bowel sounds, no masses    EXT: No edema, nontender    SKIN: Warm and well perfused, no rashes noted.    NEURO: Alert, oriented, interactive, nonfocal    RADIOLOGY:   < from: CT Angio Abdomen and Pelvis w/ IV Cont (09.05.17 @ 01:44) >    IMPRESSION:    No evidence of aortic dissection or pulmonary embolism.  Multifocal pneumonia complicated by air in the left lower lobe, left   pleural space, and posterior mediastinum concerning for necrotizing   infection with trans-spatial spread of gas-forming organism.  Correlate   clinically for wretching to exclude esophageal injury as cause of   posterior mediastinal air. Bronchopleural fistula is less likely.  No significant change in moderately large volume of abdominopelvic   ascites.  Decreased pancreatic ductal dilatation status post stent placement.   Chronic pancreatitis without convincing acute peripancreatic   inflammation.     < end of copied text >    CRITICAL CARE TIME SPENT:
Patient is a 57y old  Male who presents with a chief complaint of Shortness of breath (06 Sep 2017 00:44)      BRIEF HOSPITAL COURSE: 57M Presents with C/O SOB worsening over past few days.  PMhx chronic pancreatitis (dx 2002), Alcohol abuse (last drink 2 years), HTN, inguinal hernia repair.  Recent protracted hospital course initially presented to [facility] 7/29/2017 for worsening foot pain refractory to antibiotics. Patient was found to have acute on chronic pancreatitis.  He did  improved with course of steroid taper. Patient has had multiple complications throughout his course. He was found to have worsening abdominal ascites, which was tapped found to have  SBP and started on abx, and escalated to meropenum for worsening leukocytosis. . MRI Abdomen showed stable pancreatic pseudocysts, dilated main pancreatic duct as well as acute/subacute portal vein thrombosis. Patient started on therapeutic Lovenox with  repeat abdominal US with doppler which shows no thrombus.  Patient was then transferred to [facility] 8/15/2017 for  an  ERCP and stent placement.   Eventually discharged to Rehab on 8/23.  Pt has been home from rehab X 1 week.  Lives alone and has a neighbor who helps out.      Events last 24 hours: worsening hypoxia requiring more time on BIPAP    PAST MEDICAL & SURGICAL HISTORY:  Ascites: may 2017  Hernia  Pancreatitis  Hypertension  TIA (transient ischemic attack)  History of inguinal hernia repair  S/P skin graft using Integra: right calf skin graft  Fracture of shaft of left femur: 1971 compound fracrture left femur  1980 left femur bone spur removed      Review of Systems:  CONSTITUTIONAL: No fever, chills, or fatigue  EYES: No eye pain, visual disturbances, or discharge  ENMT:  No difficulty hearing, tinnitus, vertigo; No sinus or throat pain  NECK: No pain or stiffness  RESPIRATORY: No cough, wheezing, chills or hemoptysis; No shortness of breath  CARDIOVASCULAR: No chest pain, palpitations, dizziness, or leg swelling  GASTROINTESTINAL: No abdominal or epigastric pain. No nausea, vomiting, or hematemesis; No diarrhea or constipation. No melena or hematochezia.  GENITOURINARY: No dysuria, frequency, hematuria, or incontinence  NEUROLOGICAL: No headaches, memory loss, loss of strength, numbness, or tremors  SKIN: No itching, burning, rashes, or lesions   MUSCULOSKELETAL: No joint pain or swelling; No muscle, back, or extremity pain  PSYCHIATRIC: No depression, reports some anxiety about his condition deteriorating       Medications:  meropenem IVPB   IV Intermittent   meropenem IVPB 1000 milliGRAM(s) IV Intermittent every 8 hours      ALBUTerol/ipratropium for Nebulization 3 milliLiter(s) Nebulizer every 6 hours    oxyCODONE    IR 5 milliGRAM(s) Oral every 6 hours PRN  morphine  - Injectable 1 milliGRAM(s) IV Push every 2 hours PRN  ALPRAZolam 0.25 milliGRAM(s) Oral every 8 hours PRN  morphine  - Injectable 2 milliGRAM(s) IV Push every 2 hours PRN  ALPRAZolam 0.25 milliGRAM(s) Oral every 6 hours PRN  morphine  - Injectable 2 milliGRAM(s) IV Push every 4 hours        pantoprazole  Injectable 40 milliGRAM(s) IV Push daily      hydrocortisone sodium succinate Injectable 50 milliGRAM(s) IV Push every 6 hours    thiamine IVPB 200 milliGRAM(s) IV Intermittent every 12 hours    influenza   Vaccine 0.5 milliLiter(s) IntraMuscular once      Ascorbic Acid in 160mL NS 1500 milliGRAM(s) 160 milliLiter(s) IV Intermittent every 6 hours  nicotine - 21 mG/24Hr(s) Patch 1 patch Transdermal daily          ICU Vital Signs Last 24 Hrs  T(C): 36.9 (07 Sep 2017 08:10), Max: 37 (07 Sep 2017 00:00)  T(F): 98.4 (07 Sep 2017 08:10), Max: 98.6 (07 Sep 2017 00:00)  HR: 107 (07 Sep 2017 08:00) (91 - 128)  BP: 103/61 (07 Sep 2017 08:00) (97/64 - 124/78)  BP(mean): 76 (07 Sep 2017 08:00) (74 - 94)  ABP: --  ABP(mean): --  RR: 25 (07 Sep 2017 08:00) (22 - 39)  SpO2: 90% (07 Sep 2017 08:23) (83% - 93%)          I&O's Detail    06 Sep 2017 07:01  -  07 Sep 2017 07:00  --------------------------------------------------------  IN:    Oral Fluid: 200 mL    Solution: 100 mL    Solution: 640 mL    Solution: 300 mL    Solution: 200 mL  Total IN: 1440 mL    OUT:    Voided: 700 mL  Total OUT: 700 mL    Total NET: 740 mL            LABS:                        9.1    17.4  )-----------( 44       ( 07 Sep 2017 05:53 )             27.0     09-07    133<L>  |  89<L>  |  36.0<H>  ----------------------------<  172<H>  3.8   |  27.0  |  1.01    Ca    7.3<L>      07 Sep 2017 05:53  Phos  4.5     09-07  Mg     2.0     09-07            CAPILLARY BLOOD GLUCOSE            CULTURES:  Culture - Sputum . (09.05.17 @ 15:15)    Gram Stain:   Few WBC's  Numerous Gram Positive Cocci in Pairs and Chains    Specimen Source: .Sputum Sputum    Culture Results:   Moderate Candida species  Rare Gram Negative Rods Identification and susceptibility to follow.  Moderate Routine respiratory karen present  Culture in progress        Physical Examination:    General: cachexic, mild resp distress    HEENT: Pupils equal, reactive to light.  Symmetric. + temporal wasting    PULM: scattered wheeze bilaterally posteriorly     CVS: sinus tachycardia     ABD: Soft, nondistended, nontender, normoactive bowel sounds,     EXT: No edema, nontender    SKIN: Warm and well perfused, no rashes noted.    NEURO: Alert, oriented, interactive, nonfocal    RADIOLOGY:     CRITICAL CARE TIME SPENT: 60min
Patient is a 57y old  Male who presents with a chief complaint of Shortness of breath (06 Sep 2017 00:44)      BRIEF HOSPITAL COURSE: 57M Presents with C/O SOB worsening over past few days.  PMhx chronic pancreatitis (dx 2002), Alcohol abuse (last drink 2 years), HTN, inguinal hernia repair.  Recent protracted hospital course initially presented to [facility] 7/29/2017 for worsening foot pain refractory to antibiotics. Patient was found to have acute on chronic pancreatitis.  He did  improved with course of steroid taper. Patient has had multiple complications throughout his course. He was found to have worsening abdominal ascites, which was tapped found to have  SBP and started on abx, and escalated to meropenum for worsening leukocytosis. . MRI Abdomen showed stable pancreatic pseudocysts, dilated main pancreatic duct as well as acute/subacute portal vein thrombosis. Patient started on therapeutic Lovenox with  repeat abdominal US with doppler which shows no thrombus.  Patient was then transferred to [facility] 8/15/2017 for  an  ERCP and stent placement.   Eventually discharged to Rehab on 8/23.  Pt has been home from rehab X 1 week.  Lives alone and has a neighbor who helps out.      Events last 24 hours: Remains on High Fio2 Bipap.  Acutely desaturated after removing BIPAP to expectorate.  Off vanco as trough is high.     PAST MEDICAL & SURGICAL HISTORY:  Ascites: may 2017  Hernia  Pancreatitis  Hypertension  TIA (transient ischemic attack)  History of inguinal hernia repair  S/P skin graft using Integra: right calf skin graft  Fracture of shaft of left femur: 1971 compound fracrture left femur  1980 left femur bone spur removed      Review of Systems:  CONSTITUTIONAL: No fever, chills, or fatigue  EYES: No eye pain, visual disturbances, or discharge  ENMT:  No difficulty hearing, tinnitus, vertigo; No sinus or throat pain  NECK: No pain or stiffness  RESPIRATORY: No cough, wheezing, chills or hemoptysis; (+)  shortness of breath  CARDIOVASCULAR: No chest pain, palpitations, dizziness, or leg swelling  GASTROINTESTINAL: No abdominal or epigastric pain. No nausea, vomiting, or hematemesis; No diarrhea or constipation. No melena or hematochezia.  GENITOURINARY: No dysuria, frequency, hematuria, or incontinence  NEUROLOGICAL: No headaches, memory loss, loss of strength, numbness, or tremors  SKIN: No itching, burning, rashes, or lesions   MUSCULOSKELETAL: No joint pain or swelling; No muscle, back, or extremity pain  PSYCHIATRIC: No depression, anxiety, mood swings, or difficulty sleeping      Medications:  meropenem IVPB   IV Intermittent   meropenem IVPB 1000 milliGRAM(s) IV Intermittent every 8 hours  ALBUTerol/ipratropium for Nebulization 3 milliLiter(s) Nebulizer every 6 hours  oxyCODONE    IR 5 milliGRAM(s) Oral every 6 hours PRN  morphine  - Injectable 1 milliGRAM(s) IV Push every 2 hours PRN  ALPRAZolam 0.25 milliGRAM(s) Oral every 8 hours PRN  morphine  - Injectable 2 milliGRAM(s) IV Push every 2 hours PRN  ALPRAZolam 0.25 milliGRAM(s) Oral every 6 hours PRN  morphine  - Injectable 2 milliGRAM(s) IV Push every 4 hours  enoxaparin Injectable 40 milliGRAM(s) SubCutaneous daily  pantoprazole  Injectable 40 milliGRAM(s) IV Push daily  hydrocortisone sodium succinate Injectable 50 milliGRAM(s) IV Push every 6 hours  thiamine IVPB 200 milliGRAM(s) IV Intermittent every 12 hours  influenza   Vaccine 0.5 milliLiter(s) IntraMuscular once  Ascorbic Acid in 160mL NS 1500 milliGRAM(s) 160 milliLiter(s) IV Intermittent every 6 hours  nicotine - 21 mG/24Hr(s) Patch 1 patch Transdermal daily          ICU Vital Signs Last 24 Hrs  T(C): 36.3 (06 Sep 2017 16:00), Max: 37.3 (06 Sep 2017 00:00)  T(F): 97.3 (06 Sep 2017 16:00), Max: 99.1 (06 Sep 2017 00:00)  HR: 112 (06 Sep 2017 19:00) (84 - 116)  BP: 107/75 (06 Sep 2017 19:00) (95/67 - 119/73)  BP(mean): 86 (06 Sep 2017 19:00) (72 - 91)  RR: 33 (06 Sep 2017 19:00) (21 - 37)  SpO2: 90% (06 Sep 2017 19:00) (85% - 97%)      ABG - ( 05 Sep 2017 06:28 )  pH: 7.55  /  pCO2: 35    /  pO2: 62    / HCO3: 32    / Base Excess: 8.2   /  SaO2: 93                  I&O's Detail    05 Sep 2017 07:01  -  06 Sep 2017 07:00  --------------------------------------------------------  IN:    Solution: 200 mL    Solution: 100 mL    Solution: 480 mL    Solution: 300 mL    Solution: 500 mL  Total IN: 1580 mL    OUT:    Voided: 800 mL  Total OUT: 800 mL    Total NET: 780 mL      06 Sep 2017 07:01  -  06 Sep 2017 19:52  --------------------------------------------------------  IN:    Oral Fluid: 200 mL    Solution: 100 mL    Solution: 320 mL    Solution: 100 mL    Solution: 200 mL  Total IN: 920 mL    OUT:    Voided: 400 mL  Total OUT: 400 mL    Total NET: 520 mL            LABS:                        8.8    15.5  )-----------( 83       ( 06 Sep 2017 09:41 )             25.7     09-06    132<L>  |  89<L>  |  33.0<H>  ----------------------------<  125<H>  3.3<L>   |  31.0<H>  |  0.72    Ca    7.4<L>      06 Sep 2017 05:49  Phos  4.6     09-06  Mg     2.1     09-06    TPro  5.9<L>  /  Alb  2.1<L>  /  TBili  0.5  /  DBili  x   /  AST  19  /  ALT  15  /  AlkPhos  151<H>  09-04      CARDIAC MARKERS ( 04 Sep 2017 20:37 )  x     / <0.01 ng/mL / 25 U/L / x     / x          CAPILLARY BLOOD GLUCOSE        PT/INR - ( 04 Sep 2017 20:37 )   PT: 14.7 sec;   INR: 1.33 ratio         PTT - ( 04 Sep 2017 20:37 )  PTT:29.4 sec    CULTURES:          Physical Examination:    General: Sick appearing, cachectic, Tachypneic, Tolerating bipap,  able to speak in stilted sentences from SOB,  Alert, oriented, interactive, nonfocal    HEENT: Pupils equal, reactive to light.  Symmetric. No JVD    PULM: Clear to auscultation bilaterally, no significant sputum production    CVS: S1, S2 tachy , no murmurs, rubs, or gallops    ABD: Soft, nondistended, nontender, normoactive bowel sounds, no masses    EXT: No edema, + B/L Calf tenderness     SKIN: Warm and well perfused, no rashes noted.          RADIOLOGY:   < from: CT Angio Chest w/ IV Cont (09.05.17 @ 01:44) >  EXAM:  CT ANGIO ABD PELV (W)AW IC                         EXAM:  CT ANGIO CHEST (W)AW IC                          PROCEDURE DATE:  09/05/2017          INTERPRETATION:  EXAMINATION: CT ANGIOGRAPHY OF THE CHEST (AORTIC   DISSECTION PROTOCOL)    INDICATION: Hypoxemic, tachycardic, shortness of breath, evaluate for   pulmonary embolism    Technique: Axial images were obtained from the thoracic inlet through the   pelvis during arterial phase of intravenous contrast administration. 94   cc of Omnipaque 350 was administered intravenously without complication.    Reformatted coronal and sagittal images are submitted.    COMPARISON: Abdominal CT of August 4, 2017    FINDINGS:    There is no aortic dissection, aneurysm, intramural hematoma or   para-aortic hemorrhage.  There is no central pulmonary embolism.  The   great vessels are not dilated.      The visualized neck, axilla and subcutaneous tissues are unremarkable.    The tracheobronchial tree is patent proximally. There are scattered foci  of air in the posterior mediastinum. There is a moderately large volume   of fluid in the posterior mediastinal soft tissues. There is no   significant mediastinal or hilar adenopathy.    The heart is not enlarged.  There is no pericardial effusion.    Lungs: There are patchy airspace and groundglass opacities throughout the   lungs, worst at the lung bases compatible with multifocal pneumonia. Foci   of air in the left lower lobe are compatible with necrotizing pneumonia.   There is a left-sided hydropneumothorax likely related to extension of   the left lung parenchymal infection or bronchopleural fistula should be   excluded clinically. Patchy opacities are also seen in the right lower   and middle lobes to a lesser extent. There are moderate centrilobular and   paraseptal emphysematous changes. There is minor involvement of the   lingula and left upper lobe.    There is no pleural abnormality.    There is no free air. There is a moderate volume of abdominopelvic   ascites, not significantly changed compared to the previous CT of August 4, 2017.  The liver, spleen, adrenal glands and kidneys are unremarkable. Again   seen are coarse calcifications involving the head of the pancreas   compatible with sequela of chronic pancreatitis. There is a stable 3.6 cm   cystic focus in the head of the pancreas. There is decreased dilatation   of the pancreatic duct (4-5 mm)with a stent in the neck.   Gallbladder: Unremarkable.    There is no small bowel obstruction.    Pelvic organs: No pelvic masses.  The urinary bladder is unremarkable.    The aorta is not dilated.  There are atherosclerotic vascular   calcifications.  There is no significant adenopathy.  There is no retroperitoneal mass.  The visualized osseous structures are unremarkable.    IMPRESSION:    No evidence of aortic dissection or pulmonary embolism.  Multifocal pneumonia complicated by air in the left lower lobe, left   pleural space, and posterior mediastinum concerning for necrotizing   infection with trans-spatial spread of gas-forming organism.  Correlate   clinically for wretching to exclude esophageal injury as cause of   posterior mediastinal air. Bronchopleural fistula is less likely.  No significant change in moderately large volume of abdominopelvic   ascites.  Decreased pancreatic ductal dilatation status post stent placement.   Chronic pancreatitis without convincing acute peripancreatic   inflammation. The findings were discussed with PA left at 2:47AM on   September 5, 2017 with [facility].    < end of copied text >      CRITICAL CARE TIME SPENT: 40 minutes     (Reviewing data, imaging, discussing with multidisciplinary team, non inclusive of procedures, discussing goals of care with patient/family)
CHIEF COMPLAINT/INTERVAL HISTORY:    Patient is a 57y old  Male who presents with a chief complaint of Shortness of breath (06 Sep 2017 00:44)      HPI:  57M Presents with C/O SOB worsening over past few days.  PMhx chronic pancreatitis (dx 2002), Alcohol abuse (last drink 2 years), HTN, inguinal hernia repair.  Recent protracted hospital course initially presented to [facility] 7/29/2017 for worsening foot pain refractory to antibiotics. Patient was found to have acute on chronic pancreatitis.  He did  improved with course of steroid taper. Patient has had multiple complications throughout his course. He was found to have worsening abdominal ascites, which was tapped found to have  SBP and started on abx, and escalated to meropenum for worsening leukocytosis. . MRI Abdomen showed stable pancreatic pseudocysts, dilated main pancreatic duct as well as acute/subacute portal vein thrombosis. Patient started on therapeutic Lovenox with  repeat abdominal US with doppler which shows no thrombus.  Patient was then transferred to [facility] 8/15/2017 for  an  ERCP and stent placement.   Eventually discharged to Rehab on 8/23.  Pt has been home from rehab X 1 week.  Lives alone and has a neighbor who helps out. (04 Sep 2017 22:24)      SUBJECTIVE & OBJECTIVE: Pt seen and examined at bedside. Pt actively dying.     ICU Vital Signs Last 24 Hrs  T(C): 36.7 (09 Sep 2017 11:39), Max: 36.7 (09 Sep 2017 11:39)  T(F): 98 (09 Sep 2017 11:39), Max: 98 (09 Sep 2017 11:39)  HR: 98 (09 Sep 2017 11:39) (98 - 98)  BP: 86/60 (09 Sep 2017 11:39) (86/60 - 86/60)  BP(mean): --  ABP: --  ABP(mean): --  RR: 12 (09 Sep 2017 11:39) (12 - 12)  SpO2: 95% (09 Sep 2017 11:39) (95% - 95%)        MEDICATIONS  (STANDING):  pantoprazole  Injectable 40 milliGRAM(s) IV Push daily  Ascorbic Acid in 160mL NS 1500 milliGRAM(s) 160 milliLiter(s) IV Intermittent every 6 hours  thiamine IVPB 200 milliGRAM(s) IV Intermittent every 12 hours  nicotine - 21 mG/24Hr(s) Patch 1 patch Transdermal daily  influenza   Vaccine 0.5 milliLiter(s) IntraMuscular once  micafungin IVPB   IV Intermittent   micafungin IVPB 100 milliGRAM(s) IV Intermittent every 24 hours  saliva substitute (BIOTENE MOISTURIZING MOUTH SPRAY) 2 [place](s) Topical every 2 hours  piperacillin/tazobactam IVPB. 3.375 Gram(s) IV Intermittent every 8 hours  morphine  Infusion 6 mG/Hr (6 mL/Hr) IV Continuous <Continuous>  LORazepam   Injectable 1.5 milliGRAM(s) IV Push every 6 hours    MEDICATIONS  (PRN):  morphine  - Injectable 5 milliGRAM(s) IV Push every 2 hours PRN dyspnea-dependence on BIPAP  glycopyrrolate Injectable 0.2 milliGRAM(s) IV Push every 6 hours PRN excessive secretions  ALBUTerol    0.083%. 2.5 milliGRAM(s) Nebulizer once PRN Assist with LABORED BREATHING      LABS:                CAPILLARY BLOOD GLUCOSE          RECENT CULTURES:      RADIOLOGY & ADDITIONAL TESTS:      PHYSICAL EXAM:  GENERAL: cachetic, on Bipap, difficult to arouse  CHEST/LUNG: b/l air entry, coarse  HEART: Regular   ABDOMEN: Soft, BS+  EXTREMITIES: No edema, tenderness

## 2017-09-10 NOTE — PROGRESS NOTE ADULT - ASSESSMENT
57 yr old male with hypertension, alcohol abuse, pancreatitis admitted on 9/3 with complaints of shortness of breath. Prior to this admission, patient was admitted to [facility], where he initially presented with 4th plantar digit abscess, seen by podiatry, had an I&D. He developed generalized rash and arthralgias, was consulted by rheumatology and dermatology. Diagnosed with panniculitis, likely secondary to pancreatitis and improved with a course of steroids. His course was complicated by ascites, suspected SBP. He was started on Lasix and Aldactone.He underwent paracentesis by IR, treated with Meropenem. MRI abdomen showed pancreatic duct dilatation, portal vein thrombosis, given Lovenox, on repeat portal vein US, thrombus had resolved. He was transferred to [place] for ERCP which was done on 8/17, and a pancreatic duct stent was placed. He was discharged home on 8/23 with home care. Per sister, post discharge, patient continued to smoke, was barely eating and progressively got weaker. He presented to [facility] with shortness of breath. Noted to have hypoxic respiratory failure, requiring NIPPV. Initial concern for pulmonary embolism, hence CTA chest was done, which showed necrotizing pneumonia and mediastinal fluid collection, suggestive of mediastinitis. He was started on Vancomycin and Meropenem and ID consulted. Cultures were sent. He continued to have hypoxia and continued to require BiPAP, had episodes of hypoxia on [place] Aayush. Palliative care was consulted, goals of care were discussed. Patient is DNR/DNI, started on morphine drip for respiratory distress. His sputum cultures grew Candida and hence was started on Micafungin. He was transferred to medical floor for further management. His morphine dose was titrated by Palliative care, given respiratory distress.

## 2017-09-10 NOTE — PROGRESS NOTE ADULT - ATTENDING COMMENTS
Spoke with sister Mahnaz, aware of poor prognosis. HCP requesting d/c of all abx. Urinary retention- s/p kirk by , maintain kirk    Spoke with sister Mahnaz, aware of poor prognosis. HCP requesting d/c of all abx.

## 2017-09-10 NOTE — DISCHARGE NOTE FOR THE EXPIRED PATIENT - HOSPITAL COURSE
HPI:  57M Presents with C/O SOB worsening over past few days.  PMhx chronic pancreatitis (dx 2002), Alcohol abuse (last drink 2 years), HTN, inguinal hernia repair.  Recent protracted hospital course initially presented to [facility] 7/29/2017 for worsening foot pain refractory to antibiotics. Patient was found to have acute on chronic pancreatitis.  He did  improved with course of steroid taper. Patient has had multiple complications throughout his course. He was found to have worsening abdominal ascites, which was tapped found to have  SBP and started on abx, and escalated to meropenum for worsening leukocytosis. . MRI Abdomen showed stable pancreatic pseudocysts, dilated main pancreatic duct as well as acute/subacute portal vein thrombosis. Patient started on therapeutic Lovenox with  repeat abdominal US with doppler which shows no thrombus.  Patient was then transferred to [facility] 8/15/2017 for  an  ERCP and stent placement.   Eventually discharged to Rehab on 8/23.  Pt has been home from rehab X 1 week.  Lives alone and has a neighbor who helps out. (04 Sep 2017 22:24)  Patient had a CT of chest to R/O PE:  EXAM:  CT ANGIO ABD PELV (W)AW IC                         EXAM:  CT ANGIO CHEST (W)AW IC                          PROCEDURE DATE:  09/05/2017    IMPRESSION:    No evidence of aortic dissection or pulmonary embolism.  Multifocal pneumonia complicated by air in the left lower lobe, left   pleural space, and posterior mediastinum concerning for necrotizing   infection with trans-spatial spread of gas-forming organism.  Correlate   clinically for wretching to exclude esophageal injury as cause of   posterior mediastinal air. Bronchopleural fistula is less likely.  No significant change in moderately large volume of abdominopelvic   ascites.  Decreased pancreatic ductal dilatation status post stent placement.   Chronic pancreatitis without convincing acute peripancreatic   inflammation.  Patient was placed on palliative care on 9-10-17 HPI:  57M Presents with C/O SOB worsening over past few days.  PMhx chronic pancreatitis (dx 2002), Alcohol abuse (last drink 2 years), HTN, inguinal hernia repair.  Recent protracted hospital course initially presented to [facility] 7/29/2017 for worsening foot pain refractory to antibiotics. Patient was found to have acute on chronic pancreatitis.  He did  improved with course of steroid taper. Patient has had multiple complications throughout his course. He was found to have worsening abdominal ascites, which was tapped found to have  SBP and started on abx, and escalated to meropenum for worsening leukocytosis. . MRI Abdomen showed stable pancreatic pseudocysts, dilated main pancreatic duct as well as acute/subacute portal vein thrombosis. Patient started on therapeutic Lovenox with  repeat abdominal US with doppler which shows no thrombus.  Patient was then transferred to [facility] 8/15/2017 for  an  ERCP and stent placement.   Eventually discharged to Rehab on 8/23.  Pt has been home from rehab X 1 week.  Lives alone and has a neighbor who helps out. (04 Sep 2017 22:24)    Patient had a CT of chest to R/O PE:  EXAM:  CT ANGIO ABD PELV (W)AW IC                        EXAM:  CT ANGIO CHEST (W)AW IC                        PROCEDURE DATE:  09/05/2017  IMPRESSION:  No evidence of aortic dissection or pulmonary embolism.  Multifocal pneumonia complicated by air in the left lower lobe, left   pleural space, and posterior mediastinum concerning for necrotizing   infection with trans-spatial spread of gas-forming organism.  Correlate   clinically for wretching to exclude esophageal injury as cause of   posterior mediastinal air. Bronchopleural fistula is less likely.  No significant change in moderately large volume of abdominopelvic   ascites.  Decreased pancreatic ductal dilatation status post stent placement.   Chronic pancreatitis without convincing acute peripancreatic   inflammation.    Patient was placed on palliative care on 9-10-17. His mother noted his last breath and got the RN.   Patient's family at bedside at the time of expiration. Mahnaz Lopez and Dana Guzman were both at bedside.   Dr. Jing ENG, notified by leaving message with his service. HPI:  57M Presents with C/O SOB worsening over past few days.  PMhx chronic pancreatitis (dx 2002), Alcohol abuse (last drink 2 years), HTN, inguinal hernia repair.  Recent protracted hospital course initially presented to [facility] 7/29/2017 for worsening foot pain refractory to antibiotics. Patient was found to have acute on chronic pancreatitis.  He did  improved with course of steroid taper. Patient has had multiple complications throughout his course. He was found to have worsening abdominal ascites, which was tapped found to have  SBP and started on abx, and escalated to meropenum for worsening leukocytosis. . MRI Abdomen showed stable pancreatic pseudocysts, dilated main pancreatic duct as well as acute/subacute portal vein thrombosis. Patient started on therapeutic Lovenox with  repeat abdominal US with doppler which shows no thrombus.  Patient was then transferred to [facility] 8/15/2017 for  an  ERCP and stent placement.   Eventually discharged to Rehab on 8/23.  Pt has been home from rehab X 1 week.  Lives alone and has a neighbor who helps out. (04 Sep 2017 22:24)    Patient had a CT of chest to R/O PE:  EXAM:  CT ANGIO ABD PELV (W)AW IC                        EXAM:  CT ANGIO CHEST (W)AW IC                        PROCEDURE DATE:  09/05/2017  IMPRESSION:  No evidence of aortic dissection or pulmonary embolism.  Multifocal pneumonia complicated by air in the left lower lobe, left   pleural space, and posterior mediastinum concerning for necrotizing   infection with trans-spatial spread of gas-forming organism.  Correlate   clinically for wretching to exclude esophageal injury as cause of   posterior mediastinal air. Bronchopleural fistula is less likely.  No significant change in moderately large volume of abdominopelvic ascites.  Decreased pancreatic ductal dilatation status post stent placement.   Chronic pancreatitis without convincing acute peripancreatic   inflammation.    Exam:  General: no response to noxious stimuli  CV: absent pulses and cardiac sounds  Lgs: No chest movement and no breath sounds  Neuro: absent corneal reflexes and pupils were fixed and dilated    Patient was placed on palliative care on 9-10-17. His mother noted his last breath and got the RN.   Patient's family at bedside at the time of expiration. Mahnaz Lopez and Dana Guzman were both at bedside.   Dr. Jing ENG, notified by leaving message with his service.

## 2017-09-10 NOTE — PROGRESS NOTE ADULT - PROVIDER SPECIALTY LIST ADULT
Critical Care
Hospitalist
Hospitalist
Infectious Disease
MICU
Palliative Care
Palliative Care
Hospitalist
Hospitalist

## 2017-09-10 NOTE — PROGRESS NOTE ADULT - PROBLEM SELECTOR PLAN 1
On Morphine gtt and Ativan, remains off BiPAP.
On Morphine gtt and Ativan, remains off BiPAP.
BIPAP HS/PRN alternate with venti mask, pt on oxy mask tolerating SaOz 88-90's
BIPAP more on than off, requiring high pressures with 100% Fio2  Sats are lower today
Blood cultures no growth  Sputum cx with Enterobacter aerogenes, Escherichia coli, Klebsiella pneumoniae and Candida  MRSA PCR negative  Will switch to Zosyn  Follow up cultures
Blood cultures pending  Sputum cx with GPC in pairs and chains, GNR and Candida  MRSA PCR negative  continue Meropenem   Follow up cultures
On Morphine gtt and Ativan, remains of BiPAP.
-Patient currently on non invasive mechanical ventilation ( BIPAP)   - no plan for intubation at this time PT is DNR/DNI  - will titrate settings to maintain SaO2 >90%, or pH >7.25  -keep HOB 30 degrees   -aggressive chest PT and suctioning
BIPAP HS/PRN alternate with venti mask, pt on oxy mask tolerating SaOz 88-90's.  Now DNI / DNR
Blood cultures pending  Sputum cx with GPC in pairs and chains  MRSA PCR negative  D/C Vancomycin  continue Meropenem   Follow up cultures
Continue BiPAP, on morphine drip for comfort. Palliative care following.
BIPAP was eventually withdrawn after IV Continuous Infusion of Morphine  Ativan and Dilaudid IVP was titrated to provide sedation and comfort  Oxygen
BIPAP HS/PRN alternate with venti mask  per family pt is DNI

## 2017-09-27 LAB
CULTURE RESULTS: SIGNIFICANT CHANGE UP
SPECIMEN SOURCE: SIGNIFICANT CHANGE UP

## 2017-10-03 ENCOUNTER — APPOINTMENT (OUTPATIENT)
Dept: GASTROENTEROLOGY | Facility: CLINIC | Age: 57
End: 2017-10-03

## 2017-11-02 NOTE — H&P ADULT - EYES
Cellulitis  Cellulitis is an infection of the skin and the tissue under the skin. The infected area is usually red and tender. This happens most often in the arms and lower legs.  HOME CARE   · Take your antibiotic medicine as told. Finish the medicine even if you start to feel better.  · Keep the infected arm or leg raised (elevated).  · Put a warm cloth on the area up to 4 times per day.  · Only take medicines as told by your doctor.  · Keep all doctor visits as told.  GET HELP IF:  · You see red streaks on the skin coming from the infected area.  · Your red area gets bigger or turns a dark color.  · Your bone or joint under the infected area is painful after the skin heals.  · Your infection comes back in the same area or different area.  · You have a puffy (swollen) bump in the infected area.  · You have new symptoms.  · You have a fever.  GET HELP RIGHT AWAY IF:   · You feel very sleepy.  · You throw up (vomit) or have watery poop (diarrhea).  · You feel sick and have muscle aches and pains.  MAKE SURE YOU:   · Understand these instructions.  · Will watch your condition.  · Will get help right away if you are not doing well or get worse.     This information is not intended to replace advice given to you by your health care provider. Make sure you discuss any questions you have with your health care provider.     Document Released: 06/05/2009 Document Revised: 01/08/2016 Document Reviewed: 03/04/2013  [employer] Interactive Patient Education ©2016 [employer]  Arthritis, Nonspecific  Arthritis is inflammation of a joint. This usually means pain, redness, warmth or swelling are present. One or more joints may be involved. There are a number of types of arthritis. Your caregiver may not be able to tell what type of arthritis you have right away.  CAUSES   The most common cause of arthritis is the wear and tear on the joint (osteoarthritis). This causes damage to the cartilage, which can break down over time. The  knees, hips, back and neck are most often affected by this type of arthritis.  Other types of arthritis and common causes of joint pain include:  · Sprains and other injuries near the joint. Sometimes minor sprains and injuries cause pain and swelling that develop hours later.  · Rheumatoid arthritis. This affects hands, feet and knees. It usually affects both sides of your body at the same time. It is often associated with chronic ailments, fever, weight loss and general weakness.  · Crystal arthritis. Gout and pseudo gout can cause occasional acute severe pain, redness and swelling in the foot, ankle, or knee.  · Infectious arthritis. Bacteria can get into a joint through a break in overlying skin. This can cause infection of the joint. Bacteria and viruses can also spread through the blood and affect your joints.  · Drug, infectious and allergy reactions. Sometimes joints can become mildly painful and slightly swollen with these types of illnesses.  SYMPTOMS   · Pain is the main symptom.  · Your joint or joints can also be red, swollen and warm or hot to the touch.  · You may have a fever with certain types of arthritis, or even feel overall ill.  · The joint with arthritis will hurt with movement. Stiffness is present with some types of arthritis.  DIAGNOSIS   Your caregiver will suspect arthritis based on your description of your symptoms and on your exam. Testing may be needed to find the type of arthritis:  · Blood and sometimes urine tests.  · X-ray tests and sometimes CT or MRI scans.  · Removal of fluid from the joint (arthrocentesis) is done to check for bacteria, crystals or other causes. Your caregiver (or a specialist) will numb the area over the joint with a local anesthetic, and use a needle to remove joint fluid for examination. This procedure is only minimally uncomfortable.  · Even with these tests, your caregiver may not be able to tell what kind of arthritis you have. Consultation with a  specialist (rheumatologist) may be helpful.  TREATMENT   Your caregiver will discuss with you treatment specific to your type of arthritis. If the specific type cannot be determined, then the following general recommendations may apply.  Treatment of severe joint pain includes:  · Rest.  · Elevation.  · Anti-inflammatory medication (for example, ibuprofen) may be prescribed. Avoiding activities that cause increased pain.  · Only take over-the-counter or prescription medicines for pain and discomfort as recommended by your caregiver.  · Cold packs over an inflamed joint may be used for 10 to 15 minutes every hour. Hot packs sometimes feel better, but do not use overnight. Do not use hot packs if you are diabetic without your caregiver's permission.  · A cortisone shot into arthritic joints may help reduce pain and swelling.  · Any acute arthritis that gets worse over the next 1 to 2 days needs to be looked at to be sure there is no joint infection.  Long-term arthritis treatment involves modifying activities and lifestyle to reduce joint stress jarring. This can include weight loss. Also, exercise is needed to nourish the joint cartilage and remove waste. This helps keep the muscles around the joint strong.  HOME CARE INSTRUCTIONS   · Do not take aspirin to relieve pain if gout is suspected. This elevates uric acid levels.  · Only take over-the-counter or prescription medicines for pain, discomfort or fever as directed by your caregiver.  · Rest the joint as much as possible.  · If your joint is swollen, keep it elevated.  · Use crutches if the painful joint is in your leg.  · Drinking plenty of fluids may help for certain types of arthritis.  · Follow your caregiver's dietary instructions.  · Try low-impact exercise such as:  ¨ Swimming.  ¨ Water aerobics.  ¨ Biking.  ¨ Walking.  · Morning stiffness is often relieved by a warm shower.  · Put your joints through regular range-of-motion.  SEEK MEDICAL CARE IF:   · You  do not feel better in 24 hours or are getting worse.  · You have side effects to medications, or are not getting better with treatment.  SEEK IMMEDIATE MEDICAL CARE IF:   · You have a fever.  · You develop severe joint pain, swelling or redness.  · Many joints are involved and become painful and swollen.  · There is severe back pain and/or leg weakness.  · You have loss of bowel or bladder control.     This information is not intended to replace advice given to you by your health care provider. Make sure you discuss any questions you have with your health care provider.     Document Released: 01/25/2006 Document Revised: 01/08/2016 Document Reviewed: 03/14/2016  [employer] Interactive Patient Education ©2016 [employer]     EOMI; PERRL; no drainage or redness

## 2018-07-16 PROBLEM — R18.8 OTHER ASCITES: Chronic | Status: ACTIVE | Noted: 2017-06-02

## 2018-09-27 NOTE — DISCHARGE NOTE ADULT - [id number]_GEN_ALL_CORE
deconditioning Eyes with no visual disturbances.  Ears clean and dry and no hearing difficulties. Nose with pink mucosa and no drainage.  Mouth mucous membranes moist and pink.  No tenderness or swelling to throat or neck.

## 2018-11-15 NOTE — PROGRESS NOTE ADULT - PROBLEM SELECTOR PROBLEM 10
From: Geremias Conte  Sent: 11/14/2018 8:25 PM PST  Subject: Medication Renewal Request    Geremias Conte would like a refill of the following medications:     ALPRAZolam (XANAX) 0.5 MG Tab [Bro Mcdaniel M.D.]    Preferred pharmacy: [id number] - [place], [place] - [address]    Comment:    
Prophylactic measure

## 2019-02-28 NOTE — PROVIDER CONTACT NOTE (CRITICAL VALUE NOTIFICATION) - PERSON GIVING RESULT:
Ventricular Rate : 84  Atrial Rate : 84  P-R Interval : 158  QRS Duration : 84  Q-T Interval : 374  QTC Calculation(Bezet) : 441  P Axis : 52  R Axis : 51  T Axis : 12  Diagnosis : Normal sinus rhythm with sinus arrhythmia  Nonspecific T wave abnormality  ****Abnormal ECG****  No previous ECGs available  Confirmed by KIMBERLY CORDOVA MASOOD ([id number]) on 12/10/2018 6:17:52 PM   Arteria lab

## 2019-04-20 NOTE — ASU DISCHARGE PLAN (ADULT/PEDIATRIC). - MEDICATION SUMMARY - MEDICATIONS TO CHANGE
I will SWITCH the dose or number of times a day I take the medications listed below when I get home from the hospital:  None 0 = independent

## 2019-09-23 NOTE — CONSULT NOTE ADULT - MINUTES
35
60
Burow's Advancement Flap Text: The defect edges were debeveled with a #15 scalpel blade.  Given the location of the defect and the proximity to free margins a Burow's advancement flap was deemed most appropriate.  Using a sterile surgical marker, the appropriate advancement flap was drawn incorporating the defect and placing the expected incisions within the relaxed skin tension lines where possible.    The area thus outlined was incised deep to adipose tissue with a #15 scalpel blade.  The skin margins were undermined to an appropriate distance in all directions utilizing iris scissors.

## 2020-02-27 NOTE — PHYSICAL THERAPY INITIAL EVALUATION ADULT - FOLLOWS COMMANDS/ANSWERS QUESTIONS, REHAB EVAL
Chief complaint:   Chief Complaint   Patient presents with   • UTI     Vitals:  Visit Vitals  /68 (BP Location: RUE - Right upper extremity, Patient Position: Sitting, Cuff Size: Large Adult)   Pulse 65   Temp 98 °F (36.7 °C) (Oral)   Resp 16   Ht 5' 4\" (1.626 m)   Wt 78.9 kg   LMP 02/08/2020   SpO2 98%   BMI 29.87 kg/m²       HISTORY OF PRESENT ILLNESS     HPI     Jose Raul Colindres is 46 year old female that arrives for evaluation of burning with urination, frequency, and lower back pain for three days. She denies hematuria, fever, nausea, vomiting, vaginal discharge, or concern for STI or pregnancy. She has a history of frequent UTIs and pyelonephritis. She has not tried anything for her symptoms.      Other significant problems:  Patient Active Problem List    Diagnosis Date Noted   • Abnormal CT of the chest 09/20/2016     Priority: Medium   • Attention deficit hyperactivity disorder, inattentive type 10/10/2019     Priority: Low   • HU (obstructive sleep apnea) 02/05/2019     Priority: Low   • Fever 09/19/2018     Priority: Low   • Migraine without aura and without status migrainosus, not intractable 09/19/2018     Priority: Low   • Pneumonia 09/18/2018     Priority: Low   • Insomnia due to mental disorder 01/09/2018     Priority: Low   • Panic disorder without agoraphobia 04/20/2017     Priority: Low   • Chronic pain of right knee 02/25/2017     Priority: Low   • Acute hypoxemic respiratory failure (CMS/HCC) 07/21/2016     Priority: Low     Was previously on 3-5L O2, however last home O2 eval at pulmonary (9/25) indicated she does not need this anymore     • Acute colitis 07/21/2016     Priority: Low   • Hypoxia 07/16/2016     Priority: Low   • Chest pain 11/03/2015     Priority: Low   • History of pneumonia 10/06/2015     Priority: Low   • Daytime hypersomnolence 10/06/2015     Priority: Low   • Bipolar I disorder, most recent episode (or current) depressed, severe, without mention of psychotic behavior  04/16/2015     Priority: Low   • Uncomplicated severe persistent asthma 11/20/2014     Priority: Low   • Tobacco use disorder 03/29/2014     Priority: Low   • Anxiety      Priority: Low   • Back pain, chronic      Priority: Low   • Ankle pain, chronic      Priority: Low   • Arthritis      Priority: Low   • Anemia      Priority: Low   • Pain in limb 08/13/2013     Priority: Low   • Cocaine use 05/19/2012     Priority: Low     Urine positive for cocaine 5/19/12, 4/28/11; negative for cocaine 7/6/13     • Major depressive disorder, recurrent episode, severe, without mention of psychotic behavior 05/01/2012     Priority: Low       PAST MEDICAL, FAMILY AND SOCIAL HISTORY     Medications:  Current Outpatient Medications   Medication   • tiZANidine (ZANAFLEX) 4 MG tablet   • montelukast (SINGULAIR) 10 MG tablet   • predniSONE (DELTASONE) 10 MG tablet   • amphetamine-dextroamphetamine (ADDERALL XR) 20 MG 24 hr capsule   • amphetamine-dextroamphetamine (ADDERALL) 20 MG tablet   • fluticasone-vilanterol (BREO ELLIPTA) 200-25 MCG/INH inhaler   • albuterol (VENTOLIN) (2.5 MG/3ML) 0.083% nebulizer solution   • albuterol 108 (90 Base) MCG/ACT inhaler   • nicotine (NICODERM) 14 MG/24HR patch   • pregabalin (LYRICA) 300 MG capsule   • traZODone (DESYREL) 100 MG tablet   • sertraline (ZOLOFT) 100 MG tablet   • zaleplon (SONATA) 10 MG capsule   • busPIRone (BUSPAR) 30 MG tablet   • fluticasone (FLONASE) 50 MCG/ACT nasal spray   • cetirizine (ZYRTEC ALLERGY) 10 MG tablet   • naLOXone (NARCAN) 4 MG/0.1ML nasal spray   • oxycodone (ROXICODONE) 30 MG immediate release tablet   • sulfamethoxazole-trimethoprim (BACTRIM DS) 800-160 MG per tablet   • benRALIzumab (FASENRA) 30 MG/ML Solution Prefilled Syringe   • benRALIzumab (FASENRA) 30 MG/ML Solution Prefilled Syringe   • benRALIzumab (FASENRA) 30 MG/ML Solution Prefilled Syringe   • benRALIzumab (FASENRA) 30 MG/ML Solution Prefilled Syringe   • benRALIzumab (FASENRA) 30 MG/ML Solution  Prefilled Syringe   • benRALIzumab (FASENRA) 30 MG/ML Solution Prefilled Syringe     No current facility-administered medications for this visit.        Allergies:  ALLERGIES:   Allergen Reactions   • Fentanyl ANAPHYLAXIS     Patient states she \"\" from receiving dose   • Methadone ANAPHYLAXIS   • Naproxen SHORTNESS OF BREATH   • Codeine    • Morphine ANXIETY     Per pt, gets sick when administered IV and if PO dose too high. Tolerates 15mg tabs   • Penicillins RASH       Past Medical  History/Surgeries:  Past Medical History:   Diagnosis Date   • Anemia    • Anxiety    • Bronchitis    • Chronic pain     back sees pain mgmt Dr Swenson   • Degenerated intervertebral disc    • Depression    • Facet joint disease    • Finger fracture 2018   • Fracture     wrist, ankle (R)   • Mixed sleep apnea     Severe HU and CSA - likely needs BiPAP or ASV; Awaiting Titration Study.  Followed by Dr. Jann Jones [phone]   • Otitis media     younger   • Scoliosis    • Severe persistent asthma     Followed by Dr. Jann Jones [phone]    • Tobacco use     Followed by Dr. Jann Jones [phone]   • Unspecified sinusitis (chronic)    • Urinary tract infection    • Use of cane as ambulatory aid        Past Surgical History:   Procedure Laterality Date   • Ankle surgery Right 2008    fracture   • Arthroscopy knee medial&latera Right 2017   • Back surgery  2013ish    compressed disc   • Finger closed reduction w/ percutaneous pinning Left 2018    CRPP L ring finger middle phalanx fracture   • Sinus surgery      x4     • Tubal ligation     • Tympanostomy tube placement         Family History:  Family History   Problem Relation Age of Onset   • Diabetes Mother    • COPD Mother    • Depression Mother    • High cholesterol Mother    • Hypertension Mother    • Cancer Maternal Grandmother         lung       Social History:  Social History     Tobacco Use   • Smoking status: Current Every Day Smoker      Packs/day: 0.25     Years: 24.00     Pack years: 6.00     Types: Cigarettes     Start date: 7/25/1999   • Smokeless tobacco: Never Used   Substance Use Topics   • Alcohol use: No     Alcohol/week: 0.0 standard drinks     Frequency: Never     Drinks per session: 1 or 2     Binge frequency: Never       REVIEW OF SYSTEMS     Review of Systems   Constitutional: Negative for appetite change, chills and fever.   Respiratory: Negative for cough and shortness of breath.    Cardiovascular: Negative for chest pain and palpitations.   Gastrointestinal: Negative for abdominal pain, constipation, diarrhea, nausea and vomiting.   Genitourinary: Positive for decreased urine volume, dysuria and frequency. Negative for difficulty urinating, flank pain, hematuria and urgency.   Musculoskeletal: Positive for back pain (denies injury to back).     PHYSICAL EXAM     Physical Exam  Vitals signs and nursing note reviewed.   Constitutional:       Appearance: Normal appearance.   Cardiovascular:      Rate and Rhythm: Normal rate and regular rhythm.      Heart sounds: Normal heart sounds.   Pulmonary:      Effort: Pulmonary effort is normal.      Breath sounds: Normal breath sounds.   Abdominal:      General: Abdomen is flat. Bowel sounds are normal.      Tenderness: There is tenderness in the suprapubic area. There is left CVA tenderness. There is no right CVA tenderness.   Skin:     General: Skin is warm and dry.   Neurological:      Mental Status: She is alert.   Psychiatric:         Attention and Perception: Attention and perception normal.         Mood and Affect: Mood normal.         Speech: Speech normal.         Behavior: Behavior is cooperative.         Thought Content: Thought content normal.         Cognition and Memory: Cognition normal.         Judgment: Judgment normal.       ASSESSMENT/PLAN     1.  (genitourinary) symptoms  - POCT URINE DIP AUTO  - sulfamethoxazole-trimethoprim (BACTRIM DS) 800-160 MG per tablet; Take 1  tablet by mouth 2 times daily for 7 days.  Dispense: 14 tablet; Refill: 0  - URINE, BACTERIAL CULTURE    Results for orders placed or performed in visit on 02/27/20   POCT URINE DIP AUTO   Result Value    POCT Color Yellow    POCT Appearance Cloudy    POCT Glucose Urine Negative    POCT Bilirubin Negative    POCT Ketones Negative    POCT Specific Gravity 1.020    POCT Occult Blood Negative    POCT pH 7.0    POCT Protein Negative    POCT Urobilinogen 0.2    Urine Nitrite Negative    WBC (Leukocyte) Esterase POC Negative       Patient informed that her urine results are negative. Offered to treat patient for UTI based on symptoms and history of UTIs and pyelonephritis. Will send urine for culture and call her with results. Patient agreeable with plan. Patient instructed to increase fluid intake. Patient to present to the ED with fevers, worsening flank pain, or hematuria. Patient verbalizes understanding and all questions answered.     Leslie Velazco RN DNP student [facility]     I have seen and examined the patient with the student and agree with the findings and plan as documented.     JAIME Girard        100% of the time

## 2020-04-02 PROBLEM — R18.8 ASCITES: Status: ACTIVE | Noted: 2017-06-05

## 2020-07-31 NOTE — PROGRESS NOTE ADULT - SUBJECTIVE AND OBJECTIVE BOX
Had large bowel movement overnight and feels better. No nausea or emesis. Had a small easily reducible prolapse with the bowel movements overnight.     Exam:  Vital Signs Last 24 Hrs  T(C): 37.1 (04 Aug 2017 23:40), Max: 37.1 (04 Aug 2017 23:40)  T(F): 98.7 (04 Aug 2017 23:40), Max: 98.7 (04 Aug 2017 23:40)  HR: 109 (05 Aug 2017 05:20) (72 - 109)  BP: 138/84 (05 Aug 2017 05:20) (138/84 - 153/86)  RR: 18 (04 Aug 2017 23:40) (18 - 18)  SpO2: 96% (04 Aug 2017 23:40) (96% - 96%)    General: in no distress  Abdomen: soft, distended, non tender    08-05    134<L>  |  102  |  23.0<H>  ----------------------------<  108  4.8   |  20.0<L>  |  0.47<L>    Ca    7.8<L>      05 Aug 2017 09:02  Mg     2.1     08-05    TPro  5.0<L>  /  Alb  2.0<L>  /  TBili  0.6  /  DBili  x   /  AST  70<H>  /  ALT  103<H>  /  AlkPhos  236<H>  08-05    < from: CT Abdomen and Pelvis No Cont (08.04.17 @ 20:31) >  CLINICAL HISTORY:    57 years old, male; Signs and symptoms; Constipation    TECHNIQUE:    Axial computed tomography images of the abdomen and pelvis without intravenous contrast.   Coronal and sagittal reformatted images were created and reviewed.    COMPARISON:    [facility] CT ABDOMEN ANDPELVIS OC IC 5/20/2017 9:47:38 PM    FINDINGS:    Lower thorax:  Small left pleural effusion. Smaller right pleural effusion.    Anasarca: Pleural effusions, ascites, subcutaneous edema  Moderate hiatal hernia.     ABDOMEN:    Liver:  Unremarkable.    Gallbladder and bile ducts:  Unremarkable.  No calcified stones.  No ductal dilation.    Pancreas:  Edema and inflammatory changes around the pancreas consistent with acute pancreatitis.  Calcifications in the pancreas consistent with chronic pancreatitis. Dilatation of the pancreatic duct.  Multiple fluid collections adjacent to pancreas consistent with pancreatic pseudocysts. Largest measures 4 cm. Previously it measured 3.7 cm.    Spleen:  Unremarkable.  No splenomegaly.    Adrenals:  Unremarkable.  No mass.    Kidneys and ureters:  Unremarkable.  No obstructing stones.  No hydronephrosis.    Stomach and bowel:  There are multiple loops of dilated small bowel with air-fluid levels.  This may represent ileus or early small bowel obstruction.    Findings consistent with constipation.  Large amount of constipation in the right colon.    Appendix:  No findings to suggest acute appendicitis.     PELVIS:    Bladder:  Unremarkable.  No stones.    Reproductive:  Unremarkable as visualized.     ABDOMEN and PELVIS:    Intraperitoneal space:  Large amount of ascites throughout the abdomen and pelvis.    Bones/joints:  No acute fracture.  No dislocation.    Soft tissues:  Edema in the subcutaneous fat.    Vasculature:  Unremarkable.  No abdominal aortic aneurysm.    Lymph nodes:  Unremarkable.  No enlarged lymph nodes.    IMPRESSION:       1.  Edema and inflammatory changes around the pancreas consistent with acute pancreatitis.    2.  Calcifications in the pancreas consistent with chronic pancreatitis. Dilatation of the pancreatic duct.    3.  Multiple fluid collections adjacent to pancreas consistent with pancreatic pseudocysts. Largest measures 4 cm. Previously it measured 3.7 cm.    4.  Small left pleural effusion. Smaller right pleural effusion.    5.  Large amount of ascites throughout the abdomen and pelvis.    6.  There are multiple loops of dilated small bowel with air-fluid levels.  This may represent ileus or early small bowel obstruction.    7. Anasarca    < end of copied text > stable

## 2020-11-17 NOTE — ED ADULT NURSE NOTE - WOUND TYPE
Diagnostic wire removed. Guidewire tip is intact.  Wire type: In-Q. redness and cellulitis to left lower extremity warm swollen and red

## 2021-01-30 NOTE — PROGRESS NOTE ADULT - PROBLEM/PLAN-1
DISPLAY PLAN FREE TEXT
Yes

## 2021-03-15 NOTE — DIETITIAN INITIAL EVALUATION ADULT. - NS FNS REASON FOR WEIGHT CHANG
3/15/2021: Chart review indicates pt was seen by Dr Lindy Guido and was referred to pain management and OT for hand therapy  Will resolve this episode of care  decreased po intake

## 2021-04-02 NOTE — H&P PST ADULT - LAST ECHOCARDIOGRAM
Medication Refill    amiodarone (CORDARONE) 200 MG tablet   Take 1 tablet by mouth daily    carvedilol (COREG) 12.5 MG tablet  Take 1 tablet by mouth 2 times daily (with meals)    [address], [facility] -  [phone] 2015

## 2021-07-12 NOTE — PROGRESS NOTE ADULT - ASSESSMENT
Dr Clemente Mims aware that amiodarone with be decreased at 2014 and pt is still having occasional unifocal PVCs . 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  Cardiomyopathy  Chronic systolic congestive heart failure  • Echo (03/28/2018) - EF 45.0%  Nonsustained Ventricular Tachycardia  • Dx date 2017  • Echo (03/28/2018) - HAROLDO 36.2 ml/m²  · CHADSVASc Stroke Risk Score = 8  · On Coumadin  Hypertension  History of Cerebral vascular accident, embolism of the right middle cerebral artery (03/28/2018)      Noncardiac Considerations:  Chronic obstructive pulmonary disease.  Diabetes mellitus   Cognitive dysfunction    Pertinent ED Visits/Hospitalizations since last Cardiology office visit:  None.      HISTORY OF PRESENT ILLNESS  Pinky Li is a 70 year old female who presents today for 6 month follow up for LV mural thrombus without MI.    On 12/18/2013 Cardiac Catheterization;Successful stenting of the left anterior descending mid segment using a 3.0x38 mm Xience stent post-dilated with a 3.5x20 mm NC balloon.  Primary stenting of the mid circumflex using a 4.0x15 mm Xience stent post-dilated with a 4.0x8 mm NC balloon at high pressures.     On 05/15/2014 Cardiac Catheterization; Patent stents in the circumflex and left anterior descending with no disease in the right coronary artery.  Mildly elevated left ventricular end-diastolic pressure and wedge pressure with  normal pulmonary vascular resistance and transpulmonary gradient.     On 03/28/2018 IR Cerebral Angiogram;. A spontaneous recanalization of the right MCA thrombus. Early infarction of anterior  portion of right parietal lobe is suspected.     On 03/28/2018 Echocardiogram with findings of;  LV apical thrombus, 1.5 X 0.7 cm present..  Normal left ventricular cavity size. Regional wall motion abnormalities (see diagram). LVEF, 45 %.  RV appears normal in the limited views. PASP = 32 mmHg.  Compared to prior echo dated 6/9/17, LV thrombus appears larger. Previously measured 0.7 X 0.3 cm.    Last cardiology visit was on 01/06/2021.  She walks around the around, but does otherwise does not walk that much.  Functional capacity is good.  Patient was cleared for low risk surgery.  Patient must bridge with Lovenox while holding warfarin prior to procedure.  Patient should continue 81 mg ASA.    Patient follows with pulmonary and endocrinology.    Last saw Karly Sullivan MD 5/26/2021, she manages cerebrovascular accident, diabetes and dementia.    Patient is seen in anticoagulation clinic.  Last INR was 2.3 on 6/23/2021.  Next check is scheduled for 7/21/2021.    Today patient reports ***        ALLERGIES  ALLERGIES:   Allergen Reactions   • Erythromycin Other (See Comments)     Severe stomach pain       MEDICATIONS  Current Outpatient Medications   Medication Sig Dispense Refill   • OneTouch Verio test strip Test blood sugar 3 times daily. Diagnosis: E11.9. Meter: OneTouch Verio 300 strip 3   • donepezil (ARICEPT) 10 MG tablet Take 1 tablet by mouth nightly. 90 tablet 3   • albuterol (ProAir RespiClick) 108 (90 Base) MCG/ACT inhaler Inhale 2 puffs into the lungs every 4 hours as needed for Shortness of Breath or Wheezing. 1 Inhaler 2   • gabapentin (NEURONTIN) 100 MG capsule TAKE ONE CAPSULE BY MOUTH EVERY NIGHT AT BEDTIME 90 capsule 1   • Insulin Pen Needle (BD Pen Needle Anum U/F) 32G X 4 MM Misc USE once DAILY WITH INSULIN 100 each 3   • Dulaglutide (Trulicity) 4.5 MG/0.5ML Solution Pen-injector Inject 4.5 mg into the skin every 7 days. Do not start before April 20, 2021. 6 mL 1   • insulin glargine  (Lantus SoloStar) 100 UNIT/ML pen-injector Inject 30 Units into the skin daily. Prime 2 units before each dose. 30 mL 1   • atorvastatin (LIPITOR) 80 MG tablet Take 1 tablet by mouth nightly. 90 tablet 3   • metoPROLOL tartrate (LOPRESSOR) 25 MG tablet Take 2 tablets by mouth every 12 hours. 360 tablet 3   • montelukast (SINGULAIR) 10 MG tablet Take 1 tablet by mouth every evening. 90 tablet 3   • warfarin (COUMADIN) 4 MG tablet TAKE 1 TO 1 AND 1/2 TABLETS BY MOUTH DAILY AS DIRECTED.  BLOOD THINNER 135 tablet 3   • metFORMIN (GLUCOPHAGE-XR) 500 MG 24 hr tablet Take 2 tablets by mouth 2 times daily (with meals). 360 tablet 3   • empagliflozin (Jardiance) 10 MG tablet Take 1 tablet by mouth daily (before breakfast). 90 tablet 3   • fluticasone-umeclidin-vilanterol (Trelegy Ellipta) 100-62.5-25 MCG/INH inhaler Inhale 1 puff into the lungs daily. 1 each 11   • albuterol (VENTOLIN) (2.5 MG/3ML) 0.083% nebulizer solution Take 3 mLs by nebulization every 6 hours as needed for Wheezing. 375 mL 11   • lisinopril (ZESTRIL) 5 MG tablet Take 0.5 tablets by mouth daily. 30 tablet 0   • traMADol (ULTRAM) 50 MG tablet 1 tablet every 6-8 hours (Patient taking differently: 1 tablet every 6-8 hours prn) 90 tablet 3   • ONETOUCH DELICA LANCETS 33G Misc Test blood sugar two times daily as directed. Diagnosis: Type 2 diabetes. Meter: One Touch Verio Flex 60 each 3   • Acetaminophen 500 MG Cap Take 500 mg by mouth as needed.     • VITAMIN D, CHOLECALCIFEROL, PO Take 2,000 Units by mouth daily.     • aspirin 81 MG tablet Take 1 tablet by mouth daily. 30 tablet 0     No current facility-administered medications for this visit.       MEDICAL HISTORY  Past Medical History:   Diagnosis Date   • Allergic rhinitis    • Alzheimer's dementia (CMS/HCC)    • Anxiety    • ASHD (arteriosclerotic heart disease)    • Asthma, mild intermittent    • Cataracts, bilateral 01/2021   • Cerebral infarction (CMS/HCC) 03/2018    left side weakness and cant move  1. Marcus feet abscess/ panniculitis - with fat saponification - improved feel but also on hands now  -Leukocytosis with Steroids - derm- s/p skin Bx; steroids started. with good response. Rheum Kleiner called. skin Bx consistent with panniculitis due to pancreatitis. NOw tapered steroid PO steroids  taper over 2 weeks each dose.   2. Pain issues- on pain medications; pain management called.   3. HTN- Cont home medications  4.pancreatic pseudocysts and was recommended to f/u GI outpatient for EUS/ ERCP. d/w Dr. Briones- pt got EUS outpatient which showed chronic calcified pancreatitis likely etiology Alcoholic. However IgG was elevated abnormally suggesting autoimmune component. amylase/ lipase elevated chronically. and if needed can rpt MRI . Now high Amylase/ Lipase elevated, but consistent with prior elevated numbers. and same as outpatient numbers.  Rheum ordered panel of tests. sent out. when improved clinically to change to PO steroids and discharge.  5. constipation with abd distention: no response to lactulose.  6 DVT PPX Lovenox   7. Ascites with edema - add lasix and aldactone- discussed with GI - for IR US guided paracentesis  8. SBP- INVANZ repeat paracentesis will follow up fluid results and culture   9 constipation - lactulose, discussed with sx - flat and upright xary   10 Portal vein thrombosis- full AC lovenox now Q12 , discussed with GI, ID it   • CHF (congestive heart failure) (CMS/[facility]) 6/26/2017   • Chronic airway obstruction, not elsewhere classified 8/22/2013   • Chronic anticoagulation - warfarin    • Closed fracture of distal end of left fibula 12/30/2015   • Elevated troponin, rule out ACS/non-ST elevation MI 12/18/2013   • History of cardiac cath 06/26/2017    5/15/2014 Cardiac catheterization with Dr. Salgado CORONARY ANATOMY: 1.  Left main is short, divides into LAD and circumflex and is free of disease. 2.  The left circumflex is nondominant, fairly moderate-sized vessel.  Patent stent is identified in the circumflex without any angiographic evidence of in-stent restenosis. 3.  The LAD is a type 3 vessel, gives rise to diagonals and septal perforators.     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 Oakley notes during admission 12/2013   • Overweight(278.02)     159/191   • Pure hypercholesterolemia    • S/P drug eluting coronary stent placement 6/26/2017 12/18/2013 Cardiac catheterization with Dr. Dillard, with Percutaneous transluminal coronary angioplasty stenting of the left anterior descending using a 3.0x38 mm Xience stent post-dilated proximally with a 3.5x20 mm NC balloon.   Primary stenting of the mid circumflex artery using a 4.0x15 mm Xience stent post-dilated with a 4.0x8 mm NC balloon up to 16 atmospheres.   • Tendonitis of wrist, right 5/2014    cortisone injection   • Tobacco abuse     11/4/2011 Quit 2/2012 Resumed.    • Type II or unspecified type diabetes mellitus without mention of complication, not stated as uncontrolled    • Uncomplicated senile dementia (CMS/[facility])    • Unspecified sinusitis (chronic)    • Use of cane/walker as ambulatory aid    • Wears full upper dentures         SURGICAL HISTORY  Past Surgical History:   Procedure Laterality Date   • Cardiac catherization  05/15/2014    Right/left heart cath - Patent stents in LAD & circ   • Colonoscopy w biopsy   2015    Tubular adenoma polyps, Diverticulosis - next exam due in 3 years- Dr Patrick   • Coronary angioplasty with stent placement  2013    PTCA/JELENA to LAD, & mid cirx   • Eye surgery Right 2021    Right cataract extraction with IOL implant - Dr. Cano   • Hand finger surgery unlisted  2014    Rt Deq rels & 2nd ext compartment rels  Dr. Chan        SOCIAL HISTORY  Social History     Tobacco Use   • Smoking status: Former Smoker     Packs/day: 0.25     Years: 46.00     Pack years: 11.50     Types: Cigarettes     Quit date: 2019     Years since quittin.1   • Smokeless tobacco: Never Used   Vaping Use   • Vaping Use: Never assessed   Substance Use Topics   • Alcohol use: Not Currently     Alcohol/week: 0.0 standard drinks     Comment: rare   • Drug use: No        FAMILY HISTORY  Family History   Problem Relation Age of Onset   • Diabetes Mother    • Heart disease Mother    • Diabetes Father    • Heart disease Father    • Diabetes Sister         Review of Systems:  GENERAL: Negative for: {ROS - GENERAL:249428::\"fever\",\"chills\"}  NEURO: Negative for: {ROS - NEURO:134338::\"lightheadedness\",\"dizziness \"}  PULMONARY: Negative for: {ROS PULM:111903::\"shortness of breath\",\"wheezing\",\"cough\",\"sputum production\",\"hemoptysis \"}  CV: Negative for: {ROS - CV:701329::\"syncope\",\"angina\",\"palpitations\",\"claudication\"}  GI: Negative for: {ROS - GI:024555::\"hematemesis \",\"melena\"}  MS: Negative for: {ROS - MUSCULOSKELETAL:888737::\"muscle weakness\",\"joint stiffness\",\"swelling \",\"myalgias\"}     VITALS  There were no vitals taken for this visit.     Physical Exam:  General: {GENERAL:::\"comfortable\",\"in no acute distress\"}  HEENT:   {PE HEENT:2066::\"no pallor, jaundice\"}  Neck:  {PE NECK:2066::\"supple\",\"no carotid bruits\",\"no JVD\"}  Lungs:  {PE LUNGS:1390::\"clear to auscultation\",\"good air entry\",\"no rales or wheezes\",\"no rhonchi\"}  Cardiovascular:   {PE CARDIO:2066::\"regular rate and  rhythm\",\"no S1 and S2 normal\",\"no S3 or S4\"}  Abdomen:   {PE ABD:296562::\"soft, non-tender, nondistended\"}  Extremities:   {EXTREMITIES NEGATIVES LIST:206632::\"no edema\",\"no clubbing\"}  Neuro:  {PE NEURO:247773::\"awake, alert, oriented X3\"}  Skin:   {SKIN EXAM:206634::\"color, texture, turgor are normal\",\"no bruises or rashes\"}       Lab Results   Component Value Date    SODIUM 141 01/15/2021    POTASSIUM 4.7 01/15/2021    BUN 9 01/15/2021    CREATININE 0.68 01/15/2021    WBC 6.5 01/15/2021    HCT 41.3 01/15/2021    HGB 12.6 01/15/2021     01/15/2021    INR 2.3 06/23/2021     06/08/2017    MMB 1.5 12/18/2013    RAPDTR 0.02 07/25/2018    PTT 26 10/29/2019    GLUCOSE 196 (H) 01/15/2021    TSH 0.570 02/27/2019     (H) 06/08/2017    NTPROB 409 (H) 10/30/2019    CHOLESTEROL 119 08/26/2020    HDL 43 (L) 08/26/2020    CALCLDL 48 08/26/2020    TRIGLYCERIDE 139 08/26/2020    MG 2.4 04/02/2018    GFRA >90 12/18/2019    GFRNA >90 12/18/2019    AST 16 08/26/2020    GPT 28 08/26/2020    ALKPT 88 08/26/2020    BILIRUBIN 0.3 08/26/2020     Lab Results   Component Value Date    NTPROB 409 (H) 10/30/2019        DIAGNOSTICS:  12/19/2019 12-Lead  EKG  Normal sinus rhythm   Anterolateral infarct   Abnormal ECG   When compared with ECG of 25-JUL-2018 12:28,   no significant change   Confirmed by ALEX SHAW MD   03/28/2018 Echocardiogram   LV apical thrombus, 1.5 X 0.7 cm present.  Normal left ventricular cavity size. Regional wall motion abnormalities (see diagram). LVEF, 45 %.  RV appears normal in the limited views. PASP = 32 mmHg.  Compared to prior echo dated 6/9/17, LV thrombus appears larger. Previously measured 0.7 X 0.3 cm.  03/28/2018 IR Cerebral Angiogram  Impression:  1. A spontaneous recanalization of the right MCA thrombus. Early infarction of anterior portion of right parietal lobe is suspected.  03/28/2018 CTA Head and Neck  Impression:   1. Near occlusive 4 to 5 mm thrombus in the proximal right M2  segment at the trifurcation. Reconstituted flow distally.  2. Results were communicated to Dr. JEANINE HALE on 3/28/2018 1:50 AM  by LIAM Parham,D.O.      Assessment:  ***      Plan:  ***  Follow up in cardiology clinic in *** or sooner if symptomatic      ***    {Card Provider Signature:[id number]}  07/12/21    We would like to thank Dr. Karly Sullivan MD for involving us in the care of Pinky Li.

## 2021-12-06 NOTE — DISCHARGE NOTE ADULT - REASON FOR ADMISSION
----- Message from Neda Lee MD sent at 12/6/2021  2:08 PM CST -----  Please let her know that US did not show any concerning lymph nodes.  Surgery as scheduled.  Thanks   Foot swellign and pain

## 2021-12-07 NOTE — ED PROVIDER NOTE - CRITICAL CARE PROVIDED
Yes consultation with other physicians/direct patient care (not related to procedure)/documentation/additional history taking/interpretation of diagnostic studies

## 2021-12-22 NOTE — ED ADULT TRIAGE NOTE - MEANS OF ARRIVAL
ambulatory no chest pain, no shortness of breath/no dysuria/no fever/no hematuria/no vomiting/no burning urination

## 2022-05-24 NOTE — CONSULT NOTE ADULT - CONSULT REASON
Patient returned call stating symptoms are nausea, fatigue, no appetite.   Blood Glucose Levels: Per patient did not eat much these days and mainly just drank coffee due to no appetite.  May 24th   2:18pm 229  1:06pm 165  11:30am-183  9:00am 128  6:12am 150    May 23rd:  11pm 148   7:08pm-174  11am 164  2am- 186    May 22nd  5pm 279  1:08pm 205  10:57am 169    
Acute Respiratory Failure-Acute on Chronic Pancreatitis
57 year old former alcoholic with chronic pancreatitis s/p recent hospitalization and stent in biliary tree now admitted with failure to thrive and multilobar pna
Pneumonia
mediastinal air seen on CT
Urinary retention.

## 2022-06-21 NOTE — ED ADULT NURSE NOTE - PERSON CONFIRMING BED ASSIGNMENT
ALBERT  GROUP DOCUMENTATION INDIVIDUAL                                                                          Group Therapy Note    Date: 6/21/2022    Group Start Time: 0901  Group End Time: 2174  Group Topic: Community Meeting    [facility] [address] [facility] GROUP DOCUMENTATION GROUP    Group Therapy Note    Attendees:         Attendance: Did not attend    Patient's Goal:    Interventions/techniques: Follows Directions:     Interactions:     Mental Status:     Behavior/appearance:   Goals Achieved:        Additional Notes:      Jen Vargas lokesh

## 2022-10-10 NOTE — PROGRESS NOTE ADULT - PROBLEM SELECTOR PLAN 1
Arrangements being made for OPT surgical evaluation and eventual repair of this right inguinal hernia with scrotal extension. Speaking Coherently

## 2022-11-30 NOTE — ED STATDOCS - NS ED MD SCRIBE ATTENDING SCRIBE SECTIONS
VITAL SIGNS( Pullset)/PAST MEDICAL/SURGICAL/SOCIAL HISTORY/DISPOSITION/REVIEW OF SYSTEMS/HISTORY OF PRESENT ILLNESS/HIV
No

## 2022-11-30 NOTE — PROCEDURE NOTE - NSINDICATIONS_GEN_A_CORE
HPC Transplant Team                                                      Critical / Counseling Time Provided: 30 minutes                                                                                                                                                        Chief Complaint: Autologous peripheral blood stem cell transplant with high dose CBV prep regimen for treatment of peripheral T cell lymphoma    S: Patient seen and examined with HPC Transplant Team:     All other ROS negative     O: Vitals:   Vital Signs Last 24 Hrs  T(C): 37.3 (2022 05:25), Max: 37.3 (2022 05:25)  T(F): 99.1 (2022 05:25), Max: 99.1 (2022 05:25)  HR: 91 (2022 05:25) (86 - 92)  BP: 113/72 (2022 05:25) (101/61 - 129/73)  BP(mean): --  RR: 18 (2022 05:25) (18 - 18)  SpO2: 98% (2022 05:25) (96% - 100%)    Parameters below as of 2022 05:25  Patient On (Oxygen Delivery Method): room air    Admit weight: 80.3kg   Daily Weight in k.9 (2022 10:30)  Today's weight:     Intake / Output:    @ 07:01  -   @ 07:00  --------------------------------------------------------  IN: 2549.2 mL / OUT: 2950 mL / NET: -400.8 mL        PE:   Oropharynx: patchy erythema on hard palate, and small ulcerations on hard palate and bilateral buccal mucosa, small ulcers on each side of the tongue  Oral Mucositis:            +                                          rdGrdrrdarddrderd:rd rd3rd CVS: S1, S2 RRR   Lungs: CTA throughout bilaterally   Abdomen: + BS x 4 normoactive, soft, NT, ND  Extremities: no edema   Gastric Mucositis:       -                                           Grade: n/a   Intestinal Mucositis:     -                                         Grade: n/a   Skin: + macular erythema to chest, upper back and neck post Kepivance, improving  TLC: CDI   Neuro: A&O x 3   Pain: 3/10- mouth, improving    Labs:   CBC Full  -  ( 2022 06:57 )  WBC Count : 0.03 K/uL  Hemoglobin : 8.4 g/dL  Hematocrit : 25.5 %  Platelet Count - Automated : 20 K/uL  Mean Cell Volume : 88.5 fl  Mean Cell Hemoglobin : 29.2 pg  Mean Cell Hemoglobin Concentration : 32.9 gm/dL  Auto Neutrophil # : x  Auto Lymphocyte # : x  Auto Monocyte # : x  Auto Eosinophil # : x  Auto Basophil # : x  Auto Neutrophil % : x  Auto Lymphocyte % : x  Auto Monocyte % : x  Auto Eosinophil % : x  Auto Basophil % : x                          8.4    0.03  )-----------( 20       ( 2022 06:57 )             25.5     -    139  |  103  |  9   ----------------------------<  112<H>  3.7   |  26  |  0.38<L>    Ca    9.0      2022 07:20  Phos  3.2       Mg     2.2         TPro  6.3  /  Alb  3.7  /  TBili  0.3  /  DBili  x   /  AST  28  /  ALT  61<H>  /  AlkPhos  83        LIVER FUNCTIONS - ( 2022 07:20 )  Alb: 3.7 g/dL / Pro: 6.3 g/dL / ALK PHOS: 83 U/L / ALT: 61 U/L / AST: 28 U/L / GGT: x             Cultures:   Culture Results:   <10,000 CFU/mL Normal Urogenital Maryanne (22 @ 08:19)    Culture Results:   No growth to date. (22 @ 06:10)    Culture Results:   No growth to date. (22 @ 05:50)      Radiology:   ACC: [id number] EXAM:  XR CHEST PORTABLE URGENT 1V                        PROCEDURE DATE:  2022    Impression:  No focal consolidations.        Meds:   Antimicrobials:   acyclovir   Oral Tab/Cap 400 milliGRAM(s) Oral every 8 hours  cefepime   IVPB 2000 milliGRAM(s) IV Intermittent every 8 hours  clotrimazole Lozenge 1 Lozenge Oral five times a day  fluconAZOLE   Tablet 400 milliGRAM(s) Oral daily      Heme / Onc:   heparin  Infusion 334 Unit(s)/Hr IV Continuous <Continuous>      GI:  pantoprazole    Tablet 40 milliGRAM(s) Oral before breakfast  polyethylene glycol 3350 17 Gram(s) Oral daily PRN  senna 2 Tablet(s) Oral at bedtime PRN  sodium bicarbonate Mouth Rinse 10 milliLiter(s) Swish and Spit five times a day  ursodiol Capsule 300 milliGRAM(s) Oral two times a day      Cardiovascular:       Immunologic:   filgrastim-sndz (ZARXIO) Injectable 480 MICROGram(s) SubCutaneous every 24 hours      Other medications:   acetaminophen     Tablet .. 650 milliGRAM(s) Oral every 6 hours  Biotene Dry Mouth Oral Rinse 5 milliLiter(s) Swish and Spit five times a day  chlorhexidine 4% Liquid 1 Application(s) Topical <User Schedule>  FIRST- Mouthwash  BLM 10 milliLiter(s) Swish and Spit every 6 hours  FLUoxetine 20 milliGRAM(s) Oral daily  folic acid 1 milliGRAM(s) Oral daily  lidocaine/prilocaine Cream 1 Application(s) Topical daily  loratadine 10 milliGRAM(s) Oral daily  multivitamin 1 Tablet(s) Oral daily  nystatin Powder 1 Application(s) Topical two times a day  sodium chloride 0.45% 1000 milliLiter(s) IV Continuous <Continuous>  sodium chloride 0.9%. 1000 milliLiter(s) IV Continuous <Continuous>      PRN:   acetaminophen     Tablet .. 650 milliGRAM(s) Oral every 6 hours PRN  AQUAPHOR (petrolatum Ointment) 1 Application(s) Topical two times a day PRN  HYDROmorphone  Injectable 0.5 milliGRAM(s) IV Push every 6 hours PRN  metoclopramide Injectable 10 milliGRAM(s) IV Push every 6 hours PRN  oxyCODONE    IR 5 milliGRAM(s) Oral every 6 hours PRN  polyethylene glycol 3350 17 Gram(s) Oral daily PRN  senna 2 Tablet(s) Oral at bedtime PRN  sodium chloride 0.65% Nasal 1 Spray(s) Both Nostrils four times a day PRN  sodium chloride 0.9% lock flush 10 milliLiter(s) IV Push every 1 hour PRN  zinc oxide 40% Paste 1 Application(s) Topical three times a day PRN      A/P: 57 year old female with a history of  peripheral T cell lymphoma   Pre  :  Autologous PBSCT day +5  - Completed  -16 24 hour continuous infusion, strict I&O, daily weights, prn diuresis    - Cytoxan day 3/.  - CTX - continue CTX hydration for 24 hours post infusion of last dose. Strict I&O, daily weights, prn diuresis. Start cipro 500mg po BID when ANC < 500    Neutropenic. Started Cipro for prophylaxis. If febrile, panculture and start Cefepime (has tolerated in the past)   - Kepivance day 2/3  11/27- Kepivance day 3/3- HELD due to Kepivance rash and oral erythema worsening  - Febrile overnight, tmax 100.4. CXR no obvious consolidations. Cultures pending. Started on cefepime. Grade 2 chemotherapy induced oral mucositis. Continue supportive measures.     1. Infectious Disease:   acyclovir   Oral Tab/Cap 400 milliGRAM(s) Oral every 8 hours  cefepime   IVPB 2000 milliGRAM(s) IV Intermittent every 8 hours  clotrimazole Lozenge 1 Lozenge Oral five times a day  fluconAZOLE   Tablet 400 milliGRAM(s) Oral daily    2. VOD Prophylaxis: Actigall, Glutamine, Heparin (dosed at 100 units / kg / day)     3. GI Prophylaxis:    pantoprazole    Tablet 40 milliGRAM(s) Oral before breakfast    4. Mouthcare - NS / NaHCO3 rinses, Mycelex, Biotene; Skin care     5. GVHD prophylaxis - n/a     6. Transfuse & replete electrolytes prn     7. IV hydration, daily weights, strict I&O, prn diuresis     8. PO intake as tolerated, nutrition follow up as needed, MVI, folic acid     9. Antiemetics, anti-diarrhea medications:   metoclopramide Injectable 10 milliGRAM(s) IV Push every 6 hours PRN    10. OOB as tolerated, physical therapy consult if needed     11. Monitor coags / fibrinogen 2x week, vitamin K as needed     12. Monitor closely for clinical changes, monitor for fevers     13. Emotional support provided, plan of care discussed and questions addressed     14. Patient education done regarding plan of care, restrictions and discharge planning     15. Continue regular social work input     I have written the above note for Dr. Cabrales who performed service with me in the room.   Leena Ibrahim NP-C ([phone])    I have seen and examined patient with NP, I agree with above note as scribed.                    HPC Transplant Team                                                      Critical / Counseling Time Provided: 30 minutes                                                                                                                                                        Chief Complaint: Autologous peripheral blood stem cell transplant with high dose CBV prep regimen for treatment of peripheral T cell lymphoma    S: Patient seen and examined with [facility] Transplant Team:   +mouth/throat pain(1-2/10)  +loose stool  +fatigue  All other ROS negative     O: Vitals:   Vital Signs Last 24 Hrs  T(C): 37.3 (2022 05:25), Max: 37.3 (2022 05:25)  T(F): 99.1 (2022 05:25), Max: 99.1 (2022 05:25)  HR: 91 (2022 05:25) (86 - 92)  BP: 113/72 (2022 05:25) (101/61 - 129/73)  BP(mean): --  RR: 18 (2022 05:25) (18 - 18)  SpO2: 98% (2022 05:25) (96% - 100%)    Parameters below as of 2022 05:25  Patient On (Oxygen Delivery Method): room air    Admit weight: 80.3kg   Daily Weight in k.9 (2022 10:30)  Today's weight:     Intake / Output:    @ 07:01  -   @ 07:00  --------------------------------------------------------  IN: 2549.2 mL / OUT: 2950 mL / NET: -400.8 mL        PE:   Oropharynx: patchy erythema on hard palate, and small ulcerations on hard palate and bilateral buccal mucosa, small ulcers on each side of the tongue  Oral Mucositis:            +                                          rdGrdrrdarddrderd:rd rd3rd CVS: S1, S2 RRR   Lungs: CTA throughout bilaterally   Abdomen: + BS x 4 normoactive, soft, NT, ND  Extremities: no edema   Gastric Mucositis:       -                                           Grade: n/a   Intestinal Mucositis:     -                                         Grade: n/a   Skin: + macular erythema to chest, upper back and neck post Kepivance, improving  TLC: CDI   Neuro: A&O x 3   Pain: 1-2/10- mouth, improving    Labs:   CBC Full  -  ( 2022 06:57 )  WBC Count : 0.03 K/uL  Hemoglobin : 8.4 g/dL  Hematocrit : 25.5 %  Platelet Count - Automated : 20 K/uL  Mean Cell Volume : 88.5 fl  Mean Cell Hemoglobin : 29.2 pg  Mean Cell Hemoglobin Concentration : 32.9 gm/dL  Auto Neutrophil # : x  Auto Lymphocyte # : x  Auto Monocyte # : x  Auto Eosinophil # : x  Auto Basophil # : x  Auto Neutrophil % : x  Auto Lymphocyte % : x  Auto Monocyte % : x  Auto Eosinophil % : x  Auto Basophil % : x                          8.4    0.03  )-----------( 20       ( 2022 06:57 )             25.5     -    139  |  103  |  9   ----------------------------<  112<H>  3.7   |  26  |  0.38<L>    Ca    9.0      2022 07:20  Phos  3.2       Mg     2.2         TPro  6.3  /  Alb  3.7  /  TBili  0.3  /  DBili  x   /  AST  28  /  ALT  61<H>  /  AlkPhos  83        LIVER FUNCTIONS - ( 2022 07:20 )  Alb: 3.7 g/dL / Pro: 6.3 g/dL / ALK PHOS: 83 U/L / ALT: 61 U/L / AST: 28 U/L / GGT: x             Cultures:   Culture Results:   <10,000 CFU/mL Normal Urogenital Maryanne (22 @ 08:19)    Culture Results:   No growth to date. (22 @ 06:10)    Culture Results:   No growth to date. (22 @ 05:50)      Radiology:   ACC: [id number] EXAM:  XR CHEST PORTABLE URGENT 1V                        PROCEDURE DATE:  2022    Impression:  No focal consolidations.        Meds:   Antimicrobials:   acyclovir   Oral Tab/Cap 400 milliGRAM(s) Oral every 8 hours  cefepime   IVPB 2000 milliGRAM(s) IV Intermittent every 8 hours  clotrimazole Lozenge 1 Lozenge Oral five times a day  fluconAZOLE   Tablet 400 milliGRAM(s) Oral daily      Heme / Onc:   heparin  Infusion 334 Unit(s)/Hr IV Continuous <Continuous>      GI:  pantoprazole    Tablet 40 milliGRAM(s) Oral before breakfast  polyethylene glycol 3350 17 Gram(s) Oral daily PRN  senna 2 Tablet(s) Oral at bedtime PRN  sodium bicarbonate Mouth Rinse 10 milliLiter(s) Swish and Spit five times a day  ursodiol Capsule 300 milliGRAM(s) Oral two times a day      Cardiovascular:       Immunologic:   filgrastim-sndz (ZARXIO) Injectable 480 MICROGram(s) SubCutaneous every 24 hours      Other medications:   acetaminophen     Tablet .. 650 milliGRAM(s) Oral every 6 hours  Biotene Dry Mouth Oral Rinse 5 milliLiter(s) Swish and Spit five times a day  chlorhexidine 4% Liquid 1 Application(s) Topical <User Schedule>  FIRST- Mouthwash  BLM 10 milliLiter(s) Swish and Spit every 6 hours  FLUoxetine 20 milliGRAM(s) Oral daily  folic acid 1 milliGRAM(s) Oral daily  lidocaine/prilocaine Cream 1 Application(s) Topical daily  loratadine 10 milliGRAM(s) Oral daily  multivitamin 1 Tablet(s) Oral daily  nystatin Powder 1 Application(s) Topical two times a day  sodium chloride 0.45% 1000 milliLiter(s) IV Continuous <Continuous>  sodium chloride 0.9%. 1000 milliLiter(s) IV Continuous <Continuous>      PRN:   acetaminophen     Tablet .. 650 milliGRAM(s) Oral every 6 hours PRN  AQUAPHOR (petrolatum Ointment) 1 Application(s) Topical two times a day PRN  HYDROmorphone  Injectable 0.5 milliGRAM(s) IV Push every 6 hours PRN  metoclopramide Injectable 10 milliGRAM(s) IV Push every 6 hours PRN  oxyCODONE    IR 5 milliGRAM(s) Oral every 6 hours PRN  polyethylene glycol 3350 17 Gram(s) Oral daily PRN  senna 2 Tablet(s) Oral at bedtime PRN  sodium chloride 0.65% Nasal 1 Spray(s) Both Nostrils four times a day PRN  sodium chloride 0.9% lock flush 10 milliLiter(s) IV Push every 1 hour PRN  zinc oxide 40% Paste 1 Application(s) Topical three times a day PRN      A/P: 57 year old female with a history of  peripheral T cell lymphoma   Pre  :  Autologous PBSCT day +5  - Completed  -16 24 hour continuous infusion, strict I&O, daily weights, prn diuresis    - Cytoxan day 3/4.  - CTX - continue CTX hydration for 24 hours post infusion of last dose. Strict I&O, daily weights, prn diuresis. Start cipro 500mg po BID when ANC < 500    Neutropenic. Started Cipro for prophylaxis. If febrile, panculture and start Cefepime (has tolerated in the past)   - Kepivance day 2/3  11/27- Kepivance day 3/3- HELD due to Kepivance rash and oral erythema worsening  - Febrile overnight, tmax 100.4. CXR no obvious consolidations. Cultures pending. Started on cefepime. Grade 2 chemotherapy induced oral mucositis. Continue supportive measures.     1. Infectious Disease:   acyclovir   Oral Tab/Cap 400 milliGRAM(s) Oral every 8 hours  cefepime   IVPB 2000 milliGRAM(s) IV Intermittent every 8 hours  clotrimazole Lozenge 1 Lozenge Oral five times a day  fluconAZOLE   Tablet 400 milliGRAM(s) Oral daily    2. VOD Prophylaxis: Actigall, Glutamine, Heparin (dosed at 100 units / kg / day)     3. GI Prophylaxis:    pantoprazole    Tablet 40 milliGRAM(s) Oral before breakfast    4. Mouthcare - NS / NaHCO3 rinses, Mycelex, Biotene; Skin care     5. GVHD prophylaxis - n/a     6. Transfuse & replete electrolytes prn     7. IV hydration, daily weights, strict I&O, prn diuresis     8. PO intake as tolerated, nutrition follow up as needed, MVI, folic acid     9. Antiemetics, anti-diarrhea medications:   metoclopramide Injectable 10 milliGRAM(s) IV Push every 6 hours PRN    10. OOB as tolerated, physical therapy consult if needed     11. Monitor coags / fibrinogen 2x week, vitamin K as needed     12. Monitor closely for clinical changes, monitor for fevers     13. Emotional support provided, plan of care discussed and questions addressed     14. Patient education done regarding plan of care, restrictions and discharge planning     15. Continue regular social work input     I have written the above note for Dr. Cabrales who performed service with me in the room.   Leena MONTE ([phone])    I have seen and examined patient with NP, I agree with above note as scribed.                    NGT for ileus/other

## 2023-10-01 PROBLEM — K86.89 PANCREATIC MASS: Status: ACTIVE | Noted: 2017-05-19

## 2023-10-10 NOTE — ASU PATIENT PROFILE, ADULT - BLOOD TRANSFUSION, PREVIOUS, PROFILE
Pt will perform toilet transfers with rolling walker/commode independently in 1-2 weeks no Pt will perform toilet transfers with rolling walker/commode independently in 2 weeks

## 2023-10-15 NOTE — DISCHARGE NOTE ADULT - ABILITY TO HEAR (WITH HEARING AID OR HEARING APPLIANCE IF NORMALLY USED):
Adequate: hears normal conversation without difficulty Pt reports good appetite, that he does not follow a diet at home, unsure of UBW, Closing eyes frequently. Per RN pt confused and forgetful. Pt with noted hx of non compliance, A1c 8.6. Will defer education until pt more appropriate.

## 2023-11-11 NOTE — ED ADULT NURSE NOTE - PMH
Ascites  may 2017  Hernia    Hypertension    Pancreatitis    TIA (transient ischemic attack) NEGATIVE

## 2024-03-11 NOTE — PROGRESS NOTE ADULT - PROBLEM SELECTOR PLAN 6
[facility] Visit Note    0800 Pt arrived to [facility] ambulatory and in no distress for Phlebotomy.  Assessment completed, no new complaints voiced.      Patient denied having any symptoms of COVID-19, i.e. SOB, coughing, fever, or generally not feeling well.      BP (!) 154/98   Pulse 84   Temp 97.7 °F (36.5 °C) (Temporal)   Resp 18   Ht 1.753 m (5' 9\")   Wt 103.7 kg (228 lb 9.6 oz)   SpO2 98%   BMI 33.76 kg/m²     Medication HELD per hold parameters. Hgb 16.5.     0820 Tolerated treatment well, no adverse reaction noted.  D/Cd from [facility] ambulatory and in no distress accompanied by self.  Next appt 3/25/24   Will monitor   pt with poor appetite, nutrition eval  PRN pain meds likely multifactorial from h/o of etoh, medications etc  will hold lovenox for now and monitor

## 2024-03-12 NOTE — DISCHARGE NOTE ADULT - MEDICATION SUMMARY - MEDICATIONS TO CHANGE
tanna
I will SWITCH the dose or number of times a day I take the medications listed below when I get home from the hospital:  None

## 2025-04-25 NOTE — PROGRESS NOTE ADULT - PROBLEM SELECTOR PLAN 2
Known .  s/p NG tube decompression  - improving, had BM 2 nights ago and tolerating regular diet  - minimize use of opioids for pain control  - continue to monitor for BMs and flatus, check daily abdominal flatplates  - Surgery following, recs appreciated, will f/u any new recs s/p NG tube decompression  - BM 8/21  - minimize use of opioids for pain control  - continue to monitor for BMs and flatus  - Surgery following, recs appreciated, will f/u any new recs - resolved, s/p NG tube decompression  -  8/21  - tolerating diet  - minimize use of opioids for pain control  - continue to monitor for BMs and flatus  - Surgery following, recs appreciated, will f/u any new recs normal

## 2025-07-30 NOTE — PROGRESS NOTE ADULT - ASSESSMENT
57 M from home with PMHx HTN and chronic alcohol induced pancreatitis admitted with b/l pedal cellulitis/abcess on the dorsal PIPJ of the fourth digit, for which poditatry was consulted and drained the wound. ID consulted and s/p abx therapy. Pt noted to have panniculitis likely secondary to pancreatitis, dermatology consulted on steroid taper. Course complicated by suspected SBP for which pt placed on meropenem and portal vein thrombosis and started on full dose lovenox. Pt also with recurrent ascites placed on lasix and aldactone and had paracentesis IR guided which revealed elevated cells but gram stain negative for bacteria, given worsening leukocytosis and high risk for bacterial translocation pt empirically being covered with meropenem. Course complicated by pt developing SBO, NGT placed for decompression and sx consulted. Repeat x ray shows worsening SBO. No